# Patient Record
Sex: FEMALE | Race: BLACK OR AFRICAN AMERICAN | NOT HISPANIC OR LATINO | Employment: OTHER | URBAN - METROPOLITAN AREA
[De-identification: names, ages, dates, MRNs, and addresses within clinical notes are randomized per-mention and may not be internally consistent; named-entity substitution may affect disease eponyms.]

---

## 2017-01-01 ENCOUNTER — APPOINTMENT (EMERGENCY)
Dept: RADIOLOGY | Facility: HOSPITAL | Age: 77
End: 2017-01-01
Payer: MEDICARE

## 2017-01-01 ENCOUNTER — HOSPITAL ENCOUNTER (OUTPATIENT)
Facility: HOSPITAL | Age: 77
Discharge: HOME HEALTH | End: 2017-09-25
Attending: PHYSICAL MEDICINE & REHABILITATION | Admitting: PHYSICAL MEDICINE & REHABILITATION

## 2017-01-01 ENCOUNTER — APPOINTMENT (OUTPATIENT)
Dept: DIALYSIS | Facility: HOSPITAL | Age: 77
End: 2017-01-01

## 2017-01-01 ENCOUNTER — APPOINTMENT (EMERGENCY)
Dept: CT IMAGING | Facility: HOSPITAL | Age: 77
End: 2017-01-01
Payer: COMMERCIAL

## 2017-01-01 ENCOUNTER — APPOINTMENT (EMERGENCY)
Dept: RADIOLOGY | Facility: HOSPITAL | Age: 77
End: 2017-01-01
Payer: COMMERCIAL

## 2017-01-01 ENCOUNTER — APPOINTMENT (EMERGENCY)
Dept: CT IMAGING | Facility: HOSPITAL | Age: 77
End: 2017-01-01
Payer: MEDICARE

## 2017-01-01 ENCOUNTER — HOSPITAL ENCOUNTER (EMERGENCY)
Facility: HOSPITAL | Age: 77
End: 2017-09-19
Attending: EMERGENCY MEDICINE | Admitting: EMERGENCY MEDICINE
Payer: COMMERCIAL

## 2017-01-01 ENCOUNTER — HOSPITAL ENCOUNTER (EMERGENCY)
Facility: HOSPITAL | Age: 77
Discharge: HOME | End: 2017-11-09
Payer: MEDICARE

## 2017-01-01 VITALS
RESPIRATION RATE: 16 BRPM | DIASTOLIC BLOOD PRESSURE: 70 MMHG | HEIGHT: 59 IN | WEIGHT: 125.44 LBS | TEMPERATURE: 98 F | SYSTOLIC BLOOD PRESSURE: 168 MMHG | HEART RATE: 90 BPM | BODY MASS INDEX: 25.29 KG/M2 | OXYGEN SATURATION: 95 %

## 2017-01-01 VITALS
RESPIRATION RATE: 17 BRPM | SYSTOLIC BLOOD PRESSURE: 117 MMHG | DIASTOLIC BLOOD PRESSURE: 56 MMHG | HEART RATE: 101 BPM | OXYGEN SATURATION: 96 % | TEMPERATURE: 98.3 F | WEIGHT: 136.69 LBS

## 2017-01-01 DIAGNOSIS — I95.9 HYPOTENSION: Primary | ICD-10-CM

## 2017-01-01 DIAGNOSIS — Z99.2 ESRD ON DIALYSIS (HCC): ICD-10-CM

## 2017-01-01 DIAGNOSIS — R41.89 EPISODE OF UNRESPONSIVENESS: ICD-10-CM

## 2017-01-01 DIAGNOSIS — N18.6 ESRD ON DIALYSIS (HCC): ICD-10-CM

## 2017-01-01 DIAGNOSIS — N39.0 URINARY TRACT INFECTION IN FEMALE: Primary | ICD-10-CM

## 2017-01-01 DIAGNOSIS — Z01.818 ENCOUNTER FOR OTHER PREPROCEDURAL EXAMINATION: ICD-10-CM

## 2017-01-01 LAB
ABO GROUP BLD: NORMAL
ALBUMIN SERPL BCP-MCNC: 2.4 G/DL (ref 3.5–5)
ALBUMIN SERPL BCP-MCNC: 2.8 G/DL (ref 3.5–5)
ALBUMIN SERPL BCP-MCNC: 3.3 G/DL (ref 3.5–5)
ALBUMIN SERPL BCP-MCNC: 3.3 G/DL (ref 3.5–5)
ALP SERPL-CCNC: 108 U/L (ref 46–116)
ALP SERPL-CCNC: 209 U/L (ref 46–116)
ALP SERPL-CCNC: 339 U/L (ref 46–116)
ALT SERPL W P-5'-P-CCNC: 115 U/L (ref 12–78)
ALT SERPL W P-5'-P-CCNC: 33 U/L (ref 12–78)
ALT SERPL W P-5'-P-CCNC: 93 U/L (ref 12–78)
ANION GAP SERPL CALCULATED.3IONS-SCNC: 10 MMOL/L (ref 4–13)
ANION GAP SERPL CALCULATED.3IONS-SCNC: 10 MMOL/L (ref 4–13)
ANION GAP SERPL CALCULATED.3IONS-SCNC: 11 MMOL/L (ref 4–13)
ANION GAP SERPL CALCULATED.3IONS-SCNC: 12 MMOL/L (ref 4–13)
ANION GAP SERPL CALCULATED.3IONS-SCNC: 13 MMOL/L (ref 4–13)
ANION GAP SERPL CALCULATED.3IONS-SCNC: 14 MMOL/L (ref 4–13)
ANION GAP SERPL CALCULATED.3IONS-SCNC: 8 MMOL/L (ref 4–13)
APTT PPP: 26 SECONDS (ref 23–35)
APTT PPP: 30 SECONDS (ref 23–35)
AST SERPL W P-5'-P-CCNC: 23 U/L (ref 5–45)
AST SERPL W P-5'-P-CCNC: 59 U/L (ref 5–45)
AST SERPL W P-5'-P-CCNC: 63 U/L (ref 5–45)
ATRIAL RATE: 92 BPM
ATRIAL RATE: 98 BPM
BACTERIA UR CULT: NORMAL
BACTERIA UR QL AUTO: ABNORMAL /HPF
BASOPHILS # BLD AUTO: 0.02 THOUSANDS/ΜL (ref 0–0.1)
BASOPHILS # BLD AUTO: 0.03 THOUSANDS/ΜL (ref 0–0.1)
BASOPHILS NFR BLD AUTO: 0 % (ref 0–1)
BILIRUB SERPL-MCNC: 0.3 MG/DL (ref 0.2–1)
BILIRUB SERPL-MCNC: 0.4 MG/DL (ref 0.2–1)
BILIRUB SERPL-MCNC: 0.5 MG/DL (ref 0.2–1)
BILIRUB UR QL STRIP: NEGATIVE
BLD GP AB SCN SERPL QL: NEGATIVE
BUN SERPL-MCNC: 10 MG/DL (ref 5–25)
BUN SERPL-MCNC: 14 MG/DL (ref 5–25)
BUN SERPL-MCNC: 16 MG/DL (ref 5–25)
BUN SERPL-MCNC: 18 MG/DL (ref 5–25)
BUN SERPL-MCNC: 36 MG/DL (ref 5–25)
BUN SERPL-MCNC: 36 MG/DL (ref 5–25)
BUN SERPL-MCNC: 38 MG/DL (ref 5–25)
CALCIUM SERPL-MCNC: 8.9 MG/DL (ref 8.3–10.1)
CALCIUM SERPL-MCNC: 9.1 MG/DL (ref 8.3–10.1)
CALCIUM SERPL-MCNC: 9.3 MG/DL (ref 8.3–10.1)
CALCIUM SERPL-MCNC: 9.4 MG/DL (ref 8.3–10.1)
CALCIUM SERPL-MCNC: 9.9 MG/DL (ref 8.3–10.1)
CHLORIDE SERPL-SCNC: 100 MMOL/L (ref 100–108)
CHLORIDE SERPL-SCNC: 100 MMOL/L (ref 100–108)
CHLORIDE SERPL-SCNC: 96 MMOL/L (ref 100–108)
CHLORIDE SERPL-SCNC: 98 MMOL/L (ref 100–108)
CHLORIDE SERPL-SCNC: 98 MMOL/L (ref 100–108)
CLARITY UR: ABNORMAL
CO2 SERPL-SCNC: 26 MMOL/L (ref 21–32)
CO2 SERPL-SCNC: 27 MMOL/L (ref 21–32)
CO2 SERPL-SCNC: 28 MMOL/L (ref 21–32)
CO2 SERPL-SCNC: 28 MMOL/L (ref 21–32)
CO2 SERPL-SCNC: 29 MMOL/L (ref 21–32)
CO2 SERPL-SCNC: 29 MMOL/L (ref 21–32)
CO2 SERPL-SCNC: 31 MMOL/L (ref 21–32)
COLOR UR: YELLOW
CREAT SERPL-MCNC: 3.12 MG/DL (ref 0.6–1.3)
CREAT SERPL-MCNC: 3.4 MG/DL (ref 0.6–1.3)
CREAT SERPL-MCNC: 3.66 MG/DL (ref 0.6–1.3)
CREAT SERPL-MCNC: 3.76 MG/DL (ref 0.6–1.3)
CREAT SERPL-MCNC: 5.89 MG/DL (ref 0.6–1.3)
CREAT SERPL-MCNC: 6.56 MG/DL (ref 0.6–1.3)
CREAT SERPL-MCNC: 6.84 MG/DL (ref 0.6–1.3)
EOSINOPHIL # BLD AUTO: 0.08 THOUSAND/ΜL (ref 0–0.61)
EOSINOPHIL # BLD AUTO: 0.1 THOUSAND/ΜL (ref 0–0.61)
EOSINOPHIL # BLD AUTO: 0.1 THOUSAND/ΜL (ref 0–0.61)
EOSINOPHIL # BLD AUTO: 0.11 THOUSAND/ΜL (ref 0–0.61)
EOSINOPHIL # BLD AUTO: 0.13 THOUSAND/ΜL (ref 0–0.61)
EOSINOPHIL # BLD AUTO: 0.14 THOUSAND/ΜL (ref 0–0.61)
EOSINOPHIL # BLD AUTO: 0.2 THOUSAND/ΜL (ref 0–0.61)
EOSINOPHIL NFR BLD AUTO: 1 % (ref 0–6)
EOSINOPHIL NFR BLD AUTO: 2 % (ref 0–6)
EOSINOPHIL NFR BLD AUTO: 3 % (ref 0–6)
ERYTHROCYTE [DISTWIDTH] IN BLOOD BY AUTOMATED COUNT: 14.8 % (ref 11.6–15.1)
ERYTHROCYTE [DISTWIDTH] IN BLOOD BY AUTOMATED COUNT: 14.9 % (ref 11.6–15.1)
ERYTHROCYTE [DISTWIDTH] IN BLOOD BY AUTOMATED COUNT: 15.3 % (ref 11.6–15.1)
ERYTHROCYTE [DISTWIDTH] IN BLOOD BY AUTOMATED COUNT: 16.3 % (ref 11.6–15.1)
ERYTHROCYTE [DISTWIDTH] IN BLOOD BY AUTOMATED COUNT: 16.6 % (ref 11.6–15.1)
ERYTHROCYTE [DISTWIDTH] IN BLOOD BY AUTOMATED COUNT: 16.6 % (ref 11.6–15.1)
ERYTHROCYTE [DISTWIDTH] IN BLOOD BY AUTOMATED COUNT: 18 % (ref 11.6–15.1)
EST. AVERAGE GLUCOSE BLD GHB EST-MCNC: 111 MG/DL
FERRITIN SERPL-MCNC: 726 NG/ML (ref 8–388)
FERRITIN SERPL-MCNC: 779 NG/ML (ref 8–388)
GFR SERPL CREATININE-BSD FRML MDRD: 13 ML/MIN/1.73SQ M
GFR SERPL CREATININE-BSD FRML MDRD: 13 ML/MIN/1.73SQ M
GFR SERPL CREATININE-BSD FRML MDRD: 14 ML/MIN/1.73SQ M
GFR SERPL CREATININE-BSD FRML MDRD: 16 ML/MIN/1.73SQ M
GFR SERPL CREATININE-BSD FRML MDRD: 6 ML/MIN/1.73SQ M
GFR SERPL CREATININE-BSD FRML MDRD: 7 ML/MIN/1.73SQ M
GFR SERPL CREATININE-BSD FRML MDRD: 7 ML/MIN/1.73SQ M
GLUCOSE P FAST SERPL-MCNC: 187 MG/DL (ref 65–99)
GLUCOSE P FAST SERPL-MCNC: 99 MG/DL (ref 65–99)
GLUCOSE SERPL-MCNC: 106 MG/DL (ref 65–140)
GLUCOSE SERPL-MCNC: 120 MG/DL (ref 65–140)
GLUCOSE SERPL-MCNC: 135 MG/DL (ref 65–140)
GLUCOSE SERPL-MCNC: 187 MG/DL (ref 65–140)
GLUCOSE SERPL-MCNC: 205 MG/DL (ref 65–140)
GLUCOSE SERPL-MCNC: 206 MG/DL (ref 65–140)
GLUCOSE SERPL-MCNC: 282 MG/DL (ref 65–140)
GLUCOSE SERPL-MCNC: 99 MG/DL (ref 65–140)
GLUCOSE UR STRIP-MCNC: ABNORMAL MG/DL
HAV IGM SER QL: NORMAL
HBA1C MFR BLD: 5.5 % (ref 4.2–6.3)
HBV CORE IGM SER QL: NORMAL
HBV SURFACE AG SER QL: NORMAL
HCT VFR BLD AUTO: 28.5 % (ref 34.8–46.1)
HCT VFR BLD AUTO: 28.8 % (ref 34.8–46.1)
HCT VFR BLD AUTO: 30.5 % (ref 34.8–46.1)
HCT VFR BLD AUTO: 30.6 % (ref 34.8–46.1)
HCT VFR BLD AUTO: 31.4 % (ref 34.8–46.1)
HCT VFR BLD AUTO: 33.6 % (ref 34.8–46.1)
HCT VFR BLD AUTO: 34.5 % (ref 34.8–46.1)
HCV AB SER QL: NORMAL
HGB BLD-MCNC: 10 G/DL (ref 11.5–15.4)
HGB BLD-MCNC: 10.2 G/DL (ref 11.5–15.4)
HGB BLD-MCNC: 10.8 G/DL (ref 11.5–15.4)
HGB BLD-MCNC: 10.8 G/DL (ref 11.5–15.4)
HGB BLD-MCNC: 9.1 G/DL (ref 11.5–15.4)
HGB BLD-MCNC: 9.6 G/DL (ref 11.5–15.4)
HGB BLD-MCNC: 9.8 G/DL (ref 11.5–15.4)
HGB UR QL STRIP.AUTO: ABNORMAL
HYALINE CASTS #/AREA URNS LPF: ABNORMAL /LPF
INR PPP: 0.86 (ref 0.86–1.16)
IRON SATN MFR SERPL: 28 %
IRON SERPL-MCNC: 62 UG/DL (ref 50–170)
KETONES UR STRIP-MCNC: NEGATIVE MG/DL
LACTATE SERPL-SCNC: 1.1 MMOL/L (ref 0.5–2)
LEUKOCYTE ESTERASE UR QL STRIP: ABNORMAL
LYMPHOCYTES # BLD AUTO: 1.29 THOUSANDS/ΜL (ref 0.6–4.47)
LYMPHOCYTES # BLD AUTO: 1.69 THOUSANDS/ΜL (ref 0.6–4.47)
LYMPHOCYTES # BLD AUTO: 1.95 THOUSANDS/ΜL (ref 0.6–4.47)
LYMPHOCYTES # BLD AUTO: 1.97 THOUSANDS/ΜL (ref 0.6–4.47)
LYMPHOCYTES # BLD AUTO: 2.18 THOUSANDS/ΜL (ref 0.6–4.47)
LYMPHOCYTES # BLD AUTO: 2.42 THOUSANDS/ΜL (ref 0.6–4.47)
LYMPHOCYTES # BLD AUTO: 3.45 THOUSANDS/ΜL (ref 0.6–4.47)
LYMPHOCYTES NFR BLD AUTO: 18 % (ref 14–44)
LYMPHOCYTES NFR BLD AUTO: 25 % (ref 14–44)
LYMPHOCYTES NFR BLD AUTO: 29 % (ref 14–44)
LYMPHOCYTES NFR BLD AUTO: 30 % (ref 14–44)
LYMPHOCYTES NFR BLD AUTO: 31 % (ref 14–44)
LYMPHOCYTES NFR BLD AUTO: 35 % (ref 14–44)
LYMPHOCYTES NFR BLD AUTO: 44 % (ref 14–44)
MAGNESIUM SERPL-MCNC: 2.1 MG/DL (ref 1.6–2.6)
MCH RBC QN AUTO: 30.3 PG (ref 26.8–34.3)
MCH RBC QN AUTO: 30.8 PG (ref 26.8–34.3)
MCH RBC QN AUTO: 30.9 PG (ref 26.8–34.3)
MCH RBC QN AUTO: 31.2 PG (ref 26.8–34.3)
MCH RBC QN AUTO: 31.3 PG (ref 26.8–34.3)
MCH RBC QN AUTO: 31.9 PG (ref 26.8–34.3)
MCH RBC QN AUTO: 32.5 PG (ref 26.8–34.3)
MCHC RBC AUTO-ENTMCNC: 31.3 G/DL (ref 31.4–37.4)
MCHC RBC AUTO-ENTMCNC: 31.9 G/DL (ref 31.4–37.4)
MCHC RBC AUTO-ENTMCNC: 32.1 G/DL (ref 31.4–37.4)
MCHC RBC AUTO-ENTMCNC: 32.1 G/DL (ref 31.4–37.4)
MCHC RBC AUTO-ENTMCNC: 32.5 G/DL (ref 31.4–37.4)
MCHC RBC AUTO-ENTMCNC: 32.7 G/DL (ref 31.4–37.4)
MCHC RBC AUTO-ENTMCNC: 33.3 G/DL (ref 31.4–37.4)
MCV RBC AUTO: 96 FL (ref 82–98)
MCV RBC AUTO: 96 FL (ref 82–98)
MCV RBC AUTO: 97 FL (ref 82–98)
MCV RBC AUTO: 98 FL (ref 82–98)
MCV RBC AUTO: 98 FL (ref 82–98)
MONOCYTES # BLD AUTO: 0.48 THOUSAND/ΜL (ref 0.17–1.22)
MONOCYTES # BLD AUTO: 0.51 THOUSAND/ΜL (ref 0.17–1.22)
MONOCYTES # BLD AUTO: 0.53 THOUSAND/ΜL (ref 0.17–1.22)
MONOCYTES # BLD AUTO: 0.58 THOUSAND/ΜL (ref 0.17–1.22)
MONOCYTES # BLD AUTO: 0.58 THOUSAND/ΜL (ref 0.17–1.22)
MONOCYTES # BLD AUTO: 0.6 THOUSAND/ΜL (ref 0.17–1.22)
MONOCYTES # BLD AUTO: 0.6 THOUSAND/ΜL (ref 0.17–1.22)
MONOCYTES NFR BLD AUTO: 6 % (ref 4–12)
MONOCYTES NFR BLD AUTO: 8 % (ref 4–12)
MONOCYTES NFR BLD AUTO: 9 % (ref 4–12)
MONOCYTES NFR BLD AUTO: 9 % (ref 4–12)
NEUTROPHILS # BLD AUTO: 3.66 THOUSANDS/ΜL (ref 1.85–7.62)
NEUTROPHILS # BLD AUTO: 3.7 THOUSANDS/ΜL (ref 1.85–7.62)
NEUTROPHILS # BLD AUTO: 3.96 THOUSANDS/ΜL (ref 1.85–7.62)
NEUTROPHILS # BLD AUTO: 3.99 THOUSANDS/ΜL (ref 1.85–7.62)
NEUTROPHILS # BLD AUTO: 4.21 THOUSANDS/ΜL (ref 1.85–7.62)
NEUTROPHILS # BLD AUTO: 4.31 THOUSANDS/ΜL (ref 1.85–7.62)
NEUTROPHILS # BLD AUTO: 5.22 THOUSANDS/ΜL (ref 1.85–7.62)
NEUTS SEG NFR BLD AUTO: 47 % (ref 43–75)
NEUTS SEG NFR BLD AUTO: 53 % (ref 43–75)
NEUTS SEG NFR BLD AUTO: 56 % (ref 43–75)
NEUTS SEG NFR BLD AUTO: 60 % (ref 43–75)
NEUTS SEG NFR BLD AUTO: 61 % (ref 43–75)
NEUTS SEG NFR BLD AUTO: 63 % (ref 43–75)
NEUTS SEG NFR BLD AUTO: 71 % (ref 43–75)
NITRITE UR QL STRIP: NEGATIVE
NON-SQ EPI CELLS URNS QL MICRO: ABNORMAL /HPF
NRBC BLD AUTO-RTO: 0 /100 WBCS
NRBC BLD AUTO-RTO: 1 /100 WBCS
NRBC BLD AUTO-RTO: 1 /100 WBCS
P AXIS: 52 DEGREES
P AXIS: 75 DEGREES
PH UR STRIP.AUTO: 8.5 [PH] (ref 4.5–8)
PHOSPHATE SERPL-MCNC: 2.1 MG/DL (ref 2.3–4.1)
PHOSPHATE SERPL-MCNC: 3.2 MG/DL (ref 2.3–4.1)
PLATELET # BLD AUTO: 174 THOUSANDS/UL (ref 149–390)
PLATELET # BLD AUTO: 180 THOUSANDS/UL (ref 149–390)
PLATELET # BLD AUTO: 180 THOUSANDS/UL (ref 149–390)
PLATELET # BLD AUTO: 187 THOUSANDS/UL (ref 149–390)
PLATELET # BLD AUTO: 191 THOUSANDS/UL (ref 149–390)
PLATELET # BLD AUTO: 209 THOUSANDS/UL (ref 149–390)
PLATELET # BLD AUTO: 215 THOUSANDS/UL (ref 149–390)
PMV BLD AUTO: 11.6 FL (ref 8.9–12.7)
PMV BLD AUTO: 11.7 FL (ref 8.9–12.7)
PMV BLD AUTO: 11.8 FL (ref 8.9–12.7)
PMV BLD AUTO: 11.9 FL (ref 8.9–12.7)
PMV BLD AUTO: 12.1 FL (ref 8.9–12.7)
PMV BLD AUTO: 12.2 FL (ref 8.9–12.7)
PMV BLD AUTO: 12.3 FL (ref 8.9–12.7)
POTASSIUM SERPL-SCNC: 3.2 MMOL/L (ref 3.5–5.3)
POTASSIUM SERPL-SCNC: 3.6 MMOL/L (ref 3.5–5.3)
POTASSIUM SERPL-SCNC: 3.6 MMOL/L (ref 3.5–5.3)
POTASSIUM SERPL-SCNC: 3.8 MMOL/L (ref 3.5–5.3)
POTASSIUM SERPL-SCNC: 4.1 MMOL/L (ref 3.5–5.3)
POTASSIUM SERPL-SCNC: 4.5 MMOL/L (ref 3.5–5.3)
POTASSIUM SERPL-SCNC: 4.6 MMOL/L (ref 3.5–5.3)
PR INTERVAL: 130 MS
PR INTERVAL: 138 MS
PREALB SERPL-MCNC: 26 MG/DL (ref 18–40)
PROT SERPL-MCNC: 6.9 G/DL (ref 6.4–8.2)
PROT SERPL-MCNC: 7.2 G/DL (ref 6.4–8.2)
PROT SERPL-MCNC: 7.3 G/DL (ref 6.4–8.2)
PROT UR STRIP-MCNC: >=300 MG/DL
PROTHROMBIN TIME: 11.9 SECONDS (ref 12.1–14.4)
QRS AXIS: 2 DEGREES
QRS AXIS: 25 DEGREES
QRSD INTERVAL: 72 MS
QRSD INTERVAL: 76 MS
QT INTERVAL: 378 MS
QT INTERVAL: 384 MS
QTC INTERVAL: 474 MS
QTC INTERVAL: 482 MS
RBC # BLD AUTO: 2.95 MILLION/UL (ref 3.81–5.12)
RBC # BLD AUTO: 2.95 MILLION/UL (ref 3.81–5.12)
RBC # BLD AUTO: 3.13 MILLION/UL (ref 3.81–5.12)
RBC # BLD AUTO: 3.17 MILLION/UL (ref 3.81–5.12)
RBC # BLD AUTO: 3.26 MILLION/UL (ref 3.81–5.12)
RBC # BLD AUTO: 3.46 MILLION/UL (ref 3.81–5.12)
RBC # BLD AUTO: 3.56 MILLION/UL (ref 3.81–5.12)
RBC #/AREA URNS AUTO: ABNORMAL /HPF
RH BLD: POSITIVE
SODIUM SERPL-SCNC: 134 MMOL/L (ref 136–145)
SODIUM SERPL-SCNC: 135 MMOL/L (ref 136–145)
SODIUM SERPL-SCNC: 136 MMOL/L (ref 136–145)
SODIUM SERPL-SCNC: 137 MMOL/L (ref 136–145)
SODIUM SERPL-SCNC: 137 MMOL/L (ref 136–145)
SODIUM SERPL-SCNC: 140 MMOL/L (ref 136–145)
SODIUM SERPL-SCNC: 141 MMOL/L (ref 136–145)
SP GR UR STRIP.AUTO: 1.01 (ref 1–1.03)
SPECIMEN EXPIRATION DATE: NORMAL
SPECIMEN SOURCE: NORMAL
T WAVE AXIS: 64 DEGREES
T WAVE AXIS: 78 DEGREES
TIBC SERPL-MCNC: 222 UG/DL (ref 250–450)
TROPONIN I BLD-MCNC: 0 NG/ML (ref 0–0.08)
TROPONIN I SERPL-MCNC: 0.04 NG/ML
UROBILINOGEN UR QL STRIP.AUTO: 0.2 E.U./DL
VENTRICULAR RATE: 92 BPM
VENTRICULAR RATE: 98 BPM
WBC # BLD AUTO: 6.62 THOUSAND/UL (ref 4.31–10.16)
WBC # BLD AUTO: 6.85 THOUSAND/UL (ref 4.31–10.16)
WBC # BLD AUTO: 6.91 THOUSAND/UL (ref 4.31–10.16)
WBC # BLD AUTO: 6.94 THOUSAND/UL (ref 4.31–10.16)
WBC # BLD AUTO: 7.04 THOUSAND/UL (ref 4.31–10.16)
WBC # BLD AUTO: 7.33 THOUSAND/UL (ref 4.31–10.16)
WBC # BLD AUTO: 7.79 THOUSAND/UL (ref 4.31–10.16)
WBC #/AREA URNS AUTO: ABNORMAL /HPF

## 2017-01-01 PROCEDURE — 85025 COMPLETE CBC W/AUTO DIFF WBC: CPT | Performed by: INTERNAL MEDICINE

## 2017-01-01 PROCEDURE — 84134 ASSAY OF PREALBUMIN: CPT | Performed by: PHYSICAL MEDICINE & REHABILITATION

## 2017-01-01 PROCEDURE — G0257 UNSCHED DIALYSIS ESRD PT HOS: HCPCS

## 2017-01-01 PROCEDURE — 80048 BASIC METABOLIC PNL TOTAL CA: CPT | Performed by: INTERNAL MEDICINE

## 2017-01-01 PROCEDURE — 83605 ASSAY OF LACTIC ACID: CPT | Performed by: EMERGENCY MEDICINE

## 2017-01-01 PROCEDURE — 99285 EMERGENCY DEPT VISIT HI MDM: CPT

## 2017-01-01 PROCEDURE — 85610 PROTHROMBIN TIME: CPT | Performed by: NURSE PRACTITIONER

## 2017-01-01 PROCEDURE — 87086 URINE CULTURE/COLONY COUNT: CPT | Performed by: NURSE PRACTITIONER

## 2017-01-01 PROCEDURE — 80048 BASIC METABOLIC PNL TOTAL CA: CPT | Performed by: PHYSICAL MEDICINE & REHABILITATION

## 2017-01-01 PROCEDURE — 83036 HEMOGLOBIN GLYCOSYLATED A1C: CPT | Performed by: PHYSICAL MEDICINE & REHABILITATION

## 2017-01-01 PROCEDURE — 80053 COMPREHEN METABOLIC PANEL: CPT | Performed by: EMERGENCY MEDICINE

## 2017-01-01 PROCEDURE — 82728 ASSAY OF FERRITIN: CPT | Performed by: PHYSICAL MEDICINE & REHABILITATION

## 2017-01-01 PROCEDURE — 81001 URINALYSIS AUTO W/SCOPE: CPT | Performed by: NURSE PRACTITIONER

## 2017-01-01 PROCEDURE — 80053 COMPREHEN METABOLIC PANEL: CPT | Performed by: PHYSICAL MEDICINE & REHABILITATION

## 2017-01-01 PROCEDURE — 93005 ELECTROCARDIOGRAM TRACING: CPT

## 2017-01-01 PROCEDURE — 86901 BLOOD TYPING SEROLOGIC RH(D): CPT | Performed by: NURSE PRACTITIONER

## 2017-01-01 PROCEDURE — 83735 ASSAY OF MAGNESIUM: CPT | Performed by: EMERGENCY MEDICINE

## 2017-01-01 PROCEDURE — 85025 COMPLETE CBC W/AUTO DIFF WBC: CPT | Performed by: PHYSICAL MEDICINE & REHABILITATION

## 2017-01-01 PROCEDURE — 85025 COMPLETE CBC W/AUTO DIFF WBC: CPT | Performed by: NURSE PRACTITIONER

## 2017-01-01 PROCEDURE — 99284 EMERGENCY DEPT VISIT MOD MDM: CPT

## 2017-01-01 PROCEDURE — 71020 HB CHEST X-RAY 2VW FRONTAL&LATL: CPT

## 2017-01-01 PROCEDURE — 84100 ASSAY OF PHOSPHORUS: CPT | Performed by: PHYSICAL MEDICINE & REHABILITATION

## 2017-01-01 PROCEDURE — 80074 ACUTE HEPATITIS PANEL: CPT | Performed by: PHYSICAL MEDICINE & REHABILITATION

## 2017-01-01 PROCEDURE — 36415 COLL VENOUS BLD VENIPUNCTURE: CPT | Performed by: EMERGENCY MEDICINE

## 2017-01-01 PROCEDURE — 82948 REAGENT STRIP/BLOOD GLUCOSE: CPT

## 2017-01-01 PROCEDURE — 96361 HYDRATE IV INFUSION ADD-ON: CPT

## 2017-01-01 PROCEDURE — 86850 RBC ANTIBODY SCREEN: CPT | Performed by: NURSE PRACTITIONER

## 2017-01-01 PROCEDURE — 83550 IRON BINDING TEST: CPT | Performed by: PHYSICAL MEDICINE & REHABILITATION

## 2017-01-01 PROCEDURE — 86900 BLOOD TYPING SEROLOGIC ABO: CPT | Performed by: NURSE PRACTITIONER

## 2017-01-01 PROCEDURE — 85730 THROMBOPLASTIN TIME PARTIAL: CPT | Performed by: NURSE PRACTITIONER

## 2017-01-01 PROCEDURE — 93005 ELECTROCARDIOGRAM TRACING: CPT | Performed by: EMERGENCY MEDICINE

## 2017-01-01 PROCEDURE — 80053 COMPREHEN METABOLIC PANEL: CPT | Performed by: NURSE PRACTITIONER

## 2017-01-01 PROCEDURE — 84484 ASSAY OF TROPONIN QUANT: CPT

## 2017-01-01 PROCEDURE — 96360 HYDRATION IV INFUSION INIT: CPT

## 2017-01-01 PROCEDURE — 82040 ASSAY OF SERUM ALBUMIN: CPT | Performed by: PHYSICAL MEDICINE & REHABILITATION

## 2017-01-01 PROCEDURE — 70450 CT HEAD/BRAIN W/O DYE: CPT

## 2017-01-01 PROCEDURE — 84100 ASSAY OF PHOSPHORUS: CPT | Performed by: EMERGENCY MEDICINE

## 2017-01-01 PROCEDURE — 83540 ASSAY OF IRON: CPT | Performed by: PHYSICAL MEDICINE & REHABILITATION

## 2017-01-01 PROCEDURE — 36415 COLL VENOUS BLD VENIPUNCTURE: CPT | Performed by: NURSE PRACTITIONER

## 2017-01-01 PROCEDURE — 85025 COMPLETE CBC W/AUTO DIFF WBC: CPT | Performed by: EMERGENCY MEDICINE

## 2017-01-01 PROCEDURE — 84484 ASSAY OF TROPONIN QUANT: CPT | Performed by: INTERNAL MEDICINE

## 2017-01-01 PROCEDURE — 85730 THROMBOPLASTIN TIME PARTIAL: CPT | Performed by: PHYSICAL MEDICINE & REHABILITATION

## 2017-01-01 RX ORDER — POLYETHYLENE GLYCOL 3350 17 G/17G
17 POWDER, FOR SOLUTION ORAL DAILY PRN
Status: ON HOLD | COMMUNITY
End: 2018-01-01 | Stop reason: ALTCHOICE

## 2017-01-01 RX ORDER — AMLODIPINE BESYLATE 10 MG/1
10 TABLET ORAL DAILY
COMMUNITY
End: 2018-01-01 | Stop reason: HOSPADM

## 2017-01-01 RX ORDER — CIPROFLOXACIN 500 MG/1
500 TABLET, FILM COATED ORAL ONCE
Status: COMPLETED | OUTPATIENT
Start: 2017-01-01 | End: 2017-01-01

## 2017-01-01 RX ORDER — ACETAMINOPHEN 325 MG/1
650 TABLET ORAL ONCE
Status: COMPLETED | OUTPATIENT
Start: 2017-01-01 | End: 2017-01-01

## 2017-01-01 RX ORDER — IPRATROPIUM BROMIDE AND ALBUTEROL SULFATE 2.5; .5 MG/3ML; MG/3ML
3 SOLUTION RESPIRATORY (INHALATION) 4 TIMES DAILY
COMMUNITY
End: 2018-01-01 | Stop reason: HOSPADM

## 2017-01-01 RX ORDER — LOSARTAN POTASSIUM 100 MG/1
100 TABLET ORAL DAILY
COMMUNITY
End: 2018-01-01 | Stop reason: HOSPADM

## 2017-01-01 RX ORDER — ACETAMINOPHEN 325 MG/1
650 TABLET ORAL EVERY 4 HOURS PRN
COMMUNITY
End: 2018-01-01 | Stop reason: HOSPADM

## 2017-01-01 RX ORDER — FOLIC ACID/VIT B COMPLEX AND C 0.8 MG
0.8 TABLET ORAL
Status: ON HOLD | COMMUNITY
End: 2018-01-01

## 2017-01-01 RX ORDER — BENZONATATE 100 MG/1
100 CAPSULE ORAL EVERY 4 HOURS PRN
COMMUNITY
End: 2018-01-01 | Stop reason: HOSPADM

## 2017-01-01 RX ORDER — HYDRALAZINE HYDROCHLORIDE 50 MG/1
25 TABLET, FILM COATED ORAL EVERY 12 HOURS
COMMUNITY
End: 2018-01-01 | Stop reason: HOSPADM

## 2017-01-01 RX ORDER — CIPROFLOXACIN 250 MG/1
250 TABLET, FILM COATED ORAL 2 TIMES DAILY
Qty: 14 TABLET | Refills: 0 | Status: SHIPPED | OUTPATIENT
Start: 2017-01-01 | End: 2017-01-01

## 2017-01-01 RX ORDER — SODIUM CHLORIDE 9 MG/ML
125 INJECTION, SOLUTION INTRAVENOUS CONTINUOUS
Status: DISCONTINUED | OUTPATIENT
Start: 2017-01-01 | End: 2017-01-01 | Stop reason: HOSPADM

## 2017-01-01 RX ORDER — SENNA PLUS 8.6 MG/1
2 TABLET ORAL
Status: ON HOLD | COMMUNITY
End: 2018-01-01 | Stop reason: ALTCHOICE

## 2017-01-01 RX ADMIN — ACETAMINOPHEN 650 MG: 325 TABLET ORAL at 19:41

## 2017-01-01 RX ADMIN — SODIUM CHLORIDE 125 ML/HR: 0.9 INJECTION, SOLUTION INTRAVENOUS at 17:30

## 2017-01-01 RX ADMIN — SODIUM CHLORIDE 500 ML: 0.9 INJECTION, SOLUTION INTRAVENOUS at 19:49

## 2017-01-01 RX ADMIN — CIPROFLOXACIN 500 MG: 500 TABLET, FILM COATED ORAL at 22:40

## 2017-11-10 NOTE — ED PROVIDER NOTES
History  Chief Complaint   Patient presents with    Fatigue     pt brought in by family for weakness     51-year-old female presenting here with reports of a generalized weakness  She has a renal dialysis patient with end-stage renal disease  She did have dialysis today which may be contributing to her symptoms but her daughter reports that she is concerned about her anemia  States that she usually is anemic when she is like this  Plan is to evaluate her for anemia but also for other weakness causing medical problems  Denies fever denies chills nausea no vomiting no diarrhea no cough no recent colds  I suspect urinary tract infection  Prior to Admission Medications   Prescriptions Last Dose Informant Patient Reported? Taking?    Cholecalciferol 57405 units TABS   Yes No   Sig: Take 50,000 Units by mouth Every Monday and Friday in AM   NON FORMULARY   Yes No   Sig: Heparin 5000 Unites subcutaneously q8h, which can be stopped when she is D/C   NON FORMULARY   Yes No   Sig: Tap water enema   acetaminophen (TYLENOL) 325 mg tablet   Yes No   Sig: Take 650 mg by mouth every 4 (four) hours as needed for mild pain   amLODIPine (NORVASC) 10 mg tablet   Yes No   Sig: Take 10 mg by mouth daily   b complex-C-folic acid (NEPHRO-TRACY) 0 8 mg tablet   Yes No   Sig: Take 0 8 mg by mouth daily with dinner   benzonatate (TESSALON PERLES) 100 mg capsule   Yes No   Sig: Take 100 mg by mouth every 4 (four) hours as needed for cough   hydrALAZINE (APRESOLINE) 50 mg tablet   Yes No   Sig: Take 50 mg by mouth every 12 (twelve) hours   ipratropium-albuterol (DUONEB) 0 5-2 5 mg/mL   Yes No   Sig: Take 3 mL by nebulization 4 (four) times a day   losartan (COZAAR) 100 MG tablet   Yes No   Sig: Take 100 mg by mouth daily   polyethylene glycol (MIRALAX) 17 g packet   Yes No   Sig: Take 17 g by mouth daily as needed   senna (SENOKOT) 8 6 MG tablet   Yes No   Sig: Take 2 tablets by mouth daily at bedtime as needed for constipation      Facility-Administered Medications: None       Past Medical History:   Diagnosis Date    Anemia     Hypertension        History reviewed  No pertinent surgical history  History reviewed  No pertinent family history  I have reviewed and agree with the history as documented  Social History   Substance Use Topics    Smoking status: Never Smoker    Smokeless tobacco: Never Used    Alcohol use No        Review of Systems   Constitutional: Negative for activity change, fatigue and fever  HENT: Negative for congestion, ear pain, rhinorrhea and sore throat  Eyes: Negative  Respiratory: Negative for cough, shortness of breath and wheezing  Gastrointestinal: Negative for abdominal pain, diarrhea, nausea and vomiting  Endocrine: Negative  Genitourinary: Negative for difficulty urinating, dyspareunia, dysuria, flank pain, frequency, menstrual problem, pelvic pain, urgency, vaginal bleeding, vaginal discharge and vaginal pain  Musculoskeletal: Negative for arthralgias and myalgias  Skin: Negative for color change and pallor  Neurological: Positive for weakness  Negative for dizziness, speech difficulty and headaches  Hematological: Negative for adenopathy  Psychiatric/Behavioral: Negative for confusion  Physical Exam  ED Triage Vitals [11/09/17 1909]   Temperature Pulse Respirations Blood Pressure SpO2   97 7 °F (36 5 °C) 102 16 167/72 97 %      Temp Source Heart Rate Source Patient Position - Orthostatic VS BP Location FiO2 (%)   Oral Monitor Lying Right arm --      Pain Score       --           Orthostatic Vital Signs  Vitals:    11/09/17 1909 11/09/17 2126   BP: 167/72 168/70   Pulse: 102 90   Patient Position - Orthostatic VS: Lying Lying       Physical Exam   Constitutional: She is oriented to person, place, and time  She appears well-developed and well-nourished  She is cooperative  Non-toxic appearance  She does not have a sickly appearance   She does not appear ill  No distress  HENT:   Head: Normocephalic and atraumatic  Right Ear: Tympanic membrane and external ear normal    Left Ear: Tympanic membrane and external ear normal    Nose: No rhinorrhea, sinus tenderness or nasal deformity  No epistaxis  Right sinus exhibits no maxillary sinus tenderness and no frontal sinus tenderness  Left sinus exhibits no maxillary sinus tenderness and no frontal sinus tenderness  Mouth/Throat: Oropharynx is clear and moist and mucous membranes are normal  Normal dentition  Eyes: EOM are normal  Pupils are equal, round, and reactive to light  Neck: Normal range of motion  Neck supple  Cardiovascular: Normal rate, regular rhythm and normal heart sounds  No murmur heard  Pulmonary/Chest: Effort normal and breath sounds normal  No accessory muscle usage  No respiratory distress  She has no wheezes  She has no rales  She exhibits no tenderness  Abdominal: Soft  She exhibits no distension  There is no guarding  Musculoskeletal: Normal range of motion  She exhibits no edema or tenderness  Lymphadenopathy:     She has no cervical adenopathy  Neurological: She is alert and oriented to person, place, and time  She exhibits normal muscle tone  Skin: Skin is warm and dry  No rash noted  No erythema  Psychiatric: She has a normal mood and affect  Nursing note and vitals reviewed  ED Medications  Medications   ciprofloxacin (CIPRO) tablet 500 mg (500 mg Oral Given 11/9/17 2240)       Diagnostic Studies  Results Reviewed     Procedure Component Value Units Date/Time    Urine Microscopic [[id number]]  (Abnormal) Collected:  11/09/17 2126    Lab Status:  Final result Specimen:  Urine from Urine, Straight Cath Updated:  11/09/17 2149     RBC, UA 4-10 (A) /hpf      WBC, UA 10-20 (A) /hpf      Epithelial Cells Occasional /hpf      Bacteria, UA Occasional /hpf      Hyaline Casts, UA 0-1 (A) /lpf     Narrative:       Urine volume less than 12 mL      Urine culture [[id number]] Collected:  11/09/17 2126    Lab Status: In process Specimen:  Urine from Urine, Straight Cath Updated:  11/09/17 2149    UA w Reflex to Microscopic w Reflex to Culture [[id number]]  (Abnormal) Collected:  11/09/17 2126    Lab Status:  Final result Specimen:  Urine from Urine, Straight Cath Updated:  11/09/17 2136     Color, UA Yellow     Clarity, UA Slightly Cloudy     Specific Gravity, UA 1 015     pH, UA 8 5 (H)     Leukocytes, UA Trace (A)     Nitrite, UA Negative     Protein, UA >=300 (A) mg/dl      Glucose,  (1/10%) (A) mg/dl      Ketones, UA Negative mg/dl      Urobilinogen, UA 0 2 E U /dl      Bilirubin, UA Negative     Blood, UA Moderate (A)    POCT troponin [[id number]]  (Normal) Collected:  11/09/17 2025    Lab Status:  Final result Updated:  11/09/17 2038     POC Troponin I 0 00 ng/ml      Specimen Type VENOUS    Narrative:         [employer] i-Stat handheld analyzer 99% cutoff is > 0 08ng/mL in [facility] Emergency Departments    o cTnI 99% cutoff is useful only when applied to patients in the clinical setting of myocardial ischemia  o cTnI 99% cutoff should be interpreted in the context of clinical history, ECG findings and possibly cardiac imaging to establish correct diagnosis  o cTnI 99% cutoff may be suggestive but clearly not indicative of a coronary event without the clinical setting of myocardial ischemia      Comprehensive metabolic panel [[id number]]  (Abnormal) Collected:  11/09/17 1956    Lab Status:  Final result Specimen:  Blood from Arm, Right Updated:  11/09/17 2024     Sodium 136 mmol/L      Potassium 4 6 mmol/L      Chloride 98 (L) mmol/L      CO2 28 mmol/L      Anion Gap 10 mmol/L      BUN 10 mg/dL      Creatinine 3 66 (H) mg/dL      Glucose 282 (H) mg/dL      Calcium 9 1 mg/dL      AST 63 (H) U/L      ALT 93 (H) U/L      Alkaline Phosphatase 339 (H) U/L      Total Protein 7 2 g/dL      Albumin 3 3 (L) g/dL      Total Bilirubin 0 50 mg/dL      eGFR 13 ml/min/1 73sq m     Narrative: National Kidney Disease Education Program recommendations are as follows:  GFR calculation is accurate only with a steady state creatinine  Chronic Kidney disease less than 60 ml/min/1 73 sq  meters  Kidney failure less than 15 ml/min/1 73 sq  meters  Protime-INR [[id number]]  (Abnormal) Collected:  11/09/17 1956    Lab Status:  Final result Specimen:  Blood from Arm, Right Updated:  11/09/17 2022     Protime 11 9 (L) seconds      INR 0 86    APTT [[id number]]  (Normal) Collected:  11/09/17 1956    Lab Status:  Final result Specimen:  Blood from Arm, Right Updated:  11/09/17 2022     PTT 26 seconds     Narrative: Therapeutic Heparin Range = 60-90 seconds    CBC and differential [[id number]]  (Abnormal) Collected:  11/09/17 1956    Lab Status:  Final result Specimen:  Blood from Arm, Right Updated:  11/09/17 2008     WBC 7 33 Thousand/uL      RBC 3 56 (L) Million/uL      Hemoglobin 10 8 (L) g/dL      Hematocrit 34 5 (L) %      MCV 97 fL      MCH 30 3 pg      MCHC 31 3 (L) g/dL      RDW 18 0 (H) %      MPV 12 3 fL      Platelets 942 Thousands/uL      nRBC 0 /100 WBCs      Neutrophils Relative 71 %      Lymphocytes Relative 18 %      Monocytes Relative 8 %      Eosinophils Relative 1 %      Basophils Relative 0 %      Neutrophils Absolute 5 22 Thousands/µL      Lymphocytes Absolute 1 29 Thousands/µL      Monocytes Absolute 0 60 Thousand/µL      Eosinophils Absolute 0 10 Thousand/µL      Basophils Absolute 0 02 Thousands/µL     Lactic acid, plasma [[id number]]     Lab Status:  No result Specimen:  Blood                  CT head without contrast   Final Result by Rika Vásquez MD (11/09 2201)      No acute intracranial abnormality  Chronic small and large vessel ischemic changes, similar from previous examination   Cerebral atrophy, similar from prior exam          Workstation performed: [id number]         XR chest 2 views    (Results Pending)              Procedures  Procedures       Phone Contacts  ED Phone Contact    ED Course  ED Course                                MDM  Number of Diagnoses or Management Options  Urinary tract infection in female: new and requires workup  Diagnosis management comments: Recommend that the patient contact her primary nephrologist tomorrow  Return if worsening or not improving  BUN and creatinine at baseline, liver enzymes are chronically mildly elevated  Amount and/or Complexity of Data Reviewed  Clinical lab tests: reviewed and ordered  Tests in the radiology section of CPT®: reviewed and ordered  Review and summarize past medical records: yes  Independent visualization of images, tracings, or specimens: yes      CritCare Time    Disposition  Final diagnoses:   Urinary tract infection in female     Time reflects when diagnosis was documented in both MDM as applicable and the Disposition within this note     Time User Action Codes Description Comment    11/9/2017 10:21 PM Yohan Maria Add [N39 0] Urinary tract infection in female       ED Disposition     ED Disposition Condition Comment    Discharge  [facility] discharge to home/self care  Condition at discharge: Good        Follow-up Information     Follow up With Specialties Details Why Seamus Russell MD Nephrology Schedule an appointment as soon as possible for a visit For Continued Evaluation [address] [place] [address]  [phone]          Patient's Medications   Discharge Prescriptions    CIPROFLOXACIN (CIPRO) 250 MG TABLET    Take 1 tablet by mouth 2 (two) times a day for 7 days       Start Date: 11/9/2017 End Date: 11/16/2017       Order Dose: 250 mg       Quantity: 14 tablet    Refills: 0     No discharge procedures on file      ED Provider  Electronically Signed by           BENNY Vargas  11/09/17 81 Mary Jane Cifuentes  11/09/17 7577

## 2017-11-10 NOTE — DISCHARGE INSTRUCTIONS

## 2018-01-01 ENCOUNTER — APPOINTMENT (INPATIENT)
Dept: RADIOLOGY | Facility: HOSPITAL | Age: 78
DRG: 870 | End: 2018-01-01
Payer: MEDICARE

## 2018-01-01 ENCOUNTER — APPOINTMENT (EMERGENCY)
Dept: CT IMAGING | Facility: HOSPITAL | Age: 78
DRG: 870 | End: 2018-01-01
Payer: MEDICARE

## 2018-01-01 ENCOUNTER — APPOINTMENT (INPATIENT)
Dept: ULTRASOUND IMAGING | Facility: HOSPITAL | Age: 78
DRG: 870 | End: 2018-01-01
Payer: MEDICARE

## 2018-01-01 ENCOUNTER — APPOINTMENT (INPATIENT)
Dept: DIALYSIS | Facility: HOSPITAL | Age: 78
DRG: 870 | End: 2018-01-01
Attending: INTERNAL MEDICINE
Payer: MEDICARE

## 2018-01-01 ENCOUNTER — APPOINTMENT (INPATIENT)
Dept: NON INVASIVE DIAGNOSTICS | Facility: HOSPITAL | Age: 78
DRG: 480 | End: 2018-01-01
Payer: MEDICARE

## 2018-01-01 ENCOUNTER — APPOINTMENT (OUTPATIENT)
Dept: RADIOLOGY | Facility: HOSPITAL | Age: 78
DRG: 480 | End: 2018-01-01
Payer: MEDICARE

## 2018-01-01 ENCOUNTER — PREP FOR PROCEDURE (OUTPATIENT)
Dept: VASCULAR SURGERY | Facility: CLINIC | Age: 78
End: 2018-01-01

## 2018-01-01 ENCOUNTER — OFFICE VISIT (OUTPATIENT)
Dept: VASCULAR SURGERY | Facility: CLINIC | Age: 78
End: 2018-01-01

## 2018-01-01 ENCOUNTER — HOSPITAL ENCOUNTER (OUTPATIENT)
Dept: NON INVASIVE DIAGNOSTICS | Facility: CLINIC | Age: 78
Discharge: HOME | End: 2018-02-07
Payer: MEDICARE

## 2018-01-01 ENCOUNTER — APPOINTMENT (INPATIENT)
Dept: RADIOLOGY | Facility: HOSPITAL | Age: 78
DRG: 480 | End: 2018-01-01
Payer: MEDICARE

## 2018-01-01 ENCOUNTER — ANESTHESIA (INPATIENT)
Dept: PERIOP | Facility: HOSPITAL | Age: 78
DRG: 480 | End: 2018-01-01
Payer: MEDICARE

## 2018-01-01 ENCOUNTER — TRANSCRIBE ORDERS (OUTPATIENT)
Dept: ADMINISTRATIVE | Facility: HOSPITAL | Age: 78
End: 2018-01-01

## 2018-01-01 ENCOUNTER — ANESTHESIA EVENT (INPATIENT)
Dept: PERIOP | Facility: HOSPITAL | Age: 78
DRG: 480 | End: 2018-01-01
Payer: MEDICARE

## 2018-01-01 ENCOUNTER — APPOINTMENT (INPATIENT)
Dept: DIALYSIS | Facility: HOSPITAL | Age: 78
DRG: 480 | End: 2018-01-01
Attending: INTERNAL MEDICINE
Payer: MEDICARE

## 2018-01-01 ENCOUNTER — APPOINTMENT (INPATIENT)
Dept: DIALYSIS | Facility: HOSPITAL | Age: 78
DRG: 682 | End: 2018-01-01
Attending: INTERNAL MEDICINE
Payer: MEDICARE

## 2018-01-01 ENCOUNTER — HOSPITAL ENCOUNTER (INPATIENT)
Facility: HOSPITAL | Age: 78
LOS: 9 days | DRG: 870 | End: 2018-05-29
Attending: EMERGENCY MEDICINE | Admitting: ANESTHESIOLOGY
Payer: MEDICARE

## 2018-01-01 ENCOUNTER — TELEPHONE (OUTPATIENT)
Dept: VASCULAR SURGERY | Facility: CLINIC | Age: 78
End: 2018-01-01

## 2018-01-01 ENCOUNTER — APPOINTMENT (INPATIENT)
Dept: CT IMAGING | Facility: HOSPITAL | Age: 78
DRG: 870 | End: 2018-01-01
Payer: MEDICARE

## 2018-01-01 ENCOUNTER — APPOINTMENT (INPATIENT)
Dept: DIALYSIS | Facility: HOSPITAL | Age: 78
DRG: 682 | End: 2018-01-01
Payer: MEDICARE

## 2018-01-01 ENCOUNTER — APPOINTMENT (INPATIENT)
Dept: ULTRASOUND IMAGING | Facility: HOSPITAL | Age: 78
DRG: 480 | End: 2018-01-01
Payer: MEDICARE

## 2018-01-01 ENCOUNTER — APPOINTMENT (INPATIENT)
Dept: MRI IMAGING | Facility: HOSPITAL | Age: 78
DRG: 870 | End: 2018-01-01
Payer: MEDICARE

## 2018-01-01 ENCOUNTER — APPOINTMENT (EMERGENCY)
Dept: RADIOLOGY | Facility: HOSPITAL | Age: 78
DRG: 682 | End: 2018-01-01
Payer: MEDICARE

## 2018-01-01 ENCOUNTER — HOSPITAL ENCOUNTER (INPATIENT)
Facility: HOSPITAL | Age: 78
LOS: 7 days | Discharge: HOME HEALTH | DRG: 682 | End: 2018-03-22
Attending: EMERGENCY MEDICINE | Admitting: INTERNAL MEDICINE
Payer: MEDICARE

## 2018-01-01 ENCOUNTER — ANESTHESIA (OUTPATIENT)
Dept: PERIOP | Facility: HOSPITAL | Age: 78
DRG: 480 | End: 2018-01-01
Payer: MEDICARE

## 2018-01-01 ENCOUNTER — APPOINTMENT (INPATIENT)
Dept: NON INVASIVE DIAGNOSTICS | Facility: HOSPITAL | Age: 78
DRG: 870 | End: 2018-01-01
Payer: MEDICARE

## 2018-01-01 ENCOUNTER — APPOINTMENT (EMERGENCY)
Dept: CT IMAGING | Facility: HOSPITAL | Age: 78
DRG: 682 | End: 2018-01-01
Payer: MEDICARE

## 2018-01-01 ENCOUNTER — APPOINTMENT (EMERGENCY)
Dept: RADIOLOGY | Facility: HOSPITAL | Age: 78
DRG: 480 | End: 2018-01-01
Payer: MEDICARE

## 2018-01-01 ENCOUNTER — APPOINTMENT (EMERGENCY)
Dept: ULTRASOUND IMAGING | Facility: HOSPITAL | Age: 78
DRG: 480 | End: 2018-01-01
Payer: MEDICARE

## 2018-01-01 ENCOUNTER — ANESTHESIA EVENT (OUTPATIENT)
Dept: PERIOP | Facility: HOSPITAL | Age: 78
DRG: 480 | End: 2018-01-01
Payer: MEDICARE

## 2018-01-01 ENCOUNTER — TELEPHONE (OUTPATIENT)
Dept: OBGYN CLINIC | Facility: HOSPITAL | Age: 78
End: 2018-01-01

## 2018-01-01 ENCOUNTER — APPOINTMENT (INPATIENT)
Dept: DIALYSIS | Facility: HOSPITAL | Age: 78
DRG: 870 | End: 2018-01-01
Payer: MEDICARE

## 2018-01-01 ENCOUNTER — HOSPITAL ENCOUNTER (INPATIENT)
Facility: HOSPITAL | Age: 78
LOS: 13 days | DRG: 480 | End: 2018-03-13
Attending: EMERGENCY MEDICINE | Admitting: INTERNAL MEDICINE
Payer: MEDICARE

## 2018-01-01 ENCOUNTER — APPOINTMENT (INPATIENT)
Dept: NEUROLOGY | Facility: HOSPITAL | Age: 78
DRG: 870 | End: 2018-01-01
Payer: MEDICARE

## 2018-01-01 ENCOUNTER — APPOINTMENT (INPATIENT)
Dept: DIALYSIS | Facility: HOSPITAL | Age: 78
DRG: 480 | End: 2018-01-01
Payer: MEDICARE

## 2018-01-01 ENCOUNTER — APPOINTMENT (INPATIENT)
Dept: CT IMAGING | Facility: HOSPITAL | Age: 78
DRG: 480 | End: 2018-01-01
Payer: MEDICARE

## 2018-01-01 ENCOUNTER — HOSPITAL ENCOUNTER (OUTPATIENT)
Facility: HOSPITAL | Age: 78
Setting detail: OUTPATIENT SURGERY
Discharge: HOME | DRG: 480 | End: 2018-02-28
Attending: SURGERY | Admitting: SURGERY
Payer: MEDICARE

## 2018-01-01 VITALS
WEIGHT: 109.13 LBS | BODY MASS INDEX: 21.42 KG/M2 | TEMPERATURE: 97.6 F | OXYGEN SATURATION: 97 % | DIASTOLIC BLOOD PRESSURE: 70 MMHG | HEART RATE: 103 BPM | SYSTOLIC BLOOD PRESSURE: 126 MMHG | HEIGHT: 60 IN | RESPIRATION RATE: 18 BRPM

## 2018-01-01 VITALS
HEIGHT: 59 IN | DIASTOLIC BLOOD PRESSURE: 60 MMHG | HEART RATE: 101 BPM | SYSTOLIC BLOOD PRESSURE: 145 MMHG | WEIGHT: 113.32 LBS | OXYGEN SATURATION: 100 % | TEMPERATURE: 98.2 F | RESPIRATION RATE: 18 BRPM | BODY MASS INDEX: 22.84 KG/M2

## 2018-01-01 VITALS
DIASTOLIC BLOOD PRESSURE: 44 MMHG | HEIGHT: 59 IN | HEART RATE: 104 BPM | TEMPERATURE: 97.5 F | WEIGHT: 112.88 LBS | BODY MASS INDEX: 22.76 KG/M2 | OXYGEN SATURATION: 84 % | SYSTOLIC BLOOD PRESSURE: 97 MMHG

## 2018-01-01 VITALS — RESPIRATION RATE: 18 BRPM | SYSTOLIC BLOOD PRESSURE: 130 MMHG | DIASTOLIC BLOOD PRESSURE: 78 MMHG | HEART RATE: 92 BPM

## 2018-01-01 VITALS
BODY MASS INDEX: 25.2 KG/M2 | HEIGHT: 59 IN | TEMPERATURE: 98.4 F | RESPIRATION RATE: 18 BRPM | OXYGEN SATURATION: 97 % | DIASTOLIC BLOOD PRESSURE: 64 MMHG | SYSTOLIC BLOOD PRESSURE: 134 MMHG | WEIGHT: 125 LBS | HEART RATE: 99 BPM

## 2018-01-01 DIAGNOSIS — C90.00 MULTIPLE MYELOMA NOT HAVING ACHIEVED REMISSION (HCC): ICD-10-CM

## 2018-01-01 DIAGNOSIS — E43 SEVERE PROTEIN-CALORIE MALNUTRITION (HCC): ICD-10-CM

## 2018-01-01 DIAGNOSIS — K92.2 GI BLEED: ICD-10-CM

## 2018-01-01 DIAGNOSIS — I10 ESSENTIAL HYPERTENSION: ICD-10-CM

## 2018-01-01 DIAGNOSIS — Z01.818 ENCOUNTER FOR OTHER PREPROCEDURAL EXAMINATION: ICD-10-CM

## 2018-01-01 DIAGNOSIS — L89.159 SACRAL DECUBITUS ULCER: ICD-10-CM

## 2018-01-01 DIAGNOSIS — R79.89 ABNORMAL LFTS: ICD-10-CM

## 2018-01-01 DIAGNOSIS — Z99.2 ESRD (END STAGE RENAL DISEASE) ON DIALYSIS (HCC): ICD-10-CM

## 2018-01-01 DIAGNOSIS — Z99.2 ESRD (END STAGE RENAL DISEASE) ON DIALYSIS (HCC): Primary | ICD-10-CM

## 2018-01-01 DIAGNOSIS — I69.359 HEMIPARESIS DUE TO OLD STROKE (HCC): ICD-10-CM

## 2018-01-01 DIAGNOSIS — N19 RENAL FAILURE: ICD-10-CM

## 2018-01-01 DIAGNOSIS — K59.00 OBSTIPATION: ICD-10-CM

## 2018-01-01 DIAGNOSIS — S72.142A CLOSED INTERTROCHANTERIC FRACTURE OF LEFT FEMUR (HCC): ICD-10-CM

## 2018-01-01 DIAGNOSIS — D62 ACUTE BLOOD LOSS ANEMIA: ICD-10-CM

## 2018-01-01 DIAGNOSIS — J96.00 ACUTE RESPIRATORY FAILURE (HCC): ICD-10-CM

## 2018-01-01 DIAGNOSIS — R41.82 ALTERED MENTAL STATUS: ICD-10-CM

## 2018-01-01 DIAGNOSIS — R06.02 SHORTNESS OF BREATH: ICD-10-CM

## 2018-01-01 DIAGNOSIS — D64.9 ANEMIA, UNSPECIFIED TYPE: ICD-10-CM

## 2018-01-01 DIAGNOSIS — S72.142S CLOSED DISPLACED INTERTROCHANTERIC FRACTURE OF LEFT FEMUR, SEQUELA: ICD-10-CM

## 2018-01-01 DIAGNOSIS — D63.1 ANEMIA OF CHRONIC KIDNEY FAILURE, STAGE 5 (HCC): ICD-10-CM

## 2018-01-01 DIAGNOSIS — R53.2 FUNCTIONAL QUADRIPLEGIA (HCC): ICD-10-CM

## 2018-01-01 DIAGNOSIS — I21.4 NSTEMI (NON-ST ELEVATED MYOCARDIAL INFARCTION) (HCC): Primary | ICD-10-CM

## 2018-01-01 DIAGNOSIS — L89.152 DECUBITUS ULCER OF SACRAL REGION, STAGE 2 (HCC): ICD-10-CM

## 2018-01-01 DIAGNOSIS — R05.9 COUGH: ICD-10-CM

## 2018-01-01 DIAGNOSIS — R50.9 FEVER, UNSPECIFIED FEVER CAUSE: ICD-10-CM

## 2018-01-01 DIAGNOSIS — N18.9 CHRONIC KIDNEY DISEASE, UNSPECIFIED CKD STAGE: ICD-10-CM

## 2018-01-01 DIAGNOSIS — N85.9 FLUID IN ENDOMETRIAL CAVITY: ICD-10-CM

## 2018-01-01 DIAGNOSIS — S72.142A CLOSED INTERTROCHANTERIC FRACTURE OF HIP, LEFT, INITIAL ENCOUNTER (HCC): ICD-10-CM

## 2018-01-01 DIAGNOSIS — I63.81 LACUNAR INFARCTION (HCC): ICD-10-CM

## 2018-01-01 DIAGNOSIS — K85.90 ACUTE PANCREATITIS: Primary | ICD-10-CM

## 2018-01-01 DIAGNOSIS — R79.89 ABNORMAL SERUM CREATININE LEVEL: ICD-10-CM

## 2018-01-01 DIAGNOSIS — N18.9 CHRONIC KIDNEY DISEASE, UNSPECIFIED CKD STAGE: Primary | ICD-10-CM

## 2018-01-01 DIAGNOSIS — N18.5 ANEMIA OF CHRONIC KIDNEY FAILURE, STAGE 5 (HCC): ICD-10-CM

## 2018-01-01 DIAGNOSIS — I48.91 RAPID ATRIAL FIBRILLATION (HCC): ICD-10-CM

## 2018-01-01 DIAGNOSIS — N18.6 ESRD (END STAGE RENAL DISEASE) ON DIALYSIS (HCC): Primary | ICD-10-CM

## 2018-01-01 DIAGNOSIS — K92.1 GASTROINTESTINAL HEMORRHAGE WITH MELENA: ICD-10-CM

## 2018-01-01 DIAGNOSIS — N18.6 ESRD (END STAGE RENAL DISEASE) ON DIALYSIS (HCC): ICD-10-CM

## 2018-01-01 DIAGNOSIS — R79.89 ELEVATED PROCALCITONIN: ICD-10-CM

## 2018-01-01 LAB
ABO GROUP BLD BPU: NORMAL
ABO GROUP BLD: NORMAL
ALBUMIN SERPL BCP-MCNC: 2.3 G/DL (ref 3.5–5)
ALBUMIN SERPL BCP-MCNC: 2.4 G/DL (ref 3.5–5)
ALBUMIN SERPL BCP-MCNC: 2.8 G/DL (ref 3.5–5)
ALBUMIN SERPL BCP-MCNC: 2.9 G/DL (ref 3.5–5)
ALBUMIN SERPL BCP-MCNC: 3 G/DL (ref 3.5–5)
ALBUMIN SERPL BCP-MCNC: 3.1 G/DL (ref 3.5–5)
ALBUMIN SERPL BCP-MCNC: 3.2 G/DL (ref 3.5–5)
ALBUMIN SERPL BCP-MCNC: 3.3 G/DL (ref 3.5–5)
ALBUMIN SERPL BCP-MCNC: 3.3 G/DL (ref 3.5–5)
ALBUMIN SERPL BCP-MCNC: 3.4 G/DL (ref 3.5–5)
ALP SERPL-CCNC: 1032 U/L (ref 46–116)
ALP SERPL-CCNC: 1045 U/L (ref 46–116)
ALP SERPL-CCNC: 1047 U/L (ref 46–116)
ALP SERPL-CCNC: 1071 U/L (ref 46–116)
ALP SERPL-CCNC: 194 U/L (ref 46–116)
ALP SERPL-CCNC: 200 U/L (ref 46–116)
ALP SERPL-CCNC: 698 U/L (ref 46–116)
ALP SERPL-CCNC: 715 U/L (ref 46–116)
ALP SERPL-CCNC: 833 U/L (ref 46–116)
ALP SERPL-CCNC: 839 U/L (ref 46–116)
ALP SERPL-CCNC: 943 U/L (ref 46–116)
ALT SERPL W P-5'-P-CCNC: 101 U/L (ref 12–78)
ALT SERPL W P-5'-P-CCNC: 112 U/L (ref 12–78)
ALT SERPL W P-5'-P-CCNC: 147 U/L (ref 12–78)
ALT SERPL W P-5'-P-CCNC: 198 U/L (ref 12–78)
ALT SERPL W P-5'-P-CCNC: 316 U/L (ref 12–78)
ALT SERPL W P-5'-P-CCNC: 321 U/L (ref 12–78)
ALT SERPL W P-5'-P-CCNC: 34 U/L (ref 12–78)
ALT SERPL W P-5'-P-CCNC: 34 U/L (ref 12–78)
ALT SERPL W P-5'-P-CCNC: 359 U/L (ref 12–78)
ALT SERPL W P-5'-P-CCNC: 57 U/L (ref 12–78)
ALT SERPL W P-5'-P-CCNC: 94 U/L (ref 12–78)
ANCILLARY VALUES: ABNORMAL
ANION GAP SERPL CALCULATED.3IONS-SCNC: 11 MMOL/L (ref 4–13)
ANION GAP SERPL CALCULATED.3IONS-SCNC: 12 MMOL/L (ref 4–13)
ANION GAP SERPL CALCULATED.3IONS-SCNC: 13 MMOL/L (ref 4–13)
ANION GAP SERPL CALCULATED.3IONS-SCNC: 14 MMOL/L (ref 4–13)
ANION GAP SERPL CALCULATED.3IONS-SCNC: 15 MMOL/L (ref 4–13)
ANION GAP SERPL CALCULATED.3IONS-SCNC: 15 MMOL/L (ref 4–13)
ANION GAP SERPL CALCULATED.3IONS-SCNC: 16 MMOL/L (ref 4–13)
ANION GAP SERPL CALCULATED.3IONS-SCNC: 17 MMOL/L (ref 4–13)
ANION GAP SERPL CALCULATED.3IONS-SCNC: 17 MMOL/L (ref 4–13)
ANION GAP SERPL CALCULATED.3IONS-SCNC: 18 MMOL/L (ref 4–13)
ANION GAP SERPL CALCULATED.3IONS-SCNC: 18 MMOL/L (ref 4–13)
ANION GAP SERPL CALCULATED.3IONS-SCNC: 19 MMOL/L (ref 4–13)
ANION GAP SERPL CALCULATED.3IONS-SCNC: 21 MMOL/L (ref 4–13)
ANISOCYTOSIS BLD QL SMEAR: PRESENT
APTT PPP: 25 SECONDS (ref 24–36)
APTT PPP: 29 SECONDS (ref 23–35)
APTT PPP: 29 SECONDS (ref 23–35)
APTT PPP: 62 SECONDS (ref 24–36)
ARTERIAL PATENCY WRIST A: ABNORMAL
ARTERIAL PATENCY WRIST A: YES
AST SERPL W P-5'-P-CCNC: 111 U/L (ref 5–45)
AST SERPL W P-5'-P-CCNC: 115 U/L (ref 5–45)
AST SERPL W P-5'-P-CCNC: 125 U/L (ref 5–45)
AST SERPL W P-5'-P-CCNC: 144 U/L (ref 5–45)
AST SERPL W P-5'-P-CCNC: 150 U/L (ref 5–45)
AST SERPL W P-5'-P-CCNC: 174 U/L (ref 5–45)
AST SERPL W P-5'-P-CCNC: 22 U/L (ref 5–45)
AST SERPL W P-5'-P-CCNC: 23 U/L (ref 5–45)
AST SERPL W P-5'-P-CCNC: 260 U/L (ref 5–45)
AST SERPL W P-5'-P-CCNC: 280 U/L (ref 5–45)
AST SERPL W P-5'-P-CCNC: 596 U/L (ref 5–45)
ATRIAL RATE: 100 BPM
ATRIAL RATE: 102 BPM
ATRIAL RATE: 112 BPM
ATRIAL RATE: 113 BPM
ATRIAL RATE: 170 BPM
ATRIAL RATE: 91 BPM
ATRIAL RATE: 96 BPM
ATRIAL RATE: 98 BPM
BACTERIA BLD CULT: ABNORMAL
BACTERIA BLD CULT: NORMAL
BACTERIA SPT RESP CULT: ABNORMAL
BACTERIA UR QL AUTO: ABNORMAL /HPF
BASE EXCESS BLDA CALC-SCNC: -1.7 MMOL/L
BASE EXCESS BLDA CALC-SCNC: -2 MMOL/L (ref -2–3)
BASE EXCESS BLDA CALC-SCNC: 1.5 MMOL/L
BASOPHILS # BLD AUTO: 0.02 THOUSANDS/ΜL (ref 0–0.1)
BASOPHILS # BLD AUTO: 0.03 THOUSANDS/ΜL (ref 0–0.1)
BASOPHILS # BLD AUTO: 0.05 THOUSANDS/ΜL (ref 0–0.1)
BASOPHILS # BLD AUTO: 0.05 THOUSANDS/ΜL (ref 0–0.1)
BASOPHILS # BLD AUTO: 0.06 THOUSANDS/ΜL (ref 0–0.1)
BASOPHILS # BLD AUTO: 0.06 THOUSANDS/ΜL (ref 0–0.1)
BASOPHILS # BLD AUTO: 0.07 THOUSANDS/ΜL (ref 0–0.1)
BASOPHILS # BLD MANUAL: 0 THOUSAND/UL (ref 0–0.1)
BASOPHILS # BLD MANUAL: 0.12 THOUSAND/UL (ref 0–0.1)
BASOPHILS # BLD MANUAL: 0.13 THOUSAND/UL (ref 0–0.1)
BASOPHILS NFR BLD AUTO: 0 % (ref 0–1)
BASOPHILS NFR MAR MANUAL: 0 % (ref 0–1)
BASOPHILS NFR MAR MANUAL: 1 % (ref 0–1)
BASOPHILS NFR MAR MANUAL: 1 % (ref 0–1)
BILIRUB SERPL-MCNC: 0.5 MG/DL (ref 0.2–1)
BILIRUB SERPL-MCNC: 0.6 MG/DL (ref 0.2–1)
BILIRUB SERPL-MCNC: 0.6 MG/DL (ref 0.2–1)
BILIRUB SERPL-MCNC: 0.7 MG/DL (ref 0.2–1)
BILIRUB SERPL-MCNC: 0.7 MG/DL (ref 0.2–1)
BILIRUB SERPL-MCNC: 2.4 MG/DL (ref 0.2–1)
BILIRUB SERPL-MCNC: 3.4 MG/DL (ref 0.2–1)
BILIRUB SERPL-MCNC: 3.4 MG/DL (ref 0.2–1)
BILIRUB SERPL-MCNC: 4.9 MG/DL (ref 0.2–1)
BILIRUB SERPL-MCNC: 5.7 MG/DL (ref 0.2–1)
BILIRUB SERPL-MCNC: 6.4 MG/DL (ref 0.2–1)
BILIRUB UR QL STRIP: NEGATIVE
BLD GP AB SCN SERPL QL: NEGATIVE
BODY TEMPERATURE: 99.3 DEGREES FEHRENHEIT
BPU ID: NORMAL
BUN SERPL-MCNC: 105 MG/DL (ref 5–25)
BUN SERPL-MCNC: 134 MG/DL (ref 5–25)
BUN SERPL-MCNC: 18 MG/DL (ref 5–25)
BUN SERPL-MCNC: 24 MG/DL (ref 5–25)
BUN SERPL-MCNC: 27 MG/DL (ref 5–25)
BUN SERPL-MCNC: 29 MG/DL (ref 5–25)
BUN SERPL-MCNC: 30 MG/DL (ref 5–25)
BUN SERPL-MCNC: 32 MG/DL (ref 5–25)
BUN SERPL-MCNC: 33 MG/DL (ref 5–25)
BUN SERPL-MCNC: 38 MG/DL (ref 5–25)
BUN SERPL-MCNC: 41 MG/DL (ref 5–25)
BUN SERPL-MCNC: 42 MG/DL (ref 5–25)
BUN SERPL-MCNC: 42 MG/DL (ref 5–25)
BUN SERPL-MCNC: 49 MG/DL (ref 5–25)
BUN SERPL-MCNC: 55 MG/DL (ref 5–25)
BUN SERPL-MCNC: 58 MG/DL (ref 5–25)
BUN SERPL-MCNC: 63 MG/DL (ref 5–25)
BUN SERPL-MCNC: 67 MG/DL (ref 5–25)
BUN SERPL-MCNC: 68 MG/DL (ref 5–25)
BUN SERPL-MCNC: 68 MG/DL (ref 5–25)
BUN SERPL-MCNC: 70 MG/DL (ref 5–25)
BUN SERPL-MCNC: 71 MG/DL (ref 5–25)
BUN SERPL-MCNC: 77 MG/DL (ref 5–25)
BUN SERPL-MCNC: 78 MG/DL (ref 5–25)
BUN SERPL-MCNC: 81 MG/DL (ref 5–25)
BUN SERPL-MCNC: 84 MG/DL (ref 5–25)
BUN SERPL-MCNC: 85 MG/DL (ref 5–25)
BUN SERPL-MCNC: 91 MG/DL (ref 5–25)
BUN SERPL-MCNC: 98 MG/DL (ref 5–25)
C DIFF TOX GENS STL QL NAA+PROBE: NORMAL
CA-I BLD-SCNC: 1.11 MMOL/L (ref 1.12–1.32)
CA-I BLD-SCNC: 1.12 MMOL/L (ref 1.12–1.32)
CA-I BLD-SCNC: 1.22 MMOL/L (ref 1.12–1.32)
CALCIUM SERPL-MCNC: 10 MG/DL (ref 8.3–10.1)
CALCIUM SERPL-MCNC: 10 MG/DL (ref 8.3–10.1)
CALCIUM SERPL-MCNC: 10.1 MG/DL (ref 8.3–10.1)
CALCIUM SERPL-MCNC: 10.2 MG/DL (ref 8.3–10.1)
CALCIUM SERPL-MCNC: 10.2 MG/DL (ref 8.3–10.1)
CALCIUM SERPL-MCNC: 10.3 MG/DL (ref 8.3–10.1)
CALCIUM SERPL-MCNC: 10.4 MG/DL (ref 8.3–10.1)
CALCIUM SERPL-MCNC: 10.5 MG/DL (ref 8.3–10.1)
CALCIUM SERPL-MCNC: 10.6 MG/DL (ref 8.3–10.1)
CALCIUM SERPL-MCNC: 10.8 MG/DL (ref 8.3–10.1)
CALCIUM SERPL-MCNC: 10.8 MG/DL (ref 8.3–10.1)
CALCIUM SERPL-MCNC: 11 MG/DL (ref 8.3–10.1)
CALCIUM SERPL-MCNC: 11.1 MG/DL (ref 8.3–10.1)
CALCIUM SERPL-MCNC: 11.2 MG/DL (ref 8.3–10.1)
CALCIUM SERPL-MCNC: 11.3 MG/DL (ref 8.3–10.1)
CALCIUM SERPL-MCNC: 11.5 MG/DL (ref 8.3–10.1)
CALCIUM SERPL-MCNC: 9.2 MG/DL (ref 8.3–10.1)
CALCIUM SERPL-MCNC: 9.4 MG/DL (ref 8.3–10.1)
CALCIUM SERPL-MCNC: 9.5 MG/DL (ref 8.3–10.1)
CALCIUM SERPL-MCNC: 9.5 MG/DL (ref 8.3–10.1)
CALCIUM SERPL-MCNC: 9.7 MG/DL (ref 8.3–10.1)
CALCIUM SERPL-MCNC: 9.8 MG/DL (ref 8.3–10.1)
CALCIUM SERPL-MCNC: 9.8 MG/DL (ref 8.3–10.1)
CALCIUM SERPL-MCNC: 9.9 MG/DL (ref 8.3–10.1)
CHLORIDE SERPL-SCNC: 88 MMOL/L (ref 100–108)
CHLORIDE SERPL-SCNC: 90 MMOL/L (ref 100–108)
CHLORIDE SERPL-SCNC: 92 MMOL/L (ref 100–108)
CHLORIDE SERPL-SCNC: 92 MMOL/L (ref 100–108)
CHLORIDE SERPL-SCNC: 93 MMOL/L (ref 100–108)
CHLORIDE SERPL-SCNC: 93 MMOL/L (ref 100–108)
CHLORIDE SERPL-SCNC: 94 MMOL/L (ref 100–108)
CHLORIDE SERPL-SCNC: 95 MMOL/L (ref 100–108)
CHLORIDE SERPL-SCNC: 96 MMOL/L (ref 100–108)
CHLORIDE SERPL-SCNC: 97 MMOL/L (ref 100–108)
CHLORIDE SERPL-SCNC: 98 MMOL/L (ref 100–108)
CHLORIDE SERPL-SCNC: 99 MMOL/L (ref 100–108)
CHOLEST SERPL-MCNC: 206 MG/DL (ref 50–200)
CHOLEST SERPL-MCNC: 229 MG/DL (ref 50–200)
CLARITY UR: ABNORMAL
CO2 SERPL-SCNC: 18 MMOL/L (ref 21–32)
CO2 SERPL-SCNC: 19 MMOL/L (ref 21–32)
CO2 SERPL-SCNC: 20 MMOL/L (ref 21–32)
CO2 SERPL-SCNC: 21 MMOL/L (ref 21–32)
CO2 SERPL-SCNC: 22 MMOL/L (ref 21–32)
CO2 SERPL-SCNC: 23 MMOL/L (ref 21–32)
CO2 SERPL-SCNC: 24 MMOL/L (ref 21–32)
CO2 SERPL-SCNC: 25 MMOL/L (ref 21–32)
CO2 SERPL-SCNC: 26 MMOL/L (ref 21–32)
CO2 SERPL-SCNC: 27 MMOL/L (ref 21–32)
CO2 SERPL-SCNC: 28 MMOL/L (ref 21–32)
CO2 SERPL-SCNC: 29 MMOL/L (ref 21–32)
CO2 SERPL-SCNC: 29 MMOL/L (ref 21–32)
CO2 SERPL-SCNC: 30 MMOL/L (ref 21–32)
CO2 SERPL-SCNC: 31 MMOL/L (ref 21–32)
CO2 SERPL-SCNC: 32 MMOL/L (ref 21–32)
CO2 SERPL-SCNC: 32 MMOL/L (ref 21–32)
COLOR UR: ABNORMAL
CREAT SERPL-MCNC: 1.94 MG/DL (ref 0.6–1.3)
CREAT SERPL-MCNC: 2.51 MG/DL (ref 0.6–1.3)
CREAT SERPL-MCNC: 2.55 MG/DL (ref 0.6–1.3)
CREAT SERPL-MCNC: 2.6 MG/DL (ref 0.6–1.3)
CREAT SERPL-MCNC: 2.81 MG/DL (ref 0.6–1.3)
CREAT SERPL-MCNC: 2.96 MG/DL (ref 0.6–1.3)
CREAT SERPL-MCNC: 3 MG/DL (ref 0.6–1.3)
CREAT SERPL-MCNC: 3.09 MG/DL (ref 0.6–1.3)
CREAT SERPL-MCNC: 3.13 MG/DL (ref 0.6–1.3)
CREAT SERPL-MCNC: 3.4 MG/DL (ref 0.6–1.3)
CREAT SERPL-MCNC: 3.45 MG/DL (ref 0.6–1.3)
CREAT SERPL-MCNC: 3.53 MG/DL (ref 0.6–1.3)
CREAT SERPL-MCNC: 3.6 MG/DL (ref 0.6–1.3)
CREAT SERPL-MCNC: 4.2 MG/DL (ref 0.6–1.3)
CREAT SERPL-MCNC: 4.34 MG/DL (ref 0.6–1.3)
CREAT SERPL-MCNC: 4.35 MG/DL (ref 0.6–1.3)
CREAT SERPL-MCNC: 4.41 MG/DL (ref 0.6–1.3)
CREAT SERPL-MCNC: 4.73 MG/DL (ref 0.6–1.3)
CREAT SERPL-MCNC: 4.97 MG/DL (ref 0.6–1.3)
CREAT SERPL-MCNC: 5.07 MG/DL (ref 0.6–1.3)
CREAT SERPL-MCNC: 5.18 MG/DL (ref 0.6–1.3)
CREAT SERPL-MCNC: 5.23 MG/DL (ref 0.6–1.3)
CREAT SERPL-MCNC: 5.32 MG/DL (ref 0.6–1.3)
CREAT SERPL-MCNC: 5.44 MG/DL (ref 0.6–1.3)
CREAT SERPL-MCNC: 5.45 MG/DL (ref 0.6–1.3)
CREAT SERPL-MCNC: 5.49 MG/DL (ref 0.6–1.3)
CREAT SERPL-MCNC: 5.59 MG/DL (ref 0.6–1.3)
CREAT SERPL-MCNC: 5.91 MG/DL (ref 0.6–1.3)
CREAT SERPL-MCNC: 7.22 MG/DL (ref 0.6–1.3)
DACRYOCYTES BLD QL SMEAR: PRESENT
DS:DELIVERY SYSTEM: AC
EOSINOPHIL # BLD AUTO: 0.05 THOUSAND/ΜL (ref 0–0.61)
EOSINOPHIL # BLD AUTO: 0.06 THOUSAND/ΜL (ref 0–0.61)
EOSINOPHIL # BLD AUTO: 0.08 THOUSAND/ΜL (ref 0–0.61)
EOSINOPHIL # BLD AUTO: 0.09 THOUSAND/ΜL (ref 0–0.61)
EOSINOPHIL # BLD AUTO: 0.1 THOUSAND/ΜL (ref 0–0.61)
EOSINOPHIL # BLD AUTO: 0.12 THOUSAND/ΜL (ref 0–0.61)
EOSINOPHIL # BLD AUTO: 0.17 THOUSAND/ΜL (ref 0–0.61)
EOSINOPHIL # BLD MANUAL: 0 THOUSAND/UL (ref 0–0.4)
EOSINOPHIL # BLD MANUAL: 0.12 THOUSAND/UL (ref 0–0.4)
EOSINOPHIL # BLD MANUAL: 0.12 THOUSAND/UL (ref 0–0.4)
EOSINOPHIL # BLD MANUAL: 0.22 THOUSAND/UL (ref 0–0.4)
EOSINOPHIL # BLD MANUAL: 0.25 THOUSAND/UL (ref 0–0.4)
EOSINOPHIL # BLD MANUAL: 0.35 THOUSAND/UL (ref 0–0.4)
EOSINOPHIL NFR BLD AUTO: 0 % (ref 0–6)
EOSINOPHIL NFR BLD AUTO: 0 % (ref 0–6)
EOSINOPHIL NFR BLD AUTO: 1 % (ref 0–6)
EOSINOPHIL NFR BLD MANUAL: 0 % (ref 0–6)
EOSINOPHIL NFR BLD MANUAL: 1 % (ref 0–6)
EOSINOPHIL NFR BLD MANUAL: 1 % (ref 0–6)
EOSINOPHIL NFR BLD MANUAL: 2 % (ref 0–6)
ERYTHROCYTE [DISTWIDTH] IN BLOOD BY AUTOMATED COUNT: 20 % (ref 11.6–15.1)
ERYTHROCYTE [DISTWIDTH] IN BLOOD BY AUTOMATED COUNT: 20.1 % (ref 11.6–15.1)
ERYTHROCYTE [DISTWIDTH] IN BLOOD BY AUTOMATED COUNT: 20.2 % (ref 11.6–15.1)
ERYTHROCYTE [DISTWIDTH] IN BLOOD BY AUTOMATED COUNT: 20.3 % (ref 11.6–15.1)
ERYTHROCYTE [DISTWIDTH] IN BLOOD BY AUTOMATED COUNT: 20.4 % (ref 11.6–15.1)
ERYTHROCYTE [DISTWIDTH] IN BLOOD BY AUTOMATED COUNT: 20.6 % (ref 11.6–15.1)
ERYTHROCYTE [DISTWIDTH] IN BLOOD BY AUTOMATED COUNT: 20.6 % (ref 11.6–15.1)
ERYTHROCYTE [DISTWIDTH] IN BLOOD BY AUTOMATED COUNT: 20.7 % (ref 11.6–15.1)
ERYTHROCYTE [DISTWIDTH] IN BLOOD BY AUTOMATED COUNT: 20.8 % (ref 11.6–15.1)
ERYTHROCYTE [DISTWIDTH] IN BLOOD BY AUTOMATED COUNT: 20.8 % (ref 11.6–15.1)
ERYTHROCYTE [DISTWIDTH] IN BLOOD BY AUTOMATED COUNT: 20.9 % (ref 11.6–15.1)
ERYTHROCYTE [DISTWIDTH] IN BLOOD BY AUTOMATED COUNT: 20.9 % (ref 11.6–15.1)
ERYTHROCYTE [DISTWIDTH] IN BLOOD BY AUTOMATED COUNT: 21 % (ref 11.6–15.1)
ERYTHROCYTE [DISTWIDTH] IN BLOOD BY AUTOMATED COUNT: 21.1 % (ref 11.6–15.1)
ERYTHROCYTE [DISTWIDTH] IN BLOOD BY AUTOMATED COUNT: 21.1 % (ref 11.6–15.1)
ERYTHROCYTE [DISTWIDTH] IN BLOOD BY AUTOMATED COUNT: 23.7 % (ref 11.6–15.1)
ERYTHROCYTE [DISTWIDTH] IN BLOOD BY AUTOMATED COUNT: 23.8 % (ref 11.6–15.1)
ERYTHROCYTE [DISTWIDTH] IN BLOOD BY AUTOMATED COUNT: 23.8 % (ref 11.6–15.1)
ERYTHROCYTE [DISTWIDTH] IN BLOOD BY AUTOMATED COUNT: 24 % (ref 11.6–15.1)
ERYTHROCYTE [DISTWIDTH] IN BLOOD BY AUTOMATED COUNT: 24.2 % (ref 11.6–15.1)
ERYTHROCYTE [DISTWIDTH] IN BLOOD BY AUTOMATED COUNT: 24.5 % (ref 11.6–15.1)
ERYTHROCYTE [DISTWIDTH] IN BLOOD BY AUTOMATED COUNT: 24.7 % (ref 11.6–15.1)
ERYTHROCYTE [DISTWIDTH] IN BLOOD BY AUTOMATED COUNT: 24.7 % (ref 11.6–15.1)
ERYTHROCYTE [DISTWIDTH] IN BLOOD BY AUTOMATED COUNT: 26.1 % (ref 11.6–15.1)
ERYTHROCYTE [DISTWIDTH] IN BLOOD BY AUTOMATED COUNT: 26.2 % (ref 11.6–15.1)
ERYTHROCYTE [DISTWIDTH] IN BLOOD BY AUTOMATED COUNT: 26.4 % (ref 11.6–15.1)
EST. AVERAGE GLUCOSE BLD GHB EST-MCNC: 82 MG/DL
EST. AVERAGE GLUCOSE BLD GHB EST-MCNC: 97 MG/DL
FIBRINOGEN PPP-MCNC: 1002 MG/DL (ref 227–495)
GFR SERPL CREATININE-BSD FRML MDRD: 10 ML/MIN/1.73SQ M
GFR SERPL CREATININE-BSD FRML MDRD: 10 ML/MIN/1.73SQ M
GFR SERPL CREATININE-BSD FRML MDRD: 11 ML/MIN/1.73SQ M
GFR SERPL CREATININE-BSD FRML MDRD: 13 ML/MIN/1.73SQ M
GFR SERPL CREATININE-BSD FRML MDRD: 14 ML/MIN/1.73SQ M
GFR SERPL CREATININE-BSD FRML MDRD: 16 ML/MIN/1.73SQ M
GFR SERPL CREATININE-BSD FRML MDRD: 16 ML/MIN/1.73SQ M
GFR SERPL CREATININE-BSD FRML MDRD: 17 ML/MIN/1.73SQ M
GFR SERPL CREATININE-BSD FRML MDRD: 17 ML/MIN/1.73SQ M
GFR SERPL CREATININE-BSD FRML MDRD: 18 ML/MIN/1.73SQ M
GFR SERPL CREATININE-BSD FRML MDRD: 20 ML/MIN/1.73SQ M
GFR SERPL CREATININE-BSD FRML MDRD: 20 ML/MIN/1.73SQ M
GFR SERPL CREATININE-BSD FRML MDRD: 21 ML/MIN/1.73SQ M
GFR SERPL CREATININE-BSD FRML MDRD: 28 ML/MIN/1.73SQ M
GFR SERPL CREATININE-BSD FRML MDRD: 6 ML/MIN/1.73SQ M
GFR SERPL CREATININE-BSD FRML MDRD: 7 ML/MIN/1.73SQ M
GFR SERPL CREATININE-BSD FRML MDRD: 8 ML/MIN/1.73SQ M
GFR SERPL CREATININE-BSD FRML MDRD: 9 ML/MIN/1.73SQ M
GLUCOSE P FAST SERPL-MCNC: 176 MG/DL (ref 65–99)
GLUCOSE SERPL-MCNC: 100 MG/DL (ref 65–140)
GLUCOSE SERPL-MCNC: 100 MG/DL (ref 65–140)
GLUCOSE SERPL-MCNC: 102 MG/DL (ref 65–140)
GLUCOSE SERPL-MCNC: 104 MG/DL (ref 65–140)
GLUCOSE SERPL-MCNC: 105 MG/DL (ref 65–140)
GLUCOSE SERPL-MCNC: 105 MG/DL (ref 65–140)
GLUCOSE SERPL-MCNC: 109 MG/DL (ref 65–140)
GLUCOSE SERPL-MCNC: 112 MG/DL (ref 65–140)
GLUCOSE SERPL-MCNC: 113 MG/DL (ref 65–140)
GLUCOSE SERPL-MCNC: 115 MG/DL (ref 65–140)
GLUCOSE SERPL-MCNC: 116 MG/DL (ref 65–140)
GLUCOSE SERPL-MCNC: 116 MG/DL (ref 65–140)
GLUCOSE SERPL-MCNC: 118 MG/DL (ref 65–140)
GLUCOSE SERPL-MCNC: 119 MG/DL (ref 65–140)
GLUCOSE SERPL-MCNC: 120 MG/DL (ref 65–140)
GLUCOSE SERPL-MCNC: 122 MG/DL (ref 65–140)
GLUCOSE SERPL-MCNC: 123 MG/DL (ref 65–140)
GLUCOSE SERPL-MCNC: 125 MG/DL (ref 65–140)
GLUCOSE SERPL-MCNC: 128 MG/DL (ref 65–140)
GLUCOSE SERPL-MCNC: 129 MG/DL (ref 65–140)
GLUCOSE SERPL-MCNC: 130 MG/DL (ref 65–140)
GLUCOSE SERPL-MCNC: 130 MG/DL (ref 65–140)
GLUCOSE SERPL-MCNC: 131 MG/DL (ref 65–140)
GLUCOSE SERPL-MCNC: 133 MG/DL (ref 65–140)
GLUCOSE SERPL-MCNC: 134 MG/DL (ref 65–140)
GLUCOSE SERPL-MCNC: 136 MG/DL (ref 65–140)
GLUCOSE SERPL-MCNC: 140 MG/DL (ref 65–140)
GLUCOSE SERPL-MCNC: 142 MG/DL (ref 65–140)
GLUCOSE SERPL-MCNC: 143 MG/DL (ref 65–140)
GLUCOSE SERPL-MCNC: 144 MG/DL (ref 65–140)
GLUCOSE SERPL-MCNC: 145 MG/DL (ref 65–140)
GLUCOSE SERPL-MCNC: 146 MG/DL (ref 65–140)
GLUCOSE SERPL-MCNC: 146 MG/DL (ref 65–140)
GLUCOSE SERPL-MCNC: 147 MG/DL (ref 65–140)
GLUCOSE SERPL-MCNC: 147 MG/DL (ref 65–140)
GLUCOSE SERPL-MCNC: 148 MG/DL (ref 65–140)
GLUCOSE SERPL-MCNC: 150 MG/DL (ref 65–140)
GLUCOSE SERPL-MCNC: 150 MG/DL (ref 65–140)
GLUCOSE SERPL-MCNC: 151 MG/DL (ref 65–140)
GLUCOSE SERPL-MCNC: 153 MG/DL (ref 65–140)
GLUCOSE SERPL-MCNC: 153 MG/DL (ref 65–140)
GLUCOSE SERPL-MCNC: 155 MG/DL (ref 65–140)
GLUCOSE SERPL-MCNC: 156 MG/DL (ref 65–140)
GLUCOSE SERPL-MCNC: 157 MG/DL (ref 65–140)
GLUCOSE SERPL-MCNC: 158 MG/DL (ref 65–140)
GLUCOSE SERPL-MCNC: 159 MG/DL (ref 65–140)
GLUCOSE SERPL-MCNC: 160 MG/DL (ref 65–140)
GLUCOSE SERPL-MCNC: 161 MG/DL (ref 65–140)
GLUCOSE SERPL-MCNC: 162 MG/DL (ref 65–140)
GLUCOSE SERPL-MCNC: 163 MG/DL (ref 65–140)
GLUCOSE SERPL-MCNC: 163 MG/DL (ref 65–140)
GLUCOSE SERPL-MCNC: 164 MG/DL (ref 65–140)
GLUCOSE SERPL-MCNC: 164 MG/DL (ref 65–140)
GLUCOSE SERPL-MCNC: 165 MG/DL (ref 65–140)
GLUCOSE SERPL-MCNC: 167 MG/DL (ref 65–140)
GLUCOSE SERPL-MCNC: 169 MG/DL (ref 65–140)
GLUCOSE SERPL-MCNC: 170 MG/DL (ref 65–140)
GLUCOSE SERPL-MCNC: 172 MG/DL (ref 65–140)
GLUCOSE SERPL-MCNC: 172 MG/DL (ref 65–140)
GLUCOSE SERPL-MCNC: 173 MG/DL (ref 65–140)
GLUCOSE SERPL-MCNC: 173 MG/DL (ref 65–140)
GLUCOSE SERPL-MCNC: 174 MG/DL (ref 65–140)
GLUCOSE SERPL-MCNC: 176 MG/DL (ref 65–140)
GLUCOSE SERPL-MCNC: 177 MG/DL (ref 65–140)
GLUCOSE SERPL-MCNC: 177 MG/DL (ref 65–140)
GLUCOSE SERPL-MCNC: 179 MG/DL (ref 65–140)
GLUCOSE SERPL-MCNC: 180 MG/DL (ref 65–140)
GLUCOSE SERPL-MCNC: 181 MG/DL (ref 65–140)
GLUCOSE SERPL-MCNC: 183 MG/DL (ref 65–140)
GLUCOSE SERPL-MCNC: 183 MG/DL (ref 65–140)
GLUCOSE SERPL-MCNC: 185 MG/DL (ref 65–140)
GLUCOSE SERPL-MCNC: 185 MG/DL (ref 65–140)
GLUCOSE SERPL-MCNC: 186 MG/DL (ref 65–140)
GLUCOSE SERPL-MCNC: 186 MG/DL (ref 65–140)
GLUCOSE SERPL-MCNC: 187 MG/DL (ref 65–140)
GLUCOSE SERPL-MCNC: 187 MG/DL (ref 65–140)
GLUCOSE SERPL-MCNC: 188 MG/DL (ref 65–140)
GLUCOSE SERPL-MCNC: 188 MG/DL (ref 65–140)
GLUCOSE SERPL-MCNC: 189 MG/DL (ref 65–140)
GLUCOSE SERPL-MCNC: 191 MG/DL (ref 65–140)
GLUCOSE SERPL-MCNC: 193 MG/DL (ref 65–140)
GLUCOSE SERPL-MCNC: 193 MG/DL (ref 65–140)
GLUCOSE SERPL-MCNC: 194 MG/DL (ref 65–140)
GLUCOSE SERPL-MCNC: 195 MG/DL (ref 65–140)
GLUCOSE SERPL-MCNC: 197 MG/DL (ref 65–140)
GLUCOSE SERPL-MCNC: 197 MG/DL (ref 65–140)
GLUCOSE SERPL-MCNC: 198 MG/DL (ref 65–140)
GLUCOSE SERPL-MCNC: 199 MG/DL (ref 65–140)
GLUCOSE SERPL-MCNC: 199 MG/DL (ref 65–140)
GLUCOSE SERPL-MCNC: 200 MG/DL (ref 65–140)
GLUCOSE SERPL-MCNC: 201 MG/DL (ref 65–140)
GLUCOSE SERPL-MCNC: 202 MG/DL (ref 65–140)
GLUCOSE SERPL-MCNC: 203 MG/DL (ref 65–140)
GLUCOSE SERPL-MCNC: 206 MG/DL (ref 65–140)
GLUCOSE SERPL-MCNC: 206 MG/DL (ref 65–140)
GLUCOSE SERPL-MCNC: 208 MG/DL (ref 65–140)
GLUCOSE SERPL-MCNC: 210 MG/DL (ref 65–140)
GLUCOSE SERPL-MCNC: 211 MG/DL (ref 65–140)
GLUCOSE SERPL-MCNC: 214 MG/DL (ref 65–140)
GLUCOSE SERPL-MCNC: 215 MG/DL (ref 65–140)
GLUCOSE SERPL-MCNC: 216 MG/DL (ref 65–140)
GLUCOSE SERPL-MCNC: 217 MG/DL (ref 65–140)
GLUCOSE SERPL-MCNC: 217 MG/DL (ref 65–140)
GLUCOSE SERPL-MCNC: 219 MG/DL (ref 65–140)
GLUCOSE SERPL-MCNC: 221 MG/DL (ref 65–140)
GLUCOSE SERPL-MCNC: 222 MG/DL (ref 65–140)
GLUCOSE SERPL-MCNC: 222 MG/DL (ref 65–140)
GLUCOSE SERPL-MCNC: 224 MG/DL (ref 65–140)
GLUCOSE SERPL-MCNC: 225 MG/DL (ref 65–140)
GLUCOSE SERPL-MCNC: 226 MG/DL (ref 65–140)
GLUCOSE SERPL-MCNC: 230 MG/DL (ref 65–140)
GLUCOSE SERPL-MCNC: 233 MG/DL (ref 65–140)
GLUCOSE SERPL-MCNC: 239 MG/DL (ref 65–140)
GLUCOSE SERPL-MCNC: 240 MG/DL (ref 65–140)
GLUCOSE SERPL-MCNC: 241 MG/DL (ref 65–140)
GLUCOSE SERPL-MCNC: 241 MG/DL (ref 65–140)
GLUCOSE SERPL-MCNC: 254 MG/DL (ref 65–140)
GLUCOSE SERPL-MCNC: 256 MG/DL (ref 65–140)
GLUCOSE SERPL-MCNC: 256 MG/DL (ref 65–140)
GLUCOSE SERPL-MCNC: 260 MG/DL (ref 65–140)
GLUCOSE SERPL-MCNC: 275 MG/DL (ref 65–140)
GLUCOSE SERPL-MCNC: 87 MG/DL (ref 65–140)
GLUCOSE SERPL-MCNC: 91 MG/DL (ref 65–140)
GLUCOSE SERPL-MCNC: 92 MG/DL (ref 65–140)
GLUCOSE SERPL-MCNC: 93 MG/DL (ref 65–140)
GLUCOSE SERPL-MCNC: 93 MG/DL (ref 65–140)
GLUCOSE SERPL-MCNC: 94 MG/DL (ref 65–140)
GLUCOSE SERPL-MCNC: 95 MG/DL (ref 65–140)
GLUCOSE SERPL-MCNC: 96 MG/DL (ref 65–140)
GLUCOSE SERPL-MCNC: 96 MG/DL (ref 65–140)
GLUCOSE SERPL-MCNC: 97 MG/DL (ref 65–140)
GLUCOSE SERPL-MCNC: 99 MG/DL (ref 65–140)
GLUCOSE SERPL-MCNC: 99 MG/DL (ref 65–140)
GLUCOSE UR STRIP-MCNC: ABNORMAL MG/DL
GRAM STN SPEC: ABNORMAL
HAV IGM SER QL: NORMAL
HBA1C MFR BLD: 4.5 % (ref 4.2–6.3)
HBA1C MFR BLD: 5 % (ref 4.2–6.3)
HBV CORE AB SER QL: NORMAL
HBV CORE IGM SER QL: NORMAL
HBV CORE IGM SER QL: NORMAL
HBV SURFACE AB SER-ACNC: <3.1 MIU/ML
HBV SURFACE AG SER QL: NORMAL
HBV SURFACE AG SER QL: NORMAL
HCO3 BLDA-SCNC: 21.9 MMOL/L (ref 22–28)
HCO3 BLDA-SCNC: 22.8 MMOL/L (ref 22–28)
HCO3 BLDA-SCNC: 25.2 MMOL/L (ref 22–28)
HCT VFR BLD AUTO: 17.5 % (ref 34.8–46.1)
HCT VFR BLD AUTO: 22.2 % (ref 34.8–46.1)
HCT VFR BLD AUTO: 24 % (ref 34.8–46.1)
HCT VFR BLD AUTO: 24.3 % (ref 34.8–46.1)
HCT VFR BLD AUTO: 25.1 % (ref 34.8–46.1)
HCT VFR BLD AUTO: 25.1 % (ref 34.8–46.1)
HCT VFR BLD AUTO: 25.2 % (ref 34.8–46.1)
HCT VFR BLD AUTO: 25.2 % (ref 34.8–46.1)
HCT VFR BLD AUTO: 26.8 % (ref 34.8–46.1)
HCT VFR BLD AUTO: 26.8 % (ref 34.8–46.1)
HCT VFR BLD AUTO: 27.4 % (ref 34.8–46.1)
HCT VFR BLD AUTO: 28 % (ref 34.8–46.1)
HCT VFR BLD AUTO: 28.1 % (ref 34.8–46.1)
HCT VFR BLD AUTO: 29.2 % (ref 34.8–46.1)
HCT VFR BLD AUTO: 30.1 % (ref 34.8–46.1)
HCT VFR BLD AUTO: 30.2 % (ref 34.8–46.1)
HCT VFR BLD AUTO: 30.7 % (ref 34.8–46.1)
HCT VFR BLD AUTO: 30.9 % (ref 34.8–46.1)
HCT VFR BLD AUTO: 31.2 % (ref 34.8–46.1)
HCT VFR BLD AUTO: 31.6 % (ref 34.8–46.1)
HCT VFR BLD AUTO: 32.8 % (ref 34.8–46.1)
HCT VFR BLD AUTO: 33.1 % (ref 34.8–46.1)
HCT VFR BLD AUTO: 33.1 % (ref 34.8–46.1)
HCT VFR BLD AUTO: 33.2 % (ref 34.8–46.1)
HCT VFR BLD AUTO: 33.2 % (ref 34.8–46.1)
HCT VFR BLD AUTO: 33.7 % (ref 34.8–46.1)
HCT VFR BLD AUTO: 34.1 % (ref 34.8–46.1)
HCT VFR BLD AUTO: 34.4 % (ref 34.8–46.1)
HCT VFR BLD AUTO: 35.5 % (ref 34.8–46.1)
HCT VFR BLD CALC: 27 % (ref 34.8–46.1)
HCV AB SER QL: NORMAL
HCV AB SER QL: NORMAL
HDLC SERPL-MCNC: 10 MG/DL (ref 40–60)
HDLC SERPL-MCNC: 51 MG/DL (ref 40–60)
HGB BLD-MCNC: 10 G/DL (ref 11.5–15.4)
HGB BLD-MCNC: 10 G/DL (ref 11.5–15.4)
HGB BLD-MCNC: 10.1 G/DL (ref 11.5–15.4)
HGB BLD-MCNC: 10.2 G/DL (ref 11.5–15.4)
HGB BLD-MCNC: 10.4 G/DL (ref 11.5–15.4)
HGB BLD-MCNC: 10.5 G/DL (ref 11.5–15.4)
HGB BLD-MCNC: 10.9 G/DL (ref 11.5–15.4)
HGB BLD-MCNC: 11 G/DL (ref 11.5–15.4)
HGB BLD-MCNC: 11.1 G/DL (ref 11.5–15.4)
HGB BLD-MCNC: 11.2 G/DL (ref 11.5–15.4)
HGB BLD-MCNC: 11.2 G/DL (ref 11.5–15.4)
HGB BLD-MCNC: 11.3 G/DL (ref 11.5–15.4)
HGB BLD-MCNC: 12.1 G/DL (ref 11.5–15.4)
HGB BLD-MCNC: 5.4 G/DL (ref 11.5–15.4)
HGB BLD-MCNC: 7.1 G/DL (ref 11.5–15.4)
HGB BLD-MCNC: 7.5 G/DL (ref 11.5–15.4)
HGB BLD-MCNC: 8 G/DL (ref 11.5–15.4)
HGB BLD-MCNC: 8 G/DL (ref 11.5–15.4)
HGB BLD-MCNC: 8.1 G/DL (ref 11.5–15.4)
HGB BLD-MCNC: 8.1 G/DL (ref 11.5–15.4)
HGB BLD-MCNC: 8.3 G/DL (ref 11.5–15.4)
HGB BLD-MCNC: 8.3 G/DL (ref 11.5–15.4)
HGB BLD-MCNC: 8.5 G/DL (ref 11.5–15.4)
HGB BLD-MCNC: 8.7 G/DL (ref 11.5–15.4)
HGB BLD-MCNC: 9 G/DL (ref 11.5–15.4)
HGB BLD-MCNC: 9 G/DL (ref 11.5–15.4)
HGB BLD-MCNC: 9.5 G/DL (ref 11.5–15.4)
HGB BLD-MCNC: 9.6 G/DL (ref 11.5–15.4)
HGB BLD-MCNC: 9.7 G/DL (ref 11.5–15.4)
HGB BLDA-MCNC: 9.2 G/DL (ref 11.5–15.4)
HGB UR QL STRIP.AUTO: ABNORMAL
INR PPP: 0.92 (ref 0.86–1.16)
INR PPP: 0.94 (ref 0.86–1.16)
INR PPP: 0.98 (ref 0.86–1.16)
INR PPP: 1.05 (ref 0.86–1.17)
INR PPP: 1.13 (ref 0.86–1.17)
INR PPP: 1.17 (ref 0.86–1.17)
INR PPP: 1.19 (ref 0.86–1.17)
KETONES UR STRIP-MCNC: NEGATIVE MG/DL
LACTATE SERPL-SCNC: 0.6 MMOL/L (ref 0.5–2)
LACTATE SERPL-SCNC: 1.2 MMOL/L (ref 0.5–2)
LACTATE SERPL-SCNC: 1.5 MMOL/L (ref 0.5–2)
LACTATE SERPL-SCNC: 11.6 MMOL/L (ref 0.5–2)
LDLC SERPL CALC-MCNC: 89 MG/DL (ref 0–100)
LEUKOCYTE ESTERASE UR QL STRIP: ABNORMAL
LG PLATELETS BLD QL SMEAR: PRESENT
LIPASE SERPL-CCNC: 1043 U/L (ref 73–393)
LIPASE SERPL-CCNC: 3092 U/L (ref 73–393)
LIPASE SERPL-CCNC: 3180 U/L (ref 73–393)
LIPASE SERPL-CCNC: 6005 U/L (ref 73–393)
LYMPHOCYTES # BLD AUTO: 1.62 THOUSANDS/ΜL (ref 0.6–4.47)
LYMPHOCYTES # BLD AUTO: 1.66 THOUSAND/UL (ref 0.6–4.47)
LYMPHOCYTES # BLD AUTO: 1.81 THOUSANDS/ΜL (ref 0.6–4.47)
LYMPHOCYTES # BLD AUTO: 1.96 THOUSANDS/ΜL (ref 0.6–4.47)
LYMPHOCYTES # BLD AUTO: 12 % (ref 14–44)
LYMPHOCYTES # BLD AUTO: 14 % (ref 14–44)
LYMPHOCYTES # BLD AUTO: 14 % (ref 14–44)
LYMPHOCYTES # BLD AUTO: 16 % (ref 14–44)
LYMPHOCYTES # BLD AUTO: 17 % (ref 14–44)
LYMPHOCYTES # BLD AUTO: 2.07 THOUSAND/UL (ref 0.6–4.47)
LYMPHOCYTES # BLD AUTO: 2.14 THOUSAND/UL (ref 0.6–4.47)
LYMPHOCYTES # BLD AUTO: 2.22 THOUSANDS/ΜL (ref 0.6–4.47)
LYMPHOCYTES # BLD AUTO: 2.3 THOUSANDS/ΜL (ref 0.6–4.47)
LYMPHOCYTES # BLD AUTO: 2.52 THOUSAND/UL (ref 0.6–4.47)
LYMPHOCYTES # BLD AUTO: 2.77 THOUSAND/UL (ref 0.6–4.47)
LYMPHOCYTES # BLD AUTO: 2.85 THOUSANDS/ΜL (ref 0.6–4.47)
LYMPHOCYTES # BLD AUTO: 2.91 THOUSAND/UL (ref 0.6–4.47)
LYMPHOCYTES # BLD AUTO: 20 % (ref 14–44)
LYMPHOCYTES # BLD AUTO: 24 % (ref 14–44)
LYMPHOCYTES # BLD AUTO: 27 % (ref 14–44)
LYMPHOCYTES # BLD AUTO: 3.06 THOUSANDS/ΜL (ref 0.6–4.47)
LYMPHOCYTES # BLD AUTO: 3.12 THOUSAND/UL (ref 0.6–4.47)
LYMPHOCYTES # BLD AUTO: 3.74 THOUSAND/UL (ref 0.6–4.47)
LYMPHOCYTES # BLD AUTO: 34 % (ref 14–44)
LYMPHOCYTES # BLD AUTO: 4.16 THOUSAND/UL (ref 0.6–4.47)
LYMPHOCYTES NFR BLD AUTO: 12 % (ref 14–44)
LYMPHOCYTES NFR BLD AUTO: 13 % (ref 14–44)
LYMPHOCYTES NFR BLD AUTO: 14 % (ref 14–44)
LYMPHOCYTES NFR BLD AUTO: 15 % (ref 14–44)
LYMPHOCYTES NFR BLD AUTO: 16 % (ref 14–44)
LYMPHOCYTES NFR BLD AUTO: 19 % (ref 14–44)
LYMPHOCYTES NFR BLD AUTO: 21 % (ref 14–44)
MACROCYTES BLD QL AUTO: PRESENT
MAGNESIUM SERPL-MCNC: 2.3 MG/DL (ref 1.6–2.6)
MAGNESIUM SERPL-MCNC: 2.3 MG/DL (ref 1.6–2.6)
MAGNESIUM SERPL-MCNC: 2.4 MG/DL (ref 1.6–2.6)
MAGNESIUM SERPL-MCNC: 2.4 MG/DL (ref 1.6–2.6)
MAGNESIUM SERPL-MCNC: 2.5 MG/DL (ref 1.6–2.6)
MAGNESIUM SERPL-MCNC: 2.8 MG/DL (ref 1.6–2.6)
MAGNESIUM SERPL-MCNC: 3.1 MG/DL (ref 1.6–2.6)
MCH RBC QN AUTO: 32.4 PG (ref 26.8–34.3)
MCH RBC QN AUTO: 32.5 PG (ref 26.8–34.3)
MCH RBC QN AUTO: 32.6 PG (ref 26.8–34.3)
MCH RBC QN AUTO: 32.7 PG (ref 26.8–34.3)
MCH RBC QN AUTO: 32.8 PG (ref 26.8–34.3)
MCH RBC QN AUTO: 32.9 PG (ref 26.8–34.3)
MCH RBC QN AUTO: 33.1 PG (ref 26.8–34.3)
MCH RBC QN AUTO: 33.3 PG (ref 26.8–34.3)
MCH RBC QN AUTO: 33.3 PG (ref 26.8–34.3)
MCH RBC QN AUTO: 33.5 PG (ref 26.8–34.3)
MCH RBC QN AUTO: 33.5 PG (ref 26.8–34.3)
MCH RBC QN AUTO: 33.9 PG (ref 26.8–34.3)
MCH RBC QN AUTO: 33.9 PG (ref 26.8–34.3)
MCH RBC QN AUTO: 34 PG (ref 26.8–34.3)
MCH RBC QN AUTO: 34.1 PG (ref 26.8–34.3)
MCH RBC QN AUTO: 34.5 PG (ref 26.8–34.3)
MCH RBC QN AUTO: 34.6 PG (ref 26.8–34.3)
MCH RBC QN AUTO: 34.7 PG (ref 26.8–34.3)
MCH RBC QN AUTO: 34.7 PG (ref 26.8–34.3)
MCH RBC QN AUTO: 34.9 PG (ref 26.8–34.3)
MCH RBC QN AUTO: 35 PG (ref 26.8–34.3)
MCH RBC QN AUTO: 35 PG (ref 26.8–34.3)
MCH RBC QN AUTO: 35.2 PG (ref 26.8–34.3)
MCH RBC QN AUTO: 37 PG (ref 26.8–34.3)
MCHC RBC AUTO-ENTMCNC: 29.5 G/DL (ref 31.4–37.4)
MCHC RBC AUTO-ENTMCNC: 30.8 G/DL (ref 31.4–37.4)
MCHC RBC AUTO-ENTMCNC: 30.9 G/DL (ref 31.4–37.4)
MCHC RBC AUTO-ENTMCNC: 31.7 G/DL (ref 31.4–37.4)
MCHC RBC AUTO-ENTMCNC: 31.9 G/DL (ref 31.4–37.4)
MCHC RBC AUTO-ENTMCNC: 32 G/DL (ref 31.4–37.4)
MCHC RBC AUTO-ENTMCNC: 32 G/DL (ref 31.4–37.4)
MCHC RBC AUTO-ENTMCNC: 32.1 G/DL (ref 31.4–37.4)
MCHC RBC AUTO-ENTMCNC: 32.1 G/DL (ref 31.4–37.4)
MCHC RBC AUTO-ENTMCNC: 32.3 G/DL (ref 31.4–37.4)
MCHC RBC AUTO-ENTMCNC: 32.3 G/DL (ref 31.4–37.4)
MCHC RBC AUTO-ENTMCNC: 32.4 G/DL (ref 31.4–37.4)
MCHC RBC AUTO-ENTMCNC: 32.5 G/DL (ref 31.4–37.4)
MCHC RBC AUTO-ENTMCNC: 32.6 G/DL (ref 31.4–37.4)
MCHC RBC AUTO-ENTMCNC: 32.8 G/DL (ref 31.4–37.4)
MCHC RBC AUTO-ENTMCNC: 32.8 G/DL (ref 31.4–37.4)
MCHC RBC AUTO-ENTMCNC: 32.9 G/DL (ref 31.4–37.4)
MCHC RBC AUTO-ENTMCNC: 32.9 G/DL (ref 31.4–37.4)
MCHC RBC AUTO-ENTMCNC: 33.1 G/DL (ref 31.4–37.4)
MCHC RBC AUTO-ENTMCNC: 33.1 G/DL (ref 31.4–37.4)
MCHC RBC AUTO-ENTMCNC: 33.2 G/DL (ref 31.4–37.4)
MCHC RBC AUTO-ENTMCNC: 33.3 G/DL (ref 31.4–37.4)
MCHC RBC AUTO-ENTMCNC: 33.5 G/DL (ref 31.4–37.4)
MCHC RBC AUTO-ENTMCNC: 33.6 G/DL (ref 31.4–37.4)
MCHC RBC AUTO-ENTMCNC: 34 G/DL (ref 31.4–37.4)
MCV RBC AUTO: 100 FL (ref 82–98)
MCV RBC AUTO: 100 FL (ref 82–98)
MCV RBC AUTO: 101 FL (ref 82–98)
MCV RBC AUTO: 102 FL (ref 82–98)
MCV RBC AUTO: 103 FL (ref 82–98)
MCV RBC AUTO: 103 FL (ref 82–98)
MCV RBC AUTO: 104 FL (ref 82–98)
MCV RBC AUTO: 104 FL (ref 82–98)
MCV RBC AUTO: 105 FL (ref 82–98)
MCV RBC AUTO: 105 FL (ref 82–98)
MCV RBC AUTO: 106 FL (ref 82–98)
MCV RBC AUTO: 107 FL (ref 82–98)
MCV RBC AUTO: 108 FL (ref 82–98)
MCV RBC AUTO: 109 FL (ref 82–98)
MCV RBC AUTO: 110 FL (ref 82–98)
MCV RBC AUTO: 110 FL (ref 82–98)
MCV RBC AUTO: 120 FL (ref 82–98)
MCV RBC AUTO: 99 FL (ref 82–98)
METAMYELOCYTES NFR BLD MANUAL: 1 % (ref 0–1)
MONOCYTES # BLD AUTO: 0.22 THOUSAND/UL (ref 0–1.22)
MONOCYTES # BLD AUTO: 0.22 THOUSAND/UL (ref 0–1.22)
MONOCYTES # BLD AUTO: 0.23 THOUSAND/UL (ref 0–1.22)
MONOCYTES # BLD AUTO: 0.49 THOUSAND/UL (ref 0–1.22)
MONOCYTES # BLD AUTO: 0.52 THOUSAND/UL (ref 0–1.22)
MONOCYTES # BLD AUTO: 0.63 THOUSAND/UL (ref 0–1.22)
MONOCYTES # BLD AUTO: 0.63 THOUSAND/ΜL (ref 0.17–1.22)
MONOCYTES # BLD AUTO: 0.72 THOUSAND/ΜL (ref 0.17–1.22)
MONOCYTES # BLD AUTO: 0.78 THOUSAND/ΜL (ref 0.17–1.22)
MONOCYTES # BLD AUTO: 0.84 THOUSAND/ΜL (ref 0.17–1.22)
MONOCYTES # BLD AUTO: 0.95 THOUSAND/ΜL (ref 0.17–1.22)
MONOCYTES # BLD AUTO: 1.03 THOUSAND/ΜL (ref 0.17–1.22)
MONOCYTES # BLD AUTO: 1.04 THOUSAND/UL (ref 0–1.22)
MONOCYTES # BLD AUTO: 1.31 THOUSAND/ΜL (ref 0.17–1.22)
MONOCYTES # BLD AUTO: 1.32 THOUSAND/UL (ref 0–1.22)
MONOCYTES # BLD AUTO: 1.53 THOUSAND/UL (ref 0–1.22)
MONOCYTES NFR BLD AUTO: 4 % (ref 4–12)
MONOCYTES NFR BLD AUTO: 5 % (ref 4–12)
MONOCYTES NFR BLD AUTO: 6 % (ref 4–12)
MONOCYTES NFR BLD AUTO: 6 % (ref 4–12)
MONOCYTES NFR BLD AUTO: 7 % (ref 4–12)
MONOCYTES NFR BLD AUTO: 7 % (ref 4–12)
MONOCYTES NFR BLD AUTO: 9 % (ref 4–12)
MONOCYTES NFR BLD: 1 % (ref 4–12)
MONOCYTES NFR BLD: 10 % (ref 4–12)
MONOCYTES NFR BLD: 2 % (ref 4–12)
MONOCYTES NFR BLD: 2 % (ref 4–12)
MONOCYTES NFR BLD: 4 % (ref 4–12)
MONOCYTES NFR BLD: 5 % (ref 4–12)
MONOCYTES NFR BLD: 5 % (ref 4–12)
MONOCYTES NFR BLD: 6 % (ref 4–12)
MONOCYTES NFR BLD: 6 % (ref 4–12)
NEUTROPHILS # BLD AUTO: 10.01 THOUSANDS/ΜL (ref 1.85–7.62)
NEUTROPHILS # BLD AUTO: 10.31 THOUSANDS/ΜL (ref 1.85–7.62)
NEUTROPHILS # BLD AUTO: 10.75 THOUSANDS/ΜL (ref 1.85–7.62)
NEUTROPHILS # BLD AUTO: 11.33 THOUSANDS/ΜL (ref 1.85–7.62)
NEUTROPHILS # BLD AUTO: 11.89 THOUSANDS/ΜL (ref 1.85–7.62)
NEUTROPHILS # BLD AUTO: 13 THOUSANDS/ΜL (ref 1.85–7.62)
NEUTROPHILS # BLD AUTO: 8.62 THOUSANDS/ΜL (ref 1.85–7.62)
NEUTROPHILS # BLD MANUAL: 11.16 THOUSAND/UL (ref 1.85–7.62)
NEUTROPHILS # BLD MANUAL: 13.66 THOUSAND/UL (ref 1.85–7.62)
NEUTROPHILS # BLD MANUAL: 16.28 THOUSAND/UL (ref 1.85–7.62)
NEUTROPHILS # BLD MANUAL: 18.96 THOUSAND/UL (ref 1.85–7.62)
NEUTROPHILS # BLD MANUAL: 7.22 THOUSAND/UL (ref 1.85–7.62)
NEUTROPHILS # BLD MANUAL: 7.32 THOUSAND/UL (ref 1.85–7.62)
NEUTROPHILS # BLD MANUAL: 8.18 THOUSAND/UL (ref 1.85–7.62)
NEUTROPHILS # BLD MANUAL: 8.44 THOUSAND/UL (ref 1.85–7.62)
NEUTROPHILS # BLD MANUAL: 9.06 THOUSAND/UL (ref 1.85–7.62)
NEUTS BAND NFR BLD MANUAL: 1 % (ref 0–8)
NEUTS BAND NFR BLD MANUAL: 16 % (ref 0–8)
NEUTS BAND NFR BLD MANUAL: 3 % (ref 0–8)
NEUTS SEG NFR BLD AUTO: 56 % (ref 43–75)
NEUTS SEG NFR BLD AUTO: 57 % (ref 43–75)
NEUTS SEG NFR BLD AUTO: 67 % (ref 43–75)
NEUTS SEG NFR BLD AUTO: 67 % (ref 43–75)
NEUTS SEG NFR BLD AUTO: 70 % (ref 43–75)
NEUTS SEG NFR BLD AUTO: 72 % (ref 43–75)
NEUTS SEG NFR BLD AUTO: 73 % (ref 43–75)
NEUTS SEG NFR BLD AUTO: 74 % (ref 43–75)
NEUTS SEG NFR BLD AUTO: 75 % (ref 43–75)
NEUTS SEG NFR BLD AUTO: 77 % (ref 43–75)
NEUTS SEG NFR BLD AUTO: 78 % (ref 43–75)
NEUTS SEG NFR BLD AUTO: 78 % (ref 43–75)
NEUTS SEG NFR BLD AUTO: 79 % (ref 43–75)
NEUTS SEG NFR BLD AUTO: 79 % (ref 43–75)
NEUTS SEG NFR BLD AUTO: 80 % (ref 43–75)
NEUTS SEG NFR BLD AUTO: 84 % (ref 43–75)
NITRITE UR QL STRIP: POSITIVE
NON-SQ EPI CELLS URNS QL MICRO: ABNORMAL /HPF
NONHDLC SERPL-MCNC: 219 MG/DL
NRBC BLD AUTO-RTO: 0 /100 WBCS
NRBC BLD AUTO-RTO: 1 /100 WBC (ref 0–2)
NRBC BLD AUTO-RTO: 1 /100 WBCS
NRBC BLD AUTO-RTO: 2 /100 WBC (ref 0–2)
NRBC BLD AUTO-RTO: 2 /100 WBC (ref 0–2)
NRBC BLD AUTO-RTO: 2 /100 WBCS
NRBC BLD AUTO-RTO: 3 /100 WBC (ref 0–2)
NRBC BLD AUTO-RTO: 3 /100 WBCS
NRBC BLD AUTO-RTO: 4 /100 WBCS
NRBC BLD AUTO-RTO: 5 /100 WBC (ref 0–2)
NRBC BLD AUTO-RTO: 7 /100 WBCS
NRBC BLD AUTO-RTO: 9 /100 WBC (ref 0–2)
NRBC BLD AUTO-RTO: 9 /100 WBCS
NT-PROBNP SERPL-MCNC: 4142 PG/ML
O2 CT BLDA-SCNC: 12.6 ML/DL (ref 16–23)
O2 CT BLDA-SCNC: 9.5 ML/DL (ref 16–23)
OVALOCYTES BLD QL SMEAR: PRESENT
OVALOCYTES BLD QL SMEAR: PRESENT
OXYHGB MFR BLDA: 94.8 % (ref 94–97)
OXYHGB MFR BLDA: 98.2 % (ref 94–97)
P AXIS: 21 DEGREES
P AXIS: 30 DEGREES
P AXIS: 6 DEGREES
P AXIS: 66 DEGREES
P AXIS: 69 DEGREES
P AXIS: 73 DEGREES
P AXIS: 85 DEGREES
PCO2 BLD: 23 MMOL/L (ref 21–32)
PCO2 BLD: 31.1 MM HG (ref 36–44)
PCO2 BLDA: 35.8 MM HG (ref 36–44)
PCO2 BLDA: 36.9 MM HG (ref 36–44)
PH BLD: 7.46 [PH] (ref 7.35–7.45)
PH BLDA: 7.41 [PH] (ref 7.35–7.45)
PH BLDA: 7.46 [PH] (ref 7.35–7.45)
PH UR STRIP.AUTO: 6.5 [PH] (ref 4.5–8)
PHOSPHATE SERPL-MCNC: 1.9 MG/DL (ref 2.3–4.1)
PHOSPHATE SERPL-MCNC: 2.1 MG/DL (ref 2.3–4.1)
PHOSPHATE SERPL-MCNC: 2.1 MG/DL (ref 2.3–4.1)
PHOSPHATE SERPL-MCNC: 2.7 MG/DL (ref 2.3–4.1)
PHOSPHATE SERPL-MCNC: 2.7 MG/DL (ref 2.3–4.1)
PHOSPHATE SERPL-MCNC: 4.4 MG/DL (ref 2.3–4.1)
PHOSPHATE SERPL-MCNC: 5.4 MG/DL (ref 2.3–4.1)
PHOSPHATE SERPL-MCNC: 6.4 MG/DL (ref 2.3–4.1)
PHOSPHATE SERPL-MCNC: 6.9 MG/DL (ref 2.3–4.1)
PLATELET # BLD AUTO: 126 THOUSANDS/UL (ref 149–390)
PLATELET # BLD AUTO: 146 THOUSANDS/UL (ref 149–390)
PLATELET # BLD AUTO: 156 THOUSANDS/UL (ref 149–390)
PLATELET # BLD AUTO: 164 THOUSANDS/UL (ref 149–390)
PLATELET # BLD AUTO: 168 THOUSANDS/UL (ref 149–390)
PLATELET # BLD AUTO: 169 THOUSANDS/UL (ref 149–390)
PLATELET # BLD AUTO: 171 THOUSANDS/UL (ref 149–390)
PLATELET # BLD AUTO: 201 THOUSANDS/UL (ref 149–390)
PLATELET # BLD AUTO: 204 THOUSANDS/UL (ref 149–390)
PLATELET # BLD AUTO: 206 THOUSANDS/UL (ref 149–390)
PLATELET # BLD AUTO: 206 THOUSANDS/UL (ref 149–390)
PLATELET # BLD AUTO: 210 THOUSANDS/UL (ref 149–390)
PLATELET # BLD AUTO: 212 THOUSANDS/UL (ref 149–390)
PLATELET # BLD AUTO: 215 THOUSANDS/UL (ref 149–390)
PLATELET # BLD AUTO: 216 THOUSANDS/UL (ref 149–390)
PLATELET # BLD AUTO: 217 THOUSANDS/UL (ref 149–390)
PLATELET # BLD AUTO: 220 THOUSANDS/UL (ref 149–390)
PLATELET # BLD AUTO: 223 THOUSANDS/UL (ref 149–390)
PLATELET # BLD AUTO: 226 THOUSANDS/UL (ref 149–390)
PLATELET # BLD AUTO: 228 THOUSANDS/UL (ref 149–390)
PLATELET # BLD AUTO: 240 THOUSANDS/UL (ref 149–390)
PLATELET # BLD AUTO: 244 THOUSANDS/UL (ref 149–390)
PLATELET # BLD AUTO: 254 THOUSANDS/UL (ref 149–390)
PLATELET # BLD AUTO: 258 THOUSANDS/UL (ref 149–390)
PLATELET # BLD AUTO: 259 THOUSANDS/UL (ref 149–390)
PLATELET # BLD AUTO: 282 THOUSANDS/UL (ref 149–390)
PLATELET # BLD AUTO: 305 THOUSANDS/UL (ref 149–390)
PLATELET # BLD AUTO: 316 THOUSANDS/UL (ref 149–390)
PLATELET BLD QL SMEAR: ABNORMAL
PLATELET BLD QL SMEAR: ADEQUATE
PMV BLD AUTO: 11.4 FL (ref 8.9–12.7)
PMV BLD AUTO: 11.6 FL (ref 8.9–12.7)
PMV BLD AUTO: 11.7 FL (ref 8.9–12.7)
PMV BLD AUTO: 12 FL (ref 8.9–12.7)
PMV BLD AUTO: 12 FL (ref 8.9–12.7)
PMV BLD AUTO: 12.1 FL (ref 8.9–12.7)
PMV BLD AUTO: 12.2 FL (ref 8.9–12.7)
PMV BLD AUTO: 12.2 FL (ref 8.9–12.7)
PMV BLD AUTO: 12.3 FL (ref 8.9–12.7)
PMV BLD AUTO: 12.3 FL (ref 8.9–12.7)
PMV BLD AUTO: 12.4 FL (ref 8.9–12.7)
PMV BLD AUTO: 12.5 FL (ref 8.9–12.7)
PMV BLD AUTO: 12.5 FL (ref 8.9–12.7)
PMV BLD AUTO: 12.6 FL (ref 8.9–12.7)
PMV BLD AUTO: 12.7 FL (ref 8.9–12.7)
PMV BLD AUTO: 12.8 FL (ref 8.9–12.7)
PMV BLD AUTO: 12.8 FL (ref 8.9–12.7)
PMV BLD AUTO: 12.9 FL (ref 8.9–12.7)
PMV BLD AUTO: 13 FL (ref 8.9–12.7)
PMV BLD AUTO: 13.3 FL (ref 8.9–12.7)
PO2 BLD: 84 MM HG (ref 75–129)
PO2 BLDA: 149.5 MM HG (ref 75–129)
PO2 BLDA: 82.4 MM HG (ref 75–129)
POIKILOCYTOSIS BLD QL SMEAR: PRESENT
POLYCHROMASIA BLD QL SMEAR: PRESENT
POTASSIUM BLD-SCNC: 3.1 MMOL/L (ref 3.5–5.3)
POTASSIUM SERPL-SCNC: 3.1 MMOL/L (ref 3.5–5.3)
POTASSIUM SERPL-SCNC: 3.2 MMOL/L (ref 3.5–5.3)
POTASSIUM SERPL-SCNC: 3.4 MMOL/L (ref 3.5–5.3)
POTASSIUM SERPL-SCNC: 3.6 MMOL/L (ref 3.5–5.3)
POTASSIUM SERPL-SCNC: 3.7 MMOL/L (ref 3.5–5.3)
POTASSIUM SERPL-SCNC: 3.8 MMOL/L (ref 3.5–5.3)
POTASSIUM SERPL-SCNC: 3.9 MMOL/L (ref 3.5–5.3)
POTASSIUM SERPL-SCNC: 4 MMOL/L (ref 3.5–5.3)
POTASSIUM SERPL-SCNC: 4.1 MMOL/L (ref 3.5–5.3)
POTASSIUM SERPL-SCNC: 4.1 MMOL/L (ref 3.5–5.3)
POTASSIUM SERPL-SCNC: 4.2 MMOL/L (ref 3.5–5.3)
POTASSIUM SERPL-SCNC: 4.3 MMOL/L (ref 3.5–5.3)
POTASSIUM SERPL-SCNC: 4.3 MMOL/L (ref 3.5–5.3)
POTASSIUM SERPL-SCNC: 4.5 MMOL/L (ref 3.5–5.3)
POTASSIUM SERPL-SCNC: 4.5 MMOL/L (ref 3.5–5.3)
POTASSIUM SERPL-SCNC: 4.6 MMOL/L (ref 3.5–5.3)
POTASSIUM SERPL-SCNC: 4.6 MMOL/L (ref 3.5–5.3)
POTASSIUM SERPL-SCNC: 4.7 MMOL/L (ref 3.5–5.3)
PR INTERVAL: 132 MS
PR INTERVAL: 134 MS
PR INTERVAL: 134 MS
PR INTERVAL: 140 MS
PR INTERVAL: 144 MS
PRESSURE SETTING: 5
PROCALCITONIN SERPL-MCNC: 5.55 NG/ML
PROCALCITONIN SERPL-MCNC: 7.13 NG/ML
PROCALCITONIN SERPL-MCNC: 9.28 NG/ML
PROT SERPL-MCNC: 6.2 G/DL (ref 6.4–8.2)
PROT SERPL-MCNC: 6.8 G/DL (ref 6.4–8.2)
PROT SERPL-MCNC: 6.9 G/DL (ref 6.4–8.2)
PROT SERPL-MCNC: 6.9 G/DL (ref 6.4–8.2)
PROT SERPL-MCNC: 7 G/DL (ref 6.4–8.2)
PROT SERPL-MCNC: 7.1 G/DL (ref 6.4–8.2)
PROT SERPL-MCNC: 7.2 G/DL (ref 6.4–8.2)
PROT SERPL-MCNC: 7.4 G/DL (ref 6.4–8.2)
PROT SERPL-MCNC: 7.4 G/DL (ref 6.4–8.2)
PROT SERPL-MCNC: 7.6 G/DL (ref 6.4–8.2)
PROT SERPL-MCNC: 7.6 G/DL (ref 6.4–8.2)
PROT UR STRIP-MCNC: ABNORMAL MG/DL
PROTHROMBIN TIME: 12.5 SECONDS (ref 12.1–14.4)
PROTHROMBIN TIME: 12.8 SECONDS (ref 12.1–14.4)
PROTHROMBIN TIME: 13.1 SECONDS (ref 12.1–14.4)
PROTHROMBIN TIME: 13.6 SECONDS (ref 11.8–14.2)
PROTHROMBIN TIME: 14.4 SECONDS (ref 11.8–14.2)
PROTHROMBIN TIME: 14.8 SECONDS (ref 11.8–14.2)
PROTHROMBIN TIME: 15 SECONDS (ref 11.8–14.2)
PS CM H2O: 10
PS CM H2O: 12
PS VENT FIO2: 35
PS VENT FIO2: 35
PS VENT PEEP: 5
PS VENT PEEP: 5
QRS AXIS: -20 DEGREES
QRS AXIS: -9 DEGREES
QRS AXIS: 14 DEGREES
QRS AXIS: 18 DEGREES
QRS AXIS: 2 DEGREES
QRS AXIS: 20 DEGREES
QRS AXIS: 21 DEGREES
QRS AXIS: 22 DEGREES
QRSD INTERVAL: 70 MS
QRSD INTERVAL: 70 MS
QRSD INTERVAL: 72 MS
QRSD INTERVAL: 74 MS
QRSD INTERVAL: 76 MS
QRSD INTERVAL: 82 MS
QT INTERVAL: 296 MS
QT INTERVAL: 300 MS
QT INTERVAL: 320 MS
QT INTERVAL: 336 MS
QT INTERVAL: 338 MS
QT INTERVAL: 354 MS
QT INTERVAL: 362 MS
QT INTERVAL: 366 MS
QTC INTERVAL: 381 MS
QTC INTERVAL: 437 MS
QTC INTERVAL: 438 MS
QTC INTERVAL: 447 MS
QTC INTERVAL: 450 MS
QTC INTERVAL: 461 MS
QTC INTERVAL: 462 MS
QTC INTERVAL: 486 MS
RBC # BLD AUTO: 1.46 MILLION/UL (ref 3.81–5.12)
RBC # BLD AUTO: 2.03 MILLION/UL (ref 3.81–5.12)
RBC # BLD AUTO: 2.3 MILLION/UL (ref 3.81–5.12)
RBC # BLD AUTO: 2.38 MILLION/UL (ref 3.81–5.12)
RBC # BLD AUTO: 2.43 MILLION/UL (ref 3.81–5.12)
RBC # BLD AUTO: 2.43 MILLION/UL (ref 3.81–5.12)
RBC # BLD AUTO: 2.45 MILLION/UL (ref 3.81–5.12)
RBC # BLD AUTO: 2.47 MILLION/UL (ref 3.81–5.12)
RBC # BLD AUTO: 2.51 MILLION/UL (ref 3.81–5.12)
RBC # BLD AUTO: 2.55 MILLION/UL (ref 3.81–5.12)
RBC # BLD AUTO: 2.59 MILLION/UL (ref 3.81–5.12)
RBC # BLD AUTO: 2.6 MILLION/UL (ref 3.81–5.12)
RBC # BLD AUTO: 2.61 MILLION/UL (ref 3.81–5.12)
RBC # BLD AUTO: 2.86 MILLION/UL (ref 3.81–5.12)
RBC # BLD AUTO: 2.86 MILLION/UL (ref 3.81–5.12)
RBC # BLD AUTO: 2.87 MILLION/UL (ref 3.81–5.12)
RBC # BLD AUTO: 2.88 MILLION/UL (ref 3.81–5.12)
RBC # BLD AUTO: 2.89 MILLION/UL (ref 3.81–5.12)
RBC # BLD AUTO: 2.9 MILLION/UL (ref 3.81–5.12)
RBC # BLD AUTO: 2.99 MILLION/UL (ref 3.81–5.12)
RBC # BLD AUTO: 3.07 MILLION/UL (ref 3.81–5.12)
RBC # BLD AUTO: 3.09 MILLION/UL (ref 3.81–5.12)
RBC # BLD AUTO: 3.21 MILLION/UL (ref 3.81–5.12)
RBC # BLD AUTO: 3.28 MILLION/UL (ref 3.81–5.12)
RBC # BLD AUTO: 3.28 MILLION/UL (ref 3.81–5.12)
RBC # BLD AUTO: 3.29 MILLION/UL (ref 3.81–5.12)
RBC # BLD AUTO: 3.31 MILLION/UL (ref 3.81–5.12)
RBC # BLD AUTO: 3.31 MILLION/UL (ref 3.81–5.12)
RBC #/AREA URNS AUTO: ABNORMAL /HPF
RESPIRATORY RATE: 12
RH BLD: POSITIVE
SAO2 % BLD FROM PO2: 97 % (ref 95–98)
SODIUM BLD-SCNC: 133 MMOL/L (ref 136–145)
SODIUM SERPL-SCNC: 126 MMOL/L (ref 136–145)
SODIUM SERPL-SCNC: 126 MMOL/L (ref 136–145)
SODIUM SERPL-SCNC: 130 MMOL/L (ref 136–145)
SODIUM SERPL-SCNC: 131 MMOL/L (ref 136–145)
SODIUM SERPL-SCNC: 131 MMOL/L (ref 136–145)
SODIUM SERPL-SCNC: 132 MMOL/L (ref 136–145)
SODIUM SERPL-SCNC: 132 MMOL/L (ref 136–145)
SODIUM SERPL-SCNC: 133 MMOL/L (ref 136–145)
SODIUM SERPL-SCNC: 133 MMOL/L (ref 136–145)
SODIUM SERPL-SCNC: 134 MMOL/L (ref 136–145)
SODIUM SERPL-SCNC: 134 MMOL/L (ref 136–145)
SODIUM SERPL-SCNC: 135 MMOL/L (ref 136–145)
SODIUM SERPL-SCNC: 135 MMOL/L (ref 136–145)
SODIUM SERPL-SCNC: 136 MMOL/L (ref 136–145)
SODIUM SERPL-SCNC: 137 MMOL/L (ref 136–145)
SODIUM SERPL-SCNC: 137 MMOL/L (ref 136–145)
SODIUM SERPL-SCNC: 138 MMOL/L (ref 136–145)
SODIUM SERPL-SCNC: 139 MMOL/L (ref 136–145)
SODIUM SERPL-SCNC: 139 MMOL/L (ref 136–145)
SODIUM SERPL-SCNC: 140 MMOL/L (ref 136–145)
SODIUM SERPL-SCNC: 140 MMOL/L (ref 136–145)
SP GR UR STRIP.AUTO: <=1.005 (ref 1–1.03)
SPECIMEN EXPIRATION DATE: NORMAL
SPECIMEN SOURCE: ABNORMAL
T WAVE AXIS: 193 DEGREES
T WAVE AXIS: 207 DEGREES
T WAVE AXIS: 40 DEGREES
T WAVE AXIS: 57 DEGREES
T WAVE AXIS: 60 DEGREES
T WAVE AXIS: 64 DEGREES
T WAVE AXIS: 84 DEGREES
T WAVE AXIS: 96 DEGREES
T4 FREE SERPL-MCNC: 1.01 NG/DL (ref 0.76–1.46)
TOTAL CELLS COUNTED SPEC: 100
TRIGL SERPL-MCNC: 330 MG/DL
TRIGL SERPL-MCNC: 443 MG/DL
TRIGL SERPL-MCNC: 558 MG/DL
TROPONIN I SERPL-MCNC: 0.06 NG/ML
TROPONIN I SERPL-MCNC: 0.13 NG/ML
TROPONIN I SERPL-MCNC: 0.22 NG/ML
TROPONIN I SERPL-MCNC: 0.27 NG/ML
TROPONIN I SERPL-MCNC: 0.29 NG/ML
TSH SERPL DL<=0.05 MIU/L-ACNC: 10.15 UIU/ML (ref 0.36–3.74)
TSH SERPL DL<=0.05 MIU/L-ACNC: 7.71 UIU/ML (ref 0.36–3.74)
UNIT DISPENSE STATUS: NORMAL
UNIT PRODUCT CODE: NORMAL
UNIT RH: NORMAL
UROBILINOGEN UR QL STRIP.AUTO: 1 E.U./DL
VANCOMYCIN TROUGH SERPL-MCNC: 13.2 UG/ML (ref 10–20)
VARIANT LYMPHS # BLD AUTO: 1 %
VARIANT LYMPHS # BLD AUTO: 1 %
VARIANT LYMPHS # BLD AUTO: 2 %
VARIANT LYMPHS # BLD AUTO: 2 %
VENT - PS: ABNORMAL
VENT - PS: ABNORMAL
VENT TYPE: ABNORMAL
VENTILATION VALUE: 300
VENTRICULAR RATE: 100 BPM
VENTRICULAR RATE: 102 BPM
VENTRICULAR RATE: 112 BPM
VENTRICULAR RATE: 113 BPM
VENTRICULAR RATE: 158 BPM
VENTRICULAR RATE: 91 BPM
VENTRICULAR RATE: 96 BPM
VENTRICULAR RATE: 98 BPM
WBC # BLD AUTO: 10.35 THOUSAND/UL (ref 4.31–10.16)
WBC # BLD AUTO: 10.6 THOUSAND/UL (ref 4.31–10.16)
WBC # BLD AUTO: 10.77 THOUSAND/UL (ref 4.31–10.16)
WBC # BLD AUTO: 10.85 THOUSAND/UL (ref 4.31–10.16)
WBC # BLD AUTO: 11.07 THOUSAND/UL (ref 4.31–10.16)
WBC # BLD AUTO: 11.1 THOUSAND/UL (ref 4.31–10.16)
WBC # BLD AUTO: 11.16 THOUSAND/UL (ref 4.31–10.16)
WBC # BLD AUTO: 11.18 THOUSAND/UL (ref 4.31–10.16)
WBC # BLD AUTO: 11.56 THOUSAND/UL (ref 4.31–10.16)
WBC # BLD AUTO: 12 THOUSAND/UL (ref 4.31–10.16)
WBC # BLD AUTO: 12.19 THOUSAND/UL (ref 4.31–10.16)
WBC # BLD AUTO: 12.24 THOUSAND/UL (ref 4.31–10.16)
WBC # BLD AUTO: 12.59 THOUSAND/UL (ref 4.31–10.16)
WBC # BLD AUTO: 12.67 THOUSAND/UL (ref 4.31–10.16)
WBC # BLD AUTO: 12.77 THOUSAND/UL (ref 4.31–10.16)
WBC # BLD AUTO: 14.3 THOUSAND/UL (ref 4.31–10.16)
WBC # BLD AUTO: 14.54 THOUSAND/UL (ref 4.31–10.16)
WBC # BLD AUTO: 14.75 THOUSAND/UL (ref 4.31–10.16)
WBC # BLD AUTO: 14.86 THOUSAND/UL (ref 4.31–10.16)
WBC # BLD AUTO: 14.96 THOUSAND/UL (ref 4.31–10.16)
WBC # BLD AUTO: 15.29 THOUSAND/UL (ref 4.31–10.16)
WBC # BLD AUTO: 15.6 THOUSAND/UL (ref 4.31–10.16)
WBC # BLD AUTO: 16.45 THOUSAND/UL (ref 4.31–10.16)
WBC # BLD AUTO: 16.89 THOUSAND/UL (ref 4.31–10.16)
WBC # BLD AUTO: 17.29 THOUSAND/UL (ref 4.31–10.16)
WBC # BLD AUTO: 22 THOUSAND/UL (ref 4.31–10.16)
WBC # BLD AUTO: 22.3 THOUSAND/UL (ref 4.31–10.16)
WBC # BLD AUTO: 9.73 THOUSAND/UL (ref 4.31–10.16)
WBC #/AREA URNS AUTO: ABNORMAL /HPF

## 2018-01-01 PROCEDURE — 27245 TREAT THIGH FRACTURE: CPT | Performed by: ORTHOPAEDIC SURGERY

## 2018-01-01 PROCEDURE — 74176 CT ABD & PELVIS W/O CONTRAST: CPT

## 2018-01-01 PROCEDURE — 99221 1ST HOSP IP/OBS SF/LOW 40: CPT | Performed by: INTERNAL MEDICINE

## 2018-01-01 PROCEDURE — 82948 REAGENT STRIP/BLOOD GLUCOSE: CPT

## 2018-01-01 PROCEDURE — 94760 N-INVAS EAR/PLS OXIMETRY 1: CPT

## 2018-01-01 PROCEDURE — 99232 SBSQ HOSP IP/OBS MODERATE 35: CPT | Performed by: GENERAL PRACTICE

## 2018-01-01 PROCEDURE — 85027 COMPLETE CBC AUTOMATED: CPT | Performed by: GENERAL PRACTICE

## 2018-01-01 PROCEDURE — 85730 THROMBOPLASTIN TIME PARTIAL: CPT | Performed by: EMERGENCY MEDICINE

## 2018-01-01 PROCEDURE — 94640 AIRWAY INHALATION TREATMENT: CPT

## 2018-01-01 PROCEDURE — 93880 EXTRACRANIAL BILAT STUDY: CPT

## 2018-01-01 PROCEDURE — 93970 EXTREMITY STUDY: CPT

## 2018-01-01 PROCEDURE — 70551 MRI BRAIN STEM W/O DYE: CPT

## 2018-01-01 PROCEDURE — 99232 SBSQ HOSP IP/OBS MODERATE 35: CPT | Performed by: INTERNAL MEDICINE

## 2018-01-01 PROCEDURE — 99238 HOSP IP/OBS DSCHRG MGMT 30/<: CPT | Performed by: FAMILY MEDICINE

## 2018-01-01 PROCEDURE — 94003 VENT MGMT INPAT SUBQ DAY: CPT

## 2018-01-01 PROCEDURE — 86901 BLOOD TYPING SEROLOGIC RH(D): CPT

## 2018-01-01 PROCEDURE — 80048 BASIC METABOLIC PNL TOTAL CA: CPT | Performed by: GENERAL PRACTICE

## 2018-01-01 PROCEDURE — 85027 COMPLETE CBC AUTOMATED: CPT | Performed by: EMERGENCY MEDICINE

## 2018-01-01 PROCEDURE — 84439 ASSAY OF FREE THYROXINE: CPT | Performed by: EMERGENCY MEDICINE

## 2018-01-01 PROCEDURE — 84443 ASSAY THYROID STIM HORMONE: CPT | Performed by: EMERGENCY MEDICINE

## 2018-01-01 PROCEDURE — C1713 ANCHOR/SCREW BN/BN,TIS/BN: HCPCS | Performed by: ORTHOPAEDIC SURGERY

## 2018-01-01 PROCEDURE — 85025 COMPLETE CBC W/AUTO DIFF WBC: CPT | Performed by: EMERGENCY MEDICINE

## 2018-01-01 PROCEDURE — 83880 ASSAY OF NATRIURETIC PEPTIDE: CPT | Performed by: EMERGENCY MEDICINE

## 2018-01-01 PROCEDURE — 90935 HEMODIALYSIS ONE EVALUATION: CPT | Performed by: INTERNAL MEDICINE

## 2018-01-01 PROCEDURE — 85025 COMPLETE CBC W/AUTO DIFF WBC: CPT | Performed by: INTERNAL MEDICINE

## 2018-01-01 PROCEDURE — 84478 ASSAY OF TRIGLYCERIDES: CPT | Performed by: NURSE PRACTITIONER

## 2018-01-01 PROCEDURE — 83690 ASSAY OF LIPASE: CPT | Performed by: NURSE PRACTITIONER

## 2018-01-01 PROCEDURE — 80053 COMPREHEN METABOLIC PANEL: CPT | Performed by: GENERAL PRACTICE

## 2018-01-01 PROCEDURE — 0QS704Z REPOSITION LEFT UPPER FEMUR WITH INTERNAL FIXATION DEVICE, OPEN APPROACH: ICD-10-PCS | Performed by: ORTHOPAEDIC SURGERY

## 2018-01-01 PROCEDURE — 85027 COMPLETE CBC AUTOMATED: CPT | Performed by: INTERNAL MEDICINE

## 2018-01-01 PROCEDURE — 93970 EXTREMITY STUDY: CPT | Performed by: SURGERY

## 2018-01-01 PROCEDURE — 80053 COMPREHEN METABOLIC PANEL: CPT | Performed by: NURSE PRACTITIONER

## 2018-01-01 PROCEDURE — 82330 ASSAY OF CALCIUM: CPT | Performed by: NURSE PRACTITIONER

## 2018-01-01 PROCEDURE — 80061 LIPID PANEL: CPT | Performed by: PHYSICIAN ASSISTANT

## 2018-01-01 PROCEDURE — 83735 ASSAY OF MAGNESIUM: CPT | Performed by: NURSE PRACTITIONER

## 2018-01-01 PROCEDURE — 84484 ASSAY OF TROPONIN QUANT: CPT | Performed by: EMERGENCY MEDICINE

## 2018-01-01 PROCEDURE — 84100 ASSAY OF PHOSPHORUS: CPT | Performed by: PHYSICIAN ASSISTANT

## 2018-01-01 PROCEDURE — 84145 PROCALCITONIN (PCT): CPT | Performed by: NURSE PRACTITIONER

## 2018-01-01 PROCEDURE — 99222 1ST HOSP IP/OBS MODERATE 55: CPT | Performed by: INTERNAL MEDICINE

## 2018-01-01 PROCEDURE — 86900 BLOOD TYPING SEROLOGIC ABO: CPT | Performed by: EMERGENCY MEDICINE

## 2018-01-01 PROCEDURE — 85014 HEMATOCRIT: CPT

## 2018-01-01 PROCEDURE — 87070 CULTURE OTHR SPECIMN AEROBIC: CPT | Performed by: NURSE PRACTITIONER

## 2018-01-01 PROCEDURE — 80202 ASSAY OF VANCOMYCIN: CPT | Performed by: NURSE PRACTITIONER

## 2018-01-01 PROCEDURE — 85027 COMPLETE CBC AUTOMATED: CPT | Performed by: NURSE PRACTITIONER

## 2018-01-01 PROCEDURE — 84484 ASSAY OF TROPONIN QUANT: CPT | Performed by: PHYSICIAN ASSISTANT

## 2018-01-01 PROCEDURE — 99222 1ST HOSP IP/OBS MODERATE 55: CPT | Performed by: PSYCHIATRY & NEUROLOGY

## 2018-01-01 PROCEDURE — 87205 SMEAR GRAM STAIN: CPT | Performed by: NURSE PRACTITIONER

## 2018-01-01 PROCEDURE — 99024 POSTOP FOLLOW-UP VISIT: CPT | Performed by: PHYSICIAN ASSISTANT

## 2018-01-01 PROCEDURE — 93010 ELECTROCARDIOGRAM REPORT: CPT | Performed by: INTERNAL MEDICINE

## 2018-01-01 PROCEDURE — 93880 EXTRACRANIAL BILAT STUDY: CPT | Performed by: SURGERY

## 2018-01-01 PROCEDURE — C9113 INJ PANTOPRAZOLE SODIUM, VIA: HCPCS | Performed by: NURSE PRACTITIONER

## 2018-01-01 PROCEDURE — 85014 HEMATOCRIT: CPT | Performed by: INTERNAL MEDICINE

## 2018-01-01 PROCEDURE — 84100 ASSAY OF PHOSPHORUS: CPT | Performed by: NURSE PRACTITIONER

## 2018-01-01 PROCEDURE — 84484 ASSAY OF TROPONIN QUANT: CPT | Performed by: INTERNAL MEDICINE

## 2018-01-01 PROCEDURE — 80069 RENAL FUNCTION PANEL: CPT | Performed by: INTERNAL MEDICINE

## 2018-01-01 PROCEDURE — 93005 ELECTROCARDIOGRAM TRACING: CPT

## 2018-01-01 PROCEDURE — 71275 CT ANGIOGRAPHY CHEST: CPT

## 2018-01-01 PROCEDURE — 86704 HEP B CORE ANTIBODY TOTAL: CPT | Performed by: INTERNAL MEDICINE

## 2018-01-01 PROCEDURE — 80048 BASIC METABOLIC PNL TOTAL CA: CPT | Performed by: INTERNAL MEDICINE

## 2018-01-01 PROCEDURE — 85027 COMPLETE CBC AUTOMATED: CPT | Performed by: PHYSICIAN ASSISTANT

## 2018-01-01 PROCEDURE — 83735 ASSAY OF MAGNESIUM: CPT | Performed by: EMERGENCY MEDICINE

## 2018-01-01 PROCEDURE — 99231 SBSQ HOSP IP/OBS SF/LOW 25: CPT | Performed by: INTERNAL MEDICINE

## 2018-01-01 PROCEDURE — 71045 X-RAY EXAM CHEST 1 VIEW: CPT

## 2018-01-01 PROCEDURE — 0DJ08ZZ INSPECTION OF UPPER INTESTINAL TRACT, VIA NATURAL OR ARTIFICIAL OPENING ENDOSCOPIC: ICD-10-PCS | Performed by: STUDENT IN AN ORGANIZED HEALTH CARE EDUCATION/TRAINING PROGRAM

## 2018-01-01 PROCEDURE — 85025 COMPLETE CBC W/AUTO DIFF WBC: CPT | Performed by: GENERAL PRACTICE

## 2018-01-01 PROCEDURE — 73502 X-RAY EXAM HIP UNI 2-3 VIEWS: CPT

## 2018-01-01 PROCEDURE — 94002 VENT MGMT INPAT INIT DAY: CPT

## 2018-01-01 PROCEDURE — 74018 RADEX ABDOMEN 1 VIEW: CPT

## 2018-01-01 PROCEDURE — 5A1D70Z PERFORMANCE OF URINARY FILTRATION, INTERMITTENT, LESS THAN 6 HOURS PER DAY: ICD-10-PCS | Performed by: INTERNAL MEDICINE

## 2018-01-01 PROCEDURE — 70450 CT HEAD/BRAIN W/O DYE: CPT

## 2018-01-01 PROCEDURE — 87185 SC STD ENZYME DETCJ PER NZM: CPT | Performed by: EMERGENCY MEDICINE

## 2018-01-01 PROCEDURE — 0BH18EZ INSERTION OF ENDOTRACHEAL AIRWAY INTO TRACHEA, VIA NATURAL OR ARTIFICIAL OPENING ENDOSCOPIC: ICD-10-PCS | Performed by: STUDENT IN AN ORGANIZED HEALTH CARE EDUCATION/TRAINING PROGRAM

## 2018-01-01 PROCEDURE — 85007 BL SMEAR W/DIFF WBC COUNT: CPT | Performed by: EMERGENCY MEDICINE

## 2018-01-01 PROCEDURE — 99223 1ST HOSP IP/OBS HIGH 75: CPT | Performed by: INTERNAL MEDICINE

## 2018-01-01 PROCEDURE — 99233 SBSQ HOSP IP/OBS HIGH 50: CPT | Performed by: PSYCHIATRY & NEUROLOGY

## 2018-01-01 PROCEDURE — 96361 HYDRATE IV INFUSION ADD-ON: CPT

## 2018-01-01 PROCEDURE — 86900 BLOOD TYPING SEROLOGIC ABO: CPT | Performed by: INTERNAL MEDICINE

## 2018-01-01 PROCEDURE — 76856 US EXAM PELVIC COMPLETE: CPT

## 2018-01-01 PROCEDURE — 85610 PROTHROMBIN TIME: CPT | Performed by: EMERGENCY MEDICINE

## 2018-01-01 PROCEDURE — C1769 GUIDE WIRE: HCPCS | Performed by: ORTHOPAEDIC SURGERY

## 2018-01-01 PROCEDURE — 82947 ASSAY GLUCOSE BLOOD QUANT: CPT

## 2018-01-01 PROCEDURE — 84295 ASSAY OF SERUM SODIUM: CPT

## 2018-01-01 PROCEDURE — 86923 COMPATIBILITY TEST ELECTRIC: CPT

## 2018-01-01 PROCEDURE — 82330 ASSAY OF CALCIUM: CPT | Performed by: PHYSICIAN ASSISTANT

## 2018-01-01 PROCEDURE — 85007 BL SMEAR W/DIFF WBC COUNT: CPT | Performed by: NURSE PRACTITIONER

## 2018-01-01 PROCEDURE — 84100 ASSAY OF PHOSPHORUS: CPT | Performed by: EMERGENCY MEDICINE

## 2018-01-01 PROCEDURE — 96376 TX/PRO/DX INJ SAME DRUG ADON: CPT

## 2018-01-01 PROCEDURE — 36415 COLL VENOUS BLD VENIPUNCTURE: CPT | Performed by: EMERGENCY MEDICINE

## 2018-01-01 PROCEDURE — 99203 OFFICE O/P NEW LOW 30 MIN: CPT | Performed by: SURGERY

## 2018-01-01 PROCEDURE — G8987 SELF CARE CURRENT STATUS: HCPCS

## 2018-01-01 PROCEDURE — 74177 CT ABD & PELVIS W/CONTRAST: CPT

## 2018-01-01 PROCEDURE — P9021 RED BLOOD CELLS UNIT: HCPCS

## 2018-01-01 PROCEDURE — 80048 BASIC METABOLIC PNL TOTAL CA: CPT | Performed by: NURSE PRACTITIONER

## 2018-01-01 PROCEDURE — 83605 ASSAY OF LACTIC ACID: CPT | Performed by: EMERGENCY MEDICINE

## 2018-01-01 PROCEDURE — 82803 BLOOD GASES ANY COMBINATION: CPT

## 2018-01-01 PROCEDURE — 5A1D70Z PERFORMANCE OF URINARY FILTRATION, INTERMITTENT, LESS THAN 6 HOURS PER DAY: ICD-10-PCS | Performed by: STUDENT IN AN ORGANIZED HEALTH CARE EDUCATION/TRAINING PROGRAM

## 2018-01-01 PROCEDURE — 85007 BL SMEAR W/DIFF WBC COUNT: CPT | Performed by: GENERAL PRACTICE

## 2018-01-01 PROCEDURE — 76705 ECHO EXAM OF ABDOMEN: CPT

## 2018-01-01 PROCEDURE — 93930 UPPER EXTREMITY STUDY: CPT | Performed by: SURGERY

## 2018-01-01 PROCEDURE — 80053 COMPREHEN METABOLIC PANEL: CPT | Performed by: EMERGENCY MEDICINE

## 2018-01-01 PROCEDURE — PBRAD PB RADIOLOGY PLACEHOLDER: Performed by: SURGERY

## 2018-01-01 PROCEDURE — 99233 SBSQ HOSP IP/OBS HIGH 50: CPT | Performed by: INTERNAL MEDICINE

## 2018-01-01 PROCEDURE — 83735 ASSAY OF MAGNESIUM: CPT | Performed by: PHYSICIAN ASSISTANT

## 2018-01-01 PROCEDURE — 86705 HEP B CORE ANTIBODY IGM: CPT | Performed by: INTERNAL MEDICINE

## 2018-01-01 PROCEDURE — 87493 C DIFF AMPLIFIED PROBE: CPT | Performed by: NURSE PRACTITIONER

## 2018-01-01 PROCEDURE — 30233N1 TRANSFUSION OF NONAUTOLOGOUS RED BLOOD CELLS INTO PERIPHERAL VEIN, PERCUTANEOUS APPROACH: ICD-10-PCS | Performed by: STUDENT IN AN ORGANIZED HEALTH CARE EDUCATION/TRAINING PROGRAM

## 2018-01-01 PROCEDURE — 87040 BLOOD CULTURE FOR BACTERIA: CPT | Performed by: EMERGENCY MEDICINE

## 2018-01-01 PROCEDURE — 83036 HEMOGLOBIN GLYCOSYLATED A1C: CPT | Performed by: INTERNAL MEDICINE

## 2018-01-01 PROCEDURE — 86803 HEPATITIS C AB TEST: CPT | Performed by: INTERNAL MEDICINE

## 2018-01-01 PROCEDURE — 93306 TTE W/DOPPLER COMPLETE: CPT | Performed by: INTERNAL MEDICINE

## 2018-01-01 PROCEDURE — 93306 TTE W/DOPPLER COMPLETE: CPT

## 2018-01-01 PROCEDURE — 5A1955Z RESPIRATORY VENTILATION, GREATER THAN 96 CONSECUTIVE HOURS: ICD-10-PCS | Performed by: STUDENT IN AN ORGANIZED HEALTH CARE EDUCATION/TRAINING PROGRAM

## 2018-01-01 PROCEDURE — 87070 CULTURE OTHR SPECIMN AEROBIC: CPT | Performed by: PHYSICIAN ASSISTANT

## 2018-01-01 PROCEDURE — 93971 EXTREMITY STUDY: CPT

## 2018-01-01 PROCEDURE — 99024 POSTOP FOLLOW-UP VISIT: CPT | Performed by: ORTHOPAEDIC SURGERY

## 2018-01-01 PROCEDURE — 82805 BLOOD GASES W/O2 SATURATION: CPT | Performed by: NURSE PRACTITIONER

## 2018-01-01 PROCEDURE — 96374 THER/PROPH/DIAG INJ IV PUSH: CPT

## 2018-01-01 PROCEDURE — 86900 BLOOD TYPING SEROLOGIC ABO: CPT

## 2018-01-01 PROCEDURE — 87205 SMEAR GRAM STAIN: CPT | Performed by: PHYSICIAN ASSISTANT

## 2018-01-01 PROCEDURE — 87077 CULTURE AEROBIC IDENTIFY: CPT | Performed by: EMERGENCY MEDICINE

## 2018-01-01 PROCEDURE — 83036 HEMOGLOBIN GLYCOSYLATED A1C: CPT | Performed by: PHYSICIAN ASSISTANT

## 2018-01-01 PROCEDURE — 3E0G76Z INTRODUCTION OF NUTRITIONAL SUBSTANCE INTO UPPER GI, VIA NATURAL OR ARTIFICIAL OPENING: ICD-10-PCS | Performed by: STUDENT IN AN ORGANIZED HEALTH CARE EDUCATION/TRAINING PROGRAM

## 2018-01-01 PROCEDURE — G8988 SELF CARE GOAL STATUS: HCPCS

## 2018-01-01 PROCEDURE — 84443 ASSAY THYROID STIM HORMONE: CPT | Performed by: GENERAL PRACTICE

## 2018-01-01 PROCEDURE — 73552 X-RAY EXAM OF FEMUR 2/>: CPT

## 2018-01-01 PROCEDURE — 36430 TRANSFUSION BLD/BLD COMPNT: CPT

## 2018-01-01 PROCEDURE — 30233N1 TRANSFUSION OF NONAUTOLOGOUS RED BLOOD CELLS INTO PERIPHERAL VEIN, PERCUTANEOUS APPROACH: ICD-10-PCS | Performed by: INTERNAL MEDICINE

## 2018-01-01 PROCEDURE — G8979 MOBILITY GOAL STATUS: HCPCS

## 2018-01-01 PROCEDURE — 86850 RBC ANTIBODY SCREEN: CPT | Performed by: INTERNAL MEDICINE

## 2018-01-01 PROCEDURE — 86901 BLOOD TYPING SEROLOGIC RH(D): CPT | Performed by: EMERGENCY MEDICINE

## 2018-01-01 PROCEDURE — 99285 EMERGENCY DEPT VISIT HI MDM: CPT

## 2018-01-01 PROCEDURE — 36600 WITHDRAWAL OF ARTERIAL BLOOD: CPT

## 2018-01-01 PROCEDURE — 83605 ASSAY OF LACTIC ACID: CPT | Performed by: NURSE PRACTITIONER

## 2018-01-01 PROCEDURE — 83605 ASSAY OF LACTIC ACID: CPT | Performed by: INTERNAL MEDICINE

## 2018-01-01 PROCEDURE — 95816 EEG AWAKE AND DROWSY: CPT

## 2018-01-01 PROCEDURE — 99222 1ST HOSP IP/OBS MODERATE 55: CPT | Performed by: PHYSICIAN ASSISTANT

## 2018-01-01 PROCEDURE — 80048 BASIC METABOLIC PNL TOTAL CA: CPT | Performed by: PHYSICIAN ASSISTANT

## 2018-01-01 PROCEDURE — 86901 BLOOD TYPING SEROLOGIC RH(D): CPT | Performed by: INTERNAL MEDICINE

## 2018-01-01 PROCEDURE — G8980 MOBILITY D/C STATUS: HCPCS

## 2018-01-01 PROCEDURE — 86850 RBC ANTIBODY SCREEN: CPT | Performed by: EMERGENCY MEDICINE

## 2018-01-01 PROCEDURE — 80074 ACUTE HEPATITIS PANEL: CPT | Performed by: NURSE PRACTITIONER

## 2018-01-01 PROCEDURE — 87040 BLOOD CULTURE FOR BACTERIA: CPT | Performed by: GENERAL PRACTICE

## 2018-01-01 PROCEDURE — 83690 ASSAY OF LIPASE: CPT | Performed by: EMERGENCY MEDICINE

## 2018-01-01 PROCEDURE — 80069 RENAL FUNCTION PANEL: CPT | Performed by: NURSE PRACTITIONER

## 2018-01-01 PROCEDURE — 85610 PROTHROMBIN TIME: CPT | Performed by: NURSE PRACTITIONER

## 2018-01-01 PROCEDURE — 82330 ASSAY OF CALCIUM: CPT

## 2018-01-01 PROCEDURE — 97163 PT EVAL HIGH COMPLEX 45 MIN: CPT

## 2018-01-01 PROCEDURE — 80053 COMPREHEN METABOLIC PANEL: CPT | Performed by: PHYSICIAN ASSISTANT

## 2018-01-01 PROCEDURE — 85730 THROMBOPLASTIN TIME PARTIAL: CPT | Performed by: NURSE PRACTITIONER

## 2018-01-01 PROCEDURE — 87040 BLOOD CULTURE FOR BACTERIA: CPT | Performed by: PHYSICIAN ASSISTANT

## 2018-01-01 PROCEDURE — 99232 SBSQ HOSP IP/OBS MODERATE 35: CPT | Performed by: FAMILY MEDICINE

## 2018-01-01 PROCEDURE — 99239 HOSP IP/OBS DSCHRG MGMT >30: CPT | Performed by: INTERNAL MEDICINE

## 2018-01-01 PROCEDURE — 97167 OT EVAL HIGH COMPLEX 60 MIN: CPT

## 2018-01-01 PROCEDURE — 87040 BLOOD CULTURE FOR BACTERIA: CPT | Performed by: NURSE PRACTITIONER

## 2018-01-01 PROCEDURE — 82272 OCCULT BLD FECES 1-3 TESTS: CPT

## 2018-01-01 PROCEDURE — 87340 HEPATITIS B SURFACE AG IA: CPT | Performed by: INTERNAL MEDICINE

## 2018-01-01 PROCEDURE — 85027 COMPLETE CBC AUTOMATED: CPT | Performed by: SURGERY

## 2018-01-01 PROCEDURE — 99219 PR INITIAL OBSERVATION CARE/DAY 50 MINUTES: CPT | Performed by: INTERNAL MEDICINE

## 2018-01-01 PROCEDURE — 87147 CULTURE TYPE IMMUNOLOGIC: CPT | Performed by: EMERGENCY MEDICINE

## 2018-01-01 PROCEDURE — 86706 HEP B SURFACE ANTIBODY: CPT | Performed by: INTERNAL MEDICINE

## 2018-01-01 PROCEDURE — 43235 EGD DIAGNOSTIC BRUSH WASH: CPT | Performed by: INTERNAL MEDICINE

## 2018-01-01 PROCEDURE — 94664 DEMO&/EVAL PT USE INHALER: CPT

## 2018-01-01 PROCEDURE — 73551 X-RAY EXAM OF FEMUR 1: CPT

## 2018-01-01 PROCEDURE — G8989 SELF CARE D/C STATUS: HCPCS

## 2018-01-01 PROCEDURE — 85610 PROTHROMBIN TIME: CPT | Performed by: PHYSICIAN ASSISTANT

## 2018-01-01 PROCEDURE — G0365 VESSEL MAPPING HEMO ACCESS: HCPCS

## 2018-01-01 PROCEDURE — 71260 CT THORAX DX C+: CPT

## 2018-01-01 PROCEDURE — 81001 URINALYSIS AUTO W/SCOPE: CPT | Performed by: NURSE PRACTITIONER

## 2018-01-01 PROCEDURE — 85018 HEMOGLOBIN: CPT | Performed by: INTERNAL MEDICINE

## 2018-01-01 PROCEDURE — 85610 PROTHROMBIN TIME: CPT | Performed by: SURGERY

## 2018-01-01 PROCEDURE — 85384 FIBRINOGEN ACTIVITY: CPT | Performed by: NURSE PRACTITIONER

## 2018-01-01 PROCEDURE — 80053 COMPREHEN METABOLIC PANEL: CPT | Performed by: INTERNAL MEDICINE

## 2018-01-01 PROCEDURE — 85007 BL SMEAR W/DIFF WBC COUNT: CPT | Performed by: PHYSICIAN ASSISTANT

## 2018-01-01 PROCEDURE — 71250 CT THORAX DX C-: CPT

## 2018-01-01 PROCEDURE — 80048 BASIC METABOLIC PNL TOTAL CA: CPT | Performed by: SURGERY

## 2018-01-01 PROCEDURE — G8978 MOBILITY CURRENT STATUS: HCPCS

## 2018-01-01 PROCEDURE — 84132 ASSAY OF SERUM POTASSIUM: CPT

## 2018-01-01 PROCEDURE — 95816 EEG AWAKE AND DROWSY: CPT | Performed by: PSYCHIATRY & NEUROLOGY

## 2018-01-01 DEVICE — 11MM/130 DEG TI CANN TROCH FIXATION NAIL 340MM/LEFT-STER: Type: IMPLANTABLE DEVICE | Site: FEMUR | Status: FUNCTIONAL

## 2018-01-01 DEVICE — 11.0MM TI HELICAL BLADE 80MM-STERILE: Type: IMPLANTABLE DEVICE | Site: FEMUR | Status: FUNCTIONAL

## 2018-01-01 DEVICE — 1.7MM CABLE WITH CRIMP 750MM-STERILE: Type: IMPLANTABLE DEVICE | Site: FEMUR | Status: FUNCTIONAL

## 2018-01-01 DEVICE — 5.0MM TI LOCKING SCREW 42MM- FOR IM NAILS-STERILE: Type: IMPLANTABLE DEVICE | Site: FEMUR | Status: FUNCTIONAL

## 2018-01-01 DEVICE — 5.0MM TI LOCKING SCREW 30MM- FOR IM NAILS-STERILE: Type: IMPLANTABLE DEVICE | Site: FEMUR | Status: FUNCTIONAL

## 2018-01-01 RX ORDER — FENTANYL CITRATE 50 UG/ML
INJECTION, SOLUTION INTRAMUSCULAR; INTRAVENOUS AS NEEDED
Status: DISCONTINUED | OUTPATIENT
Start: 2018-01-01 | End: 2018-01-01 | Stop reason: SURG

## 2018-01-01 RX ORDER — ALBUMIN, HUMAN INJ 5% 5 %
SOLUTION INTRAVENOUS CONTINUOUS PRN
Status: DISCONTINUED | OUTPATIENT
Start: 2018-01-01 | End: 2018-01-01 | Stop reason: SURG

## 2018-01-01 RX ORDER — SODIUM CHLORIDE 9 MG/ML
75 INJECTION, SOLUTION INTRAVENOUS CONTINUOUS
Status: DISCONTINUED | OUTPATIENT
Start: 2018-01-01 | End: 2018-01-01

## 2018-01-01 RX ORDER — PROPOFOL 10 MG/ML
INJECTION, EMULSION INTRAVENOUS
Status: DISCONTINUED
Start: 2018-01-01 | End: 2018-01-01 | Stop reason: WASHOUT

## 2018-01-01 RX ORDER — OXYCODONE HCL 5 MG/5 ML
2.5 SOLUTION, ORAL ORAL EVERY 4 HOURS PRN
Qty: 100 ML | Refills: 0 | Status: SHIPPED | OUTPATIENT
Start: 2018-01-01 | End: 2018-01-01

## 2018-01-01 RX ORDER — IPRATROPIUM BROMIDE AND ALBUTEROL SULFATE 2.5; .5 MG/3ML; MG/3ML
3 SOLUTION RESPIRATORY (INHALATION) 4 TIMES DAILY
Status: DISCONTINUED | OUTPATIENT
Start: 2018-01-01 | End: 2018-01-01

## 2018-01-01 RX ORDER — ALBUTEROL SULFATE 2.5 MG/3ML
2.5 SOLUTION RESPIRATORY (INHALATION) EVERY 6 HOURS PRN
Status: DISCONTINUED | OUTPATIENT
Start: 2018-01-01 | End: 2018-01-01

## 2018-01-01 RX ORDER — ONDANSETRON 2 MG/ML
4 INJECTION INTRAMUSCULAR; INTRAVENOUS EVERY 6 HOURS PRN
Status: DISCONTINUED | OUTPATIENT
Start: 2018-01-01 | End: 2018-01-01 | Stop reason: HOSPADM

## 2018-01-01 RX ORDER — HEPARIN SODIUM 5000 [USP'U]/ML
5000 INJECTION, SOLUTION INTRAVENOUS; SUBCUTANEOUS EVERY 8 HOURS SCHEDULED
Status: CANCELLED | OUTPATIENT
Start: 2018-01-01

## 2018-01-01 RX ORDER — ATORVASTATIN CALCIUM 20 MG/1
20 TABLET, FILM COATED ORAL DAILY
Status: DISCONTINUED | OUTPATIENT
Start: 2018-01-01 | End: 2018-01-01 | Stop reason: HOSPADM

## 2018-01-01 RX ORDER — HEPARIN SODIUM 1000 [USP'U]/ML
3000 INJECTION, SOLUTION INTRAVENOUS; SUBCUTANEOUS AS NEEDED
Status: DISCONTINUED | OUTPATIENT
Start: 2018-01-01 | End: 2018-01-01 | Stop reason: CLARIF

## 2018-01-01 RX ORDER — PANTOPRAZOLE SODIUM 20 MG/1
20 TABLET, DELAYED RELEASE ORAL
Status: DISCONTINUED | OUTPATIENT
Start: 2018-01-01 | End: 2018-01-01

## 2018-01-01 RX ORDER — CHLORHEXIDINE GLUCONATE 0.12 MG/ML
15 RINSE ORAL EVERY 12 HOURS SCHEDULED
Status: DISCONTINUED | OUTPATIENT
Start: 2018-01-01 | End: 2018-01-01

## 2018-01-01 RX ORDER — MEPERIDINE HYDROCHLORIDE 50 MG/ML
12.5 INJECTION INTRAMUSCULAR; INTRAVENOUS; SUBCUTANEOUS ONCE AS NEEDED
Status: DISCONTINUED | OUTPATIENT
Start: 2018-01-01 | End: 2018-01-01

## 2018-01-01 RX ORDER — ACETAMINOPHEN 160 MG/5ML
650 SUSPENSION, ORAL (FINAL DOSE FORM) ORAL EVERY 6 HOURS PRN
Status: DISCONTINUED | OUTPATIENT
Start: 2018-01-01 | End: 2018-01-01

## 2018-01-01 RX ORDER — PROPOFOL 10 MG/ML
5-50 INJECTION, EMULSION INTRAVENOUS
Status: DISCONTINUED | OUTPATIENT
Start: 2018-01-01 | End: 2018-01-01

## 2018-01-01 RX ORDER — FOLIC ACID/VIT B COMPLEX AND C 0.8 MG
0.8 TABLET ORAL
Qty: 30 TABLET | Refills: 0 | Status: SHIPPED | OUTPATIENT
Start: 2018-01-01 | End: 2018-01-01 | Stop reason: HOSPADM

## 2018-01-01 RX ORDER — FENTANYL CITRATE 50 UG/ML
25 INJECTION, SOLUTION INTRAMUSCULAR; INTRAVENOUS ONCE
Status: COMPLETED | OUTPATIENT
Start: 2018-01-01 | End: 2018-01-01

## 2018-01-01 RX ORDER — ALBUMIN (HUMAN) 12.5 G/50ML
SOLUTION INTRAVENOUS
Status: DISPENSED
Start: 2018-01-01 | End: 2018-01-01

## 2018-01-01 RX ORDER — MINERAL OIL AND PETROLATUM 150; 830 MG/G; MG/G
OINTMENT OPHTHALMIC
Status: DISCONTINUED | OUTPATIENT
Start: 2018-01-01 | End: 2018-01-01

## 2018-01-01 RX ORDER — ALBUMIN (HUMAN) 12.5 G/50ML
50 SOLUTION INTRAVENOUS ONCE
Status: COMPLETED | OUTPATIENT
Start: 2018-01-01 | End: 2018-01-01

## 2018-01-01 RX ORDER — POLYETHYLENE GLYCOL 3350 17 G/17G
17 POWDER, FOR SOLUTION ORAL DAILY
Status: DISCONTINUED | OUTPATIENT
Start: 2018-01-01 | End: 2018-01-01

## 2018-01-01 RX ORDER — CHOLECALCIFEROL (VITAMIN D3) 10 MCG
1 TABLET ORAL
Status: DISCONTINUED | OUTPATIENT
Start: 2018-01-01 | End: 2018-01-01 | Stop reason: HOSPADM

## 2018-01-01 RX ORDER — ALBUMIN (HUMAN) 12.5 G/50ML
50 SOLUTION INTRAVENOUS ONCE
Status: DISCONTINUED | OUTPATIENT
Start: 2018-01-01 | End: 2018-01-01 | Stop reason: HOSPADM

## 2018-01-01 RX ORDER — LEVALBUTEROL 1.25 MG/.5ML
1.25 SOLUTION, CONCENTRATE RESPIRATORY (INHALATION)
Status: DISCONTINUED | OUTPATIENT
Start: 2018-01-01 | End: 2018-01-01

## 2018-01-01 RX ORDER — BISACODYL 10 MG
10 SUPPOSITORY, RECTAL RECTAL DAILY PRN
Status: DISCONTINUED | OUTPATIENT
Start: 2018-01-01 | End: 2018-01-01 | Stop reason: HOSPADM

## 2018-01-01 RX ORDER — IPRATROPIUM BROMIDE AND ALBUTEROL SULFATE 2.5; .5 MG/3ML; MG/3ML
3 SOLUTION RESPIRATORY (INHALATION)
Status: DISCONTINUED | OUTPATIENT
Start: 2018-01-01 | End: 2018-01-01

## 2018-01-01 RX ORDER — ALBUMIN, HUMAN INJ 5% 5 %
25 SOLUTION INTRAVENOUS ONCE
Status: COMPLETED | OUTPATIENT
Start: 2018-01-01 | End: 2018-01-01

## 2018-01-01 RX ORDER — METOPROLOL TARTRATE 5 MG/5ML
5 INJECTION INTRAVENOUS ONCE
Status: COMPLETED | OUTPATIENT
Start: 2018-01-01 | End: 2018-01-01

## 2018-01-01 RX ORDER — PROPOFOL 10 MG/ML
50 INJECTION, EMULSION INTRAVENOUS ONCE
Status: COMPLETED | OUTPATIENT
Start: 2018-01-01 | End: 2018-01-01

## 2018-01-01 RX ORDER — ACETAMINOPHEN 325 MG/1
650 TABLET ORAL EVERY 6 HOURS PRN
Status: DISCONTINUED | OUTPATIENT
Start: 2018-01-01 | End: 2018-01-01

## 2018-01-01 RX ORDER — PANTOPRAZOLE SODIUM 40 MG/1
40 INJECTION, POWDER, FOR SOLUTION INTRAVENOUS
Status: DISCONTINUED | OUTPATIENT
Start: 2018-01-01 | End: 2018-01-01

## 2018-01-01 RX ORDER — METOCLOPRAMIDE HYDROCHLORIDE 5 MG/ML
10 INJECTION INTRAMUSCULAR; INTRAVENOUS ONCE AS NEEDED
Status: DISCONTINUED | OUTPATIENT
Start: 2018-01-01 | End: 2018-01-01

## 2018-01-01 RX ORDER — AMOXICILLIN 250 MG
1 CAPSULE ORAL
Refills: 0
Start: 2018-01-01 | End: 2018-01-01 | Stop reason: HOSPADM

## 2018-01-01 RX ORDER — ACETAMINOPHEN 325 MG/1
650 TABLET ORAL ONCE
Status: DISCONTINUED | OUTPATIENT
Start: 2018-01-01 | End: 2018-01-01

## 2018-01-01 RX ORDER — AMOXICILLIN 250 MG
1 CAPSULE ORAL
Status: DISCONTINUED | OUTPATIENT
Start: 2018-01-01 | End: 2018-01-01 | Stop reason: HOSPADM

## 2018-01-01 RX ORDER — HEPARIN SODIUM 1000 [USP'U]/ML
1500 INJECTION, SOLUTION INTRAVENOUS; SUBCUTANEOUS AS NEEDED
Status: DISCONTINUED | OUTPATIENT
Start: 2018-01-01 | End: 2018-01-01 | Stop reason: CLARIF

## 2018-01-01 RX ORDER — ASPIRIN 325 MG
325 TABLET ORAL DAILY
Status: DISCONTINUED | OUTPATIENT
Start: 2018-01-01 | End: 2018-01-01 | Stop reason: HOSPADM

## 2018-01-01 RX ORDER — LIDOCAINE HYDROCHLORIDE 10 MG/ML
INJECTION, SOLUTION INFILTRATION; PERINEURAL AS NEEDED
Status: DISCONTINUED | OUTPATIENT
Start: 2018-01-01 | End: 2018-01-01 | Stop reason: SURG

## 2018-01-01 RX ORDER — MAGNESIUM SULFATE HEPTAHYDRATE 40 MG/ML
2 INJECTION, SOLUTION INTRAVENOUS ONCE
Status: COMPLETED | OUTPATIENT
Start: 2018-01-01 | End: 2018-01-01

## 2018-01-01 RX ORDER — ALBUMIN, HUMAN INJ 5% 5 %
SOLUTION INTRAVENOUS
Status: DISPENSED
Start: 2018-01-01 | End: 2018-01-01

## 2018-01-01 RX ORDER — MIDAZOLAM HYDROCHLORIDE 1 MG/ML
INJECTION INTRAMUSCULAR; INTRAVENOUS
Status: COMPLETED
Start: 2018-01-01 | End: 2018-01-01

## 2018-01-01 RX ORDER — ALBUTEROL SULFATE 2.5 MG/3ML
2.5 SOLUTION RESPIRATORY (INHALATION) EVERY 4 HOURS PRN
Status: DISCONTINUED | OUTPATIENT
Start: 2018-01-01 | End: 2018-01-01 | Stop reason: HOSPADM

## 2018-01-01 RX ORDER — MIDODRINE HYDROCHLORIDE 5 MG/1
10 TABLET ORAL
Status: DISCONTINUED | OUTPATIENT
Start: 2018-01-01 | End: 2018-01-01

## 2018-01-01 RX ORDER — AMLODIPINE BESYLATE 5 MG/1
5 TABLET ORAL DAILY
Status: DISCONTINUED | OUTPATIENT
Start: 2018-01-01 | End: 2018-01-01

## 2018-01-01 RX ORDER — MIDAZOLAM HYDROCHLORIDE 1 MG/ML
2 INJECTION INTRAMUSCULAR; INTRAVENOUS ONCE
Status: COMPLETED | OUTPATIENT
Start: 2018-01-01 | End: 2018-01-01

## 2018-01-01 RX ORDER — PANTOPRAZOLE SODIUM 20 MG/1
20 TABLET, DELAYED RELEASE ORAL DAILY
COMMUNITY
End: 2018-01-01 | Stop reason: HOSPADM

## 2018-01-01 RX ORDER — FENTANYL CITRATE 50 UG/ML
INJECTION, SOLUTION INTRAMUSCULAR; INTRAVENOUS
Status: COMPLETED
Start: 2018-01-01 | End: 2018-01-01

## 2018-01-01 RX ORDER — ETOMIDATE 2 MG/ML
10 INJECTION INTRAVENOUS ONCE
Status: COMPLETED | OUTPATIENT
Start: 2018-01-01 | End: 2018-01-01

## 2018-01-01 RX ORDER — PROPOFOL 10 MG/ML
INJECTION, EMULSION INTRAVENOUS AS NEEDED
Status: DISCONTINUED | OUTPATIENT
Start: 2018-01-01 | End: 2018-01-01 | Stop reason: SURG

## 2018-01-01 RX ORDER — MORPHINE SULFATE 4 MG/ML
4 INJECTION, SOLUTION INTRAMUSCULAR; INTRAVENOUS ONCE
Status: DISCONTINUED | OUTPATIENT
Start: 2018-01-01 | End: 2018-01-01

## 2018-01-01 RX ORDER — ASPIRIN 325 MG
325 TABLET ORAL DAILY
Qty: 14 TABLET | Refills: 0 | Status: SHIPPED | OUTPATIENT
Start: 2018-01-01 | End: 2018-01-01 | Stop reason: HOSPADM

## 2018-01-01 RX ORDER — VANCOMYCIN HYDROCHLORIDE 500 MG/100ML
10 INJECTION, SOLUTION INTRAVENOUS
Status: DISCONTINUED | OUTPATIENT
Start: 2018-01-01 | End: 2018-01-01

## 2018-01-01 RX ORDER — LORAZEPAM 2 MG/ML
1 INJECTION INTRAMUSCULAR
Status: DISCONTINUED | OUTPATIENT
Start: 2018-01-01 | End: 2018-01-01 | Stop reason: HOSPADM

## 2018-01-01 RX ORDER — BISACODYL 10 MG
10 SUPPOSITORY, RECTAL RECTAL DAILY
Status: DISCONTINUED | OUTPATIENT
Start: 2018-01-01 | End: 2018-01-01

## 2018-01-01 RX ORDER — SIMETHICONE 80 MG
80 TABLET,CHEWABLE ORAL 4 TIMES DAILY PRN
Status: DISCONTINUED | OUTPATIENT
Start: 2018-01-01 | End: 2018-01-01 | Stop reason: HOSPADM

## 2018-01-01 RX ORDER — POTASSIUM CHLORIDE 20MEQ/15ML
40 LIQUID (ML) ORAL ONCE
Status: COMPLETED | OUTPATIENT
Start: 2018-01-01 | End: 2018-01-01

## 2018-01-01 RX ORDER — ACETAMINOPHEN 325 MG/1
650 TABLET ORAL EVERY 8 HOURS PRN
Status: DISCONTINUED | OUTPATIENT
Start: 2018-01-01 | End: 2018-01-01 | Stop reason: HOSPADM

## 2018-01-01 RX ORDER — PROMETHAZINE HYDROCHLORIDE 25 MG/ML
25 INJECTION, SOLUTION INTRAMUSCULAR; INTRAVENOUS ONCE AS NEEDED
Status: DISCONTINUED | OUTPATIENT
Start: 2018-01-01 | End: 2018-01-01

## 2018-01-01 RX ORDER — OMEPRAZOLE 10 MG/1
10 CAPSULE, DELAYED RELEASE ORAL DAILY
Status: ON HOLD | COMMUNITY
End: 2018-01-01 | Stop reason: ALTCHOICE

## 2018-01-01 RX ORDER — CLOPIDOGREL BISULFATE 75 MG/1
75 TABLET ORAL DAILY
Status: DISCONTINUED | OUTPATIENT
Start: 2018-01-01 | End: 2018-01-01

## 2018-01-01 RX ORDER — OXYCODONE HCL 5 MG/5 ML
2.5 SOLUTION, ORAL ORAL EVERY 4 HOURS PRN
Status: DISCONTINUED | OUTPATIENT
Start: 2018-01-01 | End: 2018-01-01 | Stop reason: HOSPADM

## 2018-01-01 RX ORDER — MIDODRINE HYDROCHLORIDE 5 MG/1
5 TABLET ORAL ONCE
Status: COMPLETED | OUTPATIENT
Start: 2018-01-01 | End: 2018-01-01

## 2018-01-01 RX ORDER — ASPIRIN 81 MG/1
81 TABLET, CHEWABLE ORAL DAILY
Status: DISCONTINUED | OUTPATIENT
Start: 2018-01-01 | End: 2018-01-01

## 2018-01-01 RX ORDER — FENTANYL CITRATE 50 UG/ML
50 INJECTION, SOLUTION INTRAMUSCULAR; INTRAVENOUS
Status: DISCONTINUED | OUTPATIENT
Start: 2018-01-01 | End: 2018-01-01 | Stop reason: HOSPADM

## 2018-01-01 RX ORDER — VANCOMYCIN HYDROCHLORIDE 1 G/200ML
20 INJECTION, SOLUTION INTRAVENOUS ONCE
Status: COMPLETED | OUTPATIENT
Start: 2018-01-01 | End: 2018-01-01

## 2018-01-01 RX ORDER — ACETAMINOPHEN 160 MG/5ML
650 SUSPENSION, ORAL (FINAL DOSE FORM) ORAL ONCE
Status: COMPLETED | OUTPATIENT
Start: 2018-01-01 | End: 2018-01-01

## 2018-01-01 RX ORDER — ACETAMINOPHEN 325 MG/1
650 TABLET ORAL EVERY 4 HOURS PRN
Status: DISCONTINUED | OUTPATIENT
Start: 2018-01-01 | End: 2018-01-01 | Stop reason: HOSPADM

## 2018-01-01 RX ORDER — PROPOFOL 10 MG/ML
30 INJECTION, EMULSION INTRAVENOUS ONCE
Status: COMPLETED | OUTPATIENT
Start: 2018-01-01 | End: 2018-01-01

## 2018-01-01 RX ORDER — ACETAMINOPHEN 650 MG/1
650 SUPPOSITORY RECTAL EVERY 6 HOURS PRN
Status: DISCONTINUED | OUTPATIENT
Start: 2018-01-01 | End: 2018-01-01

## 2018-01-01 RX ORDER — POLYVINYL ALCOHOL 14 MG/ML
2 SOLUTION/ DROPS OPHTHALMIC EVERY 6 HOURS
Status: DISCONTINUED | OUTPATIENT
Start: 2018-01-01 | End: 2018-01-01

## 2018-01-01 RX ORDER — AMLODIPINE BESYLATE 10 MG/1
10 TABLET ORAL DAILY
Status: DISCONTINUED | OUTPATIENT
Start: 2018-01-01 | End: 2018-01-01

## 2018-01-01 RX ORDER — ACETAMINOPHEN 325 MG/1
650 TABLET ORAL ONCE
Status: COMPLETED | OUTPATIENT
Start: 2018-01-01 | End: 2018-01-01

## 2018-01-01 RX ORDER — SODIUM CHLORIDE 9 MG/ML
INJECTION, SOLUTION INTRAVENOUS CONTINUOUS PRN
Status: DISCONTINUED | OUTPATIENT
Start: 2018-01-01 | End: 2018-01-01 | Stop reason: SURG

## 2018-01-01 RX ORDER — ALBUMIN (HUMAN) 12.5 G/50ML
12.5 SOLUTION INTRAVENOUS ONCE
Status: DISCONTINUED | OUTPATIENT
Start: 2018-01-01 | End: 2018-01-01

## 2018-01-01 RX ORDER — DIPHENHYDRAMINE HYDROCHLORIDE 50 MG/ML
12.5 INJECTION INTRAMUSCULAR; INTRAVENOUS ONCE AS NEEDED
Status: DISCONTINUED | OUTPATIENT
Start: 2018-01-01 | End: 2018-01-01

## 2018-01-01 RX ORDER — SUCCINYLCHOLINE CHLORIDE 20 MG/ML
80 INJECTION INTRAMUSCULAR; INTRAVENOUS ONCE
Status: COMPLETED | OUTPATIENT
Start: 2018-01-01 | End: 2018-01-01

## 2018-01-01 RX ORDER — BISACODYL 10 MG
10 SUPPOSITORY, RECTAL RECTAL DAILY PRN
Qty: 12 SUPPOSITORY | Refills: 0 | Status: SHIPPED | OUTPATIENT
Start: 2018-01-01 | End: 2018-01-01 | Stop reason: HOSPADM

## 2018-01-01 RX ORDER — OXYCODONE HCL 5 MG/5 ML
2.5 SOLUTION, ORAL ORAL EVERY 4 HOURS PRN
Qty: 100 ML | Refills: 0 | Status: ON HOLD | OUTPATIENT
Start: 2018-01-01 | End: 2018-01-01

## 2018-01-01 RX ORDER — GLYCOPYRROLATE 0.2 MG/ML
0.1 INJECTION INTRAMUSCULAR; INTRAVENOUS ONCE
Status: COMPLETED | OUTPATIENT
Start: 2018-01-01 | End: 2018-01-01

## 2018-01-01 RX ORDER — MIDODRINE HYDROCHLORIDE 5 MG/1
5 TABLET ORAL
Status: DISCONTINUED | OUTPATIENT
Start: 2018-01-01 | End: 2018-01-01

## 2018-01-01 RX ORDER — MAGNESIUM HYDROXIDE 1200 MG/15ML
LIQUID ORAL AS NEEDED
Status: DISCONTINUED | OUTPATIENT
Start: 2018-01-01 | End: 2018-01-01 | Stop reason: HOSPADM

## 2018-01-01 RX ORDER — HEPARIN SODIUM 10000 [USP'U]/100ML
3-20 INJECTION, SOLUTION INTRAVENOUS
Status: DISCONTINUED | OUTPATIENT
Start: 2018-01-01 | End: 2018-01-01 | Stop reason: CLARIF

## 2018-01-01 RX ORDER — ATORVASTATIN CALCIUM 20 MG/1
20 TABLET, FILM COATED ORAL DAILY
Status: ON HOLD | COMMUNITY
End: 2018-01-01

## 2018-01-01 RX ORDER — METOPROLOL TARTRATE 5 MG/5ML
5 INJECTION INTRAVENOUS EVERY 6 HOURS PRN
Status: DISCONTINUED | OUTPATIENT
Start: 2018-01-01 | End: 2018-01-01

## 2018-01-01 RX ORDER — SUCCINYLCHOLINE CHLORIDE 20 MG/ML
INJECTION INTRAMUSCULAR; INTRAVENOUS AS NEEDED
Status: DISCONTINUED | OUTPATIENT
Start: 2018-01-01 | End: 2018-01-01 | Stop reason: SURG

## 2018-01-01 RX ORDER — DOPAMINE HYDROCHLORIDE 160 MG/100ML
1-20 INJECTION, SOLUTION INTRAVENOUS
Status: DISCONTINUED | OUTPATIENT
Start: 2018-01-01 | End: 2018-01-01

## 2018-01-01 RX ORDER — DOPAMINE HYDROCHLORIDE 160 MG/100ML
INJECTION, SOLUTION INTRAVENOUS
Status: DISCONTINUED
Start: 2018-01-01 | End: 2018-01-01 | Stop reason: HOSPADM

## 2018-01-01 RX ORDER — HYDRALAZINE HYDROCHLORIDE 10 MG/1
10 TABLET, FILM COATED ORAL EVERY 12 HOURS
Status: DISCONTINUED | OUTPATIENT
Start: 2018-01-01 | End: 2018-01-01

## 2018-01-01 RX ORDER — HEPARIN SODIUM 5000 [USP'U]/ML
5000 INJECTION, SOLUTION INTRAVENOUS; SUBCUTANEOUS EVERY 8 HOURS SCHEDULED
Status: DISCONTINUED | OUTPATIENT
Start: 2018-01-01 | End: 2018-01-01

## 2018-01-01 RX ORDER — CALCIUM CARBONATE 200(500)MG
1000 TABLET,CHEWABLE ORAL DAILY PRN
Status: DISCONTINUED | OUTPATIENT
Start: 2018-01-01 | End: 2018-01-01 | Stop reason: HOSPADM

## 2018-01-01 RX ORDER — MORPHINE SULFATE 4 MG/ML
4 INJECTION, SOLUTION INTRAMUSCULAR; INTRAVENOUS ONCE
Status: COMPLETED | OUTPATIENT
Start: 2018-01-01 | End: 2018-01-01

## 2018-01-01 RX ORDER — ATORVASTATIN CALCIUM 20 MG/1
20 TABLET, FILM COATED ORAL DAILY
Qty: 30 TABLET | Refills: 0 | Status: SHIPPED | OUTPATIENT
Start: 2018-01-01 | End: 2018-01-01 | Stop reason: HOSPADM

## 2018-01-01 RX ORDER — OXYCODONE HCL 5 MG/5 ML
2.5 SOLUTION, ORAL ORAL ONCE
Status: COMPLETED | OUTPATIENT
Start: 2018-01-01 | End: 2018-01-01

## 2018-01-01 RX ORDER — ATORVASTATIN CALCIUM 10 MG/1
20 TABLET, FILM COATED ORAL
Status: DISCONTINUED | OUTPATIENT
Start: 2018-01-01 | End: 2018-01-01

## 2018-01-01 RX ORDER — LEVALBUTEROL 1.25 MG/.5ML
1.25 SOLUTION, CONCENTRATE RESPIRATORY (INHALATION) EVERY 6 HOURS
Status: DISCONTINUED | OUTPATIENT
Start: 2018-01-01 | End: 2018-01-01

## 2018-01-01 RX ORDER — ALBUMIN (HUMAN) 12.5 G/50ML
SOLUTION INTRAVENOUS
Status: COMPLETED
Start: 2018-01-01 | End: 2018-01-01

## 2018-01-01 RX ORDER — ALBUMIN (HUMAN) 12.5 G/50ML
25 SOLUTION INTRAVENOUS ONCE
Status: COMPLETED | OUTPATIENT
Start: 2018-01-01 | End: 2018-01-01

## 2018-01-01 RX ORDER — VECURONIUM BROMIDE 1 MG/ML
10 INJECTION, POWDER, LYOPHILIZED, FOR SOLUTION INTRAVENOUS ONCE
Status: COMPLETED | OUTPATIENT
Start: 2018-01-01 | End: 2018-01-01

## 2018-01-01 RX ORDER — FENTANYL CITRATE 50 UG/ML
50 INJECTION, SOLUTION INTRAMUSCULAR; INTRAVENOUS EVERY 2 HOUR PRN
Status: DISCONTINUED | OUTPATIENT
Start: 2018-01-01 | End: 2018-01-01

## 2018-01-01 RX ORDER — MIDODRINE HYDROCHLORIDE 5 MG/1
15 TABLET ORAL
Status: DISCONTINUED | OUTPATIENT
Start: 2018-01-01 | End: 2018-01-01

## 2018-01-01 RX ORDER — HYDRALAZINE HYDROCHLORIDE 25 MG/1
25 TABLET, FILM COATED ORAL EVERY 12 HOURS
Status: DISCONTINUED | OUTPATIENT
Start: 2018-01-01 | End: 2018-01-01

## 2018-01-01 RX ORDER — METOPROLOL TARTRATE 5 MG/5ML
INJECTION INTRAVENOUS AS NEEDED
Status: DISCONTINUED | OUTPATIENT
Start: 2018-01-01 | End: 2018-01-01 | Stop reason: SURG

## 2018-01-01 RX ORDER — FENTANYL CITRATE/PF 50 MCG/ML
25 SYRINGE (ML) INJECTION AS NEEDED
Status: DISCONTINUED | OUTPATIENT
Start: 2018-01-01 | End: 2018-01-01

## 2018-01-01 RX ORDER — OMEPRAZOLE 20 MG/1
20 CAPSULE, DELAYED RELEASE ORAL DAILY
Qty: 30 CAPSULE | Refills: 0 | Status: SHIPPED | OUTPATIENT
Start: 2018-01-01 | End: 2018-01-01 | Stop reason: HOSPADM

## 2018-01-01 RX ORDER — CEFAZOLIN SODIUM 1 G/3ML
INJECTION, POWDER, FOR SOLUTION INTRAMUSCULAR; INTRAVENOUS AS NEEDED
Status: DISCONTINUED | OUTPATIENT
Start: 2018-01-01 | End: 2018-01-01 | Stop reason: SURG

## 2018-01-01 RX ORDER — PROPOFOL 10 MG/ML
INJECTION, EMULSION INTRAVENOUS
Status: COMPLETED
Start: 2018-01-01 | End: 2018-01-01

## 2018-01-01 RX ORDER — LOSARTAN POTASSIUM 50 MG/1
100 TABLET ORAL DAILY
Status: DISCONTINUED | OUTPATIENT
Start: 2018-01-01 | End: 2018-01-01

## 2018-01-01 RX ORDER — ALBUMIN (HUMAN) 12.5 G/50ML
12.5 SOLUTION INTRAVENOUS ONCE
Status: COMPLETED | OUTPATIENT
Start: 2018-01-01 | End: 2018-01-01

## 2018-01-01 RX ORDER — ONDANSETRON 2 MG/ML
INJECTION INTRAMUSCULAR; INTRAVENOUS AS NEEDED
Status: DISCONTINUED | OUTPATIENT
Start: 2018-01-01 | End: 2018-01-01 | Stop reason: SURG

## 2018-01-01 RX ORDER — POTASSIUM CHLORIDE 20MEQ/15ML
20 LIQUID (ML) ORAL ONCE
Status: COMPLETED | OUTPATIENT
Start: 2018-01-01 | End: 2018-01-01

## 2018-01-01 RX ADMIN — FENTANYL CITRATE 25 MCG: 50 INJECTION, SOLUTION INTRAMUSCULAR; INTRAVENOUS at 13:59

## 2018-01-01 RX ADMIN — INSULIN LISPRO 1 UNITS: 100 INJECTION, SOLUTION INTRAVENOUS; SUBCUTANEOUS at 17:33

## 2018-01-01 RX ADMIN — ACETAMINOPHEN 650 MG: 325 TABLET, FILM COATED ORAL at 11:28

## 2018-01-01 RX ADMIN — MIDODRINE HYDROCHLORIDE 10 MG: 5 TABLET ORAL at 08:00

## 2018-01-01 RX ADMIN — PANTOPRAZOLE SODIUM 20 MG: 20 TABLET, DELAYED RELEASE ORAL at 15:32

## 2018-01-01 RX ADMIN — ATORVASTATIN CALCIUM 20 MG: 20 TABLET, FILM COATED ORAL at 12:52

## 2018-01-01 RX ADMIN — IPRATROPIUM BROMIDE AND ALBUTEROL SULFATE 3 ML: .5; 3 SOLUTION RESPIRATORY (INHALATION) at 19:53

## 2018-01-01 RX ADMIN — INSULIN LISPRO 1 UNITS: 100 INJECTION, SOLUTION INTRAVENOUS; SUBCUTANEOUS at 14:09

## 2018-01-01 RX ADMIN — MIDODRINE HYDROCHLORIDE 15 MG: 5 TABLET ORAL at 07:07

## 2018-01-01 RX ADMIN — HEPARIN SODIUM 5000 UNITS: 5000 INJECTION, SOLUTION INTRAVENOUS; SUBCUTANEOUS at 14:00

## 2018-01-01 RX ADMIN — ASCORBIC ACID, THIAMINE MONONITRATE,RIBOFLAVIN, NIACINAMIDE, PYRIDOXINE HYDROCHLORIDE, FOLIC ACID, CYANOCOBALAMIN, BIOTIN, CALCIUM PANTOTHENATE, 1 CAPSULE: 100; 1.5; 1.7; 20; 10; 1; 6000; 150000; 5 CAPSULE, LIQUID FILLED ORAL at 15:58

## 2018-01-01 RX ADMIN — ATORVASTATIN CALCIUM 20 MG: 20 TABLET, FILM COATED ORAL at 10:24

## 2018-01-01 RX ADMIN — ACETAMINOPHEN 650 MG: 325 TABLET, FILM COATED ORAL at 21:15

## 2018-01-01 RX ADMIN — INSULIN LISPRO 1 UNITS: 100 INJECTION, SOLUTION INTRAVENOUS; SUBCUTANEOUS at 21:27

## 2018-01-01 RX ADMIN — INSULIN LISPRO 1 UNITS: 100 INJECTION, SOLUTION INTRAVENOUS; SUBCUTANEOUS at 06:11

## 2018-01-01 RX ADMIN — IODIXANOL 85 ML: 320 INJECTION, SOLUTION INTRAVASCULAR at 16:00

## 2018-01-01 RX ADMIN — SODIUM CHLORIDE: 0.9 INJECTION, SOLUTION INTRAVENOUS at 09:31

## 2018-01-01 RX ADMIN — LEVALBUTEROL HYDROCHLORIDE 1.25 MG: 1.25 SOLUTION, CONCENTRATE RESPIRATORY (INHALATION) at 14:04

## 2018-01-01 RX ADMIN — METOPROLOL TARTRATE 25 MG: 25 TABLET ORAL at 08:12

## 2018-01-01 RX ADMIN — METOPROLOL TARTRATE 12.5 MG: 25 TABLET ORAL at 08:40

## 2018-01-01 RX ADMIN — ACETAMINOPHEN 650 MG: 325 TABLET, FILM COATED ORAL at 10:24

## 2018-01-01 RX ADMIN — FENTANYL CITRATE 50 MCG: 50 INJECTION INTRAMUSCULAR; INTRAVENOUS at 05:16

## 2018-01-01 RX ADMIN — ACETAMINOPHEN 650 MG: 160 SUSPENSION ORAL at 01:48

## 2018-01-01 RX ADMIN — ACETAMINOPHEN 650 MG: 325 TABLET, FILM COATED ORAL at 15:11

## 2018-01-01 RX ADMIN — Medication 80 MG: at 01:54

## 2018-01-01 RX ADMIN — METOPROLOL TARTRATE 5 MG: 5 INJECTION, SOLUTION INTRAVENOUS at 21:56

## 2018-01-01 RX ADMIN — METOPROLOL TARTRATE 25 MG: 25 TABLET ORAL at 20:58

## 2018-01-01 RX ADMIN — PANTOPRAZOLE SODIUM 40 MG: 40 INJECTION, POWDER, FOR SOLUTION INTRAVENOUS at 08:43

## 2018-01-01 RX ADMIN — CHLORHEXIDINE GLUCONATE 15 ML: 1.2 RINSE ORAL at 21:19

## 2018-01-01 RX ADMIN — METOPROLOL TARTRATE 12.5 MG: 25 TABLET ORAL at 21:27

## 2018-01-01 RX ADMIN — IPRATROPIUM BROMIDE 0.5 MG: 0.5 SOLUTION RESPIRATORY (INHALATION) at 07:19

## 2018-01-01 RX ADMIN — OMEPRAZOLE MAGNESIUM 20 MG: 20 CAPSULE, DELAYED RELEASE ORAL at 08:14

## 2018-01-01 RX ADMIN — INSULIN LISPRO 1 UNITS: 100 INJECTION, SOLUTION INTRAVENOUS; SUBCUTANEOUS at 06:07

## 2018-01-01 RX ADMIN — ASPIRIN 325 MG: 325 TABLET ORAL at 09:10

## 2018-01-01 RX ADMIN — INSULIN LISPRO 1 UNITS: 100 INJECTION, SOLUTION INTRAVENOUS; SUBCUTANEOUS at 21:39

## 2018-01-01 RX ADMIN — ASPIRIN 81 MG 81 MG: 81 TABLET ORAL at 08:59

## 2018-01-01 RX ADMIN — INSULIN LISPRO 1 UNITS: 100 INJECTION, SOLUTION INTRAVENOUS; SUBCUTANEOUS at 13:25

## 2018-01-01 RX ADMIN — ATORVASTATIN CALCIUM 20 MG: 20 TABLET, FILM COATED ORAL at 09:10

## 2018-01-01 RX ADMIN — ACETAMINOPHEN 650 MG: 325 TABLET, FILM COATED ORAL at 21:21

## 2018-01-01 RX ADMIN — ATORVASTATIN CALCIUM 20 MG: 10 TABLET, FILM COATED ORAL at 16:01

## 2018-01-01 RX ADMIN — OXYCODONE HYDROCHLORIDE 2.5 MG: 5 SOLUTION ORAL at 13:29

## 2018-01-01 RX ADMIN — PROPOFOL 20 MCG/KG/MIN: 10 INJECTION, EMULSION INTRAVENOUS at 12:30

## 2018-01-01 RX ADMIN — MIDODRINE HYDROCHLORIDE 15 MG: 5 TABLET ORAL at 12:07

## 2018-01-01 RX ADMIN — OXYCODONE HYDROCHLORIDE 2.5 MG: 5 SOLUTION ORAL at 09:09

## 2018-01-01 RX ADMIN — METOPROLOL TARTRATE 25 MG: 25 TABLET ORAL at 10:39

## 2018-01-01 RX ADMIN — PROPOFOL 30 MG: 10 INJECTION, EMULSION INTRAVENOUS at 17:11

## 2018-01-01 RX ADMIN — IPRATROPIUM BROMIDE AND ALBUTEROL SULFATE 3 ML: .5; 3 SOLUTION RESPIRATORY (INHALATION) at 20:03

## 2018-01-01 RX ADMIN — ASPIRIN 325 MG: 325 TABLET ORAL at 15:48

## 2018-01-01 RX ADMIN — FENTANYL CITRATE 25 MCG: 50 INJECTION INTRAMUSCULAR; INTRAVENOUS at 13:29

## 2018-01-01 RX ADMIN — SODIUM CHLORIDE 75 ML/HR: 0.9 INJECTION, SOLUTION INTRAVENOUS at 06:00

## 2018-01-01 RX ADMIN — FENTANYL CITRATE 25 MCG: 50 INJECTION INTRAMUSCULAR; INTRAVENOUS at 13:40

## 2018-01-01 RX ADMIN — NOREPINEPHRINE BITARTRATE 5 MCG/MIN: 1 INJECTION INTRAVENOUS at 12:30

## 2018-01-01 RX ADMIN — OXYCODONE HYDROCHLORIDE 2.5 MG: 5 SOLUTION ORAL at 15:31

## 2018-01-01 RX ADMIN — ALBUMIN HUMAN 25 G: 0.05 INJECTION, SOLUTION INTRAVENOUS at 03:17

## 2018-01-01 RX ADMIN — INSULIN LISPRO 1 UNITS: 100 INJECTION, SOLUTION INTRAVENOUS; SUBCUTANEOUS at 12:24

## 2018-01-01 RX ADMIN — IPRATROPIUM BROMIDE 0.5 MG: 0.5 SOLUTION RESPIRATORY (INHALATION) at 07:54

## 2018-01-01 RX ADMIN — POTASSIUM CHLORIDE 20 MEQ: 20 SOLUTION ORAL at 08:43

## 2018-01-01 RX ADMIN — ATORVASTATIN CALCIUM 20 MG: 20 TABLET, FILM COATED ORAL at 12:17

## 2018-01-01 RX ADMIN — ASPIRIN 81 MG 81 MG: 81 TABLET ORAL at 09:44

## 2018-01-01 RX ADMIN — ASPIRIN 325 MG: 325 TABLET ORAL at 13:10

## 2018-01-01 RX ADMIN — ASPIRIN 81 MG 81 MG: 81 TABLET ORAL at 13:59

## 2018-01-01 RX ADMIN — ASCORBIC ACID, THIAMINE MONONITRATE,RIBOFLAVIN, NIACINAMIDE, PYRIDOXINE HYDROCHLORIDE, FOLIC ACID, CYANOCOBALAMIN, BIOTIN, CALCIUM PANTOTHENATE, 1 CAPSULE: 100; 1.5; 1.7; 20; 10; 1; 6000; 150000; 5 CAPSULE, LIQUID FILLED ORAL at 16:20

## 2018-01-01 RX ADMIN — HEPARIN SODIUM 5000 UNITS: 5000 INJECTION, SOLUTION INTRAVENOUS; SUBCUTANEOUS at 05:10

## 2018-01-01 RX ADMIN — ASCORBIC ACID, THIAMINE MONONITRATE,RIBOFLAVIN, NIACINAMIDE, PYRIDOXINE HYDROCHLORIDE, FOLIC ACID, CYANOCOBALAMIN, BIOTIN, CALCIUM PANTOTHENATE, 1 CAPSULE: 100; 1.5; 1.7; 20; 10; 1; 6000; 150000; 5 CAPSULE, LIQUID FILLED ORAL at 15:44

## 2018-01-01 RX ADMIN — IPRATROPIUM BROMIDE 0.5 MG: 0.5 SOLUTION RESPIRATORY (INHALATION) at 19:51

## 2018-01-01 RX ADMIN — LOSARTAN POTASSIUM 100 MG: 50 TABLET ORAL at 13:34

## 2018-01-01 RX ADMIN — Medication 1 TABLET: at 21:42

## 2018-01-01 RX ADMIN — INSULIN LISPRO 1 UNITS: 100 INJECTION, SOLUTION INTRAVENOUS; SUBCUTANEOUS at 21:20

## 2018-01-01 RX ADMIN — HYDROMORPHONE HYDROCHLORIDE 0.2 MG: 1 INJECTION, SOLUTION INTRAMUSCULAR; INTRAVENOUS; SUBCUTANEOUS at 09:47

## 2018-01-01 RX ADMIN — ATORVASTATIN CALCIUM 20 MG: 20 TABLET, FILM COATED ORAL at 13:29

## 2018-01-01 RX ADMIN — ASCORBIC ACID, THIAMINE MONONITRATE,RIBOFLAVIN, NIACINAMIDE, PYRIDOXINE HYDROCHLORIDE, FOLIC ACID, CYANOCOBALAMIN, BIOTIN, CALCIUM PANTOTHENATE, 1 CAPSULE: 100; 1.5; 1.7; 20; 10; 1; 6000; 150000; 5 CAPSULE, LIQUID FILLED ORAL at 21:14

## 2018-01-01 RX ADMIN — CHLORHEXIDINE GLUCONATE 15 ML: 1.2 RINSE ORAL at 21:20

## 2018-01-01 RX ADMIN — ATORVASTATIN CALCIUM 20 MG: 20 TABLET, FILM COATED ORAL at 08:30

## 2018-01-01 RX ADMIN — ASCORBIC ACID, THIAMINE MONONITRATE,RIBOFLAVIN, NIACINAMIDE, PYRIDOXINE HYDROCHLORIDE, FOLIC ACID, CYANOCOBALAMIN, BIOTIN, CALCIUM PANTOTHENATE, 1 CAPSULE: 100; 1.5; 1.7; 20; 10; 1; 6000; 150000; 5 CAPSULE, LIQUID FILLED ORAL at 17:15

## 2018-01-01 RX ADMIN — PANTOPRAZOLE SODIUM 40 MG: 40 INJECTION, POWDER, FOR SOLUTION INTRAVENOUS at 08:58

## 2018-01-01 RX ADMIN — OXYCODONE HYDROCHLORIDE 2.5 MG: 5 SOLUTION ORAL at 21:42

## 2018-01-01 RX ADMIN — INSULIN LISPRO 1 UNITS: 100 INJECTION, SOLUTION INTRAVENOUS; SUBCUTANEOUS at 22:04

## 2018-01-01 RX ADMIN — CHLORHEXIDINE GLUCONATE 15 ML: 1.2 RINSE ORAL at 08:24

## 2018-01-01 RX ADMIN — MIDODRINE HYDROCHLORIDE 15 MG: 5 TABLET ORAL at 08:00

## 2018-01-01 RX ADMIN — SUCCINYLCHOLINE CHLORIDE 80 MG: 20 INJECTION, SOLUTION INTRAMUSCULAR; INTRAVENOUS at 22:40

## 2018-01-01 RX ADMIN — METOPROLOL TARTRATE 5 MG: 5 INJECTION, SOLUTION INTRAVENOUS at 13:35

## 2018-01-01 RX ADMIN — ALBUMIN HUMAN 12.5 G: 0.25 SOLUTION INTRAVENOUS at 07:25

## 2018-01-01 RX ADMIN — IPRATROPIUM BROMIDE AND ALBUTEROL SULFATE 3 ML: .5; 3 SOLUTION RESPIRATORY (INHALATION) at 07:42

## 2018-01-01 RX ADMIN — INSULIN LISPRO 1 UNITS: 100 INJECTION, SOLUTION INTRAVENOUS; SUBCUTANEOUS at 07:40

## 2018-01-01 RX ADMIN — FENTANYL CITRATE 100 MCG: 50 INJECTION, SOLUTION INTRAMUSCULAR; INTRAVENOUS at 09:56

## 2018-01-01 RX ADMIN — MAGNESIUM SULFATE HEPTAHYDRATE 2 G: 40 INJECTION, SOLUTION INTRAVENOUS at 16:24

## 2018-01-01 RX ADMIN — CEFEPIME HYDROCHLORIDE 1000 MG: 1 INJECTION, SOLUTION INTRAVENOUS at 04:02

## 2018-01-01 RX ADMIN — Medication 1 TABLET: at 21:21

## 2018-01-01 RX ADMIN — IPRATROPIUM BROMIDE 0.5 MG: 0.5 SOLUTION RESPIRATORY (INHALATION) at 07:14

## 2018-01-01 RX ADMIN — IPRATROPIUM BROMIDE 0.5 MG: 0.5 SOLUTION RESPIRATORY (INHALATION) at 19:54

## 2018-01-01 RX ADMIN — ACETAMINOPHEN 650 MG: 325 TABLET, FILM COATED ORAL at 21:04

## 2018-01-01 RX ADMIN — ACETAMINOPHEN 650 MG: 325 TABLET, FILM COATED ORAL at 19:36

## 2018-01-01 RX ADMIN — LEVALBUTEROL HYDROCHLORIDE 1.25 MG: 1.25 SOLUTION, CONCENTRATE RESPIRATORY (INHALATION) at 13:53

## 2018-01-01 RX ADMIN — Medication 1 TABLET: at 21:07

## 2018-01-01 RX ADMIN — ACETAMINOPHEN 650 MG: 325 TABLET, FILM COATED ORAL at 09:45

## 2018-01-01 RX ADMIN — INSULIN LISPRO 1 UNITS: 100 INJECTION, SOLUTION INTRAVENOUS; SUBCUTANEOUS at 11:55

## 2018-01-01 RX ADMIN — IPRATROPIUM BROMIDE 0.5 MG: 0.5 SOLUTION RESPIRATORY (INHALATION) at 20:05

## 2018-01-01 RX ADMIN — INSULIN LISPRO 2 UNITS: 100 INJECTION, SOLUTION INTRAVENOUS; SUBCUTANEOUS at 08:14

## 2018-01-01 RX ADMIN — METOPROLOL TARTRATE 25 MG: 25 TABLET ORAL at 21:22

## 2018-01-01 RX ADMIN — ONDANSETRON 4 MG: 2 INJECTION INTRAMUSCULAR; INTRAVENOUS at 12:20

## 2018-01-01 RX ADMIN — METOPROLOL TARTRATE 12.5 MG: 25 TABLET ORAL at 10:00

## 2018-01-01 RX ADMIN — HEPARIN SODIUM 5000 UNITS: 5000 INJECTION, SOLUTION INTRAVENOUS; SUBCUTANEOUS at 23:00

## 2018-01-01 RX ADMIN — INSULIN LISPRO 2 UNITS: 100 INJECTION, SOLUTION INTRAVENOUS; SUBCUTANEOUS at 15:32

## 2018-01-01 RX ADMIN — VANCOMYCIN HYDROCHLORIDE 750 MG: 750 INJECTION, SOLUTION INTRAVENOUS at 11:01

## 2018-01-01 RX ADMIN — IBUPROFEN 400 MG: 100 SUSPENSION ORAL at 23:46

## 2018-01-01 RX ADMIN — METOPROLOL TARTRATE 12.5 MG: 25 TABLET ORAL at 22:33

## 2018-01-01 RX ADMIN — HEPARIN SODIUM 5000 UNITS: 5000 INJECTION, SOLUTION INTRAVENOUS; SUBCUTANEOUS at 15:00

## 2018-01-01 RX ADMIN — MIDODRINE HYDROCHLORIDE 15 MG: 5 TABLET ORAL at 11:19

## 2018-01-01 RX ADMIN — LIDOCAINE HYDROCHLORIDE 50 MG: 10 INJECTION, SOLUTION INFILTRATION; PERINEURAL at 09:56

## 2018-01-01 RX ADMIN — IPRATROPIUM BROMIDE 0.5 MG: 0.5 SOLUTION RESPIRATORY (INHALATION) at 13:13

## 2018-01-01 RX ADMIN — INSULIN LISPRO 1 UNITS: 100 INJECTION, SOLUTION INTRAVENOUS; SUBCUTANEOUS at 07:47

## 2018-01-01 RX ADMIN — IBUPROFEN 400 MG: 100 SUSPENSION ORAL at 13:13

## 2018-01-01 RX ADMIN — IPRATROPIUM BROMIDE 0.5 MG: 0.5 SOLUTION RESPIRATORY (INHALATION) at 19:59

## 2018-01-01 RX ADMIN — EPOETIN ALFA 6000 UNITS: 3000 SOLUTION INTRAVENOUS; SUBCUTANEOUS at 10:30

## 2018-01-01 RX ADMIN — METOPROLOL TARTRATE 25 MG: 25 TABLET ORAL at 21:01

## 2018-01-01 RX ADMIN — INSULIN LISPRO 1 UNITS: 100 INJECTION, SOLUTION INTRAVENOUS; SUBCUTANEOUS at 17:39

## 2018-01-01 RX ADMIN — ASPIRIN 325 MG: 325 TABLET ORAL at 08:30

## 2018-01-01 RX ADMIN — ASCORBIC ACID, THIAMINE MONONITRATE,RIBOFLAVIN, NIACINAMIDE, PYRIDOXINE HYDROCHLORIDE, FOLIC ACID, CYANOCOBALAMIN, BIOTIN, CALCIUM PANTOTHENATE, 1 CAPSULE: 100; 1.5; 1.7; 20; 10; 1; 6000; 150000; 5 CAPSULE, LIQUID FILLED ORAL at 15:30

## 2018-01-01 RX ADMIN — ASPIRIN 325 MG: 325 TABLET ORAL at 12:51

## 2018-01-01 RX ADMIN — LOSARTAN POTASSIUM 100 MG: 50 TABLET ORAL at 08:29

## 2018-01-01 RX ADMIN — MIDODRINE HYDROCHLORIDE 15 MG: 5 TABLET ORAL at 06:03

## 2018-01-01 RX ADMIN — METOPROLOL TARTRATE 25 MG: 25 TABLET ORAL at 21:06

## 2018-01-01 RX ADMIN — OMEPRAZOLE MAGNESIUM 20 MG: 20 CAPSULE, DELAYED RELEASE ORAL at 09:12

## 2018-01-01 RX ADMIN — ATORVASTATIN CALCIUM 20 MG: 20 TABLET, FILM COATED ORAL at 10:06

## 2018-01-01 RX ADMIN — INSULIN LISPRO 2 UNITS: 100 INJECTION, SOLUTION INTRAVENOUS; SUBCUTANEOUS at 12:28

## 2018-01-01 RX ADMIN — EPOETIN ALFA 3000 UNITS: 3000 SOLUTION INTRAVENOUS; SUBCUTANEOUS at 11:41

## 2018-01-01 RX ADMIN — INSULIN LISPRO 1 UNITS: 100 INJECTION, SOLUTION INTRAVENOUS; SUBCUTANEOUS at 08:35

## 2018-01-01 RX ADMIN — ASPIRIN 325 MG: 325 TABLET ORAL at 09:04

## 2018-01-01 RX ADMIN — OXYCODONE HYDROCHLORIDE 2.5 MG: 5 SOLUTION ORAL at 00:34

## 2018-01-01 RX ADMIN — OMEPRAZOLE MAGNESIUM 20 MG: 20 CAPSULE, DELAYED RELEASE ORAL at 12:32

## 2018-01-01 RX ADMIN — FENTANYL CITRATE 100 MCG: 50 INJECTION INTRAMUSCULAR; INTRAVENOUS at 22:39

## 2018-01-01 RX ADMIN — HEPARIN SODIUM 5000 UNITS: 5000 INJECTION, SOLUTION INTRAVENOUS; SUBCUTANEOUS at 21:16

## 2018-01-01 RX ADMIN — INSULIN LISPRO 1 UNITS: 100 INJECTION, SOLUTION INTRAVENOUS; SUBCUTANEOUS at 01:45

## 2018-01-01 RX ADMIN — INSULIN LISPRO 1 UNITS: 100 INJECTION, SOLUTION INTRAVENOUS; SUBCUTANEOUS at 18:08

## 2018-01-01 RX ADMIN — CHLORHEXIDINE GLUCONATE 15 ML: 1.2 RINSE ORAL at 08:40

## 2018-01-01 RX ADMIN — HYDRALAZINE HYDROCHLORIDE 25 MG: 25 TABLET, FILM COATED ORAL at 20:47

## 2018-01-01 RX ADMIN — CHLORHEXIDINE GLUCONATE 15 ML: 1.2 RINSE ORAL at 21:16

## 2018-01-01 RX ADMIN — ASCORBIC ACID, THIAMINE MONONITRATE,RIBOFLAVIN, NIACINAMIDE, PYRIDOXINE HYDROCHLORIDE, FOLIC ACID, CYANOCOBALAMIN, BIOTIN, CALCIUM PANTOTHENATE, 1 CAPSULE: 100; 1.5; 1.7; 20; 10; 1; 6000; 150000; 5 CAPSULE, LIQUID FILLED ORAL at 16:54

## 2018-01-01 RX ADMIN — ATORVASTATIN CALCIUM 20 MG: 10 TABLET, FILM COATED ORAL at 17:02

## 2018-01-01 RX ADMIN — HYDRALAZINE HYDROCHLORIDE 25 MG: 25 TABLET, FILM COATED ORAL at 21:55

## 2018-01-01 RX ADMIN — ATORVASTATIN CALCIUM 20 MG: 10 TABLET, FILM COATED ORAL at 16:45

## 2018-01-01 RX ADMIN — HYDROMORPHONE HYDROCHLORIDE 0.2 MG: 1 INJECTION, SOLUTION INTRAMUSCULAR; INTRAVENOUS; SUBCUTANEOUS at 20:47

## 2018-01-01 RX ADMIN — PANTOPRAZOLE SODIUM 20 MG: 20 TABLET, DELAYED RELEASE ORAL at 17:57

## 2018-01-01 RX ADMIN — MIDODRINE HYDROCHLORIDE 15 MG: 5 TABLET ORAL at 16:00

## 2018-01-01 RX ADMIN — SODIUM CHLORIDE 0.5 UNITS/HR: 9 INJECTION, SOLUTION INTRAVENOUS at 10:11

## 2018-01-01 RX ADMIN — IPRATROPIUM BROMIDE 0.5 MG: 0.5 SOLUTION RESPIRATORY (INHALATION) at 13:53

## 2018-01-01 RX ADMIN — ASPIRIN 325 MG: 325 TABLET ORAL at 08:29

## 2018-01-01 RX ADMIN — HEPARIN SODIUM 5000 UNITS: 5000 INJECTION, SOLUTION INTRAVENOUS; SUBCUTANEOUS at 05:17

## 2018-01-01 RX ADMIN — FENTANYL CITRATE 25 MCG: 50 INJECTION INTRAMUSCULAR; INTRAVENOUS at 13:35

## 2018-01-01 RX ADMIN — IPRATROPIUM BROMIDE AND ALBUTEROL SULFATE 3 ML: .5; 3 SOLUTION RESPIRATORY (INHALATION) at 19:47

## 2018-01-01 RX ADMIN — EPOETIN ALFA 10000 UNITS: 10000 SOLUTION INTRAVENOUS; SUBCUTANEOUS at 13:57

## 2018-01-01 RX ADMIN — INSULIN LISPRO 1 UNITS: 100 INJECTION, SOLUTION INTRAVENOUS; SUBCUTANEOUS at 17:16

## 2018-01-01 RX ADMIN — FENTANYL CITRATE 50 MCG: 50 INJECTION INTRAMUSCULAR; INTRAVENOUS at 20:30

## 2018-01-01 RX ADMIN — OMEPRAZOLE MAGNESIUM 20 MG: 20 CAPSULE, DELAYED RELEASE ORAL at 13:32

## 2018-01-01 RX ADMIN — INSULIN LISPRO 2 UNITS: 100 INJECTION, SOLUTION INTRAVENOUS; SUBCUTANEOUS at 14:05

## 2018-01-01 RX ADMIN — ASCORBIC ACID, THIAMINE MONONITRATE,RIBOFLAVIN, NIACINAMIDE, PYRIDOXINE HYDROCHLORIDE, FOLIC ACID, CYANOCOBALAMIN, BIOTIN, CALCIUM PANTOTHENATE, 1 CAPSULE: 100; 1.5; 1.7; 20; 10; 1; 6000; 150000; 5 CAPSULE, LIQUID FILLED ORAL at 16:36

## 2018-01-01 RX ADMIN — CHLORHEXIDINE GLUCONATE 15 ML: 1.2 RINSE ORAL at 22:34

## 2018-01-01 RX ADMIN — METOPROLOL TARTRATE 2.5 MG: 5 INJECTION INTRAVENOUS at 12:11

## 2018-01-01 RX ADMIN — METOPROLOL TARTRATE 12.5 MG: 25 TABLET ORAL at 21:16

## 2018-01-01 RX ADMIN — EPOETIN ALFA 3000 UNITS: 3000 SOLUTION INTRAVENOUS; SUBCUTANEOUS at 11:50

## 2018-01-01 RX ADMIN — LEVALBUTEROL HYDROCHLORIDE 1.25 MG: 1.25 SOLUTION, CONCENTRATE RESPIRATORY (INHALATION) at 08:17

## 2018-01-01 RX ADMIN — PANTOPRAZOLE SODIUM 40 MG: 40 INJECTION, POWDER, FOR SOLUTION INTRAVENOUS at 09:44

## 2018-01-01 RX ADMIN — METOPROLOL TARTRATE 12.5 MG: 25 TABLET ORAL at 12:27

## 2018-01-01 RX ADMIN — ATORVASTATIN CALCIUM 20 MG: 20 TABLET, FILM COATED ORAL at 08:13

## 2018-01-01 RX ADMIN — PROPOFOL 200 MG: 10 INJECTION, EMULSION INTRAVENOUS at 22:38

## 2018-01-01 RX ADMIN — OMEPRAZOLE MAGNESIUM 20 MG: 20 CAPSULE, DELAYED RELEASE ORAL at 09:23

## 2018-01-01 RX ADMIN — LEVALBUTEROL HYDROCHLORIDE 1.25 MG: 1.25 SOLUTION, CONCENTRATE RESPIRATORY (INHALATION) at 19:50

## 2018-01-01 RX ADMIN — LEVALBUTEROL HYDROCHLORIDE 1.25 MG: 1.25 SOLUTION, CONCENTRATE RESPIRATORY (INHALATION) at 19:59

## 2018-01-01 RX ADMIN — ASPIRIN 325 MG: 325 TABLET ORAL at 13:24

## 2018-01-01 RX ADMIN — ACETAMINOPHEN 650 MG: 325 TABLET, FILM COATED ORAL at 16:44

## 2018-01-01 RX ADMIN — CEFAZOLIN SODIUM 1000 MG: 1 SOLUTION INTRAVENOUS at 10:10

## 2018-01-01 RX ADMIN — CEFAZOLIN SODIUM 1000 MG: 1 SOLUTION INTRAVENOUS at 00:04

## 2018-01-01 RX ADMIN — INSULIN LISPRO 1 UNITS: 100 INJECTION, SOLUTION INTRAVENOUS; SUBCUTANEOUS at 17:51

## 2018-01-01 RX ADMIN — LEVALBUTEROL HYDROCHLORIDE 1.25 MG: 1.25 SOLUTION, CONCENTRATE RESPIRATORY (INHALATION) at 07:18

## 2018-01-01 RX ADMIN — IPRATROPIUM BROMIDE 0.5 MG: 0.5 SOLUTION RESPIRATORY (INHALATION) at 14:04

## 2018-01-01 RX ADMIN — INSULIN LISPRO 2 UNITS: 100 INJECTION, SOLUTION INTRAVENOUS; SUBCUTANEOUS at 16:49

## 2018-01-01 RX ADMIN — INSULIN LISPRO 2 UNITS: 100 INJECTION, SOLUTION INTRAVENOUS; SUBCUTANEOUS at 22:00

## 2018-01-01 RX ADMIN — CEFEPIME HYDROCHLORIDE 1000 MG: 1 INJECTION, SOLUTION INTRAVENOUS at 00:33

## 2018-01-01 RX ADMIN — PANTOPRAZOLE SODIUM 40 MG: 40 INJECTION, POWDER, FOR SOLUTION INTRAVENOUS at 12:15

## 2018-01-01 RX ADMIN — MIDODRINE HYDROCHLORIDE 15 MG: 5 TABLET ORAL at 16:45

## 2018-01-01 RX ADMIN — FENTANYL CITRATE 50 MCG: 50 INJECTION INTRAMUSCULAR; INTRAVENOUS at 04:41

## 2018-01-01 RX ADMIN — IOHEXOL 85 ML: 350 INJECTION, SOLUTION INTRAVENOUS at 15:33

## 2018-01-01 RX ADMIN — CHLORHEXIDINE GLUCONATE 15 ML: 1.2 RINSE ORAL at 20:02

## 2018-01-01 RX ADMIN — MIDODRINE HYDROCHLORIDE 15 MG: 5 TABLET ORAL at 17:00

## 2018-01-01 RX ADMIN — CEFEPIME HYDROCHLORIDE 1000 MG: 1 INJECTION, SOLUTION INTRAVENOUS at 05:19

## 2018-01-01 RX ADMIN — Medication 1 TABLET: at 21:00

## 2018-01-01 RX ADMIN — POLYVINYL ALCOHOL 2 DROP: 14 SOLUTION/ DROPS OPHTHALMIC at 05:52

## 2018-01-01 RX ADMIN — POLYVINYL ALCOHOL 2 DROP: 14 SOLUTION/ DROPS OPHTHALMIC at 18:00

## 2018-01-01 RX ADMIN — CEFEPIME HYDROCHLORIDE 1000 MG: 1 INJECTION, SOLUTION INTRAVENOUS at 04:21

## 2018-01-01 RX ADMIN — METOPROLOL TARTRATE 25 MG: 25 TABLET ORAL at 21:39

## 2018-01-01 RX ADMIN — POTASSIUM CHLORIDE 40 MEQ: 20 SOLUTION ORAL at 10:08

## 2018-01-01 RX ADMIN — FENTANYL CITRATE 50 MCG: 50 INJECTION INTRAMUSCULAR; INTRAVENOUS at 12:45

## 2018-01-01 RX ADMIN — IBUPROFEN 400 MG: 100 SUSPENSION ORAL at 04:11

## 2018-01-01 RX ADMIN — LEVALBUTEROL HYDROCHLORIDE 1.25 MG: 1.25 SOLUTION, CONCENTRATE RESPIRATORY (INHALATION) at 20:19

## 2018-01-01 RX ADMIN — INSULIN LISPRO 1 UNITS: 100 INJECTION, SOLUTION INTRAVENOUS; SUBCUTANEOUS at 21:28

## 2018-01-01 RX ADMIN — Medication 1 TABLET: at 21:27

## 2018-01-01 RX ADMIN — EPOETIN ALFA 10000 UNITS: 10000 SOLUTION INTRAVENOUS; SUBCUTANEOUS at 15:07

## 2018-01-01 RX ADMIN — CHLORHEXIDINE GLUCONATE 15 ML: 1.2 RINSE ORAL at 21:13

## 2018-01-01 RX ADMIN — Medication 1 TABLET: at 21:16

## 2018-01-01 RX ADMIN — MIDODRINE HYDROCHLORIDE 10 MG: 5 TABLET ORAL at 11:45

## 2018-01-01 RX ADMIN — POLYVINYL ALCOHOL 2 DROP: 14 SOLUTION/ DROPS OPHTHALMIC at 00:01

## 2018-01-01 RX ADMIN — PANTOPRAZOLE SODIUM 20 MG: 20 TABLET, DELAYED RELEASE ORAL at 08:30

## 2018-01-01 RX ADMIN — IPRATROPIUM BROMIDE 0.5 MG: 0.5 SOLUTION RESPIRATORY (INHALATION) at 01:16

## 2018-01-01 RX ADMIN — LEVALBUTEROL HYDROCHLORIDE 1.25 MG: 1.25 SOLUTION, CONCENTRATE RESPIRATORY (INHALATION) at 14:16

## 2018-01-01 RX ADMIN — ATORVASTATIN CALCIUM 20 MG: 20 TABLET, FILM COATED ORAL at 09:26

## 2018-01-01 RX ADMIN — IPRATROPIUM BROMIDE AND ALBUTEROL SULFATE 3 ML: .5; 3 SOLUTION RESPIRATORY (INHALATION) at 20:23

## 2018-01-01 RX ADMIN — ASPIRIN 325 MG: 325 TABLET ORAL at 09:26

## 2018-01-01 RX ADMIN — HEPARIN SODIUM 5000 UNITS: 5000 INJECTION, SOLUTION INTRAVENOUS; SUBCUTANEOUS at 17:02

## 2018-01-01 RX ADMIN — LEVALBUTEROL HYDROCHLORIDE 1.25 MG: 1.25 SOLUTION, CONCENTRATE RESPIRATORY (INHALATION) at 07:19

## 2018-01-01 RX ADMIN — CHLORHEXIDINE GLUCONATE 15 ML: 1.2 RINSE ORAL at 20:45

## 2018-01-01 RX ADMIN — GLYCOPYRROLATE 0.1 MG: 0.2 INJECTION, SOLUTION INTRAMUSCULAR; INTRAVENOUS at 07:59

## 2018-01-01 RX ADMIN — OMEPRAZOLE MAGNESIUM 20 MG: 20 CAPSULE, DELAYED RELEASE ORAL at 16:42

## 2018-01-01 RX ADMIN — ACETAMINOPHEN 650 MG: 325 TABLET, FILM COATED ORAL at 22:05

## 2018-01-01 RX ADMIN — CEFAZOLIN SODIUM 1000 MG: 1 INJECTION, POWDER, FOR SOLUTION INTRAMUSCULAR; INTRAVENOUS at 10:15

## 2018-01-01 RX ADMIN — HEPARIN SODIUM 5000 UNITS: 5000 INJECTION, SOLUTION INTRAVENOUS; SUBCUTANEOUS at 05:51

## 2018-01-01 RX ADMIN — SODIUM CHLORIDE 1000 ML: 0.9 INJECTION, SOLUTION INTRAVENOUS at 20:30

## 2018-01-01 RX ADMIN — EPOETIN ALFA 10000 UNITS: 10000 SOLUTION INTRAVENOUS; SUBCUTANEOUS at 12:25

## 2018-01-01 RX ADMIN — MIDODRINE HYDROCHLORIDE 15 MG: 5 TABLET ORAL at 12:00

## 2018-01-01 RX ADMIN — Medication 8 MG/HR: at 09:13

## 2018-01-01 RX ADMIN — HYDROMORPHONE HYDROCHLORIDE 0.2 MG: 1 INJECTION, SOLUTION INTRAMUSCULAR; INTRAVENOUS; SUBCUTANEOUS at 23:36

## 2018-01-01 RX ADMIN — INSULIN LISPRO 1 UNITS: 100 INJECTION, SOLUTION INTRAVENOUS; SUBCUTANEOUS at 16:42

## 2018-01-01 RX ADMIN — METOPROLOL TARTRATE 25 MG: 25 TABLET ORAL at 13:29

## 2018-01-01 RX ADMIN — ASPIRIN 325 MG: 325 TABLET ORAL at 12:17

## 2018-01-01 RX ADMIN — VANCOMYCIN HYDROCHLORIDE 500 MG: 500 INJECTION, SOLUTION INTRAVENOUS at 15:32

## 2018-01-01 RX ADMIN — INSULIN LISPRO 1 UNITS: 100 INJECTION, SOLUTION INTRAVENOUS; SUBCUTANEOUS at 16:37

## 2018-01-01 RX ADMIN — OMEPRAZOLE MAGNESIUM 20 MG: 20 CAPSULE, DELAYED RELEASE ORAL at 13:14

## 2018-01-01 RX ADMIN — ATORVASTATIN CALCIUM 20 MG: 10 TABLET, FILM COATED ORAL at 15:35

## 2018-01-01 RX ADMIN — ATORVASTATIN CALCIUM 20 MG: 20 TABLET, FILM COATED ORAL at 13:24

## 2018-01-01 RX ADMIN — INSULIN LISPRO 2 UNITS: 100 INJECTION, SOLUTION INTRAVENOUS; SUBCUTANEOUS at 11:53

## 2018-01-01 RX ADMIN — OXYCODONE HYDROCHLORIDE 2.5 MG: 5 SOLUTION ORAL at 03:11

## 2018-01-01 RX ADMIN — INSULIN LISPRO 1 UNITS: 100 INJECTION, SOLUTION INTRAVENOUS; SUBCUTANEOUS at 15:56

## 2018-01-01 RX ADMIN — VANCOMYCIN HYDROCHLORIDE 1000 MG: 1 INJECTION, SOLUTION INTRAVENOUS at 02:52

## 2018-01-01 RX ADMIN — ACETAMINOPHEN 650 MG: 325 TABLET, FILM COATED ORAL at 12:03

## 2018-01-01 RX ADMIN — HEPARIN SODIUM 5000 UNITS: 5000 INJECTION, SOLUTION INTRAVENOUS; SUBCUTANEOUS at 05:36

## 2018-01-01 RX ADMIN — LEVALBUTEROL HYDROCHLORIDE 1.25 MG: 1.25 SOLUTION, CONCENTRATE RESPIRATORY (INHALATION) at 01:16

## 2018-01-01 RX ADMIN — MIDAZOLAM HYDROCHLORIDE 2 MG: 1 INJECTION INTRAMUSCULAR; INTRAVENOUS at 22:30

## 2018-01-01 RX ADMIN — AMLODIPINE BESYLATE 10 MG: 10 TABLET ORAL at 13:33

## 2018-01-01 RX ADMIN — INSULIN LISPRO 1 UNITS: 100 INJECTION, SOLUTION INTRAVENOUS; SUBCUTANEOUS at 09:10

## 2018-01-01 RX ADMIN — HYDROMORPHONE HYDROCHLORIDE 0.5 MG: 1 INJECTION, SOLUTION INTRAMUSCULAR; INTRAVENOUS; SUBCUTANEOUS at 20:00

## 2018-01-01 RX ADMIN — IPRATROPIUM BROMIDE AND ALBUTEROL SULFATE 3 ML: .5; 3 SOLUTION RESPIRATORY (INHALATION) at 07:27

## 2018-01-01 RX ADMIN — EPOETIN ALFA 3000 UNITS: 3000 SOLUTION INTRAVENOUS; SUBCUTANEOUS at 09:12

## 2018-01-01 RX ADMIN — BISACODYL 10 MG: 10 SUPPOSITORY RECTAL at 08:43

## 2018-01-01 RX ADMIN — MIDODRINE HYDROCHLORIDE 10 MG: 5 TABLET ORAL at 17:28

## 2018-01-01 RX ADMIN — VANCOMYCIN HYDROCHLORIDE 500 MG: 500 INJECTION, SOLUTION INTRAVENOUS at 16:13

## 2018-01-01 RX ADMIN — METOPROLOL TARTRATE 25 MG: 25 TABLET ORAL at 21:42

## 2018-01-01 RX ADMIN — HYDROMORPHONE HYDROCHLORIDE 0.2 MG: 1 INJECTION, SOLUTION INTRAMUSCULAR; INTRAVENOUS; SUBCUTANEOUS at 06:36

## 2018-01-01 RX ADMIN — INSULIN LISPRO 2 UNITS: 100 INJECTION, SOLUTION INTRAVENOUS; SUBCUTANEOUS at 06:29

## 2018-01-01 RX ADMIN — IPRATROPIUM BROMIDE 0.5 MG: 0.5 SOLUTION RESPIRATORY (INHALATION) at 01:29

## 2018-01-01 RX ADMIN — ACETAMINOPHEN 650 MG: 650 SUPPOSITORY RECTAL at 07:41

## 2018-01-01 RX ADMIN — ASPIRIN 325 MG: 325 TABLET ORAL at 13:32

## 2018-01-01 RX ADMIN — INSULIN LISPRO 1 UNITS: 100 INJECTION, SOLUTION INTRAVENOUS; SUBCUTANEOUS at 08:31

## 2018-01-01 RX ADMIN — HYDROMORPHONE HYDROCHLORIDE 0.2 MG: 1 INJECTION, SOLUTION INTRAMUSCULAR; INTRAVENOUS; SUBCUTANEOUS at 04:02

## 2018-01-01 RX ADMIN — ASPIRIN 325 MG: 325 TABLET ORAL at 09:27

## 2018-01-01 RX ADMIN — ATORVASTATIN CALCIUM 20 MG: 10 TABLET, FILM COATED ORAL at 16:43

## 2018-01-01 RX ADMIN — ALBUMIN HUMAN 25 G: 0.25 SOLUTION INTRAVENOUS at 01:00

## 2018-01-01 RX ADMIN — ATORVASTATIN CALCIUM 20 MG: 20 TABLET, FILM COATED ORAL at 15:31

## 2018-01-01 RX ADMIN — CHLORHEXIDINE GLUCONATE 15 ML: 1.2 RINSE ORAL at 08:43

## 2018-01-01 RX ADMIN — MIDAZOLAM HYDROCHLORIDE 1 MG/HR: 5 INJECTION, SOLUTION INTRAMUSCULAR; INTRAVENOUS at 08:17

## 2018-01-01 RX ADMIN — INSULIN LISPRO 1 UNITS: 100 INJECTION, SOLUTION INTRAVENOUS; SUBCUTANEOUS at 23:10

## 2018-01-01 RX ADMIN — OXYCODONE HYDROCHLORIDE 2.5 MG: 5 SOLUTION ORAL at 15:32

## 2018-01-01 RX ADMIN — PANTOPRAZOLE SODIUM 20 MG: 20 TABLET, DELAYED RELEASE ORAL at 16:53

## 2018-01-01 RX ADMIN — HYDROMORPHONE HYDROCHLORIDE 0.2 MG: 1 INJECTION, SOLUTION INTRAMUSCULAR; INTRAVENOUS; SUBCUTANEOUS at 14:37

## 2018-01-01 RX ADMIN — Medication 1 TABLET: at 22:45

## 2018-01-01 RX ADMIN — HYDROMORPHONE HYDROCHLORIDE 0.2 MG: 1 INJECTION, SOLUTION INTRAMUSCULAR; INTRAVENOUS; SUBCUTANEOUS at 15:10

## 2018-01-01 RX ADMIN — MAGNESIUM SULFATE HEPTAHYDRATE 2 G: 40 INJECTION, SOLUTION INTRAVENOUS at 15:00

## 2018-01-01 RX ADMIN — FENTANYL CITRATE 50 MCG: 50 INJECTION INTRAMUSCULAR; INTRAVENOUS at 01:24

## 2018-01-01 RX ADMIN — DOPAMINE HYDROCHLORIDE IN DEXTROSE 15 MCG/KG/MIN: 1.6 INJECTION, SOLUTION INTRAVENOUS at 07:29

## 2018-01-01 RX ADMIN — ATORVASTATIN CALCIUM 20 MG: 20 TABLET, FILM COATED ORAL at 08:35

## 2018-01-01 RX ADMIN — Medication 1 TABLET: at 21:22

## 2018-01-01 RX ADMIN — INSULIN LISPRO 1 UNITS: 100 INJECTION, SOLUTION INTRAVENOUS; SUBCUTANEOUS at 16:21

## 2018-01-01 RX ADMIN — IPRATROPIUM BROMIDE 0.5 MG: 0.5 SOLUTION RESPIRATORY (INHALATION) at 20:14

## 2018-01-01 RX ADMIN — EPOETIN ALFA 10000 UNITS: 10000 SOLUTION INTRAVENOUS; SUBCUTANEOUS at 12:04

## 2018-01-01 RX ADMIN — MIDODRINE HYDROCHLORIDE 15 MG: 5 TABLET ORAL at 15:35

## 2018-01-01 RX ADMIN — CHLORHEXIDINE GLUCONATE 15 ML: 1.2 RINSE ORAL at 09:44

## 2018-01-01 RX ADMIN — PROPOFOL 50 MG: 10 INJECTION, EMULSION INTRAVENOUS at 17:12

## 2018-01-01 RX ADMIN — OMEPRAZOLE MAGNESIUM 20 MG: 20 CAPSULE, DELAYED RELEASE ORAL at 10:10

## 2018-01-01 RX ADMIN — LEVALBUTEROL HYDROCHLORIDE 1.25 MG: 1.25 SOLUTION, CONCENTRATE RESPIRATORY (INHALATION) at 08:01

## 2018-01-01 RX ADMIN — EPOETIN ALFA 10000 UNITS: 10000 SOLUTION INTRAVENOUS; SUBCUTANEOUS at 10:41

## 2018-01-01 RX ADMIN — HEPARIN SODIUM 5000 UNITS: 5000 INJECTION, SOLUTION INTRAVENOUS; SUBCUTANEOUS at 21:20

## 2018-01-01 RX ADMIN — IPRATROPIUM BROMIDE AND ALBUTEROL SULFATE 3 ML: .5; 3 SOLUTION RESPIRATORY (INHALATION) at 08:45

## 2018-01-01 RX ADMIN — MIDODRINE HYDROCHLORIDE 15 MG: 5 TABLET ORAL at 16:43

## 2018-01-01 RX ADMIN — CLOPIDOGREL BISULFATE 75 MG: 75 TABLET ORAL at 08:24

## 2018-01-01 RX ADMIN — MIDODRINE HYDROCHLORIDE 5 MG: 5 TABLET ORAL at 03:17

## 2018-01-01 RX ADMIN — INSULIN LISPRO 2 UNITS: 100 INJECTION, SOLUTION INTRAVENOUS; SUBCUTANEOUS at 06:17

## 2018-01-01 RX ADMIN — HYDROMORPHONE HYDROCHLORIDE 0.5 MG: 1 INJECTION, SOLUTION INTRAMUSCULAR; INTRAVENOUS; SUBCUTANEOUS at 06:16

## 2018-01-01 RX ADMIN — HYDROMORPHONE HYDROCHLORIDE 0.2 MG: 1 INJECTION, SOLUTION INTRAMUSCULAR; INTRAVENOUS; SUBCUTANEOUS at 22:08

## 2018-01-01 RX ADMIN — CHLORHEXIDINE GLUCONATE 15 ML: 1.2 RINSE ORAL at 23:15

## 2018-01-01 RX ADMIN — IPRATROPIUM BROMIDE 0.5 MG: 0.5 SOLUTION RESPIRATORY (INHALATION) at 13:10

## 2018-01-01 RX ADMIN — HYDROMORPHONE HYDROCHLORIDE 0.2 MG: 1 INJECTION, SOLUTION INTRAMUSCULAR; INTRAVENOUS; SUBCUTANEOUS at 18:08

## 2018-01-01 RX ADMIN — HEPARIN SODIUM 5000 UNITS: 5000 INJECTION, SOLUTION INTRAVENOUS; SUBCUTANEOUS at 21:10

## 2018-01-01 RX ADMIN — Medication 1 TABLET: at 21:25

## 2018-01-01 RX ADMIN — PANTOPRAZOLE SODIUM 40 MG: 40 INJECTION, POWDER, FOR SOLUTION INTRAVENOUS at 08:40

## 2018-01-01 RX ADMIN — Medication 1 TABLET: at 22:05

## 2018-01-01 RX ADMIN — ATORVASTATIN CALCIUM 20 MG: 10 TABLET, FILM COATED ORAL at 17:28

## 2018-01-01 RX ADMIN — ASPIRIN 81 MG 81 MG: 81 TABLET ORAL at 10:07

## 2018-01-01 RX ADMIN — OXYCODONE HYDROCHLORIDE 2.5 MG: 5 SOLUTION ORAL at 05:43

## 2018-01-01 RX ADMIN — IPRATROPIUM BROMIDE AND ALBUTEROL SULFATE 3 ML: .5; 3 SOLUTION RESPIRATORY (INHALATION) at 20:15

## 2018-01-01 RX ADMIN — BISACODYL 10 MG: 10 SUPPOSITORY RECTAL at 17:08

## 2018-01-01 RX ADMIN — IOHEXOL 85 ML: 350 INJECTION, SOLUTION INTRAVENOUS at 09:47

## 2018-01-01 RX ADMIN — MIDODRINE HYDROCHLORIDE 15 MG: 5 TABLET ORAL at 05:58

## 2018-01-01 RX ADMIN — LEVALBUTEROL HYDROCHLORIDE 1.25 MG: 1.25 SOLUTION, CONCENTRATE RESPIRATORY (INHALATION) at 19:54

## 2018-01-01 RX ADMIN — INSULIN LISPRO 1 UNITS: 100 INJECTION, SOLUTION INTRAVENOUS; SUBCUTANEOUS at 21:29

## 2018-01-01 RX ADMIN — LEVALBUTEROL HYDROCHLORIDE 1.25 MG: 1.25 SOLUTION, CONCENTRATE RESPIRATORY (INHALATION) at 20:08

## 2018-01-01 RX ADMIN — ALBUMIN HUMAN 25 G: 0.05 INJECTION, SOLUTION INTRAVENOUS at 08:51

## 2018-01-01 RX ADMIN — ACETAMINOPHEN 650 MG: 325 TABLET, FILM COATED ORAL at 17:14

## 2018-01-01 RX ADMIN — PANTOPRAZOLE SODIUM 40 MG: 40 INJECTION, POWDER, FOR SOLUTION INTRAVENOUS at 08:13

## 2018-01-01 RX ADMIN — OXYCODONE HYDROCHLORIDE 2.5 MG: 5 SOLUTION ORAL at 10:00

## 2018-01-01 RX ADMIN — ASPIRIN 325 MG: 325 TABLET ORAL at 08:35

## 2018-01-01 RX ADMIN — IOHEXOL 100 ML: 350 INJECTION, SOLUTION INTRAVENOUS at 10:59

## 2018-01-01 RX ADMIN — EPOETIN ALFA 10000 UNITS: 10000 SOLUTION INTRAVENOUS; SUBCUTANEOUS at 11:11

## 2018-01-01 RX ADMIN — CEFEPIME HYDROCHLORIDE 1000 MG: 1 INJECTION, SOLUTION INTRAVENOUS at 01:16

## 2018-01-01 RX ADMIN — LEVALBUTEROL HYDROCHLORIDE 1.25 MG: 1.25 SOLUTION, CONCENTRATE RESPIRATORY (INHALATION) at 13:10

## 2018-01-01 RX ADMIN — HYDROMORPHONE HYDROCHLORIDE 0.2 MG: 1 INJECTION, SOLUTION INTRAMUSCULAR; INTRAVENOUS; SUBCUTANEOUS at 04:32

## 2018-01-01 RX ADMIN — CHLORHEXIDINE GLUCONATE 15 ML: 1.2 RINSE ORAL at 12:07

## 2018-01-01 RX ADMIN — IPRATROPIUM BROMIDE 0.5 MG: 0.5 SOLUTION RESPIRATORY (INHALATION) at 20:08

## 2018-01-01 RX ADMIN — CHLORHEXIDINE GLUCONATE 15 ML: 1.2 RINSE ORAL at 09:43

## 2018-01-01 RX ADMIN — ASCORBIC ACID, THIAMINE MONONITRATE,RIBOFLAVIN, NIACINAMIDE, PYRIDOXINE HYDROCHLORIDE, FOLIC ACID, CYANOCOBALAMIN, BIOTIN, CALCIUM PANTOTHENATE, 1 CAPSULE: 100; 1.5; 1.7; 20; 10; 1; 6000; 150000; 5 CAPSULE, LIQUID FILLED ORAL at 17:57

## 2018-01-01 RX ADMIN — LEVALBUTEROL HYDROCHLORIDE 1.25 MG: 1.25 SOLUTION, CONCENTRATE RESPIRATORY (INHALATION) at 07:54

## 2018-01-01 RX ADMIN — CEFEPIME HYDROCHLORIDE 1000 MG: 1 INJECTION, SOLUTION INTRAVENOUS at 05:35

## 2018-01-01 RX ADMIN — LEVALBUTEROL HYDROCHLORIDE 1.25 MG: 1.25 SOLUTION, CONCENTRATE RESPIRATORY (INHALATION) at 01:29

## 2018-01-01 RX ADMIN — CHLORHEXIDINE GLUCONATE 15 ML: 1.2 RINSE ORAL at 08:58

## 2018-01-01 RX ADMIN — HYDROMORPHONE HYDROCHLORIDE 0.2 MG: 1 INJECTION, SOLUTION INTRAMUSCULAR; INTRAVENOUS; SUBCUTANEOUS at 21:05

## 2018-01-01 RX ADMIN — ASPIRIN 325 MG: 325 TABLET ORAL at 10:06

## 2018-01-01 RX ADMIN — IBUPROFEN 400 MG: 100 SUSPENSION ORAL at 04:42

## 2018-01-01 RX ADMIN — ALBUMIN HUMAN: 0.05 INJECTION, SOLUTION INTRAVENOUS at 11:32

## 2018-01-01 RX ADMIN — HYDRALAZINE HYDROCHLORIDE 25 MG: 25 TABLET, FILM COATED ORAL at 19:43

## 2018-01-01 RX ADMIN — ATORVASTATIN CALCIUM 20 MG: 20 TABLET, FILM COATED ORAL at 13:10

## 2018-01-01 RX ADMIN — ACETAMINOPHEN 650 MG: 325 TABLET ORAL at 16:06

## 2018-01-01 RX ADMIN — METOPROLOL TARTRATE 12.5 MG: 25 TABLET ORAL at 09:10

## 2018-01-01 RX ADMIN — CLOPIDOGREL BISULFATE 75 MG: 75 TABLET ORAL at 10:07

## 2018-01-01 RX ADMIN — INSULIN LISPRO 1 UNITS: 100 INJECTION, SOLUTION INTRAVENOUS; SUBCUTANEOUS at 12:46

## 2018-01-01 RX ADMIN — IPRATROPIUM BROMIDE 0.5 MG: 0.5 SOLUTION RESPIRATORY (INHALATION) at 08:01

## 2018-01-01 RX ADMIN — LEVALBUTEROL HYDROCHLORIDE 1.25 MG: 1.25 SOLUTION, CONCENTRATE RESPIRATORY (INHALATION) at 13:13

## 2018-01-01 RX ADMIN — CEFEPIME HYDROCHLORIDE 1000 MG: 1 INJECTION, SOLUTION INTRAVENOUS at 05:00

## 2018-01-01 RX ADMIN — IBUPROFEN 400 MG: 100 SUSPENSION ORAL at 15:33

## 2018-01-01 RX ADMIN — IPRATROPIUM BROMIDE 0.5 MG: 0.5 SOLUTION RESPIRATORY (INHALATION) at 07:18

## 2018-01-01 RX ADMIN — HYDROMORPHONE HYDROCHLORIDE 0.2 MG: 1 INJECTION, SOLUTION INTRAMUSCULAR; INTRAVENOUS; SUBCUTANEOUS at 19:20

## 2018-01-01 RX ADMIN — OXYCODONE HYDROCHLORIDE 2.5 MG: 5 SOLUTION ORAL at 13:34

## 2018-01-01 RX ADMIN — LEVALBUTEROL HYDROCHLORIDE 1.25 MG: 1.25 SOLUTION, CONCENTRATE RESPIRATORY (INHALATION) at 20:14

## 2018-01-01 RX ADMIN — VECURONIUM BROMIDE 10 MG: 10 INJECTION, POWDER, LYOPHILIZED, FOR SOLUTION INTRAVENOUS at 23:23

## 2018-01-01 RX ADMIN — PROPOFOL 5 MCG/KG/MIN: 10 INJECTION, EMULSION INTRAVENOUS at 23:07

## 2018-01-01 RX ADMIN — Medication 1 TABLET: at 21:01

## 2018-01-01 RX ADMIN — ASPIRIN 325 MG: 325 TABLET ORAL at 13:29

## 2018-01-01 RX ADMIN — HEPARIN SODIUM 5000 UNITS: 5000 INJECTION, SOLUTION INTRAVENOUS; SUBCUTANEOUS at 21:00

## 2018-01-01 RX ADMIN — ALBUMIN HUMAN 50 G: 0.25 SOLUTION INTRAVENOUS at 07:17

## 2018-01-01 RX ADMIN — PROPOFOL 100 MG: 10 INJECTION, EMULSION INTRAVENOUS at 09:56

## 2018-01-01 RX ADMIN — METOPROLOL TARTRATE 25 MG: 25 TABLET ORAL at 09:04

## 2018-01-01 RX ADMIN — METOPROLOL TARTRATE 5 MG: 5 INJECTION, SOLUTION INTRAVENOUS at 05:07

## 2018-01-01 RX ADMIN — HYDROMORPHONE HYDROCHLORIDE 0.5 MG: 1 INJECTION, SOLUTION INTRAMUSCULAR; INTRAVENOUS; SUBCUTANEOUS at 10:36

## 2018-01-01 RX ADMIN — OMEPRAZOLE MAGNESIUM 20 MG: 20 CAPSULE, DELAYED RELEASE ORAL at 13:35

## 2018-01-01 RX ADMIN — MORPHINE SULFATE 4 MG: 4 INJECTION, SOLUTION INTRAMUSCULAR; INTRAVENOUS at 07:56

## 2018-01-01 RX ADMIN — MIDODRINE HYDROCHLORIDE 15 MG: 5 TABLET ORAL at 12:03

## 2018-01-01 RX ADMIN — FENTANYL CITRATE 50 MCG: 50 INJECTION INTRAMUSCULAR; INTRAVENOUS at 21:13

## 2018-01-01 RX ADMIN — HYDROMORPHONE HYDROCHLORIDE 0.5 MG: 1 INJECTION, SOLUTION INTRAMUSCULAR; INTRAVENOUS; SUBCUTANEOUS at 02:05

## 2018-01-01 RX ADMIN — ACETAMINOPHEN 650 MG: 325 TABLET, FILM COATED ORAL at 01:19

## 2018-01-01 RX ADMIN — ATORVASTATIN CALCIUM 20 MG: 20 TABLET, FILM COATED ORAL at 15:48

## 2018-01-01 RX ADMIN — MIDAZOLAM HYDROCHLORIDE 1 MG/HR: 5 INJECTION, SOLUTION INTRAMUSCULAR; INTRAVENOUS at 22:30

## 2018-01-01 RX ADMIN — METOPROLOL TARTRATE 25 MG: 25 TABLET ORAL at 13:10

## 2018-01-01 RX ADMIN — IPRATROPIUM BROMIDE 0.5 MG: 0.5 SOLUTION RESPIRATORY (INHALATION) at 14:16

## 2018-01-01 RX ADMIN — METOPROLOL TARTRATE 25 MG: 25 TABLET ORAL at 21:16

## 2018-01-01 RX ADMIN — IPRATROPIUM BROMIDE 0.5 MG: 0.5 SOLUTION RESPIRATORY (INHALATION) at 08:17

## 2018-01-01 RX ADMIN — Medication 1 TABLET: at 21:39

## 2018-01-01 RX ADMIN — ACETAMINOPHEN 650 MG: 325 TABLET, FILM COATED ORAL at 04:25

## 2018-01-01 RX ADMIN — IPRATROPIUM BROMIDE 0.5 MG: 0.5 SOLUTION RESPIRATORY (INHALATION) at 20:19

## 2018-01-01 RX ADMIN — CEFAZOLIN SODIUM 1000 MG: 1 SOLUTION INTRAVENOUS at 21:55

## 2018-01-01 RX ADMIN — ASCORBIC ACID, THIAMINE MONONITRATE,RIBOFLAVIN, NIACINAMIDE, PYRIDOXINE HYDROCHLORIDE, FOLIC ACID, CYANOCOBALAMIN, BIOTIN, CALCIUM PANTOTHENATE, 1 CAPSULE: 100; 1.5; 1.7; 20; 10; 1; 6000; 150000; 5 CAPSULE, LIQUID FILLED ORAL at 17:51

## 2018-01-01 RX ADMIN — LEVALBUTEROL HYDROCHLORIDE 1.25 MG: 1.25 SOLUTION, CONCENTRATE RESPIRATORY (INHALATION) at 20:05

## 2018-01-01 RX ADMIN — HYDRALAZINE HYDROCHLORIDE 25 MG: 25 TABLET, FILM COATED ORAL at 08:30

## 2018-01-01 RX ADMIN — METOPROLOL TARTRATE 12.5 MG: 25 TABLET ORAL at 21:25

## 2018-01-01 RX ADMIN — ASCORBIC ACID, THIAMINE MONONITRATE,RIBOFLAVIN, NIACINAMIDE, PYRIDOXINE HYDROCHLORIDE, FOLIC ACID, CYANOCOBALAMIN, BIOTIN, CALCIUM PANTOTHENATE, 1 CAPSULE: 100; 1.5; 1.7; 20; 10; 1; 6000; 150000; 5 CAPSULE, LIQUID FILLED ORAL at 16:43

## 2018-01-01 RX ADMIN — ASCORBIC ACID, THIAMINE MONONITRATE,RIBOFLAVIN, NIACINAMIDE, PYRIDOXINE HYDROCHLORIDE, FOLIC ACID, CYANOCOBALAMIN, BIOTIN, CALCIUM PANTOTHENATE, 1 CAPSULE: 100; 1.5; 1.7; 20; 10; 1; 6000; 150000; 5 CAPSULE, LIQUID FILLED ORAL at 17:35

## 2018-01-01 RX ADMIN — CEFEPIME HYDROCHLORIDE 1000 MG: 1 INJECTION, SOLUTION INTRAVENOUS at 04:38

## 2018-01-01 RX ADMIN — OXYCODONE HYDROCHLORIDE 2.5 MG: 5 SOLUTION ORAL at 03:40

## 2018-01-01 RX ADMIN — ALBUMIN HUMAN 50 G: 0.25 SOLUTION INTRAVENOUS at 11:07

## 2018-01-01 RX ADMIN — ATORVASTATIN CALCIUM 20 MG: 20 TABLET, FILM COATED ORAL at 09:27

## 2018-01-01 RX ADMIN — ACETAMINOPHEN 650 MG: 325 TABLET, FILM COATED ORAL at 15:43

## 2018-01-01 RX ADMIN — EPOETIN ALFA 10000 UNITS: 10000 SOLUTION INTRAVENOUS; SUBCUTANEOUS at 08:59

## 2018-01-01 RX ADMIN — AMLODIPINE BESYLATE 10 MG: 10 TABLET ORAL at 08:30

## 2018-01-01 RX ADMIN — ASPIRIN 325 MG: 325 TABLET ORAL at 08:13

## 2018-01-01 RX ADMIN — ETOMIDATE 10 MG: 2 INJECTION, SOLUTION INTRAVENOUS at 22:33

## 2018-01-01 RX ADMIN — PANTOPRAZOLE SODIUM 40 MG: 40 INJECTION, POWDER, FOR SOLUTION INTRAVENOUS at 08:24

## 2018-01-01 RX ADMIN — METOPROLOL TARTRATE 25 MG: 25 TABLET ORAL at 21:10

## 2018-01-01 RX ADMIN — Medication 1 TABLET: at 21:10

## 2018-01-01 RX ADMIN — ASPIRIN 325 MG: 325 TABLET ORAL at 10:25

## 2018-01-01 RX ADMIN — EPOETIN ALFA 10000 UNITS: 10000 SOLUTION INTRAVENOUS; SUBCUTANEOUS at 10:11

## 2018-01-01 RX ADMIN — ATORVASTATIN CALCIUM 20 MG: 20 TABLET, FILM COATED ORAL at 09:04

## 2018-01-01 RX ADMIN — LORAZEPAM 1 MG: 2 INJECTION INTRAMUSCULAR; INTRAVENOUS at 08:05

## 2018-01-01 RX ADMIN — IPRATROPIUM BROMIDE AND ALBUTEROL SULFATE 3 ML: .5; 3 SOLUTION RESPIRATORY (INHALATION) at 08:00

## 2018-01-01 RX ADMIN — INSULIN LISPRO 1 UNITS: 100 INJECTION, SOLUTION INTRAVENOUS; SUBCUTANEOUS at 13:40

## 2018-01-01 RX ADMIN — HYDRALAZINE HYDROCHLORIDE 25 MG: 25 TABLET, FILM COATED ORAL at 13:34

## 2018-01-01 RX ADMIN — CHLORHEXIDINE GLUCONATE 15 ML: 1.2 RINSE ORAL at 08:10

## 2018-01-01 RX ADMIN — ACETAMINOPHEN 650 MG: 325 TABLET, FILM COATED ORAL at 23:55

## 2018-01-01 RX ADMIN — METOPROLOL TARTRATE 2.5 MG: 5 INJECTION INTRAVENOUS at 10:06

## 2018-01-01 RX ADMIN — CEFEPIME HYDROCHLORIDE 2000 MG: 2 INJECTION, SOLUTION INTRAVENOUS at 03:55

## 2018-01-01 RX ADMIN — OMEPRAZOLE MAGNESIUM 20 MG: 20 CAPSULE, DELAYED RELEASE ORAL at 10:22

## 2018-01-01 RX ADMIN — CLOPIDOGREL BISULFATE 75 MG: 75 TABLET ORAL at 08:59

## 2018-01-01 RX ADMIN — CLOPIDOGREL BISULFATE 75 MG: 75 TABLET ORAL at 09:44

## 2018-01-01 RX ADMIN — ACETAMINOPHEN 650 MG: 160 SUSPENSION ORAL at 06:19

## 2018-01-01 RX ADMIN — ASPIRIN 325 MG: 325 TABLET ORAL at 10:39

## 2018-01-01 RX ADMIN — SUCCINYLCHOLINE CHLORIDE 40 MG: 20 INJECTION, SOLUTION INTRAMUSCULAR; INTRAVENOUS at 09:56

## 2018-01-01 RX ADMIN — Medication 8 MG/HR: at 01:55

## 2018-01-01 RX ADMIN — ATORVASTATIN CALCIUM 20 MG: 20 TABLET, FILM COATED ORAL at 10:44

## 2018-01-01 RX ADMIN — CLOPIDOGREL BISULFATE 75 MG: 75 TABLET ORAL at 14:00

## 2018-01-01 RX ADMIN — DEXAMETHASONE SODIUM PHOSPHATE 4 MG: 10 INJECTION INTRAMUSCULAR; INTRAVENOUS at 09:56

## 2018-01-01 RX ADMIN — ACETAMINOPHEN 650 MG: 325 TABLET, FILM COATED ORAL at 10:07

## 2018-01-01 RX ADMIN — OMEPRAZOLE MAGNESIUM 20 MG: 20 CAPSULE, DELAYED RELEASE ORAL at 10:51

## 2018-01-01 RX ADMIN — ACETAMINOPHEN 650 MG: 650 SUPPOSITORY RECTAL at 23:08

## 2018-01-01 RX ADMIN — ASPIRIN 325 MG: 325 TABLET ORAL at 09:47

## 2018-01-01 RX ADMIN — ALBUMIN HUMAN 12.5 G: 0.25 SOLUTION INTRAVENOUS at 03:07

## 2018-01-01 RX ADMIN — SODIUM PHOSPHATE, MONOBASIC, MONOHYDRATE 30 MMOL: 276; 142 INJECTION, SOLUTION INTRAVENOUS at 08:23

## 2018-01-01 RX ADMIN — ASPIRIN 325 MG: 325 TABLET ORAL at 10:21

## 2018-01-01 RX ADMIN — METOPROLOL TARTRATE 5 MG: 5 INJECTION, SOLUTION INTRAVENOUS at 21:02

## 2018-01-01 RX ADMIN — ASPIRIN 81 MG 81 MG: 81 TABLET ORAL at 08:24

## 2018-01-01 RX ADMIN — INSULIN LISPRO 1 UNITS: 100 INJECTION, SOLUTION INTRAVENOUS; SUBCUTANEOUS at 09:22

## 2018-01-01 RX ADMIN — ATORVASTATIN CALCIUM 20 MG: 20 TABLET, FILM COATED ORAL at 10:21

## 2018-01-01 RX ADMIN — MIDAZOLAM 2 MG: 1 INJECTION INTRAMUSCULAR; INTRAVENOUS at 22:30

## 2018-01-01 RX ADMIN — MIDODRINE HYDROCHLORIDE 10 MG: 5 TABLET ORAL at 09:08

## 2018-01-01 RX ADMIN — LEVALBUTEROL HYDROCHLORIDE 1.25 MG: 1.25 SOLUTION, CONCENTRATE RESPIRATORY (INHALATION) at 07:14

## 2018-01-01 RX ADMIN — ASCORBIC ACID, THIAMINE MONONITRATE,RIBOFLAVIN, NIACINAMIDE, PYRIDOXINE HYDROCHLORIDE, FOLIC ACID, CYANOCOBALAMIN, BIOTIN, CALCIUM PANTOTHENATE, 1 CAPSULE: 100; 1.5; 1.7; 20; 10; 1; 6000; 150000; 5 CAPSULE, LIQUID FILLED ORAL at 17:08

## 2018-01-01 RX ADMIN — MIDODRINE HYDROCHLORIDE 15 MG: 5 TABLET ORAL at 12:30

## 2018-01-01 RX ADMIN — HEPARIN SODIUM 5000 UNITS: 5000 INJECTION, SOLUTION INTRAVENOUS; SUBCUTANEOUS at 06:03

## 2018-01-01 RX ADMIN — HEPARIN SODIUM 5000 UNITS: 5000 INJECTION, SOLUTION INTRAVENOUS; SUBCUTANEOUS at 05:58

## 2018-02-07 PROBLEM — Z99.2 ESRD (END STAGE RENAL DISEASE) ON DIALYSIS (HCC): Status: ACTIVE | Noted: 2018-01-01

## 2018-02-07 PROBLEM — N18.6 ESRD (END STAGE RENAL DISEASE) ON DIALYSIS (HCC): Status: ACTIVE | Noted: 2018-01-01

## 2018-02-07 NOTE — H&P
Assessment/Plan:  Going     ESRD (end stage renal disease) on dialysis ([address] )  On dialysis via right internal jugular PermCath  She also has had multiple strokes involving right hemiplegia  I spoke extensively with her daughter Clive Sanchez on the phone regarding the procedure  Will plan for right upper extremity AV fistula or graft creation  Upon review of the vein mapping it appears that the cephalic and basilic vein in the upper arm are adequate and hence most likely it will be a right brachial cephalic arm brachial basilic fistula  If no suitable veins on the right side then we will plan for the left side  However I will like to avoid using the left side as that is her functional arm  She has significant cognitive decline secondary to the strokes and hence consent is obtained from her daughter on the telephone  Risks of the procedure such as bleeding, non maturation, steal from hand were discussed          There are no diagnoses linked to this encounter        Subjective:       Patient ID: Nadira Cosby is a 68 y o  female      HPI     The following portions of the patient's history were reviewed and updated as appropriate: allergies, current medications, past family history, past medical history, past social history, past surgical history and problem list          Patient Active Problem List   Diagnosis    ESRD (end stage renal disease) on dialysis [facility])         Surgical History   History reviewed   No pertinent surgical history         No family history on file      Social History   Social History            Social History    Marital status: Single       Spouse name: N/A    Number of children: N/A    Years of education: N/A          Occupational History    Not on file            Social History Main Topics    Smoking status: Never Smoker    Smokeless tobacco: Never Used    Alcohol use No    Drug use: Unknown    Sexual activity: Not on file           Other Topics Concern    Not on file          Social History Narrative    No narrative on file            No Known Allergies        Current Outpatient Prescriptions:     acetaminophen (TYLENOL) 325 mg tablet, Take 650 mg by mouth every 4 (four) hours as needed for mild pain, Disp: , Rfl:     amLODIPine (NORVASC) 10 mg tablet, Take 10 mg by mouth daily, Disp: , Rfl:     b complex-C-folic acid (NEPHRO-TRACY) 0 8 mg tablet, Take 0 8 mg by mouth daily with dinner, Disp: , Rfl:     benzonatate (TESSALON PERLES) 100 mg capsule, Take 100 mg by mouth every 4 (four) hours as needed for cough, Disp: , Rfl:     Cholecalciferol 49389 units TABS, Take 50,000 Units by mouth Every Monday and Friday in AM, Disp: , Rfl:     hydrALAZINE (APRESOLINE) 50 mg tablet, Take 50 mg by mouth every 12 (twelve) hours, Disp: , Rfl:     ipratropium-albuterol (DUONEB) 0 5-2 5 mg/mL, Take 3 mL by nebulization 4 (four) times a day, Disp: , Rfl:     losartan (COZAAR) 100 MG tablet, Take 100 mg by mouth daily, Disp: , Rfl:     NON FORMULARY, Heparin 5000 Unites subcutaneously q8h, which can be stopped when she is D/C, Disp: , Rfl:     NON FORMULARY, Tap water enema, Disp: , Rfl:     polyethylene glycol (MIRALAX) 17 g packet, Take 17 g by mouth daily as needed, Disp: , Rfl:     senna (SENOKOT) 8 6 MG tablet, Take 2 tablets by mouth daily at bedtime as needed for constipation, Disp: , Rfl:         Review of Systems   Unable to perform ROS: Dementia          Objective:      Physical Exam   Constitutional: She appears well-developed and well-nourished  She appears listless  HENT:   Head: Normocephalic and atraumatic  Eyes: Conjunctivae are normal    Neck: Normal range of motion  Neck supple  Cardiovascular: Normal rate, regular rhythm, normal heart sounds and intact distal pulses  Pulses:       Radial pulses are 2+ on the right side, and 2+ on the left side  Bilateral 2+ brachial artery pulses  Pulmonary/Chest: Effort normal and breath sounds normal    Abdominal: Soft   Bowel sounds are normal    Neurological: She appears listless  Dementia with unresponsive state    Right upper extremity hemiplegia   Skin: Skin is warm and dry  Nursing note and vitals reviewed

## 2018-02-07 NOTE — PROGRESS NOTES
Assessment/Plan:  Going    ESRD (end stage renal disease) on dialysis ([address] )  On dialysis via right internal jugular PermCath  She also has had multiple strokes involving right hemiplegia  I spoke extensively with her daughter Jose Martines on the phone regarding the procedure  Will plan for right upper extremity AV fistula or graft creation  Upon review of the vein mapping it appears that the cephalic and basilic vein in the upper arm are adequate and hence most likely it will be a right brachial cephalic arm brachial basilic fistula  If no suitable veins on the right side then we will plan for the left side  However I will like to avoid using the left side as that is her functional arm  She has significant cognitive decline secondary to the strokes and hence consent is obtained from her daughter on the telephone  Risks of the procedure such as bleeding, non maturation, steal from hand were discussed  There are no diagnoses linked to this encounter  Subjective:      Patient ID: Giuliana Slaughter is a 68 y o  female  HPI    The following portions of the patient's history were reviewed and updated as appropriate: allergies, current medications, past family history, past medical history, past social history, past surgical history and problem list     Patient Active Problem List   Diagnosis    ESRD (end stage renal disease) on dialysis [facility])       History reviewed  No pertinent surgical history  No family history on file  Social History     Social History    Marital status: Single     Spouse name: N/A    Number of children: N/A    Years of education: N/A     Occupational History    Not on file       Social History Main Topics    Smoking status: Never Smoker    Smokeless tobacco: Never Used    Alcohol use No    Drug use: Unknown    Sexual activity: Not on file     Other Topics Concern    Not on file     Social History Narrative    No narrative on file       No Known Allergies      Current Outpatient Prescriptions:     acetaminophen (TYLENOL) 325 mg tablet, Take 650 mg by mouth every 4 (four) hours as needed for mild pain, Disp: , Rfl:     amLODIPine (NORVASC) 10 mg tablet, Take 10 mg by mouth daily, Disp: , Rfl:     b complex-C-folic acid (NEPHRO-TRACY) 0 8 mg tablet, Take 0 8 mg by mouth daily with dinner, Disp: , Rfl:     benzonatate (TESSALON PERLES) 100 mg capsule, Take 100 mg by mouth every 4 (four) hours as needed for cough, Disp: , Rfl:     Cholecalciferol 24053 units TABS, Take 50,000 Units by mouth Every Monday and Friday in AM, Disp: , Rfl:     hydrALAZINE (APRESOLINE) 50 mg tablet, Take 50 mg by mouth every 12 (twelve) hours, Disp: , Rfl:     ipratropium-albuterol (DUONEB) 0 5-2 5 mg/mL, Take 3 mL by nebulization 4 (four) times a day, Disp: , Rfl:     losartan (COZAAR) 100 MG tablet, Take 100 mg by mouth daily, Disp: , Rfl:     NON FORMULARY, Heparin 5000 Unites subcutaneously q8h, which can be stopped when she is D/C, Disp: , Rfl:     NON FORMULARY, Tap water enema, Disp: , Rfl:     polyethylene glycol (MIRALAX) 17 g packet, Take 17 g by mouth daily as needed, Disp: , Rfl:     senna (SENOKOT) 8 6 MG tablet, Take 2 tablets by mouth daily at bedtime as needed for constipation, Disp: , Rfl:        Review of Systems   Unable to perform ROS: Dementia         Objective:     Physical Exam   Constitutional: She appears well-developed and well-nourished  She appears listless  HENT:   Head: Normocephalic and atraumatic  Eyes: Conjunctivae are normal    Neck: Normal range of motion  Neck supple  Cardiovascular: Normal rate, regular rhythm, normal heart sounds and intact distal pulses  Pulses:       Radial pulses are 2+ on the right side, and 2+ on the left side  Bilateral 2+ brachial artery pulses  Pulmonary/Chest: Effort normal and breath sounds normal    Abdominal: Soft  Bowel sounds are normal    Neurological: She appears listless     Dementia with unresponsive state    Right upper extremity hemiplegia   Skin: Skin is warm and dry  Nursing note and vitals reviewed

## 2018-02-07 NOTE — ASSESSMENT & PLAN NOTE
On dialysis via right internal jugular PermCath  She also has had multiple strokes involving right hemiplegia  I spoke extensively with her daughter Madie Hogue on the phone regarding the procedure  Will plan for right upper extremity AV fistula or graft creation  Upon review of the vein mapping it appears that the cephalic and basilic vein in the upper arm are adequate and hence most likely it will be a right brachial cephalic arm brachial basilic fistula  If no suitable veins on the right side then we will plan for the left side  However I will like to avoid using the left side as that is her functional arm  She has significant cognitive decline secondary to the strokes and hence consent is obtained from her daughter on the telephone  Risks of the procedure such as bleeding, non maturation, steal from hand were discussed

## 2018-02-08 NOTE — TELEPHONE ENCOUNTER
Ronak Freeman and was able to obtain OR block at [facility] with an 11am start on 2/28/18  Confirmed with Dr Genaro Padilla that this is ok to schedule  Contacted Lupis Ribeiro to see if patient's dialysis day can be changed to accommodate the surgery and she confirmed that it can  Contacted patient's dtr Ellen Isaacs and gave her the surgery information  Patient is scheduled at [facility]/OR 2/28/18 11am for RUE AVF anabelle JOHNSON  She advised me her mom lives at [facility] in [place] [phone]  I have called twice today and the phone rings w/o answer  I need a fax number to fax all preadmission testing to      2/15/18 1:58pm Spoke with Supervisor Butch Martin who advised me Reddy Ellis from 2nd floor has all of our scripts and would take care of the testing      2/20/18 11:05am Spoke with Butch Martin and she advised me that the patient will be discharged today to home and she will fax me what she has and I will f/u w/the daughter tomorrow

## 2018-02-22 NOTE — PRE-PROCEDURE INSTRUCTIONS
Pre-Surgery Instructions:   Medication Instructions    acetaminophen (TYLENOL) 325 mg tablet Patient was instructed by Physician and understands   amLODIPine (NORVASC) 10 mg tablet Patient was instructed by Physician and understands   aspirin 325 mg tablet Patient was instructed by Physician and understands   b complex-C-folic acid (NEPHRO-TRACY) 0 8 mg tablet Patient was instructed by Physician and understands   hydrALAZINE (APRESOLINE) 50 mg tablet Patient was instructed by Physician and understands   omeprazole (PriLOSEC) 10 mg delayed release capsule Patient was instructed by Physician and understands   pantoprazole (PROTONIX) 20 mg tablet Patient was instructed by Physician and understands

## 2018-02-28 PROBLEM — S72.142A CLOSED INTERTROCHANTERIC FRACTURE OF LEFT FEMUR (HCC): Status: ACTIVE | Noted: 2018-01-01

## 2018-02-28 PROBLEM — I69.359 HEMIPARESIS DUE TO OLD STROKE (HCC): Status: ACTIVE | Noted: 2018-01-01

## 2018-02-28 PROBLEM — Z86.2 HISTORY OF ANEMIA DUE TO CHRONIC KIDNEY DISEASE: Status: ACTIVE | Noted: 2018-01-01

## 2018-02-28 PROBLEM — I10 ESSENTIAL HYPERTENSION: Status: ACTIVE | Noted: 2018-01-01

## 2018-02-28 PROBLEM — E11.9 DIABETES MELLITUS TYPE 2 IN NONOBESE (HCC): Status: ACTIVE | Noted: 2018-01-01

## 2018-02-28 PROBLEM — N18.5 ANEMIA OF CHRONIC KIDNEY FAILURE, STAGE 5 (HCC): Status: ACTIVE | Noted: 2018-01-01

## 2018-02-28 PROBLEM — N18.9 HISTORY OF ANEMIA DUE TO CHRONIC KIDNEY DISEASE: Status: ACTIVE | Noted: 2018-01-01

## 2018-02-28 PROBLEM — C90.00 MULTIPLE MYELOMA NOT HAVING ACHIEVED REMISSION (HCC): Status: ACTIVE | Noted: 2018-01-01

## 2018-02-28 PROBLEM — D63.1 ANEMIA OF CHRONIC KIDNEY FAILURE, STAGE 5 (HCC): Status: ACTIVE | Noted: 2018-01-01

## 2018-02-28 NOTE — H&P
History and Physical - [facility] Internal Medicine    Patient Information: Yaritza Roberts 68 y o  female MRN: [id number]  Unit/Bed#: ED 15 Encounter: [id number]  Admitting Physician: Baron Davis MD  PCP: Rosa Yo MD  Date of Admission:  02/28/18    Assessment/Plan:    Hospital Problem List:     Principal Problem:    Closed intertrochanteric fracture of left femur ([facility] )  Active Problems:    ESRD (end stage renal disease) on dialysis ([facility] )    Anemia of chronic kidney failure, stage 5 ([facility] )    Multiple myeloma not having achieved remission ([facility] )    Essential hypertension    Diabetes mellitus type 2 in nonobese ([place] [address] )    Hemiparesis due to old stroke [facility])    Present on Admission:   Closed intertrochanteric fracture of left femur ([facility] )   Anemia of chronic kidney failure, stage 5 ([facility] )   Multiple myeloma not having achieved remission ([place] [address] )   Essential hypertension   Diabetes mellitus type 2 in nonobese [facility])      Plan for the Primary Problem(s):  · This is a sick appearing 68years old [facility] [employer] female who basically is aphasic and did not communicate with me whatsoever  As per family, at times, she would have bursts of communication and then nothing  She is on tube feeding  She has right upper chest PermCath  On hemodialysis  She basically is bedridden and was found to have left femur fracture  She also has history of what appears to be multiple myeloma  I have had lengthy discussion with patient's daughter who is POA  She has discussion with ER physician multiple times to who has spoken with our orthopedic team prior to making final decision that daughter wants patient to have surgery although for comfort, however, she is still full code  Patient is high risk for any surgical intervention as her respiratory status could very well be easily compromised and high risk for aspiration  I have not restarted her tube feeds  She would be on her home medications and also sliding scale insulin    I have spoken with Nephrology service as patient would need hemodialysis tomorrow  ER physician has spoken with on call orthopedic service/physician  I have put a consult for orthopedic service as well for tomorrow  Further treatment plan would depend upon discussion between patient's daughter and the orthopedic services  · Continue other home medications as above    Plan for Additional Problems:   · As per daughter, patient required 10 units of packed RBCs when she was on subcutaneous heparin, therefore, would not want patient to be on any anticoagulants other than aspirin  Patient is already on 325 mg aspirin I believe for her stroke  This will be continued  I would increase the frequency of PPI patient has been previously  · Anemia of chronic disease  Hemoglobin stable  Patient also with history of multiple myeloma  · For diabetes mellitus , she should be on sliding scale insulin  · Old stroke with left-sided hemiparesis/aphasia   Patient has PEG tube which would be maintained  Continue with aspirin and blood pressure control    VTE Prophylaxis: Pharmacologic VTE Prophylaxis contraindicated due to History of significant GI bleeding and as per family not to give any subcutaneous heparin or anticoagulants other than aspirin  / sequential compression device   Code Status:  Full code  POLST: There is no POLST form on file for this patient (pre-hospital)    Anticipated Length of Stay:  Patient will be admitted on an Inpatient basis with an anticipated length of stay of  more than 2 midnights  Justification for Hospital Stay:  Left femur fracture    Total Time for Visit, including Counseling / Coordination of Care: 75+ minutes  Greater than 50% of this total time spent on direct patient counseling and coordination of care      Chief Complaint:   Found to have left leg fracture/femur fracture    History of Present Illness:    Leidy Herrera is a 68 y o  female who basically was supposed to have an AV fistula created today by vascular surgery, however, she was noticed to have swelling of her left leg  I believe family has also noticed that  Patient is aphasic and really cannot give any information by herself  She is also bed-bound and is taken for hemodialysis 3 times a week  Daughter who is POA is at bedside  There has not been any fall as per daughter at the nursing home from where she was recently moved back to her family  Family has been helping to transport her for hemodialysis and they have not noticed any fall either  As per daughter, with any movement, she would have significant pain in her left leg and wants her to be comfortable  Patient also has a PEG tube  She gets very little fluids slept through her mouth  Review of Systems    Information is as above  Patient is unable to provide any information because of previous stroke and being aphasic      Past Medical and Surgical History:     Past Medical History:   Diagnosis Date    Anemia     Arthritis     Chronic kidney disease     Dialysis patient ([facility] )     monday, wednesday, friday    Hypertension     [facility]     Stroke [facility])        Past Surgical History:   Procedure Laterality Date     SECTION      JOINT REPLACEMENT Right     hip       Meds/Allergies:    Prior to Admission medications    Medication Sig Start Date End Date Taking?  Authorizing Provider   acetaminophen (TYLENOL) 325 mg tablet Take 650 mg by mouth every 4 (four) hours as needed for mild pain    Historical Provider, MD   amLODIPine (NORVASC) 10 mg tablet Take 10 mg by mouth daily    Historical Provider, MD   aspirin 325 mg tablet Take 325 mg by mouth daily    Historical Provider, MD adame complex-C-folic acid (NEPHRO-TRACY) 0 8 mg tablet Take 0 8 mg by mouth daily with dinner    Historical Provider, MD   benzonatate (TESSALON PERLES) 100 mg capsule Take 100 mg by mouth every 4 (four) hours as needed for cough    Historical Provider, MD   Cholecalciferol 93154 units TABS Take 50,000 Units by mouth Every Monday and Friday in AM    Historical Provider, MD   hydrALAZINE (APRESOLINE) 50 mg tablet Take 25 mg by mouth every 12 (twelve) hours      Historical Provider, MD   ipratropium-albuterol (DUONEB) 0 5-2 5 mg/mL Take 3 mL by nebulization 4 (four) times a day    Historical Provider, MD   losartan (COZAAR) 100 MG tablet Take 100 mg by mouth daily    Historical Provider, MD   pantoprazole (PROTONIX) 20 mg tablet Take 20 mg by mouth daily    Historical Provider, MD   NON FORMULARY Heparin 5000 Unites subcutaneously q8h, which can be stopped when she is D/C  2/28/18  Historical Provider, MD   NON FORMULARY Tap water enema  2/28/18  Historical Provider, MD   omeprazole (PriLOSEC) 10 mg delayed release capsule Take 10 mg by mouth daily  2/28/18  Historical Provider, MD   polyethylene glycol (MIRALAX) 17 g packet Take 17 g by mouth daily as needed  2/28/18  Historical Provider, MD   senna (SENOKOT) 8 6 MG tablet Take 2 tablets by mouth daily at bedtime as needed for constipation  2/28/18  Historical Provider, MD     Reviewed home medications as listed in admission med rec    Allergies: No Known Allergies    Social History:     Marital Status: Single   Occupation:  Does not work  Patient Pre-hospital Living Situation:  Recently was transferred from extended care facility to home  Patient Pre-hospital Level of Mobility:  Very much nonambulatory  Patient Pre-hospital Diet Restrictions:  Patient on tube feeds  Substance Use History:   History   Alcohol Use No     History   Smoking Status    Never Smoker   Smokeless Tobacco    Never Used     History   Drug Use No       Family History:    Patient herself unable to give any information about her family because of her old stroke and aphasia        Physical Exam      Vitals:   Blood Pressure: 142/67 (02/28/18 1430)  Pulse: 97 (02/28/18 1430)  Temperature: 97 7 °F (36 5 °C) (02/28/18 1323)  Temp Source: Axillary (02/28/18 1323)  Respirations: 20 (02/28/18 1430)  Height: 4' 11" (149 9 cm) (02/28/18 1323)  Weight - Scale: 54 6 kg (120 lb 5 9 oz) (02/28/18 1323)  SpO2: 95 % (02/28/18 1430)    Vital signs are reviewed as above  Constitutional:  Chronically sick appearing aphasic 68years old [facility] [employer] female  She does not speak or ambulate  She is lying in bed  At time, she would start breathing heavy  Eyes:  Eyes are open   HENT:  Did not open her mouth  She has left-sided hemiparesis from her previous stroke    Neck: Neck is supple  Patient has right-sided PermCath for hemodialysis   Cardiac: I did not hear any rubs or gallop  Patient appears to be in sinus rhythm  Respiratory: Patient not in significant respiratory distress although at times, she would breathe heavy  Air entry in general anterolaterally  GI: Abdomen is soft  It is grossly nontender  Bowel sounds are adequate  I was not able to appreciate any hepatosplenomegaly  She has a PEG tube in  Neurologic:  Patient is awake and alert  Chronic left-sided hemiparesis and aphasia  Patient does not communicate   Skin: Skin is warm and dry  Psychiatric:  Unable to assess   Musculoskeletal   Moving her arms here and there  Otherwise she has swollen left thigh  Also left knee is currently bent  It was rotated earlier as I reviewed some of the notes  She has some ice on her left thigh  Extremities:  As above  She has left-sided hemiparesis          Additional Data:     Lab Results: I have personally reviewed pertinent reports          Results from last 7 days  Lab Units 02/28/18  1748   WBC Thousand/uL 11 18*   HEMOGLOBIN g/dL 8 7*   HEMATOCRIT % 26 8*   PLATELETS Thousands/uL 215   NEUTROS PCT % 77*   LYMPHS PCT % 15   MONOS PCT % 6   EOS PCT % 1       Results from last 7 days  Lab Units 02/28/18  1748   SODIUM mmol/L 135*   POTASSIUM mmol/L 3 2*   CHLORIDE mmol/L 96*   CO2 mmol/L 25   BUN mg/dL 71*   CREATININE mg/dL 5 32*   CALCIUM mg/dL 10 1   TOTAL PROTEIN g/dL 6 8   BILIRUBIN TOTAL mg/dL 0 50   ALK PHOS U/L 194*   ALT U/L 34   AST U/L 22   GLUCOSE RANDOM mg/dL 157*       Results from last 7 days  Lab Units 02/28/18  1748   INR  0 98       Imaging: I have personally reviewed pertinent reports  Xr Hip/pelv 2-3 Vws Left If Performed    Result Date: 2/28/2018  Narrative: LEFT HIP INDICATION:  Left hip pain  COMPARISON: None VIEWS:  XR HIP/PELV 2-3 VWS LEFT  W PELVIS IF PERFORMED 3 FINDINGS: There is a mildly displaced oblique fracture through the intertrochanteric proximal right femur with extension to the proximal diaphysis  There are severe arthritic changes of the left hip joint including severe joint space narrowing with subchondral sclerosis of the acetabulum and femoral head  Postoperative changes of prior right total hip arthroplasty are noted  No lytic or blastic osseous lesions  Soft tissues are unremarkable  The visualized lumbar spine is unremarkable  Impression: Mild displaced intertrochanteric fracture of the proximal left femur extending to the proximal diaphysis  Severe arthritic changes of the left hip joint  Workstation performed: [id number]     Xr Femur 2 Views Left    Result Date: 2/28/2018  Narrative: LEFT FEMUR INDICATION:  Left femur pain  COMPARISON: None VIEWS:  XR FEMUR 2 VW LEFT FINDINGS: Comminuted displaced left proximal femoral fracture  While suboptimally characterized, it appears the intertrochanteric or subtrochanteric  If the latter, suspicious for underlying lesion  Impression: Intertrochanteric versus of subtrochanteric left femoral fracture  Workstation performed: [id number]     Xr Chest 1 View    Result Date: 2/28/2018  Narrative: CHEST INDICATION: Preop COMPARISON:  Chest x-ray dated November 9, 2017  EXAM PERFORMED/VIEWS:  XR CHEST 1 VIEW Images: 3 FINDINGS: The cardiomediastinal silhouette is unchanged from the prior exam and demonstrate cardiomegaly  There is atherosclerotic calcification of the aortic arch  Again noted is mild pulmonary vascular congestion  There is bibasilar atelectasis with no focal infiltrate or pleural effusion  There is a right-sided dialysis catheter with the tip at the level of the atrial caval junction  No pneumothorax  There are moderate to severe arthritic changes of both shoulder joints  Impression: Persistent mild pulmonary vascular congestion  Stable cardiomegaly  Workstation performed: [id number]     Lety Mendoza Mapping For Hemodialysis Upper    Result Date: 2/7/2018  Narrative:  [facility] REPORT CLINICAL: Indications: Other Specified Pre-Operative Examination [Z01 818]  Renal Disease unspecified [N18 9]  Patient is pre-op for dialysis access graft placement  FINDINGS:  Segment                 Right                Left                                            AP (mm)  PSV (cm/s)  AP (mm)  PSV (cm/s)  Subclavian                              105                  163  Brachial Artery                         124                  114  Dist  Radial Artery         2 6          98      2 3          89  Axillary                                 79                   86  Prox  Radial                2 6         104      2 7          98  Mid Radial                  2 4         108      2 0          89  Mid   Ulnar                              103                  119  Bas Mid Arm                 2 5                  3 6              Basilic Lower Arm           3 4                  4 0              Bas AC                      2 3                  3 4              Basilic Upper Forearm       2 5                  2 5              Bas Mid Forearm             2 1                  2 2              Basilic Lower Forearm       2 0                                   Bas Wrist                                        2 4              Ceph Upper                  2 8                  2 5              Ceph Mid Arm                2 9                  2 6              Cephalic Lower Arm 2 9                  2 7              Ceph AC                     4 3                  3 0              Cephalic Upper Forearm      3 4                  2 4              Ceph Mid Forearm            3 7                  2 3              Ceph Wrist                  3 2                  3 0                 CONCLUSION: Impression RIGHT ARM: Evaluation shows patent and thrombus free cephalic vein and basilic vein  The cephalic and basilic veins communicate with the median cubital vein  The intraluminal diameters of the cephalic vein are adequate caliber >2mm throughout the arm  The intraluminal diameters of the basilic vein are adequate caliber >2mm throughout the arm  The IJV, subclavian, axillary and brachial veins are patent  There is no evidence of arterial occlusive disease in the subclavian, axillary, brachial,ulnar and radial arteries  The radial and ulnar arteries bifurcate at the antecubital fossa  The radial artery is adequate caliber  LEFT ARM: Evaluation shows patent and thrombus free cephalic vein and basilic vein  The cephalic and basilic veins communicate with the median cubital vein  The intraluminal diameters of the cephalic vein are adequate caliber >2mm throughout the arm  The intraluminal diameters of the basilic vein are adequate caliber >2mm throughout the arm  The IJV, subclavian, axillary and brachial veins are patent  There is no evidence of arterial occlusive disease in the subclavian, axillary, brachial,ulnar and radial arteries  The radial and ulnar arteries bifurcate at the antecubital fossa  The radial artery is adequate caliber  Tech note:  Preliminary report called to Dr Dora Ganser: Electronically Signed by: Melecio Bradford MD, [address] on 2018-02-07 01:43:51 PM    Vas Lower Limb Venous Duplex Study, Unilateral/limited    Result Date: 2/28/2018  Narrative:  [facility] REPORT CLINICAL: Indications: Swelling of Limb [R22 4]   Recent history of broken femur with thigh swelling and pain  Patient is bed ridden  No history of DVT  End stage kidney disease  Clinical:  FINDINGS:  Left     Impression       CFV      Normal (Patent)     CONCLUSION:  Impression: RIGHT LOWER LIMB LIMITED: Evaluation shows no evidence of thrombus in the common femoral vein  Doppler evaluation shows a normal response to augmentation maneuvers  LEFT LOWER LIMB: No evidence of acute or chronic deep vein thrombosis No evidence of superficial thrombophlebitis noted  Doppler evaluation shows a normal response to augmentation maneuvers  Popliteal, posterior tibial and anterior tibial arterial Doppler waveforms are triphasic  Technical findings were given to Dr Tony Kearney at [phone]  SIGNATURE: Electronically Signed by: Timothy Israel on 2018-02-28 05:50:39 PM      EKG, Pathology, and Other Studies Reviewed on Admission:   · EKG:  Do not see any acute ischemic changes    Allscripts Records Reviewed: No     ** Please Note: Dragon 360 Dictation voice to text software may have been used in the creation of this document   **

## 2018-02-28 NOTE — ANESTHESIA PREPROCEDURE EVALUATION
Review of Systems/Medical History  Patient summary reviewed  Chart reviewed      Cardiovascular  Hypertension poorly controlled,    Pulmonary       GI/Hepatic            Endo/Other  Diabetes well controlled Diet controlled,      GYN       Hematology  Anemia renal failure anemia,     Musculoskeletal    Arthritis     Neurology    CVA , residual symptoms,   Comment: Right sided weakness Psychology           Physical Exam    Airway    Mallampati score: III  TM Distance: <3 FB  Neck ROM: limited     Dental       Cardiovascular  Rhythm: regular, Rate: normal, Cardiovascular exam normal    Pulmonary  Pulmonary exam normal Breath sounds clear to auscultation,     Other Findings        Anesthesia Plan  ASA Score- 4     Anesthesia Type- IV sedation with anesthesia with ASA Monitors  Additional Monitors:   Airway Plan:         Plan Factors-    Induction- intravenous  Postoperative Plan- Plan for postoperative opioid use  Informed Consent- Anesthetic plan and risks discussed with patient and daughter  I personally reviewed this patient with the CRNA  Discussed and agreed on the Anesthesia Plan with the CRNA  Douglas De Jesus

## 2018-02-28 NOTE — PROGRESS NOTES
Report called to ER nurse  Patient being brought over to the ER for further evaluation of hip  AV fistula canceled as per Dr  Peewee Robinson  Called patients daughter to make her aware that patient is being transferred to the ER

## 2018-02-28 NOTE — PROGRESS NOTES
Patients daughter advised that patients left upper leg is swollen  Leg appears to be externally rotated, swollen and patient moans in pain with any movement  Dr Suha Mckay aware, and Dr Gretchen Ely aware  Hip xray ordered  Patients daughter states that she just picked her Mother up from a nursing home about 3 days ago and brought her back home with her  She states no one from the nursing home told her about any falls, and no falls at home per daughter

## 2018-03-01 PROBLEM — R53.2 FUNCTIONAL QUADRIPLEGIA (HCC): Status: ACTIVE | Noted: 2018-01-01

## 2018-03-01 PROBLEM — S72.142A CLOSED INTERTROCHANTERIC FRACTURE OF HIP, LEFT, INITIAL ENCOUNTER (HCC): Status: ACTIVE | Noted: 2018-01-01

## 2018-03-01 NOTE — TELEPHONE ENCOUNTER
Called and spoke to her about the patient   She will meet with Dr Mare Fernández to discuss all options      PATRICK

## 2018-03-01 NOTE — TELEPHONE ENCOUNTER
Patient's daughter is calling returning a voicemail she received this morning  She stated that it was regarding her mother's case

## 2018-03-01 NOTE — CASE MANAGEMENT
Initial Clinical Review    Admission: Date/Time/Statement: 2/28/18 @ 1829     Orders Placed This Encounter   Procedures    Inpatient Admission     Standing Status:   Standing     Number of Occurrences:   1     Order Specific Question:   Admitting Physician     Answer:   Sundar Rock [[id number]]     Order Specific Question:   Level of Care     Answer:   Med Surg [[id number]]     Order Specific Question:   Estimated length of stay     Answer:   More than 2 Midnights     Order Specific Question:   Certification     Answer:   I certify that inpatient services are medically necessary for this patient for a duration of greater than two midnights  See H&P and MD Progress Notes for additional information about the patient's course of treatment  ED: Date/Time/Mode of Arrival:   ED Arrival Information     Expected Arrival Acuity Means of Arrival Escorted By Service Admission Type    - 2/28/2018 13:20 Urgent Wheelchair Other General Medicine Urgent    Arrival Complaint    Hip pain          Chief Complaint:   Chief Complaint   Patient presents with    Hip Injury     Pt c/o increased L hip pain  Pt was in [facility] for AV fistula placement and patient c/o increased pain with movement  Pt had L hip XR and showed hip fx       History of Illness: Kunal Blair is a 68 y o  female who basically was supposed to have an AV fistula created today by vascular surgery, however, she was noticed to have swelling of her left leg  I believe family has also noticed that  Patient is aphasic and really cannot give any information by herself  She is also bed-bound and is taken for hemodialysis 3 times a week  Daughter who is POA is at bedside  There has not been any fall as per daughter at the nursing home from where she was recently moved back to her family  Family has been helping to transport her for hemodialysis and they have not noticed any fall either    As per daughter, with any movement, she would have significant pain in her left leg and wants her to be comfortable  Patient also has a PEG tube  She gets very little fluids slept through her mouth      ED Vital Signs:   ED Triage Vitals [02/28/18 1323]   Temperature Pulse Respirations Blood Pressure SpO2   97 7 °F (36 5 °C) 97 20 158/67 97 %      Temp Source Heart Rate Source Patient Position - Orthostatic VS BP Location FiO2 (%)   Axillary Monitor Lying Right arm --      Pain Score       Worst Possible Pain        Wt Readings from Last 1 Encounters:   02/28/18 49 5 kg (109 lb 2 oz)       Vital Signs (abnormal):     Date/Time  Temp  Pulse  Resp  BP  SpO2  O2 Device  Patient Position - Orthostatic VS   03/01/18 1430  --  104  --  122/56  --  --  --   03/01/18 1400  --   107  --  114/52  --  --  --   03/01/18 1345  --  104  --  102/50  --  --  --   03/01/18 1330  --  104  --  114/56  --  --  --   03/01/18 1315  --   107  --  103/55  --  --  --   03/01/18 1300  --  103  --  111/52  --  --  --   03/01/18 1230  --  104  --  121/61  --  --  --   03/01/18 1200  --  97  --  120/63  --  --  --   03/01/18 1130  --  100  --  127/66  --  --  --   03/01/18 1100  --  100  --  155/70  --  --  --   03/01/18 1055  --  102  --  148/73  --  --  --   03/01/18 0727   97 3 °F (36 3 °C)  101  19  143/66  96 %  None (Room air)  Lying   03/01/18 0328   97 2 °F (36 2 °C)   107  20  147/69  97 %  None (Room air)  Lying   02/28/18 2332  99 °F (37 2 °C)   107  22  143/59  97 %  None (Room air)  Lying   02/28/18 2009  97 8 °F (36 6 °C)  100  20  134/70  96 %  --  Lying         Abnormal Labs/Diagnostic Test Results:      Sodium 135 (L) 136 - 145 mmol/L     Potassium 3 2 (L) 3 5 - 5 3 mmol/L     Chloride 96 (L) 100 - 108 mmol/L     CO2 25 21 - 32 mmol/L     Anion Gap 14 (H) 4 - 13 mmol/L     BUN 71 (H) 5 - 25 mg/dL     Creatinine 5 32 (H) 0 60 - 1 30 mg/dL     Comment: Standardized to IDMS reference method       Glucose 157 (H) 65 - 140 mg/dL        Lab Units 02/28/18  1748   WBC Thousand/uL 11 18*   HEMOGLOBIN g/dL 8 7* HEMATOCRIT % 26 8*   PLATELETS Thousands/uL 215     Xray left femur  Intertrochanteric versus of subtrochanteric left femoral fracture  Xray left hip  Mild displaced intertrochanteric fracture of the proximal left femur extending to the proximal diaphysis  Severe arthritic changes of the left hip joint  CXR  Persistent mild pulmonary vascular congestion  Stable cardiomegaly  ED Treatment:   Medication Administration from 02/28/2018 1320 to 02/28/2018 2000       Date/Time Order Dose Route Action Comments     02/28/2018 1359 fentanyl citrate (PF) 100 MCG/2ML 25 mcg 25 mcg Intravenous Given      02/28/2018 1532 oxyCODONE (ROXICODONE) oral solution 2 5 mg 2 5 mg Oral Given           Past Medical/Surgical History:    Active Ambulatory Problems     Diagnosis Date Noted    ESRD (end stage renal disease) on dialysis ([place] [address] ) 02/07/2018     Resolved Ambulatory Problems     Diagnosis Date Noted    No Resolved Ambulatory Problems     Past Medical History:   Diagnosis Date    Anemia     Arthritis     Chronic kidney disease     Dialysis patient ([place] [address] )     Hypertension     Multiple myeloma ([place] [address] )     Stroke [facility])        Admitting Diagnosis: Hip pain [M25 559]  ESRD (end stage renal disease) on dialysis ([place] [address] ) [N18 6, Z99 2]  Closed intertrochanteric fracture of left femur ([place] [address] ) [S72 142A]  Closed intertrochanteric fracture of hip, left, initial encounter ([place] [address] ) [S72 142A]    Age/Sex: 68 y o  female    Assessment/Plan:    Hospital Problem List:      Principal Problem:    Closed intertrochanteric fracture of left femur ([place] [address] )  Active Problems:    ESRD (end stage renal disease) on dialysis ([place] [address] )    An[facility] of chronic kidney failure, stage 5 [facility]  )    Multiple myeloma not having achieved remission ([place] [address] )    Essential hypertension    Diabetes mellitus type 2 in nonobese ([place] [address] )    Hemiparesis due to old stroke [facility])     Present on Admission:   Closed intertrochanteric fracture of left femur (HCC)   Anemia of chronic kidney failure, stage 5 (HCC)   [facility] not having achieved remission [facility]   Essential hypertension   Diabetes mellitus type 2 in nonobese ([facility] [address] )        Plan for the Primary Problem(s):  · This is a sick appearing 68years old [facility] female who basically is aphasic and did not communicate with me whatsoever  As per family, at times, she would have bursts of communication and then nothing  She is on tube feeding  She has right upper chest PermCath  On hemodialysis  She basically is bedridden and was found to have left femur fracture  She also has history of what appears to be multiple myeloma  I have had lengthy discussion with patient's daughter who is POA  She has discussion with ER physician multiple times to who has spoken with our orthopedic team prior to making final decision that daughter wants patient to have surgery although for comfort, however, she is still full code  Patient is high risk for any surgical intervention as her respiratory status could very well be easily compromised and high risk for aspiration  I have not restarted her tube feeds  She would be on her home medications and also sliding scale insulin  I have spoken with Nephrology service as patient would need hemodialysis tomorrow  ER physician has spoken with on call orthopedic service/physician  I have put a consult for orthopedic service as well for tomorrow  Further treatment plan would depend upon discussion between patient's daughter and the orthopedic services  ? Continue other home medications as above     Plan for Additional Problems:   · As per daughter, patient required 10 units of packed RBCs when she was on subcutaneous heparin, therefore, would not want patient to be on any anticoagulants other than aspirin  Patient is already on 325 mg aspirin I believe for her stroke  This will be continued  I would increase the frequency of PPI patient has been previously  · Anemia of chronic disease  Hemoglobin stable  Patient also with history of multiple myeloma  · For diabetes mellitus , she should be on sliding scale insulin  · Old stroke with left-sided hemiparesis/aphasia   Patient has PEG tube which would be maintained  Continue with aspirin and blood pressure control     VTE Prophylaxis: Pharmacologic VTE Prophylaxis contraindicated due to History of significant GI bleeding and as per family not to give any subcutaneous heparin or anticoagulants other than aspirin  / sequential compression device   Code Status:  Full code     Anticipated Length of Stay:  Patient will be admitted on an Inpatient basis with an anticipated length of stay of  more than 2 midnights  Justification for Hospital Stay:  Left femur fracture     Total Time for Visit, including Counseling / Coordination of Care: 75+ minutes    Greater than 50% of this total time spent on direct patient counseling and coordination of care      Chief Complaint:   Found to have left leg fracture/femur fracture       Admission Orders:  Scheduled Meds:   Current Facility-Administered Medications:  acetaminophen 650 mg Oral Q4H PRN Kahlil Hagan MD   amLODIPine 10 mg Oral Daily Kahlil Hagan MD   aspirin 325 mg Oral Daily Kahlil Hagan MD   b complex-vitamin C-folic acid 1 capsule Oral Daily With Lenin Nettles MD   [START ON 3/2/2018] epoetin babak 6,000 Units Intravenous Once per day on Mon Wed Fri Sanaz Steinberg MD   hydrALAZINE 25 mg Oral Q12H Kahlil Hagan MD   HYDROmorphone 0 2 mg Intravenous Q4H PRN Kahlil Hagan MD   insulin lispro 1-6 Units Subcutaneous TID Tim Melendrez MD   insulin lispro 1-6 Units Subcutaneous HS Kahlil Hagan MD   ipratropium-albuterol 3 mL Nebulization BID Kahlil Hagan MD   losartan 100 mg Oral Daily Kahlil Hagan MD   ondansetron 4 mg Intravenous Q6H PRN Kahlil Hagan MD   oxyCODONE 2 5 mg Oral Q4H PRN Kahlil Hagan MD   pantoprazole 20 mg Oral BID AC Kahlil Hagan MD     Continuous Infusions: PRN Meds:   acetaminophen    HYDROmorphone    ondansetron    oxyCODONE    Consult orthopedics  Tube feeding  NPO  Bed rest  ECHO   CBC CMP 3/2  Hemodialysis  SCD  Tele

## 2018-03-01 NOTE — SOCIAL WORK
Cm met with pt and daughter Teresa Nuñez at bedside  Pt lives with her daughter and her family  Pt is bedridden and needs total assistance with ADL's  She has a complicated hx-multiple myeloma, multiple CVA's resulting in R hemiplegia and aphasia, tube feeding, Dm, ESRD-goes to dialysis through [place] on [facility] on M-W-F and now a L femur fracture  Pt was discharged from [place] on Wednesday  She gets to dialysis through the [facility]  [facility] is already involved in providing additional assistance  Teresa Nuñez owns [employer] and can provide any needed assistance in the home  Pt has an Advanced Directive and her POA is her daughter  CM discussed discharge plan  Daughter had promised her mother that she would not put her in a STR, but now understands that she may not be able to provide the needed care in the home  Will allow CM to place referral to [place] to check bed availability  CM will keep daughter informed of acceptance  CM department   will continue to follow through hospitalization

## 2018-03-01 NOTE — PLAN OF CARE
Problem: DISCHARGE PLANNING - CARE MANAGEMENT  Goal: Discharge to post-acute care or home with appropriate resources  INTERVENTIONS:  - Conduct assessment to determine patient/family and health care team treatment goals, and need for post-acute services based on payer coverage, community resources, and patient preferences, and barriers to discharge  - Address psychosocial, clinical, and financial barriers to discharge as identified in assessment in conjunction with the patient/family and health care team  - Arrange appropriate level of post-acute services according to patients   needs and preference and payer coverage in collaboration with the physician and health care team  - Communicate with and update the patient/family, physician, and health care team regarding progress on the discharge plan  - Arrange appropriate transportation to post-acute venues   Outcome: Progressing  Cm met with pt and daughter Nedra Rodriguez at bedside  Pt lives with her daughter and her family  Pt is bedridden and needs total assistance with ADL's  She has a complicated hx-multiple myeloma, multiple CVA's resulting in R hemiplegia and aphasia, tube feeding, Dm, ESRD-goes to dialysis through [place] on [facility] on M-W-F and now a L femur fracture  Pt was discharged from [facility] on Wednesday  She gets to dialysis through the [facility]  [facility] is already involved in providing additional assistance  Nedra Rodriguez owns [employer] and can provide any needed assistance in the home  Pt has an Advanced Directive and her POA is her daughter  CM discussed discharge plan  Daughter had promised her mother that she would not put her in a STR, but now understands that she may not be able to provide the needed care in the home  Will allow CM to place referral to [facility] to check bed availability  CM will keep daughter informed of acceptance  CM department   will continue to follow through hospitalization

## 2018-03-01 NOTE — CONSULTS
Orthopedics   Dee Goldberg 68 y o  female MRN: [id number]  Unit/Bed#: -01      Chief Complaint:   left hip pain    HPI:   68 y  o female nonambulator with left hip pain  Patient was at [facility]  for AV fistula placement on 2018 and complained of left hip pain with movement x-rays were taken and patient was noted to have a fracture of the left hip  59-year-old female with history of end-stage renal disease on dialysis, multiple myeloma, diabetes mellitus, hemiparesis secondary to stroke  Patient is aphasic and has been bed ridden for little over one year  No history of recent fall  Patient was in bed in no acute distress but unable to communicate  Any movement of the patient causes extreme pain left hip  Family was at bedside and discussed in detail with Dr YOGESH MENDIETA [facility] both surgical and nonsurgical options and family opted to move forward with surgery for the left hip      Review Of Systems:   · Skin: Normal  · Musculoskeletal: See HPI  · Unable to complete full ROS secondary to patient being aphasic from stroke    Past Medical History:   Past Medical History:   Diagnosis Date    Anemia     Arthritis     Chronic kidney disease     Dialysis patient ([facility] [address] )     monday, wednesday, friday    Hypertension     Multiple myeloma ([facility] [address] )     Stroke [facility])        Past Surgical History:   Past Surgical History:   Procedure Laterality Date     SECTION      JOINT REPLACEMENT Right     hip       Family History:  Family history reviewed and non-contributory  Family History   Problem Relation Age of Onset    Hypertension Mother        Social History:  Social History     Social History    Marital status: Single     Spouse name: N/A    Number of children: N/A    Years of education: N/A     Social History Main Topics    Smoking status: Never Smoker    Smokeless tobacco: Never Used    Alcohol use No    Drug use: No    Sexual activity: Not Asked     Other Topics Concern    None     Social History Narrative    None       Allergies:   No Known Allergies        Labs:    0  Lab Value Date/Time   HCT 25 2 (L) 03/01/2018 0539   HCT 26 8 (L) 02/28/2018 1748   HCT 27 4 (L) 02/28/2018 1207   HGB 8 3 (L) 03/01/2018 0539   HGB 8 7 (L) 02/28/2018 1748   HGB 9 0 (L) 02/28/2018 1207   INR 0 98 02/28/2018 1748   WBC 10 85 (H) 03/01/2018 0539   WBC 11 18 (H) 02/28/2018 1748   WBC 12 00 (H) 02/28/2018 1207       Meds:    Current Facility-Administered Medications:     acetaminophen (TYLENOL) tablet 650 mg, 650 mg, Oral, Q4H PRN, Edna Jernigan MD    amLODIPine (NORVASC) tablet 10 mg, 10 mg, Oral, Daily, Edna Jernigan MD, Stopped at 03/01/18 7765    aspirin tablet 325 mg, 325 mg, Oral, Daily, Edna Jernigan MD, 325 mg at 03/01/18 0947    b complex-vitamin C-folic acid (NEPHROCAPS) capsule 1 capsule, 1 capsule, Oral, Daily With Aleksander Corrales MD    hydrALAZINE (APRESOLINE) tablet 25 mg, 25 mg, Oral, Q12H, Edna Jernigan MD, Stopped at 03/01/18 0925    HYDROmorphone (DILAUDID) injection 0 2 mg, 0 2 mg, Intravenous, Q4H PRN, Edna Jernigan MD, 0 2 mg at 03/01/18 0947    insulin lispro (HumaLOG) 100 units/mL subcutaneous injection 1-6 Units, 1-6 Units, Subcutaneous, TID AC **AND** Fingerstick Glucose (POCT), , , TID AC, Edna Jernigan MD    insulin lispro (HumaLOG) 100 units/mL subcutaneous injection 1-6 Units, 1-6 Units, Subcutaneous, HS, Edna Jernigan MD    ipratropium-albuterol (DUO-NEB) 0 5-2 5 mg/3 mL inhalation solution 3 mL, 3 mL, Nebulization, 4x Daily, Edna Jernigan MD, 3 mL at 03/01/18 0727    losartan (COZAAR) tablet 100 mg, 100 mg, Oral, Daily, Edna Jernigan MD, Stopped at 03/01/18 0925    ondansetron (ZOFRAN) injection 4 mg, 4 mg, Intravenous, Q6H PRN, Edna Jernigan MD    oxyCODONE (ROXICODONE) oral solution 2 5 mg, 2 5 mg, Oral, Q4H PRN, Edna Jernigan MD    pantoprazole (PROTONIX) EC tablet 20 mg, 20 mg, Oral, BID AC, Edna Jernigan MD    Blood Culture:   No results found for: BLOODCX    Wound Culture:   No results found for: WOUNDCULT    Ins and Outs:  I/O last 24 hours: In: 200 [I V :200]  Out: -           Physical Exam:   /63   Pulse 97   Temp (!) 97 3 °F (36 3 °C) (Axillary)   Resp 19   Ht 5' (1 524 m)   Wt 49 5 kg (109 lb 2 oz)   SpO2 96%   BMI 21 31 kg/m²   Gen:  Sick appearing aphasic female  Unable to communicate   Respiratory: Bilateral chest rise  No audible wheezing found  Cardiovascular: Regular Rate and Rhythm  Abdomen: soft nontender/nondistended  Musculoskeletal: left lower extremity  · Skin intact, limb shortened and externally rotated  · Tender to palpation over hip  · Positive log roll  · Does not follow commands  · 2+ DP pulse  · Left sided hemiparesis noted      Radiology:   I personally reviewed the films  X-rays left hip shows subtrochanteric femur fracture    _*_*_*_*_*_*_*_*_*_*_*_*_*_*_*_*_*_*_*_*_*_*_*_*_*_*_*_*_*_*_*_*_*_*_*_*_*_*_*_*_*    Assessment:  68 y  o female  with leftsubtrochanteric femur fracture  Family was present to speak to Dr Andressa Disla regarding surgical intervention  Patient's daughter, Lesa Alves, gave consent for patient to have left hip intramedullary nail  Colette phone number is [phone]    We will plan for tomorrow after hemodialysis today and cardiology clearance    Plan:   · Non weight bearing left lower extremity  · Analgesics for pain  · NPO at midnight  · Medicine for all medical management and preoperative risk stratification  · To OR for IM nail femur fracture of subtrochanteric femur fracture  · Dispo: Ortho will follow      [employer] DIEGO Bates

## 2018-03-01 NOTE — ESCALATED TEAM TX
Care Team Meeting  Attended by:  Dr Jamel Colon RN/CM  RN's-Tanya Ricks  Daughter-Amira Swann  Son-in-law    Plan: To discuss d/c plan  Pt has a fractured L femur  Scheduled for surgery 3/2/18 pending cardiac clearance  Daughter just d/subhash mother home from [facility] on Wednesday and had promised not to put her back in a STR  After discussing all alternatives, it was decided to place referral to [facility] to check for bed availability  Daughter owns and operates [employer]  Will also place referral to [facility] for possible d/c home pending surgical outcome   department will continue to follow

## 2018-03-01 NOTE — PLAN OF CARE
CARDIOVASCULAR - ADULT     Maintains optimal cardiac output and hemodynamic stability Progressing     Absence of cardiac dysrhythmias or at baseline rhythm Progressing        DISCHARGE PLANNING     Discharge to home or other facility with appropriate resources Progressing        GASTROINTESTINAL - ADULT     Maintains adequate nutritional intake Progressing        Knowledge Deficit     Patient/family/caregiver demonstrates understanding of disease process, treatment plan, medications, and discharge instructions Progressing        PAIN - ADULT     Verbalizes/displays adequate comfort level or baseline comfort level Progressing        Potential for Falls     Patient will remain free of falls Progressing        Prexisting or High Potential for Compromised Skin Integrity     Skin integrity is maintained or improved Progressing        SKIN/TISSUE INTEGRITY - ADULT     Skin integrity remains intact Progressing

## 2018-03-01 NOTE — CONSULTS
Consultation - Cardiology    Eli Escobedo 68 y o  female MRN: [id number]  Unit/Bed#: -01 Encounter: [id number]    Physician Requesting Consult: Kirit Acosta MD    Reason for Consult / Principal Problem:  Close intratrochanteric fracture of left femur    HPI: Eli Escobedo is a 68y o  year old female with a past medical history significant for ESRD on hemodialysis, anemia, multiple myeloma, hypertension, diabetes, hemipareses is due to stroke presents as she is supposed to have an AV fistula created per vascular surgery  However she was noticed to have swelling in her left leg and it was discovered that she had a left femur fracture  Patient is a poor historian as she does not speak, also bedridden  Patient has significant pain with leg movement  Has PEG tube  Per chart review, no cardiac issues found      Historical Information   Past Medical History:   Diagnosis Date    Anemia     Arthritis     Chronic kidney disease     Dialysis patient ([facility] )     monday, wednesday, friday    Hypertension     Multiple myeloma ([facility] )     Stroke ([facility] )      Past Surgical History:   Procedure Laterality Date     SECTION      JOINT REPLACEMENT Right     hip     History   Alcohol Use No     History   Drug Use No     History   Smoking Status    Never Smoker   Smokeless Tobacco    Never Used       FAMILY HISTORY:  Family History   Problem Relation Age of Onset    Hypertension Mother        MEDS & ALLERGIES:  all current active meds have been reviewed and current meds:   Current Facility-Administered Medications   Medication Dose Route Frequency    acetaminophen (TYLENOL) tablet 650 mg  650 mg Oral Q4H PRN    amLODIPine (NORVASC) tablet 10 mg  10 mg Oral Daily    aspirin tablet 325 mg  325 mg Oral Daily    b complex-vitamin C-folic acid (NEPHROCAPS) capsule 1 capsule  1 capsule Oral Daily With Dinner    hydrALAZINE (APRESOLINE) tablet 25 mg  25 mg Oral Q12H    HYDROmorphone (DILAUDID) injection 0 2 mg  0 2 mg Intravenous Q4H PRN    insulin lispro (HumaLOG) 100 units/mL subcutaneous injection 1-6 Units  1-6 Units Subcutaneous TID AC    insulin lispro (HumaLOG) 100 units/mL subcutaneous injection 1-6 Units  1-6 Units Subcutaneous HS    ipratropium-albuterol (DUO-NEB) 0 5-2 5 mg/3 mL inhalation solution 3 mL  3 mL Nebulization 4x Daily    losartan (COZAAR) tablet 100 mg  100 mg Oral Daily    ondansetron (ZOFRAN) injection 4 mg  4 mg Intravenous Q6H PRN    oxyCODONE (ROXICODONE) oral solution 2 5 mg  2 5 mg Oral Q4H PRN    pantoprazole (PROTONIX) EC tablet 20 mg  20 mg Oral BID AC        No Known Allergies      REVIEW OF SYSTEMS:  Unable to be obtained as patient does not speak  PHYSICAL EXAM:  Vitals:   Vitals:    18 1300   BP: 111/52   Pulse: 103   Resp: 19   Temp:    SpO2: 96%     Body mass index is 21 31 kg/m²  Systolic (04LEM), RZ , Min:120 , STM:103     Diastolic (40APT), II, Min:59, Max:73      Intake/Output Summary (Last 24 hours) at 18 1252  Last data filed at 18 1055   Gross per 24 hour   Intake              200 ml   Output                0 ml   Net              200 ml     Weight (last 2 days)     Date/Time   Weight    18  49 5 (109 13)    18 1323  54 6 (120 37)            Invasive Devices     Peripheral Intravenous Line            Peripheral IV 18 Left Hand 1 day          Line            HD Cath Double -- days          Drain            Gastrostomy/Enterostomy Percutaneous endoscopic gastrostomy (PEG) LUQ -- days              PHYSICAL EXAM:  General: +on hemodialysis  +aphasic  Patient is not in acute distress  Laying comfortably in the bed  Awake, alert  Head: Normocephalic  Atraumatic  HEENT: Both pupils normal sized, round and reactive to light  Nonicteric  Neck: JVP not elevated   Trachea central  No thyromegaly  Respiratory: Bilateral bronchovascular breath sounds with no crackles or rhonchi  Cardiovascular: + tachycardia, S1 and S2 normal  No murmur, rub or gallop  GI: Abdomen soft, nontender  No guarding or rigidity  Liver and spleen not palpable  Bowel sounds present  Neurologic: +aphasic  Patient is awake, alert  Well-oriented to time, place and person     Extremities: No clubbing, cyanosis or edema  Integument: No skin rashes or ulceration  Lymphatic: No cervical lymphadenopathy      LABORATORY RESULTS:        CBC with diff:   Results from last 7 days  Lab Units 03/01/18  0539 02/28/18  1748 02/28/18  1207   WBC Thousand/uL 10 85* 11 18* 12 00*   HEMOGLOBIN g/dL 8 3* 8 7* 9 0*   HEMATOCRIT % 25 2* 26 8* 27 4*   MCV fL 106* 107* 106*   PLATELETS Thousands/uL 220 215 217   MCH pg 34 9* 34 7* 34 7*   MCHC g/dL 32 9 32 5 32 8   RDW % 21 0* 21 1* 20 9*   MPV fL 12 4 12 3 12 7   NRBC AUTO /100 WBCs  --  0  --        CMP:  Results from last 7 days  Lab Units 03/01/18  0539 02/28/18  1748 02/28/18  1207   SODIUM mmol/L 134* 135* 135*   POTASSIUM mmol/L 3 6 3 2* 3 9   CHLORIDE mmol/L 95* 96* 95*   CO2 mmol/L 26 25 26   ANION GAP mmol/L 13 14* 14*   BUN mg/dL 78* 71* 67*   CREATININE mg/dL 5 91* 5 32* 5 07*   GLUCOSE RANDOM mg/dL 112 157* 176*   CALCIUM mg/dL 10 5* 10 1 10 6*   AST U/L  --  22  --    ALT U/L  --  34  --    ALK PHOS U/L  --  194*  --    TOTAL PROTEIN g/dL  --  6 8  --    BILIRUBIN TOTAL mg/dL  --  0 50  --    EGFR ml/min/1 73sq m 7 8 9       BMP:  Results from last 7 days  Lab Units 03/01/18  0539 02/28/18  1748 02/28/18  1207   SODIUM mmol/L 134* 135* 135*   POTASSIUM mmol/L 3 6 3 2* 3 9   CHLORIDE mmol/L 95* 96* 95*   CO2 mmol/L 26 25 26   BUN mg/dL 78* 71* 67*   CREATININE mg/dL 5 91* 5 32* 5 07*   GLUCOSE RANDOM mg/dL 112 157* 176*   CALCIUM mg/dL 10 5* 10 1 10 6*                          Results from last 7 days  Lab Units 02/28/18  1748 02/28/18  1207   INR  0 98 0 92       Lipid Profile:   No results found for: CHOL  No results found for: HDL  No results found for: LDLCALC  No results found for: TRIG    Cardiac testing:   No results found for this or any previous visit  No results found for this or any previous visit  No procedure found  No results found for this or any previous visit  Imaging: I have personally reviewed pertinent reports  Procedure: Xr Hip/pelv 2-3 Vws Left If Performed    Result Date: 2/28/2018  Narrative: LEFT HIP INDICATION:  Left hip pain  COMPARISON: None VIEWS:  XR HIP/PELV 2-3 VWS LEFT  W PELVIS IF PERFORMED 3 FINDINGS: There is a mildly displaced oblique fracture through the intertrochanteric proximal right femur with extension to the proximal diaphysis  There are severe arthritic changes of the left hip joint including severe joint space narrowing with subchondral sclerosis of the acetabulum and femoral head  Postoperative changes of prior right total hip arthroplasty are noted  No lytic or blastic osseous lesions  Soft tissues are unremarkable  The visualized lumbar spine is unremarkable  Impression: Mild displaced intertrochanteric fracture of the proximal left femur extending to the proximal diaphysis  Severe arthritic changes of the left hip joint  Workstation performed: [id number]     Procedure: Xr Femur 2 Views Left    Result Date: 2/28/2018  Narrative: LEFT FEMUR INDICATION:  Left femur pain  COMPARISON: None VIEWS:  XR FEMUR 2 VW LEFT FINDINGS: Comminuted displaced left proximal femoral fracture  While suboptimally characterized, it appears the intertrochanteric or subtrochanteric  If the latter, suspicious for underlying lesion  Impression: Intertrochanteric versus of subtrochanteric left femoral fracture  Workstation performed: [id number]     Procedure: Xr Chest 1 View    Result Date: 2/28/2018  Narrative: CHEST INDICATION: Preop COMPARISON:  Chest x-ray dated November 9, 2017  EXAM PERFORMED/VIEWS:  XR CHEST 1 VIEW Images: 3 FINDINGS: The cardiomediastinal silhouette is unchanged from the prior exam and demonstrate cardiomegaly    There is atherosclerotic calcification of the aortic arch  Again noted is mild pulmonary vascular congestion  There is bibasilar atelectasis with no focal infiltrate or pleural effusion  There is a right-sided dialysis catheter with the tip at the level of the atrial caval junction  No pneumothorax  There are moderate to severe arthritic changes of both shoulder joints  Impression: Persistent mild pulmonary vascular congestion  Stable cardiomegaly  Workstation performed: [id number]     Procedure: Vas Lower Limb Venous Duplex Study, Unilateral/limited    Result Date: 2/28/2018  Narrative:  [facility] REPORT CLINICAL: Indications: Swelling of Limb [R22 4]  Recent history of broken femur with thigh swelling and pain  Patient is bed ridden  No history of DVT  End stage kidney disease  Clinical:  FINDINGS:  Left     Impression       CFV      Normal (Patent)     CONCLUSION:  Impression: RIGHT LOWER LIMB LIMITED: Evaluation shows no evidence of thrombus in the common femoral vein  Doppler evaluation shows a normal response to augmentation maneuvers  LEFT LOWER LIMB: No evidence of acute or chronic deep vein thrombosis No evidence of superficial thrombophlebitis noted  Doppler evaluation shows a normal response to augmentation maneuvers  Popliteal, posterior tibial and anterior tibial arterial Doppler waveforms are triphasic  Technical findings were given to Dr Tierra Craig at 4244  SIGNATURE: Electronically Signed by: Jack Soriano on 2018-02-28 05:50:39 PM    EKG reviewed personally:  NSR, occasional PVCs, poor R-wave progression in precordial leads    Assessment and Plan:  1  Preoperative clearance:  EKG shows NSR, occasional PVCs, poor R-wave progression in precordial leads  Will order echocardiogram to assess EF and regional wall motion abnormalities  Further recommendations after results of echo  2   History of stroke:  Continue aspirin  Will order lipid panel  3   Hypertension:  Stable, continue present regimen      4   ESRD, anemia from ESRD:  On hemodialysis  Hb 8 3  Managed by hospitalist Medicine, nephrology following  Code Status: Level 1 - Full Code    Thank you for allowing us to participate in this patient's care  Counseling / Coordination of Care  Total floor / unit time spent today 35 minutes  Greater than 50% of total time was spent with the patient and / or family counseling and / or coordination of care  A description of the counseling / coordination of care: Review of history, current assessment, development of a plan         Gin Khan PA-C  3/1/2018,12:52 PM

## 2018-03-01 NOTE — PLAN OF CARE
Problem: DISCHARGE PLANNING - CARE MANAGEMENT  Goal: Discharge to post-acute care or home with appropriate resources  INTERVENTIONS:  - Conduct assessment to determine patient/family and health care team treatment goals, and need for post-acute services based on payer coverage, community resources, and patient preferences, and barriers to discharge  - Address psychosocial, clinical, and financial barriers to discharge as identified in assessment in conjunction with the patient/family and health care team  - Arrange appropriate level of post-acute services according to patients   needs and preference and payer coverage in collaboration with the physician and health care team  - Communicate with and update the patient/family, physician, and health care team regarding progress on the discharge plan  - Arrange appropriate transportation to post-acute venues  Outcome: Progressing  Care Team Meeting  Attended by:  Dr Teja Starr RN/ERI Wang-Amirabritany Swann  Son-in-law    Plan: To discuss d/c plan  Pt has a fractured L femur  Scheduled for surgery 3/2/18 pending cardiac clearance  Daughter just d/subhash mother home from [place] on Wednesday and had promised not to put her back in a STR  After discussing all alternatives, it was decided to place referral to [place] to check for bed availability  Daughter owns and operates [employer]  Will also place referral to [facility] for possible d/c home pending surgical outcome   department will continue to follow  Comments: Care Team Meeting  Attended by:  Dr Teja Starr RN/ERI Wang-Amira Shree  Son-in-law    Plan: To discuss d/c plan  Pt has a fractured L femur  Scheduled for surgery 3/2/18 pending cardiac clearance  Daughter just d/subhash mother home from [place] on Wednesday and had promised not to put her back in a STR    After discussing all alternatives, it was decided to place referral to [facility] to check for bed availability  Daughter owns and operates [employer]  Will also place referral to [facility] for possible d/c home pending surgical outcome  CM department will continue to follow

## 2018-03-01 NOTE — CONSULTS
Consultation - Nephrology   Samson Bella 68 y o  female MRN: [id number]  Unit/Bed#: -01 Encounter: [id number]    Referring PHYSICIAN: Eron Avalos    REASON FOR THE CONSULTATION:  ESRD    DATE OF CONSULTATION:  2018    ADMISSION DIAGNOSIS: Closed intertrochanteric fracture of left femur ([facility] )     CHIEF COMPLAINT     63-year-old woman admitted the hospital with a fracture left femur admitted the hospital for surgery and I am being consulted for dialysis    HPI     5 woman who had a history of stroke and quite aphasic and hemiparesis on dialysis for a while on Monday and S2 Friday  Came to the hospital yesterday after she was found of fracture left femur  Patient is not giving any history as she cannot speak so water history which I got is from the chart  Appearance supposed to a fistula but she was found to swelling of the left leg workup to well left femoral fracture so she was admitted  Patient does have history of myeloma  ESRD on hemodialysis  History of CVA with him paresis as well as aphasia  She is basically bedridden    She has PEG tube for the feeding        PAST MEDICAL HISTORY     Past Medical History:   Diagnosis Date    Anemia     Arthritis     Chronic kidney disease     Dialysis patient ([place] [address] )     monday, wednesday, friday    Hypertension     Multiple myeloma ([place] [address] )     Stroke ([place] [address] )        PAST SURGICAL HISTORY     Past Surgical History:   Procedure Laterality Date     SECTION      JOINT REPLACEMENT Right     hip       ALLERGIES     No Known Allergies    SOCIAL HISTORY     History   Alcohol Use No     History   Drug Use No     History   Smoking Status    Never Smoker   Smokeless Tobacco    Never Used       FAMILY HISTORY     Family History   Problem Relation Age of Onset    Hypertension Mother        CURRENT MEDICATIONS       Current Facility-Administered Medications:     acetaminophen (TYLENOL) tablet 650 mg, 650 mg, Oral, Q4H PRN, MD Brittany Galdamez amLODIPine (NORVASC) tablet 10 mg, 10 mg, Oral, Daily, Nusrat Agrawal MD, Stopped at 03/01/18 2574    aspirin tablet 325 mg, 325 mg, Oral, Daily, Nusrat Agrawal MD, 325 mg at 03/01/18 0947    b complex-vitamin C-folic acid (NEPHROCAPS) capsule 1 capsule, 1 capsule, Oral, Daily With Steven Buckner MD    hydrALAZINE (APRESOLINE) tablet 25 mg, 25 mg, Oral, Q12H, Nusrat Agrawal MD, Stopped at 03/01/18 3831    HYDROmorphone (DILAUDID) injection 0 2 mg, 0 2 mg, Intravenous, Q4H PRN, Nusrat Agrawal MD, 0 2 mg at 03/01/18 0947    insulin lispro (HumaLOG) 100 units/mL subcutaneous injection 1-6 Units, 1-6 Units, Subcutaneous, TID AC **AND** Fingerstick Glucose (POCT), , , TID AC, Nusrat Agrawal MD    insulin lispro (HumaLOG) 100 units/mL subcutaneous injection 1-6 Units, 1-6 Units, Subcutaneous, HS, Nusrat Agrawal MD    ipratropium-albuterol (DUO-NEB) 0 5-2 5 mg/3 mL inhalation solution 3 mL, 3 mL, Nebulization, 4x Daily, Nusrat Agrawal MD, 3 mL at 03/01/18 4896    losartan (COZAAR) tablet 100 mg, 100 mg, Oral, Daily, Nusrat Agrawal MD, Stopped at 03/01/18 0925    ondansetron (ZOFRAN) injection 4 mg, 4 mg, Intravenous, Q6H PRN, Nusrat Agrawal MD    oxyCODONE (ROXICODONE) oral solution 2 5 mg, 2 5 mg, Oral, Q4H PRN, Nusrat Agrawal MD    pantoprazole (PROTONIX) EC tablet 20 mg, 20 mg, Oral, BID ACNusrat MD    REVIEW OF SYSTEMS     Review of Systems   Unable to perform ROS: Patient nonverbal     Worse symptoms which I got is from the chart  LAB RESULTS          Results from last 7 days  Lab Units 03/01/18  0539 02/28/18  1748 02/28/18  1207   WBC Thousand/uL 10 85* 11 18* 12 00*   HEMOGLOBIN g/dL 8 3* 8 7* 9 0*   HEMATOCRIT % 25 2* 26 8* 27 4*   PLATELETS Thousands/uL 220 215 217   SODIUM mmol/L 134* 135* 135*   POTASSIUM mmol/L 3 6 3 2* 3 9   CHLORIDE mmol/L 95* 96* 95*   CO2 mmol/L 26 25 26   BUN mg/dL 78* 71* 67*   CREATININE mg/dL 5 91* 5 32* 5 07*   EGFR ml/min/1 73sq m 7 8 9   CALCIUM mg/dL 10 5* 10 1 10 6*   TOTAL PROTEIN g/dL  --  6 8  --    GLUCOSE RANDOM mg/dL 112 157* 176*           RADIOLOGY RESULTS     No results found for this or any previous visit  Results for orders placed during the hospital encounter of 11/09/17   XR chest 2 views    Narrative CHEST     INDICATION:  Weakness  History taken directly from the electronic ordering system  COMPARISON:  Chest x-ray of 9/19/2017    VIEWS:  Frontal and lateral projections    IMAGES:  2    FINDINGS:  Right-sided dialysis catheter tip overlies the right atrium  The heart is enlarged  Patient is somewhat rotated to the left  There is persistent pulmonary vascular congestion, appears chronic  No pneumothorax or pleural effusion  Degenerative spurring is noted of the spine  There are marked arthritic changes of the right shoulder  Impression Persistent pulmonary vascular congestion, appears chronic  Workstation performed: [id number]       No results found for this or any previous visit  No results found for this or any previous visit  No results found for this or any previous visit  No results found for this or any previous visit  OBJECTIVE     Current Weight: Weight - Scale: 49 5 kg (109 lb 2 oz)  Vitals:    03/01/18 1345   BP: 102/50   Pulse: 104   Resp:    Temp:    SpO2:        Intake/Output Summary (Last 24 hours) at 03/01/18 1411  Last data filed at 03/01/18 1315   Gross per 24 hour   Intake              300 ml   Output                0 ml   Net              300 ml       PHYSICAL EXAMINATION     Physical Exam   Constitutional: No distress  Chronically ill looking patient   HENT:   Head: Normocephalic and atraumatic  Eyes: Conjunctivae are normal  Pupils are equal, round, and reactive to light  No scleral icterus  Neck: Normal range of motion  Neck supple  No JVD present  Cardiovascular: Normal rate and normal heart sounds  No murmur heard    Pulmonary/Chest: Effort normal and breath sounds normal  She has no wheezes  She has no rales  Abdominal: Soft  Bowel sounds are normal  She exhibits no mass  There is no tenderness  Musculoskeletal: Normal range of motion  She exhibits no edema  Neurological: She is alert  Skin: Skin is warm  No rash noted  PLAN / RECOMMENDATIONS      ESRD:  Will get dialyzed today as she missed her treatment yesterday  I will also dialyze her tomorrow to keep her on the schedule  Fractured hip:  Will be needing surgery  Renal point of view she is stable and I will try to dialyze her before surgery again  Discussed with primary team    History of CVA:  Aphasic and hemiparetic with PEG tube    Anemia will treat with Procrit and monitor her    Overall prognosis guarded    Thank you for the consultation to participate in patient's care  I have personally discussed my plan with the referring physician  Noble Alonso MD  Nephrology  3/1/2018        Portions of the record may have been created with voice recognition software  Occasional wrong word or "sound a like" substitutions may have occurred due to the inherent limitations of voice recognition software  Read the chart carefully and recognize, using context, where substitutions have occurred

## 2018-03-01 NOTE — PROGRESS NOTES
[facility] Progress Note  Patient: Albin Cameron 68 y o  female   MRN: [id number]  PCP: Clive Almanzar MD  Unit/Bed#: -[facility] Encounter: [id number]  Date Of Visit: 03/01/18    Assessment/Plan:    Principal Problem:    Closed intertrochanteric fracture of left femur ([facility] )  Active Problems:    ESRD (end stage renal disease) on dialysis ([address] )    Anemia of chronic kidney failure, stage 5 (HCC)    Multiple myeloma not having achieved remission ([address] )    Essential hypertension    Diabetes mellitus type 2 in nonobese ([address] )    Hemiparesis due to old stroke ([address] )    Closed intertrochanteric fracture of hip, left, initial encounter ([facility] [address] [facility] )    Functional quadriplegia ([address] )    Present on Admission:   Closed intertrochanteric fracture of left femur ([address] )   Anemia of chronic kidney failure, stage 5 ([address] )   Multiple myeloma not having achieved remission ([address] )   Essential hypertension   Diabetes mellitus type 2 in nonobese ([address] )   Closed intertrochanteric fracture of hip, left, initial encounter ([facility] [address] )   Functional quadriplegia ([address] )      · Fracture left femur  Patient with history of multiple myeloma  May have had pathological fracture and then complicated later on  Patient does not ambulate and basically is bedridden  She is also aphasic with history of old stroke and left hemiparesis-functional cardioplegia  Her quality of life is minimal   Again discussed with patient's daughter today  Orthopedic service ask for cardiology clearance  Awaiting echocardiogram   Previously, I believe she has had extensive workup done in another hospital in the area  She is high risk for surgical procedures-and also awaiting anesthesia service for evaluation  Goal for the treatment of her femur fracture is comfort and patient not to be in pain, however, she is still full code  · Functional quadriplegia with underlying old stroke/left millicent paresis and aphasia  Patient requires care for everything  · Nutrition  Resumed her tube feeds-at lower rate for today  Anticipating that patient would be going for surgery tomorrow if cleared by all  · End-stage renal disease with underlying multiple myeloma/hypertension/diabetes mellitus  Continue with hemodialysis  Nephrology following  · Old stroke-continue with aspirin  VTE Pharmacologic Prophylaxis:   Pharmacologic: Patient had severe bleeding while being on subcutaneous heparin in the recent past requiring multiple blood transfusions  Family does not want patient to be on any anticoagulants  Continue with aspirin  Mechanical VTE Prophylaxis in Place: Only right leg if possible as patient screams with pain with any movement especially of her left leg    Patient Centered Rounds: I have performed bedside rounds with nursing staff today  Discussions with Specialists or Other Care Team Provider:  Yes    Education and Discussions with Family / Patient:  Yes    Time Spent for Care: 35+ minutes  More than 50% of total time spent on counseling and coordination of care as described above  Current Length of Stay: 1 day(s)    Current Patient Status: Inpatient   Certification Statement: The patient will continue to require additional inpatient hospital stay due to Left femur fracture    Discharge Plan:  Daughter was adamant to take patient home after the surgery  After discussion in the presence of case management/, patient's daughter's -daughter now inclining towards patient going to a facility for at least short-term rehab    Code Status: Level 1 - Full Code      Subjective:   Patient is aphasic and does not communicate  As per staff, patient screams when she is moved in the bed because of pain in her left leg    Objective:     Vitals:   Temp (24hrs), Av 8 °F (36 6 °C), Min:97 2 °F (36 2 °C), Max:99 °F (37 2 °C)    HR:  [] 97  Resp:  [19-22] 19  BP: (120-158)/(59-73) 120/63  SpO2:  [95 %-97 %] 96 %  Body mass index is 21 31 kg/m²  Input and Output Summary (last 24 hours): Intake/Output Summary (Last 24 hours) at 03/01/18 1229  Last data filed at 03/01/18 1055   Gross per 24 hour   Intake              200 ml   Output                0 ml   Net              200 ml           Physical Exam:     Chronically sick [facility] 68[facility] who is aphasic  Lips are dry  Swollen left thigh  Left leg is also short  Awake  S1 and S2 are audible  Conjunctivae are pale  Abdomen:  Soft  Has PEG tube in  Right PermCath for hemodialysis      Additional Data:     Labs:      Results from last 7 days  Lab Units 03/01/18  0539 02/28/18  1748   WBC Thousand/uL 10 85* 11 18*   HEMOGLOBIN g/dL 8 3* 8 7*   HEMATOCRIT % 25 2* 26 8*   PLATELETS Thousands/uL 220 215   NEUTROS PCT %  --  77*   LYMPHS PCT %  --  15   MONOS PCT %  --  6   EOS PCT %  --  1       Results from last 7 days  Lab Units 03/01/18  0539 02/28/18  1748   SODIUM mmol/L 134* 135*   POTASSIUM mmol/L 3 6 3 2*   CHLORIDE mmol/L 95* 96*   CO2 mmol/L 26 25   BUN mg/dL 78* 71*   CREATININE mg/dL 5 91* 5 32*   CALCIUM mg/dL 10 5* 10 1   TOTAL PROTEIN g/dL  --  6 8   BILIRUBIN TOTAL mg/dL  --  0 50   ALK PHOS U/L  --  194*   ALT U/L  --  34   AST U/L  --  22   GLUCOSE RANDOM mg/dL 112 157*       Results from last 7 days  Lab Units 02/28/18  1748   INR  0 98       * I Have Reviewed All Lab Data Listed Above  * Additional Pertinent Lab Tests Reviewed:  All Labs Within Last 24 Hours Reviewed    Recent Cultures (last 7 days):           Last 24 Hours Medication List:     Current Facility-Administered Medications:  acetaminophen 650 mg Oral Q4H PRN James Cuello MD   amLODIPine 10 mg Oral Daily James Cuello MD   aspirin 325 mg Oral Daily James Cuello MD   b complex-vitamin C-folic acid 1 capsule Oral Daily With Cecilio Bourne MD   hydrALAZINE 25 mg Oral Q12H James Cuello MD   HYDROmorphone 0 2 mg Intravenous Q4H PRN James Cuello MD   insulin lispro 1-6 Units Subcutaneous TID Gaynelle Fleischer, MD   insulin lispro 1-6 Units Subcutaneous HS Erika Patient, MD   ipratropium-albuterol 3 mL Nebulization 4x Daily Erika Patient, MD   losartan 100 mg Oral Daily Erika Patient, MD   ondansetron 4 mg Intravenous Q6H PRN Erika Patient, MD   oxyCODONE 2 5 mg Oral Q4H PRN Erika Patient, MD   pantoprazole 20 mg Oral BID AC Erika Patient, MD        Today, Patient Was Seen By: Erika Garcia MD    ** Please Note: Dragon 360 Dictation voice to text software may have been used in the creation of this document   **

## 2018-03-01 NOTE — PLAN OF CARE
Problem: Potential for Falls  Goal: Patient will remain free of falls  INTERVENTIONS:  - Assess patient frequently for physical needs  -  Identify cognitive and physical deficits and behaviors that affect risk of falls    -  [place] fall precautions as indicated by assessment   - Educate patient/family on patient safety including physical limitations  - Instruct patient to call for assistance with activity based on assessment  - Modify environment to reduce risk of injury  - Consider OT/PT consult to assist with strengthening/mobility   Outcome: Progressing      Problem: Prexisting or High Potential for Compromised Skin Integrity  Goal: Skin integrity is maintained or improved  INTERVENTIONS:  - Identify patients at risk for skin breakdown  - Assess and monitor skin integrity  - Assess and monitor nutrition and hydration status  - Monitor labs (i e  albumin)  - Assess for incontinence   - Turn and reposition patient  - Assist with mobility/ambulation  - Relieve pressure over bony prominences  - Avoid friction and shearing  - Provide appropriate hygiene as needed including keeping skin clean and dry  - Evaluate need for skin moisturizer/barrier cream  - Collaborate with interdisciplinary team (i e  Nutrition, Rehabilitation, etc )   - Patient/family teaching   Outcome: Progressing      Problem: PAIN - ADULT  Goal: Verbalizes/displays adequate comfort level or baseline comfort level  Interventions:  - Encourage patient to monitor pain and request assistance  - Assess pain using appropriate pain scale  - Administer analgesics based on type and severity of pain and evaluate response  - Implement non-pharmacological measures as appropriate and evaluate response  - Consider cultural and social influences on pain and pain management  - Notify physician/advanced practitioner if interventions unsuccessful or patient reports new pain   Outcome: Progressing      Problem: DISCHARGE PLANNING  Goal: Discharge to home or other facility with appropriate resources  INTERVENTIONS:  - Identify barriers to discharge w/patient and caregiver  - Arrange for needed discharge resources and transportation as appropriate  - Identify discharge learning needs (meds, wound care, etc )  - Arrange for interpretive services to assist at discharge as needed  - Refer to Case Management Department for coordinating discharge planning if the patient needs post-hospital services based on physician/advanced practitioner order or complex needs related to functional status, cognitive ability, or social support system   Outcome: Progressing      Problem: Knowledge Deficit  Goal: Patient/family/caregiver demonstrates understanding of disease process, treatment plan, medications, and discharge instructions  Complete learning assessment and assess knowledge base  Interventions:  - Provide teaching at level of understanding  - Provide teaching via preferred learning methods   Outcome: Progressing      Problem: CARDIOVASCULAR - ADULT  Goal: Maintains optimal cardiac output and hemodynamic stability  INTERVENTIONS:  - Monitor I/O, vital signs and rhythm  - Monitor for S/S and trends of decreased cardiac output i e  bleeding, hypotension  - Administer and titrate ordered vasoactive medications to optimize hemodynamic stability  - Assess quality of pulses, skin color and temperature  - Assess for signs of decreased coronary artery perfusion - ex   Angina  - Instruct patient to report change in severity of symptoms   Outcome: Progressing    Goal: Absence of cardiac dysrhythmias or at baseline rhythm  INTERVENTIONS:  - Continuous cardiac monitoring, monitor vital signs, obtain 12 lead EKG if indicated  - Administer antiarrhythmic and heart rate control medications as ordered  - Monitor electrolytes and administer replacement therapy as ordered   Outcome: Progressing      Problem: GASTROINTESTINAL - ADULT  Goal: Maintains adequate nutritional intake  INTERVENTIONS:  - Monitor percentage of each meal consumed  - Identify factors contributing to decreased intake, treat as appropriate  - Assist with meals as needed  - Monitor I&O, WT and lab values  - Obtain nutrition services referral as needed   Outcome: Progressing      Problem: SKIN/TISSUE INTEGRITY - ADULT  Goal: Skin integrity remains intact  INTERVENTIONS  - Identify patients at risk for skin breakdown  - Assess and monitor skin integrity  - Assess and monitor nutrition and hydration status  - Monitor labs (i e  albumin)  - Assess for incontinence   - Turn and reposition patient  - Assist with mobility/ambulation  - Relieve pressure over bony prominences  - Avoid friction and shearing  - Provide appropriate hygiene as needed including keeping skin clean and dry  - Evaluate need for skin moisturizer/barrier cream  - Collaborate with interdisciplinary team (i e  Nutrition, Rehabilitation, etc )   - Patient/family teaching   Outcome: Progressing

## 2018-03-02 NOTE — MALNUTRITION/BMI
This medical record reflects one or more clinical indicators suggestive of malnutrition and/or morbid obesity  Malnutrition Findings:   Malnutrition type: Chronic illness  Degree of Malnutrition: Other severe protein calorie malnutrition  Malnutrition Characteristics: Muscle loss, Fat loss, Weight loss (related to inadequate energy intake as evidenced by 13% weight loss since 11/9/17 ED visit, clavicle visible  , dark circles orbital Interventions enteral feeding and protein modular via PEG  )    BMI Findings: Body mass index is 21 31 kg/m²  See Nutrition note dated 3/2/18 for additional details  Completed nutrition assessment is viewable in the nutrition documentation

## 2018-03-02 NOTE — PROGRESS NOTES
[facility] Progress Note  Patient: Ana Braun 68 y o  female   MRN: [id number]  PCP: Kg Hernandez MD  Unit/Bed#: OR POOL Encounter: [id number]  Date Of Visit: 03/02/18    Assessment/Plan:    Principal Problem:    Closed intertrochanteric fracture of left femur ([facility] [address] )  Active Problems:    ESRD (end stage renal disease) on dialysis ([facility] [address] )    Anemia of chronic kidney failure, stage 5 (HCC)    Multiple myeloma not having achieved remission ([facility] [address] )    Essential hypertension    Diabetes mellitus type 2 in nonobese ([facility] [address] )    Hemiparesis due to old stroke ([facility] [address] )    Closed intertrochanteric fracture of hip, left, initial encounter ([facility] [address] )    Functional quadriplegia ([facility] [address] )    Present on Admission:   Closed intertrochanteric fracture of left femur ([facility] [address] )   Anemia of chronic kidney failure, stage 5 ([address] )   Multiple myeloma not having achieved remission ([facility] [address] )   Essential hypertension   Diabetes mellitus type 2 in nonobese ([facility] [address] )   Closed intertrochanteric fracture of hip, left, initial encounter ([facility] [address] )   Functional quadriplegia ([address] )      · Fracture left femur-patient to go for surgery today  Goal is for pain control  Clearance obtained by Cardiology-echocardiogram results pending  I assume that echocardiogram was reviewed by Cardiology service  · End-stage renal disease  On hemodialysis  · Old stroke with functional cardioplegia  Patient basically bedridden and needs assistance for everything  She also has a PEG tube  Tube feeds on hold because of surgery scheduled  · Anemia of chronic disease  Patient also has underlying multiple myeloma  · Hypertension  Continue with current treatment      VTE Pharmacologic Prophylaxis:   Pharmacologic: No anticoagulation given her history of significant bleeding while being on subcutaneous heparin  Mechanical VTE Prophylaxis in Place:  Right leg only    Patient Centered Rounds: I have performed bedside rounds with nursing staff today      Discussions with Specialists or Other Care Team Provider:  Yes    Education and Discussions with Family / Patient:  Yes      Current Length of Stay: 2 day(s)    Current Patient Status: Inpatient   Certification Statement: The patient will continue to require additional inpatient hospital stay due to Femur fracture    Discharge Plan:  Short-term rehab-as patient's daughter is now agreeable for that    Code Status: Level 1 - Full Code      Subjective:   Patient does not communicate  Getting hemodialysis  Has mask on    Objective:     Vitals:   Temp (24hrs), Av 5 °F (36 9 °C), Min:97 6 °F (36 4 °C), Max:99 7 °F (37 6 °C)    HR:  [] 105  Resp:  [18-20] 20  BP: ()/(42-81) 110/55  SpO2:  [95 %-100 %] 100 %  Body mass index is 21 31 kg/m²  Input and Output Summary (last 24 hours): Intake/Output Summary (Last 24 hours) at 18 1132  Last data filed at 18 1100   Gross per 24 hour   Intake             1975 ml   Output             4067 ml   Net            -2092 ml           Physical Exam:     Vital signs reviewed as above  Patient in bed and getting hemodialysis  Awake and alert otherwise does not communicate with chronic left-sided hemiparesis  S1 and S2 are audible  Conjunctiva pale  Short left leg  Abdomen is soft  Positive bowel sounds    Additional Data:     Labs:      Results from last 7 days  Lab Units 18  0436  18  1748   WBC Thousand/uL 11 10*  < > 11 18*   HEMOGLOBIN g/dL 9 0*  < > 8 7*   HEMATOCRIT % 28 0*  < > 26 8*   PLATELETS Thousands/uL 259  < > 215   NEUTROS PCT %  --   --  77*   LYMPHS PCT %  --   --  15   MONOS PCT %  --   --  6   EOS PCT %  --   --  1   < > = values in this interval not displayed      Results from last 7 days  Lab Units 18  0436   SODIUM mmol/L 138   POTASSIUM mmol/L 4 0   CHLORIDE mmol/L 98*   CO2 mmol/L 28   BUN mg/dL 32*   CREATININE mg/dL 3 09*   CALCIUM mg/dL 10 3*   TOTAL PROTEIN g/dL 7 2   BILIRUBIN TOTAL mg/dL 0 60   ALK PHOS U/L 200*   ALT U/L 34   AST U/L 23   GLUCOSE RANDOM mg/dL 119       Results from last 7 days  Lab Units 02/28/18  1748   INR  0 98       * I Have Reviewed All Lab Data Listed Above  * Additional Pertinent Lab Tests Reviewed: All Labs Within Last 24 Hours Reviewed    Delete  Recent Cultures (last 7 days):           Last 24 Hours Medication List:     Current Facility-Administered Medications:  [[facility] Hold] acetaminophen 650 mg Oral Q4H PRN Suhail Keita MD   [facility] Hold] amLODIPine 10 mg Oral Daily Suhail Keita MD   [facility] Hold] aspirin 325 mg Oral Daily Suhail Keita MD   [facility] Hold] b complex-vitamin C-folic acid 1 capsule Oral Daily With [place] MD   [facility] Hold] epoetin babak 6,000 Units Intravenous Once per day on Mon Wed Fri Eli Sotomayor MD   [facility] Hold] hydrALAZINE 25 mg Oral Q12H Suhail Keita MD   [facility] Hold] HYDROmorphone 0 2 mg Intravenous Q4H PRN Suhail Keita MD   [facility] Hold] insulin lispro 1-6 Units Subcutaneous TID Jennifer Del Rosario MD   [facility] Hold] insulin lispro 1-6 Units Subcutaneous HS Suhail Keita MD   [facility] Hold] ipratropium-albuterol 3 mL Nebulization BID Suhail Keita MD   [facility] Hold] losartan 100 mg Oral Daily Suhail Keita MD   [facility] Hold] ondansetron 4 mg Intravenous Q6H PRN Suhail Keita MD   [facility] Hold] oxyCODONE 2 5 mg Oral Q4H PRN Suhail Keita MD   [facility] Hold] pantoprazole 20 mg Oral BID AC Suhail Keita MD        Today, Patient Was Seen By: Suhail Keita MD    ** Please Note: Dragon 360 Dictation voice to text software may have been used in the creation of this document   **

## 2018-03-02 NOTE — PLAN OF CARE
Problem: Potential for Falls  Goal: Patient will remain free of falls  INTERVENTIONS:  - Assess patient frequently for physical needs  -  Identify cognitive and physical deficits and behaviors that affect risk of falls    -  [place] fall precautions as indicated by assessment   - Educate patient/family on patient safety including physical limitations  - Instruct patient to call for assistance with activity based on assessment  - Modify environment to reduce risk of injury  - Consider OT/PT consult to assist with strengthening/mobility   Outcome: Progressing      Problem: Prexisting or High Potential for Compromised Skin Integrity  Goal: Skin integrity is maintained or improved  INTERVENTIONS:  - Identify patients at risk for skin breakdown  - Assess and monitor skin integrity  - Assess and monitor nutrition and hydration status  - Monitor labs (i e  albumin)  - Assess for incontinence   - Turn and reposition patient  - Assist with mobility/ambulation  - Relieve pressure over bony prominences  - Avoid friction and shearing  - Provide appropriate hygiene as needed including keeping skin clean and dry  - Evaluate need for skin moisturizer/barrier cream  - Collaborate with interdisciplinary team (i e  Nutrition, Rehabilitation, etc )   - Patient/family teaching   Outcome: Progressing      Problem: PAIN - ADULT  Goal: Verbalizes/displays adequate comfort level or baseline comfort level  Interventions:  - Encourage patient to monitor pain and request assistance  - Assess pain using appropriate pain scale  - Administer analgesics based on type and severity of pain and evaluate response  - Implement non-pharmacological measures as appropriate and evaluate response  - Consider cultural and social influences on pain and pain management  - Notify physician/advanced practitioner if interventions unsuccessful or patient reports new pain   Outcome: Progressing      Problem: DISCHARGE PLANNING  Goal: Discharge to home or other facility with appropriate resources  INTERVENTIONS:  - Identify barriers to discharge w/patient and caregiver  - Arrange for needed discharge resources and transportation as appropriate  - Identify discharge learning needs (meds, wound care, etc )  - Arrange for interpretive services to assist at discharge as needed  - Refer to Case Management Department for coordinating discharge planning if the patient needs post-hospital services based on physician/advanced practitioner order or complex needs related to functional status, cognitive ability, or social support system   Outcome: Progressing      Problem: Knowledge Deficit  Goal: Patient/family/caregiver demonstrates understanding of disease process, treatment plan, medications, and discharge instructions  Complete learning assessment and assess knowledge base  Interventions:  - Provide teaching at level of understanding  - Provide teaching via preferred learning methods   Outcome: Progressing      Problem: CARDIOVASCULAR - ADULT  Goal: Maintains optimal cardiac output and hemodynamic stability  INTERVENTIONS:  - Monitor I/O, vital signs and rhythm  - Monitor for S/S and trends of decreased cardiac output i e  bleeding, hypotension  - Administer and titrate ordered vasoactive medications to optimize hemodynamic stability  - Assess quality of pulses, skin color and temperature  - Assess for signs of decreased coronary artery perfusion - ex   Angina  - Instruct patient to report change in severity of symptoms   Outcome: Progressing    Goal: Absence of cardiac dysrhythmias or at baseline rhythm  INTERVENTIONS:  - Continuous cardiac monitoring, monitor vital signs, obtain 12 lead EKG if indicated  - Administer antiarrhythmic and heart rate control medications as ordered  - Monitor electrolytes and administer replacement therapy as ordered   Outcome: Progressing      Problem: GASTROINTESTINAL - ADULT  Goal: Maintains adequate nutritional intake  INTERVENTIONS:  - Monitor percentage of each meal consumed  - Identify factors contributing to decreased intake, treat as appropriate  - Assist with meals as needed  - Monitor I&O, WT and lab values  - Obtain nutrition services referral as needed   Outcome: Progressing      Problem: SKIN/TISSUE INTEGRITY - ADULT  Goal: Skin integrity remains intact  INTERVENTIONS  - Identify patients at risk for skin breakdown  - Assess and monitor skin integrity  - Assess and monitor nutrition and hydration status  - Monitor labs (i e  albumin)  - Assess for incontinence   - Turn and reposition patient  - Assist with mobility/ambulation  - Relieve pressure over bony prominences  - Avoid friction and shearing  - Provide appropriate hygiene as needed including keeping skin clean and dry  - Evaluate need for skin moisturizer/barrier cream  - Collaborate with interdisciplinary team (i e  Nutrition, Rehabilitation, etc )   - Patient/family teaching   Outcome: Progressing      Problem: DISCHARGE PLANNING - CARE MANAGEMENT  Goal: Discharge to post-acute care or home with appropriate resources  INTERVENTIONS:  - Conduct assessment to determine patient/family and health care team treatment goals, and need for post-acute services based on payer coverage, community resources, and patient preferences, and barriers to discharge  - Address psychosocial, clinical, and financial barriers to discharge as identified in assessment in conjunction with the patient/family and health care team  - Arrange appropriate level of post-acute services according to patients   needs and preference and payer coverage in collaboration with the physician and health care team  - Communicate with and update the patient/family, physician, and health care team regarding progress on the discharge plan  - Arrange appropriate transportation to post-acute venues   Outcome: Progressing

## 2018-03-02 NOTE — ANESTHESIA PREPROCEDURE EVALUATION
Review of Systems/Medical History  Patient summary reviewed        Cardiovascular  Hypertension ,    Pulmonary  Negative pulmonary ROS        GI/Hepatic  Negative GI/hepatic ROS          Negative  ROS Chronic kidney disease stage 4, Dialysis ,        Endo/Other  Diabetes ,   Comment: MM      GYN  Negative gynecology ROS          Hematology  Anemia renal failure anemia,     Musculoskeletal    Arthritis     Neurology    CVA , Motor deficit , quadriplegia,    Psychology   Negative psychology ROS              Physical Exam    Airway    Mallampati score: II  TM Distance: >3 FB  Neck ROM: full     Dental       Cardiovascular  Cardiovascular exam normal    Pulmonary  Pulmonary exam normal     Other Findings        Anesthesia Plan  ASA Score- 4     Anesthesia Type- general with ASA Monitors  Additional Monitors:   Airway Plan:         Plan Factors-    Induction- intravenous  Postoperative Plan-     Informed Consent- Anesthetic plan and risks discussed with patient  I personally reviewed this patient with the CRNA  Discussed and agreed on the Anesthesia Plan with the CRNA  Thelma Senior

## 2018-03-02 NOTE — PROGRESS NOTES
NEPHROLOGY HEMODIALYSIS NOTE  Albin Cameron 68 y o  female MRN: [id number]  Unit/Bed#: -01 Encounter: [id number]        Patient seen and examined on Hemodialysis, tolerating procedure well  All documentation, labs, medications were reviewed by myself, and the treatment plan was reviewed with nurse and patient  Single visit made on Hd  ASSESSMENT and PLAN:    1  End Stage Renal Disease on Hemodialysis  Patient seen and examined on HD  HD prescription:   Time: 3 5hours  Sodium:  138  Blood flow (ml/min):  300-arterial pressure is high  - will monitor    Dialyzer: F160 Potassium:  2 0 Dialysate flow(ml/min):  500   Access: Tunneled dialysis catheter, right IJ Bicarbonate:  35 Ultrafiltration goal (kg):  2 5 kg   Medications on HD: epogen 6000 units    Next HD Monday  Schedule is MWF  2  Access  Rt TDC   3  Hypertension BP controlled on antihtn meds  Continue same meds  4  Anemia of CKD - on epogen with HD  5  Mineral Bone Disorder -will check phosphorus level  6  DM-2 - on insulin per primary team   7  Closed intertrochanteric fracture left femur-plan for surgery today  8  History of multiple myeloma/ history of CVA      SUBJECTIVE:  No new complaints  Seen on Hd  Plan for surgery today  ECHO final reading pending  OBJECTIVE:    Current Weight: Weight - Scale: 49 5 kg (109 lb 2 oz)    Physical Exam:    Vitals:    03/02/18 0830   BP: 134/68   Pulse: (!) 107   Resp:    Temp:    SpO2:      General: awake alert , chronically ill     Skin: no rash  Eyes: pale conjunctivae, anicteric sclerae  ENT: moist lips and mucous membranes  Neck: supple, no JVD  Chest: clear breath sounds, rt TDC+   CVS: distinct S1 & S2, normal rate, regular rhythm  Abdomen: soft, non-tender, non-distended, normoactive bowel sounds  Extremities: no edema of both legs  Neuro: awake, alert, aphasic , does not follow commands      Laboratory Results:  Lab Results   Component Value Date    WBC 11 10 (H) 03/02/2018    HGB 9 0 (L) 03/02/2018    HCT 28 0 (L) 03/02/2018     (H) 03/02/2018     03/02/2018     Lab Results   Component Value Date    GLUCOSE 119 03/02/2018    CALCIUM 10 3 (H) 03/02/2018     03/02/2018    K 4 0 03/02/2018    CO2 28 03/02/2018    CL 98 (L) 03/02/2018    BUN 32 (H) 03/02/2018    CREATININE 3 09 (H) 03/02/2018       LEFT FEMUR     INDICATION:  Left femur pain      COMPARISON: None     VIEWS:  XR FEMUR 2 VW LEFT     FINDINGS:     Comminuted displaced left proximal femoral fracture  While suboptimally characterized, it appears the intertrochanteric or subtrochanteric    If the latter, suspicious for underlying lesion      IMPRESSION:     Intertrochanteric versus of subtrochanteric left femoral fracture

## 2018-03-02 NOTE — PROGRESS NOTES
General Cardiology   Progress Note   Leidy Herrera 68 y o  female MRN: [id number]  Unit/Bed#: -01 Encounter: [id number]        Subjective:    No significant events since the last encounter  ECHO fine  Objective:   Vitals:  Vitals:    03/02/18 1243   BP: 130/65   Pulse: (!) 114   Resp: 18   Temp: 97 6 °F (36 4 °C)   SpO2: 99%       Body mass index is 21 31 kg/m²  Systolic (29DIF), WOU:334 , Min:85 , IOW:676     Diastolic (97UXX), UPY:53, Min:42, Max:81      Intake/Output Summary (Last 24 hours) at 03/02/18 1127  Last data filed at 03/02/18 1100   Gross per 24 hour   Intake             1975 ml   Output             4067 ml   Net            -2092 ml     Weight (last 2 days)     Date/Time   Weight    02/28/18 2009  49 5 (109 13)    02/28/18 1323  54 6 (120 37)              PHYSICAL EXAMS:  General:  +bedbound, +aphasic, +on dialysis  Patient is not in acute distress, laying in the bed comfortably, awake, alert responding to commands  Head: Normocephalic, Atraumatic  HEENT: White sclera, pink conjunctiva,  PERRLA,pharynx benign  Neck:  Supple, no neck vein distention, carotids+2/+2 no bruits, thyromegaly, adenopathy  Respiratory: clear to P/A  Cardiovascular:  PMI normal, S1-S2 normal, No  Murmurs, thrills, gallops, rubs   Regular rhythm  GI:  Abdomen soft nontender   No hepatosplenomegaly, adenopathy, ascites,or rebound tenderness  Extremities: No edema, normal pulses, no calf tenderness, no joint deformities, no venous disease   Integument:  No skin rashes or ulceration  Lymphatic:  No cervical or inguinal lymphadenopathy  Neurologic:  Patient is awake alert, responding to command, well-oriented to time and place and person moving all extremities      LABORATORY RESULTS:      CBC with diff:   Results from last 7 days  Lab Units 03/02/18  0436 03/01/18  0539 02/28/18  1748   WBC Thousand/uL 11 10* 10 85* 11 18*   HEMOGLOBIN g/dL 9 0* 8 3* 8 7*   HEMATOCRIT % 28 0* 25 2* 26 8*   MCV fL 107* 106* 107*   PLATELETS Thousands/uL 259 220 215   MCH pg 34 5* 34 9* 34 7*   MCHC g/dL 32 1 32 9 32 5   RDW % 21 1* 21 0* 21 1*   MPV fL 11 6 12 4 12 3   NRBC AUTO /100 WBCs  --   --  0       CMP:  Results from last 7 days  Lab Units 03/02/18  0436 03/01/18  0539 02/28/18  1748   SODIUM mmol/L 138 134* 135*   POTASSIUM mmol/L 4 0 3 6 3 2*   CHLORIDE mmol/L 98* 95* 96*   CO2 mmol/L 28 26 25   ANION GAP mmol/L 12 13 14*   BUN mg/dL 32* 78* 71*   CREATININE mg/dL 3 09* 5 91* 5 32*   GLUCOSE RANDOM mg/dL 119 112 157*   CALCIUM mg/dL 10 3* 10 5* 10 1   AST U/L 23  --  22   ALT U/L 34  --  34   ALK PHOS U/L 200*  --  194*   TOTAL PROTEIN g/dL 7 2  --  6 8   BILIRUBIN TOTAL mg/dL 0 60  --  0 50   EGFR ml/min/1 73sq m 16 7 8       BMP:  Results from last 7 days  Lab Units 03/02/18  0436 03/01/18  0539 02/28/18  1748   SODIUM mmol/L 138 134* 135*   POTASSIUM mmol/L 4 0 3 6 3 2*   CHLORIDE mmol/L 98* 95* 96*   CO2 mmol/L 28 26 25   BUN mg/dL 32* 78* 71*   CREATININE mg/dL 3 09* 5 91* 5 32*   GLUCOSE RANDOM mg/dL 119 112 157*   CALCIUM mg/dL 10 3* 10 5* 10 1                            Results from last 7 days  Lab Units 02/28/18  1748 02/28/18  1207   INR  0 98 0 92       Lipid Profile:   Lab Results   Component Value Date    CHOL 206 (H) 03/01/2018     Lab Results   Component Value Date    HDL 51 03/01/2018     Lab Results   Component Value Date    LDLCALC 89 03/01/2018     Lab Results   Component Value Date    TRIG 330 (H) 03/01/2018       Cardiac testing:   No results found for this or any previous visit  No results found for this or any previous visit  No results found for this or any previous visit  No procedure found  No results found for this or any previous visit      Meds/Allergies   all current active meds have been reviewed and current meds:   Current Facility-Administered Medications   Medication Dose Route Frequency    acetaminophen (TYLENOL) tablet 650 mg  650 mg Oral Q4H PRN    amLODIPine (NORVASC) tablet 10 mg  10 mg Oral Daily  aspirin tablet 325 mg  325 mg Oral Daily    b complex-vitamin C-folic acid (NEPHROCAPS) capsule 1 capsule  1 capsule Oral Daily With Dinner    epoetin babak (EPOGEN,PROCRIT) injection 6,000 Units  6,000 Units Intravenous Once per day on Mon Wed Fri    hydrALAZINE (APRESOLINE) tablet 25 mg  25 mg Oral Q12H    HYDROmorphone (DILAUDID) injection 0 2 mg  0 2 mg Intravenous Q4H PRN    insulin lispro (HumaLOG) 100 units/mL subcutaneous injection 1-6 Units  1-6 Units Subcutaneous TID AC    insulin lispro (HumaLOG) 100 units/mL subcutaneous injection 1-6 Units  1-6 Units Subcutaneous HS    ipratropium-albuterol (DUO-NEB) 0 5-2 5 mg/3 mL inhalation solution 3 mL  3 mL Nebulization BID    losartan (COZAAR) tablet 100 mg  100 mg Oral Daily    ondansetron (ZOFRAN) injection 4 mg  4 mg Intravenous Q6H PRN    oxyCODONE (ROXICODONE) oral solution 2 5 mg  2 5 mg Oral Q4H PRN    pantoprazole (PROTONIX) EC tablet 20 mg  20 mg Oral BID AC     Prescriptions Prior to Admission   Medication    atorvastatin (LIPITOR) 20 mg tablet    acetaminophen (TYLENOL) 325 mg tablet    amLODIPine (NORVASC) 10 mg tablet    aspirin 81 MG tablet    b complex-C-folic acid (NEPHRO-TRACY) 0 8 mg tablet    benzonatate (TESSALON PERLES) 100 mg capsule    Cholecalciferol 97590 units TABS    hydrALAZINE (APRESOLINE) 50 mg tablet    ipratropium-albuterol (DUONEB) 0 5-2 5 mg/mL    losartan (COZAAR) 100 MG tablet    pantoprazole (PROTONIX) 20 mg tablet            Assessment/Plan:  1  Preoperative clearance:    -EKG shows NSR, occasional PVCs, poor R-wave progression in precordial leads  -ECHO unremarkable  -Patient moderate to high risk from cardiac standpoint for surgery  Patient achieves less than 4 mets of activity, bed-bound  Has cardiac risk factors including hypertension and diabetes  No cardiac contraindications including Congestive heart failure exacerbation, severe aortic stenosis, ACS, uncontrolled arrhythmias    Continue aspirin perioperatively if possible      2  History of stroke:  Continue aspirin  Lipid panel shows cholesterol 206, triglycerides 330       3  Hypertension:  Stable, continue present regimen      4  ESRD, anemia from ESRD:  On hemodialysis  Hb 9 0  Managed by hospitalist Medicine, nephrology following  ** Please Note: Dragon 360 Dictation voice to text software may have been used in the creation of this document   **

## 2018-03-02 NOTE — PROGRESS NOTES
Received pt this morning from VINI Carbajal  Pt brought to HD at 0730  Report given to HD nurse Rubén Dawkins  Pt kept NPO by prior nurse for possible surgery this am  Morning medications via PEG held until further clarification from providers  Spoke with Kelsy from ortho surgery  She stated that as per Ena Kothari they are simply managing the fracture at this point  No further plan received  Spoke with  VINI Price from [address] [place] [address]  She stated that if pt were to have surgery it may possibly be around 11:30 am   Spoke with Dr Kaylan Bentley about clarification  Waiting on Cardiology to clear patient  Pt had an echo done this morning  Pt continues to be in HD at this point  Will update daughter Teresa

## 2018-03-02 NOTE — PROGRESS NOTES
HD done this morning using HD catheter  High pressures at arterial port resulted in low BFR, MD aware      Lines reversed with out any improvement in Blood flow

## 2018-03-02 NOTE — CONSULTS
Progress Note - Wound   Heriberto Kingsley 68 y o  female MRN: [id number]  Unit/Bed#: -01 Encounter: [id number]      Assessment:  Patient is 68year old female who presents with leg pain, admitted with femur fracture  Patient has a history of - anemia, arthritis, CKD on dialysis, HTN and stroke  Wound care consulted for wound POA  Unable to see patient as patient is off unit for dialysis  Attempted to see patient in dialysis suite but patient was being accessed  Unable to assess  Dicussed with primary nurse who had not been able to assess the patient yet  Recommend preliminary plan of care as noted below  Can apply calazime to open skin / hydraguard to intact skin  Primary nurse aware of preliminary plan of care  Wound care will attempt to see patient on another date  Plan:   1  Apply calazime to sacrum and buttocks TID and PRN   2  Apply hydraguard to b/l heels BID and PRN for prevention and protection  3  Apply skin nourishing cream to the entire skin daily for moisture   4  Soft care cushion to chair when OOB   5  Turn and reposition patient every 2 hours   6  Elevate heels off of bed with pillows to offload pressure  7  Wound care will follow along with patient weekly  Please call with questions or concerns [phone]    Objective:    Vitals: Blood pressure 134/68, pulse (!) 107, temperature 98 2 °F (36 8 °C), temperature source Axillary, resp  rate 20, height 5' (1 524 m), weight 49 5 kg (109 lb 2 oz), SpO2 100 %  ,Body mass index is 21 31 kg/m²  Recommendations written as orders    Blake Baca RN BSN

## 2018-03-02 NOTE — PROGRESS NOTES
RD recommends 2 packets prosource daily in addition to Nepro at 30ml/hr, 100ml water every 4 hours via PEg

## 2018-03-03 PROBLEM — S72.22XA SUBTROCHANTERIC FRACTURE OF LEFT FEMUR, CLOSED, INITIAL ENCOUNTER (HCC): Status: ACTIVE | Noted: 2018-01-01
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
## 2018-03-03 NOTE — PROGRESS NOTES
NEPHROLOGY PROGRESS NOTE   Mookie Right 68 y o  female MRN: [id number]  Unit/Bed#: -01 Encounter: [id number]      ASSESSMENT AND PLAN:  Known end-stage renal disease on hemodialysis was admitted for left subtrochanteric fracture of femur  1   End-stage renal disease on hemodialysis  -patient had hemodialysis yesterday  Her volume status appears acceptable  Next hemodialysis on Monday   -continue as per outpatient schedule  MWF  - has rt [facility] for access  Will need AVF later on    - had high arterial pressors on dialysis yesterday, this could be positional but if persistent may  try cath flow  2   Hypertension in chronic kidney disease  -blood pressure controlled on antihypertensive medication  Continue same medication    3  Anemia in chronic kidney disease  - continue Epogen on hemodialysis    4  Hypokalemia  - continue monitoring  Will adjust potassium bath during next hemodialysis treatment  Continue tube feeding  5   Hypophosphatemia/hypercalcemia  -patient is not on any binders  Continue monitoring  She has history of multiple myeloma  6   Closed intertrochanteric fracture left femur  -status post surgery today  Continue management per Orthopedics  SUBJECTIVE:  Patient seen and examined at bedside  Postop day 0 of insertion of intramedullary nail for subtrochanteric fracture of left femur  Patient is awake but does not follow any commands    OBJECTIVE:  Current Weight: Weight - Scale: 49 5 kg (109 lb 2 oz)  Vitals:    03/03/18 1430   BP: 135/76   Pulse: 95   Resp: 18   Temp:    SpO2: 100%       Intake/Output Summary (Last 24 hours) at 03/03/18 1517  Last data filed at 03/03/18 1226   Gross per 24 hour   Intake              650 ml   Output              200 ml   Net              450 ml     General:  Ill looking, awake   Chronically ill   Eyes: Conjunctivae pink,  Sclera anicteric  ENT: lips and mucous membranes moist  Neck: supple   Chest: Clear to Auscultation both lungs,  no crackles, ronchus or wheezing  CVS: S1 & S2 present, normal rate, regular rhythm, no murmur    Abdomen: soft, non-tender, non-distended, Bowel sounds normoactive  Extremities: no edema of  Legs, dressing left thigh   Skin: no rash  Neuro:  Awake, aphasic and does not follow commands          Medications:    Current Facility-Administered Medications:     acetaminophen (TYLENOL) tablet 650 mg, 650 mg, Oral, Q4H PRN, Keyon Burgess MD    amLODIPine (NORVASC) tablet 10 mg, 10 mg, Oral, Daily, Keyon Burgess MD, 10 mg at 03/02/18 1333    aspirin tablet 325 mg, 325 mg, Oral, Daily, Keyon Burgess MD, 325 mg at 03/02/18 1332    atorvastatin (LIPITOR) tablet 20 mg, 20 mg, Oral, Daily, Jalil Velez MD    b complex-vitamin C-folic acid (NEPHROCAPS) capsule 1 capsule, 1 capsule, Oral, Daily With Ariella Cain MD, 1 capsule at 03/02/18 1654    ceFAZolin (ANCEF) IVPB (premix) 1,000 mg, 1,000 mg, Intravenous, Q8H, Jalil Velez MD    epoetin babak (EPOGEN,PROCRIT) injection 6,000 Units, 6,000 Units, Intravenous, Once per day on Mon Wed Fri, Mirza Crowley MD, 6,000 Units at 03/02/18 1030    hydrALAZINE (APRESOLINE) tablet 25 mg, 25 mg, Oral, Q12H, Keyon Burgess MD, 25 mg at 03/02/18 1334    HYDROmorphone (DILAUDID) injection 0 2 mg, 0 2 mg, Intravenous, Q4H PRN, Keyon Burgess MD, 0 2 mg at 03/03/18 1437    insulin lispro (HumaLOG) 100 units/mL subcutaneous injection 1-6 Units, 1-6 Units, Subcutaneous, TID AC **AND** Fingerstick Glucose (POCT), , , TID AC, Keyon Burgess MD    insulin lispro (HumaLOG) 100 units/mL subcutaneous injection 1-6 Units, 1-6 Units, Subcutaneous, HS, Keyon Burgess MD    ipratropium-albuterol (DUO-NEB) 0 5-2 5 mg/3 mL inhalation solution 3 mL, 3 mL, Nebulization, BID, Keyon Burgess MD, 3 mL at 03/03/18 0882    losartan (COZAAR) tablet 100 mg, 100 mg, Oral, Daily, Keyon Burgess MD, 100 mg at 03/02/18 1334    ondansetron (ZOFRAN) injection 4 mg, 4 mg, Intravenous, Q6H PRN, Clemnecia Kulkarni MD    oxyCODONE (ROXICODONE) oral solution 2 5 mg, 2 5 mg, Oral, Q4H PRN, Clemencia Kulkarni MD, 2 5 mg at 03/02/18 1334    pantoprazole (PROTONIX) EC tablet 20 mg, 20 mg, Oral, BID AC, Clemencia Kulkarni MD, Stopped at 03/03/18 2969    Laboratory Results:    Results from last 7 days  Lab Units 03/03/18  0537 03/02/18  0436 03/01/18  0539 02/28/18  1748 02/28/18  1207   WBC Thousand/uL 14 75* 11 10* 10 85* 11 18* 12 00*   HEMOGLOBIN g/dL 10 0* 9 0* 8 3* 8 7* 9 0*   HEMATOCRIT % 30 9* 28 0* 25 2* 26 8* 27 4*   PLATELETS Thousands/uL 316 259 220 215 217   SODIUM mmol/L 140 138 134* 135* 135*   POTASSIUM mmol/L 3 4* 4 0 3 6 3 2* 3 9   CHLORIDE mmol/L 97* 98* 95* 96* 95*   CO2 mmol/L 32 28 26 25 26   BUN mg/dL 33* 32* 78* 71* 67*   CREATININE mg/dL 2 96* 3 09* 5 91* 5 32* 5 07*   CALCIUM mg/dL 11 1* 10 3* 10 5* 10 1 10 6*   PHOSPHORUS mg/dL 2 1*  --   --   --   --    TOTAL PROTEIN g/dL  --  7 2  --  6 8  --    GLUCOSE RANDOM mg/dL 165* 119 112 157* 176*            Portions of the record may have been created with voice recognition software  Occasional wrong word or "sound a like" substitutions may have occurred due to the inherent limitations of voice recognition software  Read the chart carefully and recognize, using context, where substitutions have occurred

## 2018-03-03 NOTE — INTERIM OP NOTE
INSERTION NAIL IM FEMUR ANTEGRADE (TROCHANTERIC), OPEN REDUCTION W/ INTERNAL FIXATION (ORIF) FEMUR  Postoperative Note  PATIENT NAME: Jannette Veloz  : 1940  MRN: [id number]  [facility] OR ROOM 03    Surgery Date: 3/3/2018    Preop Diagnosis:  * No pre-op diagnosis entered *    Post-Op Diagnosis Codes:     * Closed displaced subtrochanteric fracture of left femur (HCC) [S72 22XA]    Procedure(s) (LRB):  INSERTION NAIL IM FEMUR ANTEGRADE (TROCHANTERIC) (Left)  OPEN REDUCTION W/ INTERNAL FIXATION (ORIF) FEMUR (Left)    Surgeon(s) and Role:     * Matilda Tucker MD - Primary    Specimens:  * No specimens in log *    Estimated Blood Loss:   200 mL    Anesthesia Type:   * No anesthesia type entered *     Findings:   Comminuted intertroch/subtroch fracture     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

## 2018-03-03 NOTE — PLAN OF CARE
CARDIOVASCULAR - ADULT     Maintains optimal cardiac output and hemodynamic stability Progressing     Absence of cardiac dysrhythmias or at baseline rhythm Progressing        DISCHARGE PLANNING     Discharge to home or other facility with appropriate resources Progressing        DISCHARGE PLANNING - CARE MANAGEMENT     Discharge to post-acute care or home with appropriate resources Progressing        GASTROINTESTINAL - ADULT     Maintains adequate nutritional intake Progressing        Knowledge Deficit     Patient/family/caregiver demonstrates understanding of disease process, treatment plan, medications, and discharge instructions Progressing        Nutrition/Hydration-ADULT     Nutrient/Hydration intake appropriate for improving, restoring or maintaining nutritional needs Progressing        PAIN - ADULT     Verbalizes/displays adequate comfort level or baseline comfort level Progressing        Potential for Falls     Patient will remain free of falls Progressing        Prexisting or High Potential for Compromised Skin Integrity     Skin integrity is maintained or improved Progressing        SKIN/TISSUE INTEGRITY - ADULT     Skin integrity remains intact Progressing

## 2018-03-03 NOTE — ANESTHESIA POSTPROCEDURE EVALUATION
Post-Op Assessment Note      CV Status:  Stable    Mental Status:  Somnolent    Hydration Status:  Euvolemic    PONV Controlled:  Controlled    Airway Patency:  Patent    Post Op Vitals Reviewed: Yes          Staff: CRNA           /58 (03/03/18 1253)    Temp 97 7 °F (36 5 °C) (03/03/18 1253)    Pulse 92 (03/03/18 1253)   Resp (!) 24 (03/03/18 1253)    SpO2 100 % (03/03/18 1253)

## 2018-03-03 NOTE — ANESTHESIA PREPROCEDURE EVALUATION
Review of Systems/Medical History  Patient summary reviewed        Cardiovascular  Hypertension ,    Pulmonary  Negative pulmonary ROS        GI/Hepatic  Negative GI/hepatic ROS          Negative  ROS Chronic kidney disease stage 4, Dialysis ,        Endo/Other  Diabetes ,   Comment: MM      GYN  Negative gynecology ROS          Hematology  Anemia renal failure anemia,     Musculoskeletal    Arthritis     Neurology    CVA , Motor deficit , quadriplegia,    Psychology   Negative psychology ROS              Physical Exam    Airway  Comment: Not cooperative with airway exam    TM Distance: >3 FB  Neck ROM: full     Dental   Comment: edentulous,     Cardiovascular  Cardiovascular exam normal    Pulmonary  Pulmonary exam normal     Other Findings        Anesthesia Plan  ASA Score- 4     Anesthesia Type- general with ASA Monitors  Additional Monitors:   Airway Plan:         Plan Factors-    Induction- intravenous  Postoperative Plan-     Informed Consent- Anesthetic plan and risks discussed with patient  I personally reviewed this patient with the CRNA  Discussed and agreed on the Anesthesia Plan with the CRNA  Janelle York

## 2018-03-04 NOTE — PROGRESS NOTES
[facility] Internal Medicine Progress Note  Patient: Suresh Parr 68 y o  female   MRN: [id number]  PCP: Fidel Abraham MD  Unit/Bed#: -01 Encounter: [id number]  Date Of Visit: 03/04/18    Assessment/Plan:    Principal Problem:    Subtrochanteric fracture of left femur, closed, initial encounter ([facility] [address] )  Active Problems:    ESRD (end stage renal disease) on dialysis ([facility] [address] )    Closed intertrochanteric fracture of left femur (HCC)    Anemia of chronic kidney failure, stage 5 ([facility] [address] )    Multiple myeloma not having achieved remission ([facility] [address] )    Essential hypertension    Diabetes mellitus type 2 in nonobese ([facility] [address] )    Hemiparesis due to old stroke ([facility] [address] )    Closed intertrochanteric fracture of hip, left, initial encounter ([facility] [address] )    Functional quadriplegia ([facility] [address] )    Present on Admission:   Closed intertrochanteric fracture of left femur ([facility] [address] )   Anemia of chronic kidney failure, stage 5 ([facility] [address] )   Multiple myeloma not having achieved remission ([facility] [address] )   Essential hypertension   Diabetes mellitus type 2 in nonobese ([facility] [address] )   [facility]   Functional quadriplegia ([facility] [address] )      · Left femur fracture-patient with history of multiple myeloma  Patient status post placement of nail mostly for comfort and pain control  Patient appears comfortable although she moans off and on with pain  Short-term rehab hopefully in the next day or so  Today is postop day 1   · End-stage renal disease  Has also anemia of chronic kidney disease with underlying multiple myeloma  Continue with hemodialysis  Nephrology following  · 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  Patient is also aphasic  · Nutrition-on tube feeds    Would increase the rate to her home rate 45 mL/hour      VTE Pharmacologic Prophylaxis:   Pharmacologic: No anticoagulants other than aspirin as per family  Mechanical VTE Prophylaxis in Place:  Right leg only    Patient Centered Rounds: I have performed bedside rounds with nursing staff today  Discussions with Specialists or Other Care Team Provider:  Yes    Education and Discussions with Family / Patient:  Yes      Current Length of Stay: 4 day(s)    Current Patient Status: Inpatient   Certification Statement: The patient will continue to require additional inpatient hospital stay due to [address]    Discharge Plan:  Short-term rehab    Code Status: Level 1 - Full Code      Subjective:   Patient does not communicate  Once in a while, she nods about having pain or not    Objective:     Vitals:   Temp (24hrs), Av 8 °F (36 6 °C), Min:97 4 °F (36 3 °C), Max:98 3 °F (36 8 °C)    HR:  [] 111  Resp:  [16-24] 20  BP: (115-137)/(58-76) 115/62  SpO2:  [96 %-100 %] 100 %  Body mass index is 21 31 kg/m²  Input and Output Summary (last 24 hours): Intake/Output Summary (Last 24 hours) at 18 1110  Last data filed at 18 0300   Gross per 24 hour   Intake              850 ml   Output              200 ml   Net              650 ml           Physical Exam:     Vital signs are reviewed as above  Constitutional:  In bed   Eyes:  Has pale conjunctivae   HENT:  Does not communicate much at all   Neck: Neck is supple  Cardiac: I did not hear any rubs or gallop  Patient appears to be in sinus rhythm  Respiratory: Patient not in significant respiratory distress  Air entry in general is fair although she had has poor inspiratory effort does not follow much commands  GI: Abdomen is soft  Getting tube feeds through her PEG  Tolerating    Neurologic:  Awake otherwise bedridden and has functional cardioplegia-requires help for any activity    Also left-sided hemiparesis with aphasia secondary to stroke   Psychiatric:  Difficult to assess  Musculoskeletal  Patient status post left femur surgery   Extremities:  As above       Additional Data:     Labs:      Results from last 7 days  Lab Units 18  0537   WBC Thousand/uL 14 75* HEMOGLOBIN g/dL 10 0*   HEMATOCRIT % 30 9*   PLATELETS Thousands/uL 316   NEUTROS PCT % 70   LYMPHS PCT % 19   MONOS PCT % 9   EOS PCT % 0       Results from last 7 days  Lab Units 03/03/18  0537 03/02/18  0436   SODIUM mmol/L 140 138   POTASSIUM mmol/L 3 4* 4 0   CHLORIDE mmol/L 97* 98*   CO2 mmol/L 32 28   BUN mg/dL 33* 32*   CREATININE mg/dL 2 96* 3 09*   CALCIUM mg/dL 11 1* 10 3*   TOTAL PROTEIN g/dL  --  7 2   BILIRUBIN TOTAL mg/dL  --  0 60   ALK PHOS U/L  --  200*   ALT U/L  --  34   AST U/L  --  23   GLUCOSE RANDOM mg/dL 165* 119       Results from last 7 days  Lab Units 02/28/18  1748   INR  0 98       * I Have Reviewed All Lab Data Listed Above    * Additional Pertinent Lab Tests Reviewed: No New Labs Available For Today      Recent Cultures (last 7 days):           Last 24 Hours Medication List:     Current Facility-Administered Medications:  acetaminophen 650 mg Oral Q4H PRN Shakira Varner MD    amLODIPine 10 mg Oral Daily Shakira Varner MD    aspirin 325 mg Oral Daily Shakira Varner MD    atorvastatin 20 mg Oral Daily Jenetta Shone, MD    b complex-vitamin C-folic acid 1 capsule Oral Daily With Prosper Soria MD    cefazolin 1,000 mg Intravenous Q12H Justino Sands MD Last Rate: 1,000 mg (03/04/18 1010)   epoetin babak 6,000 Units Intravenous Once per day on Mon Wed Fri Marilee Linares MD    hydrALAZINE 25 mg Oral Q12H Shakira Varner MD    HYDROmorphone 0 2 mg Intravenous Q4H PRN Shakira Varner MD    insulin lispro 1-6 Units Subcutaneous TID Mamta Alexandre MD    insulin lispro 1-6 Units Subcutaneous HS Shakira Varner MD    ipratropium-albuterol 3 mL Nebulization BID Shakira Varner MD    losartan 100 mg Oral Daily Shakira Varner MD    ondansetron 4 mg Intravenous Q6H PRN Shakira Varner MD    oxyCODONE 2 5 mg Oral Q4H PRN Shakira Varner MD    pantoprazole 20 mg Oral BID Mamta Alexandre MD         Today, Patient Was Seen By: Shakira Varner MD    ** Please Note: Dragon 360 Dictation voice to text software may have been used in the creation of this document   **

## 2018-03-04 NOTE — PROGRESS NOTES
NEPHROLOGY PROGRESS NOTE   aJlen Alvarado 68 y o  female MRN: [id number]  Unit/Bed#: -01 Encounter: [id number]      ASSESSMENT AND PLAN:  70-year-old  female who is aphasic post CVA, history of multiple myeloma, Known end-stage renal disease on hemodialysis was admitted for left subtrochanteric fracture of femur  1   End-stage renal disease on hemodialysis  -patient had hemodialysis Saturday  Her volume status appears acceptable  Next hemodialysis tomorrow  -continue as per outpatient schedule  MWF  - has rt [facility] [place] operated [facility] for access  Will need AVF later on    - had high arterial pressors on dialysis Saturday, this could be positional but if persistent may  try cathflow  2   Hypertension in chronic kidney disease  -blood pressure controlled on antihypertensive medication  Continue same medication    3  Anemia in chronic kidney disease  - continue Epogen on hemodialysis    4  Hypokalemia  - continue monitoring  Recheck labs tomorrow morning  Will adjust potassium bath during next hemodialysis treatment  Continue tube feeding  5   Hypophosphatemia/hypercalcemia  -patient is not on any binders  Continue monitoring  She has history of multiple myeloma which could be contributing to hypercalcemia  6   Closed intertrochanteric fracture left femur  -status post surgery March 3, 2018  Continue management per Orthopedics  SUBJECTIVE:  Patient seen and examined at bedside  Postop day 1 of insertion of intramedullary nail for subtrochanteric fracture of left femur  Patient is awake but does not follow any commands    No shortness of breath    OBJECTIVE:  Current Weight: Weight - Scale: 49 5 kg (109 lb 2 oz)  Vitals:    03/04/18 0800   BP:    Pulse:    Resp:    Temp:    SpO2: 100%       Intake/Output Summary (Last 24 hours) at 03/04/18 0937  Last data filed at 03/04/18 0300   Gross per 24 hour   Intake              850 ml   Output              200 ml   Net              650 ml     General: Ill looking, awake  Chronically ill and aphasic  Eyes: Conjunctivae pink,  Sclera anicteric  ENT: lips and mucous membranes moist  Neck: supple   Chest: Clear to Auscultation both lungs,  no crackles, ronchus or wheezing  CVS: S1 & S2 present, normal rate, regular rhythm, no murmur    Abdomen: soft, non-tender, non-distended, Bowel sounds normoactive, has PEG tube  Extremities: no edema of  legs, dressing in the left thigh  Skin: no rash  Neuro: awake,  aphasic, does not follow commands         Medications:    Current Facility-Administered Medications:     acetaminophen (TYLENOL) tablet 650 mg, 650 mg, Oral, Q4H PRN, Nusrat Agrawal MD    amLODIPine (NORVASC) tablet 10 mg, 10 mg, Oral, Daily, Nusrat Agrawal MD, 10 mg at 03/04/18 0830    aspirin tablet 325 mg, 325 mg, Oral, Daily, Nusrat Agrawal MD, 325 mg at 03/04/18 3994    atorvastatin (LIPITOR) tablet 20 mg, 20 mg, Oral, Daily, Patricia Cleveland MD, 20 mg at 03/04/18 0830    b complex-vitamin C-folic acid (NEPHROCAPS) capsule 1 capsule, 1 capsule, Oral, Daily With Steven Buckner MD, 1 capsule at 03/03/18 1530    ceFAZolin (ANCEF) IVPB (premix) 1,000 mg, 1,000 mg, Intravenous, Q12H, Jose Velarde MD, Last Rate: 100 mL/hr at 03/04/18 0004, 1,000 mg at 03/04/18 0004    epoetin babak (EPOGEN,PROCRIT) injection 6,000 Units, 6,000 Units, Intravenous, Once per day on Mon Wed Fri, Diane Tovar MD, 6,000 Units at 03/02/18 1030    hydrALAZINE (APRESOLINE) tablet 25 mg, 25 mg, Oral, Q12H, Nusrat Agrawal MD, 25 mg at 03/04/18 0830    HYDROmorphone (DILAUDID) injection 0 2 mg, 0 2 mg, Intravenous, Q4H PRN, Nusrat Agrawal MD, 0 2 mg at 03/04/18 0432    insulin lispro (HumaLOG) 100 units/mL subcutaneous injection 1-6 Units, 1-6 Units, Subcutaneous, TID AC, 1 Units at 03/04/18 0831 **AND** Fingerstick Glucose (POCT), , , TID SREEKANTH, Nusrat Agrawal MD    insulin lispro (HumaLOG) 100 units/mL subcutaneous injection 1-6 Units, 1-6 Units, Subcutaneous, HS, Raegan Coles MD, 1 Units at 03/04/18 0145    ipratropium-albuterol (DUO-NEB) 0 5-2 5 mg/3 mL inhalation solution 3 mL, 3 mL, Nebulization, BID, Raegan Coles MD, 3 mL at 03/04/18 0800    losartan (COZAAR) tablet 100 mg, 100 mg, Oral, Daily, Raegan Coles MD, 100 mg at 03/04/18 0829    ondansetron (ZOFRAN) injection 4 mg, 4 mg, Intravenous, Q6H PRN, Raegan Coles MD    oxyCODONE (ROXICODONE) oral solution 2 5 mg, 2 5 mg, Oral, Q4H PRN, Raegan Coles MD, 2 5 mg at 03/04/18 0311    pantoprazole (PROTONIX) EC tablet 20 mg, 20 mg, Oral, BID AC, Raegan Coles MD, 20 mg at 03/04/18 0830    Laboratory Results:    Results from last 7 days  Lab Units 03/03/18  0537 03/02/18  0436 03/01/18  0539 02/28/18  1748 02/28/18  1207   WBC Thousand/uL 14 75* 11 10* 10 85* 11 18* 12 00*   HEMOGLOBIN g/dL 10 0* 9 0* 8 3* 8 7* 9 0*   HEMATOCRIT % 30 9* 28 0* 25 2* 26 8* 27 4*   PLATELETS Thousands/uL 316 259 220 215 217   SODIUM mmol/L 140 138 134* 135* 135*   POTASSIUM mmol/L 3 4* 4 0 3 6 3 2* 3 9   CHLORIDE mmol/L 97* 98* 95* 96* 95*   CO2 mmol/L 32 28 26 25 26   BUN mg/dL 33* 32* 78* 71* 67*   CREATININE mg/dL 2 96* 3 09* 5 91* 5 32* 5 07*   CALCIUM mg/dL 11 1* 10 3* 10 5* 10 1 10 6*   PHOSPHORUS mg/dL 2 1*  --   --   --   --    TOTAL PROTEIN g/dL  --  7 2  --  6 8  --    GLUCOSE RANDOM mg/dL 165* 119 112 157* 176*            Portions of the record may have been created with voice recognition software  Occasional wrong word or "sound a like" substitutions may have occurred due to the inherent limitations of voice recognition software  Read the chart carefully and recognize, using context, where substitutions have occurred

## 2018-03-05 PROBLEM — D62 ACUTE BLOOD LOSS AS CAUSE OF POSTOPERATIVE ANEMIA: Status: ACTIVE | Noted: 2018-01-01

## 2018-03-05 NOTE — PROGRESS NOTES
HEMODIALYSIS ROUNDING NOTE    Patient: January Rowley               Sex: female          DOA: 2/28/2018  1:20 PM   YOB: 1940        Age:  68 y o         LOS:  LOS: 5 days     REASON FOR THE CONSULTATION:  Further management of ESRD    HPI     This is a 40-year-old female admitted for left femur fracture now s/p surgery  SUBJECTIVE     - Patient was seen during hemodialysis today  Patient was confused    - Reviewed last 24 hrs events     CURRENT MEDICATIONS       Current Facility-Administered Medications:     acetaminophen (TYLENOL) tablet 650 mg, 650 mg, Oral, Q4H PRN, Alexandra Gibbons MD    amLODIPine (NORVASC) tablet 10 mg, 10 mg, Oral, Daily, Alexandra Gibbons MD, 10 mg at 03/04/18 0830    aspirin tablet 325 mg, 325 mg, Oral, Daily, Alexandra Gibbons MD, 325 mg at 03/04/18 1305    atorvastatin (LIPITOR) tablet 20 mg, 20 mg, Oral, Daily, Maryann Chaudhry MD, 20 mg at 03/04/18 0830    b complex-vitamin C-folic acid (NEPHROCAPS) capsule 1 capsule, 1 capsule, Oral, Daily With Doris Bishop MD, 1 capsule at 03/04/18 1757    epoetin babak (EPOGEN,PROCRIT) injection 10,000 Units, 10,000 Units, Intravenous, Once per day on Mon Wed Fri, Brady Schulz MD    hydrALAZINE (APRESOLINE) tablet 25 mg, 25 mg, Oral, Q12H, Alexandra Gibbons MD, 25 mg at 03/04/18 0830    HYDROmorphone (DILAUDID) injection 0 5 mg, 0 5 mg, Intravenous, Q4H PRN, Dorina Lowry MD, 0 5 mg at 03/05/18 0616    insulin lispro (HumaLOG) 100 units/mL subcutaneous injection 1-6 Units, 1-6 Units, Subcutaneous, TID AC, 2 Units at 03/05/18 0629 **AND** Fingerstick Glucose (POCT), , , TID AC, Alexandra Gibbons MD    insulin lispro (HumaLOG) 100 units/mL subcutaneous injection 1-6 Units, 1-6 Units, Subcutaneous, HS, Alexandra Gibbons MD, 2 Units at 03/04/18 2200    ipratropium-albuterol (DUO-NEB) 0 5-2 5 mg/3 mL inhalation solution 3 mL, 3 mL, Nebulization, BID, Alexandra Gibbons MD, 3 mL at 03/04/18 2015    losartan (COZAAR) tablet 100 mg, 100 mg, Oral, Daily, Marshall Dillard MD, 100 mg at 03/04/18 0829    ondansetron [facility]) injection 4 mg, 4 mg, Intravenous, Q6H PRN, Marshall Dillard MD    oxyCODONE (ROXICODONE) oral solution 2 5 mg, 2 5 mg, Oral, Q4H PRN, Marshall Dillard MD, 2 5 mg at 03/05/18 0034    pantoprazole (PROTONIX) EC tablet 20 mg, 20 mg, Oral, BID AC, Marshall Dillard MD, 20 mg at 03/04/18 1757    OBJECTIVE     Current Weight: Weight - Scale: 49 5 kg (109 lb 2 oz)  Vitals:    03/05/18 1011   BP: (!) 72/40   Pulse: (!) 113   Resp: 18   Temp: 97 9 °F (36 6 °C)   SpO2:      Body mass index is 21 31 kg/m²  Intake/Output Summary (Last 24 hours) at 03/05/18 1029  Last data filed at 03/05/18 1000   Gross per 24 hour   Intake             1780 ml   Output                0 ml   Net             1780 ml       PHYSICAL EXAMINATION     Physical Exam   Constitutional: She appears lethargic  No distress  HENT:   Head: Normocephalic and atraumatic  Eyes: No scleral icterus  Neck: Neck supple  No JVD present  Cardiovascular: Normal rate, S1 normal and S2 normal     Pulmonary/Chest: Effort normal  No accessory muscle usage  No respiratory distress  She has no wheezes  Abdominal: Soft  She exhibits no distension  Musculoskeletal:        Right hand: She exhibits no laceration and no swelling  Left hand: She exhibits no laceration and no swelling  Neurological: She appears lethargic  Skin: Skin is warm  No cyanosis  Psychiatric: She is not combative     Confused       LAB RESULTS       Results from last 7 days  Lab Units 03/05/18  0506 03/03/18  0537 03/02/18  0436 03/01/18  0539 02/28/18  1748 02/28/18  1207   WBC Thousand/uL 14 30* 14 75* 11 10* 10 85* 11 18* 12 00*   HEMOGLOBIN g/dL 7 1* 10 0* 9 0* 8 3* 8 7* 9 0*   HEMATOCRIT % 22 2* 30 9* 28 0* 25 2* 26 8* 27 4*   PLATELETS Thousands/uL 254 316 259 220 215 217   SODIUM mmol/L 137 140 138 134* 135* 135*   POTASSIUM mmol/L 3 8 3 4* 4 0 3 6 3 2* 3 9   CHLORIDE mmol/L 95* 97* 98* 95* 96* 95*   CO2 mmol/L 28 32 28 26 25 26   BUN mg/dL 91* 33* 32* 78* 71* 67*   CREATININE mg/dL 5 59* 2 96* 3 09* 5 91* 5 32* 5 07*   EGFR ml/min/1 73sq m 8 17 16 7 8 9   CALCIUM mg/dL 10 2* 11 1* 10 3* 10 5* 10 1 10 6*   PHOSPHORUS mg/dL 4 4* 2 1*  --   --   --   --    TOTAL PROTEIN g/dL  --   --  7 2  --  6 8  --    GLUCOSE RANDOM mg/dL 206* 165* 119 112 157* 176*       RADIOLOGY RESULTS     No results found for this or any previous visit  Results for orders placed during the hospital encounter of 11/09/17   XR chest 2 views    Narrative CHEST     INDICATION:  Weakness  History taken directly from the electronic ordering system  COMPARISON:  Chest x-ray of 9/19/2017    VIEWS:  Frontal and lateral projections    IMAGES:  2    FINDINGS:  Right-sided dialysis catheter tip overlies the right atrium  The heart is enlarged  Patient is somewhat rotated to the left  There is persistent pulmonary vascular congestion, appears chronic  No pneumothorax or pleural effusion  Degenerative spurring is noted of the spine  There are marked arthritic changes of the right shoulder  Impression Persistent pulmonary vascular congestion, appears chronic  Workstation performed: [id number]         PLAN / RECOMMENDATIONS     1  End Stage Renal Disease  Plan HD today to keep on Monday Wednesday Friday dialysis schedule  Patient was found to have worsening anemia with a hemoglobin of 7 1 after left hip surgery  Plan to increase Epogen to 27273 Units with dialysis  Plan to give 2 Units of blood transfusion with hemodialysis today  At 9:27 AM on 3/5/2018, I saw and examined patient during hemodialysis treatment  The patient was receiving hemodialysis for treatment of end stage renal disease  I have also reviewed vital signs, intake and output, lab results and recent events, and agreed with today's dialysis order      Access: No issue      Trinidad Palomo MD  Nephrology  3/5/2018        Portions of the record may have been created with voice recognition software  Occasional wrong word or "sound a like" substitutions may have occurred due to the inherent limitations of voice recognition software  Read the chart carefully and recognize, using context, where substitutions have occurred

## 2018-03-05 NOTE — PROGRESS NOTES
During infusion of second unit of PRBC pt had slight change in resp rate and increase heart rate  Dr Madison Montalvo for Nephrology on the unit informed we pause  the transfusion and the Pt returned to he room  He further advised waiting 40 mins to an hour to see if the Pt presentation improved then resume the transfusion at low infusion rate to infuse remainder of PRBC within 4 hours  It was also observed that Pt appeared to have an increased facial droop on the RHS  This also appeared to resolve on it own with face returning to its previous appearance  Dr Singh, primary care hospitalist  Was informed of developments

## 2018-03-05 NOTE — TREATMENT PLAN
Received a call from Dr Madison Montalvo with Nephrology  Patient's hemoglobin down to 7 1  He recommended patient to be transfused 2 units of packed RBCs with hemodialysis  I had already obtained consent from patient's daughter at the time of admission for blood products transfusion if needed

## 2018-03-05 NOTE — PROGRESS NOTES
Patient not meeting estimated protein needs @ current TF rate of Nepro @ 30 ml/hour  Recommend adding 2 packets Prosource to tube feeding order in chart as this will provide patient with adequate protein compared to estimated needs

## 2018-03-05 NOTE — PROGRESS NOTES
Orthopedics   [facility] Egu 68 y o  female MRN: [id number]  Unit/Bed#: -01      Subjective:  68 y  o female post operative day 2 left femoral intramedullary nail  Patient seen today on the 4th floor during hemodialysis today  Patient is nonverbal   Patient also needs blood transfusion  Patient does cry out in pain with any attempts of touching around the incision      Labs:    0  Lab Value Date/Time   HCT 22 2 (L) 03/05/2018 0506   HCT 30 9 (L) 03/03/2018 0537   HCT 28 0 (L) 03/02/2018 0436   HGB 7 1 (L) 03/05/2018 0506   HGB 10 0 (L) 03/03/2018 0537   HGB 9 0 (L) 03/02/2018 0436   INR 0 98 02/28/2018 1748   WBC 14 30 (H) 03/05/2018 0506   WBC 14 75 (H) 03/03/2018 0537   WBC 11 10 (H) 03/02/2018 0436       Meds:    Current Facility-Administered Medications:     acetaminophen (TYLENOL) tablet 650 mg, 650 mg, Oral, Q4H PRN, Gavino Reynoso MD    amLODIPine (NORVASC) tablet 10 mg, 10 mg, Oral, Daily, Gavino Reynoso MD, 10 mg at 03/04/18 0830    aspirin tablet 325 mg, 325 mg, Oral, Daily, Gavino Reynoso MD, 325 mg at 03/04/18 3907    atorvastatin (LIPITOR) tablet 20 mg, 20 mg, Oral, Daily, Naima Mendez MD, 20 mg at 03/04/18 0830    b complex-vitamin C-folic acid (NEPHROCAPS) capsule 1 capsule, 1 capsule, Oral, Daily With Catalino Noel MD, 1 capsule at 03/04/18 1757    epoetin babak (EPOGEN,PROCRIT) injection 10,000 Units, 10,000 Units, Intravenous, Once per day on Mon Wed Fri, Myranda Henderson MD    hydrALAZINE (APRESOLINE) tablet 25 mg, 25 mg, Oral, Q12H, Gavino Reynoso MD, 25 mg at 03/04/18 0830    HYDROmorphone (DILAUDID) injection 0 5 mg, 0 5 mg, Intravenous, Q4H PRN, Brooklyn Acuna MD, 0 5 mg at 03/05/18 0616    insulin lispro (HumaLOG) 100 units/mL subcutaneous injection 1-6 Units, 1-6 Units, Subcutaneous, TID AC, 2 Units at 03/05/18 0629 **AND** Fingerstick Glucose (POCT), , , TID AC, Gavino Reynoso MD    insulin lispro (HumaLOG) 100 units/mL subcutaneous injection 1-6 Units, 1-6 Units, Subcutaneous, HS, Kahlil Hagan MD, 2 Units at 03/04/18 2200    ipratropium-albuterol (DUO-NEB) 0 5-2 5 mg/3 mL inhalation solution 3 mL, 3 mL, Nebulization, BID, Kahlil Hagan MD, 3 mL at 03/04/18 2015    losartan (COZAAR) tablet 100 mg, 100 mg, Oral, Daily, Kahlil Hagan MD, 100 mg at 03/04/18 0829    ondansetron (ZOFRAN) injection 4 mg, 4 mg, Intravenous, Q6H PRN, Kahlil Hagan MD    oxyCODONE (ROXICODONE) oral solution 2 5 mg, 2 5 mg, Oral, Q4H PRN, Kahlil Hagan MD, 2 5 mg at 03/05/18 0034    pantoprazole (PROTONIX) EC tablet 20 mg, 20 mg, Oral, BID AC, Kahlil Hagan MD, 20 mg at 03/04/18 1757    Blood Culture:   No results found for: BLOODCX    Wound Culture:   No results found for: WOUNDCULT    Ins and Outs:  I/O last 24 hours: In: 2130 [I V :200; Blood:350; Other:300; NG/GT:200; IV Piggyback:50; Feedings:1030]  Out: -           Physical exam:  Vitals:    03/05/18 1115   BP: (!) 87/43   Pulse: (!) 117   Resp: 18   Temp: (!) 96 6 °F (35 9 °C)   SpO2:      left lower extremity  · Dressings are clean dry intact  Thigh is soft and compressible  She does cry out in pain with any movement of the leg or any contact with the area  · Unable to assess motor and sensation, patient is non verbal  · 2+ dorsalis pedis pulse    _*_*_*_*_*_*_*_*_*_*_*_*_*_*_*_*_*_*_*_*_*_*_*_*_*_*_*_*_*_*_*_*_*_*_*_*_*_*_*_*_*    Assessment: 68 y  o female post operative day 2 left femur IMN  Patient's hemoglobin down to 7 1 and patient noted to have hypotension  Patient to have blood transfusion with dialysis today    Plan:  · Weightbearing as tolerated left lower extremity    Patient has been bed ridden for one year as her baseline  · PT/OT to evaluate and treat  · DVT prophylaxis per medical team  · Dispo: Ortho will follow  · Patient noted to have acute blood loss anemia due to a drop in Hbg of > 2 0g from preop levels, will monitor vital signs and resuscitate with IV fluids as needed   · Will need postop follow-up with   Benji for staple removal    Brosnon Mcarthur PA-C

## 2018-03-05 NOTE — TREATMENT PLAN
Received call from RN about family here to talk to me  Met with family in patient's room  Patient not able to participate  Patient keeps screaming in pain  Her blood pressure-systolic in high 87F  This is not unusual for her specially other dialysis days  She already has received 2 units of packed RBCs  She is tachycardic  Not sure how much fluid was taken out at hemodialysis  I have asked the staff to contact Nephrology for any IV fluids if patient could be given  Daughter wants patient to have no pain, however, patient is still full code and with her blood pressure on the lower side-after hemodialysis, I have mentioned to her that we would be hesitant to give her any stronger medications like narcotics  Would give her an extra dose of Tylenol  Daughter to discuss with family about code status further  All questions were answered    Discussed with case management as well

## 2018-03-05 NOTE — PROGRESS NOTES
[facility] Internal Medicine Progress Note  Patient: Giles Dukes 68 y o  female   MRN: [id number]  PCP: Vanessa Cuenca MD  Unit/Bed#: -01 Encounter: [id number]  Date Of Visit: 03/05/18    Assessment/Plan:    Principal Problem:    Subtrochanteric fracture of left femur, closed, initial encounter ([facility] )  Active Problems:    ESRD (end stage renal disease) on dialysis ([facility] )    Closed intertrochanteric fracture of left femur (H[facility])    Anemia of chronic kidney failure, stage 5 ([facility] )    Multiple myeloma not having achieved remission ([facility] )    Essential hypertension    Diabetes mellitus type 2 in nonobese ([facility] )    Hemiparesis due to old stroke ([facility] )    Closed intertrochanteric fracture of hip, left, initial encounter ([facility] )    Functional quadriplegia ([facility] )    Acute blood loss as cause of postoperative anemia    Present on Admission:   Closed intertrochanteric fracture of left femur ([facility] )   Anemia of chronic kidney failure, stage 5 ([facility] )   Multiple myeloma not having achieved remission ([facility] )   Essential hypertension   Diabetes mellitus type 2 in nonobese ([facility] )   Closed intertrochanteric fracture of hip, left, initial encounter ([facility] )   Functional quadriplegia ([facility] )      · Left femur fracture-patient status post surgery  Main purpose is for pain control and comfort although she is still full code  Continue with current treatment  Would need to go to rehab hopefully in the next day or 2  · Acute postoperative blood-loss anemia on top of anemia of chronic disease and underlying multiple myeloma  Received call from Nephrology this morning  Ordered 2 units of packed RBCs  Follow-up hemoglobin afterwards  · Old stroke with left-sided hemiparesis/functional cardioplegia  Patient basically is bed bound and needs help for everything  Her facial droop which staff thought maybe worse, is not actually worse    She is having some shaking of her left hand and on moving her legs a little bit and when she is more awake, this shaking of left arm also stops  May be positioning causing it  With repositioning, as per staff her shaking very much stopped  She also received some Tylenol as she is receiving blood transfusion  No further need for any workup at this point  · Poor nutritional status  Patient on tube feeds  She is tolerating current tube feeds  Would increase the rate to 45 mL/hour-her usual rate at home  · End-stage renal disease  On hemodialysis      VTE Pharmacologic Prophylaxis:   Pharmacologic: Other Medication: Only aspirin as patient's family declined to have any other anticoagulant given her history of significant GI bleed requiring 10 units of packed RBCs  Mechanical VTE Prophylaxis in Place:  Right leg only    Patient Centered Rounds: I have performed bedside rounds with nursing staff today  Discussions with Specialists or Other Care Team Provider:  Yes    Education and Discussions with Family / Patient:  Yes    Time Spent for Care: 40+ minutes  More than 50% of total time spent on counseling and coordination of care as described above  Current Length of Stay: 5 day(s)    Current Patient Status: Inpatient   Certification Statement: The patient will continue to require additional inpatient hospital stay due to Postop care/acute blood loss anemia/hemodialysis    Discharge Plan:  1-2 days-possible short-term rehab if family agreeable    Code Status: Level 1 - Full Code      Subjective:   Patient in bed  Receiving blood transfusion  She is aphasic  She is having some abnormal activity of her left arm which basically stops when she is more awake and in somewhat pain  Objective:     Vitals:   Temp (24hrs), Av 4 °F (36 3 °C), Min:96 5 °F (35 8 °C), Max:98 3 °F (36 8 °C)    HR:  [] 123  Resp:  [18-20] 18  BP: ()/(37-69) 113/59  SpO2:  [96 %-100 %] 98 %  Body mass index is 21 31 kg/m²  Input and Output Summary (last 24 hours):        Intake/Output Summary (Last 24 hours) at 18 [facility] filed at 03/05/18 1200   Gross per 24 hour   Intake             2430 ml   Output             2450 ml   Net              -20 ml           Physical Exam:     Vital signs are reviewed as above  Constitutional:  Patient in bed  She is chronically aphasic  She only screams when her leg is moved or she is moved because of the pain    Eyes:  Conjunctivae are pale   HENT:  Chronic facial droop/dry lips  It is not worse than before  Patient is a face    Neck:  Right-sided PermCath   Cardiac: I did not hear any rubs or gallop  Patient appears to be in sinus rhythm  Respiratory: Patient not in significant respiratory distress  Air entry in general anterolaterally although inspiratory effort is poor  GI: Abdomen is soft  Patient has PEG tube in  Neurologic:  Was sleepy earlier when she was having abnormal movement of her left arm  On waking up, she started screaming with pain  The tremor she was having of her left arm also stopped  Getting blood transfuse  Skin:  As above  Psychiatric:  Unable to assess   Musculoskeletal   Status post left femur surgery   Extremities:  As above    Additional Data:     Labs:      Results from last 7 days  Lab Units 03/05/18  0506   WBC Thousand/uL 14 30*   HEMOGLOBIN g/dL 7 1*   HEMATOCRIT % 22 2*   PLATELETS Thousands/uL 254   NEUTROS PCT % 75   LYMPHS PCT % 16   MONOS PCT % 7   EOS PCT % 0       Results from last 7 days  Lab Units 03/05/18  0506  03/02/18  0436   SODIUM mmol/L 137  < > 138   POTASSIUM mmol/L 3 8  < > 4 0   CHLORIDE mmol/L 95*  < > 98*   CO2 mmol/L 28  < > 28   BUN mg/dL 91*  < > 32*   CREATININE mg/dL 5 59*  < > 3 09*   CALCIUM mg/dL 10 2*  < > 10 3*   TOTAL PROTEIN g/dL  --   --  7 2   BILIRUBIN TOTAL mg/dL  --   --  0 60   ALK PHOS U/L  --   --  200*   ALT U/L  --   --  34   AST U/L  --   --  23   GLUCOSE RANDOM mg/dL 206*  < > 119   < > = values in this interval not displayed      Results from last 7 days  Lab Units 02/28/18  1748   INR  0 98 * I Have Reviewed All Lab Data Listed Above  * Additional Pertinent Lab Tests Reviewed: All Labs Within Last 24 Hours Reviewed          Last 24 Hours Medication List:     Current Facility-Administered Medications:  acetaminophen 650 mg Oral Q4H PRN Raegan Coles MD   amLODIPine 10 mg Oral Daily Raegan Coles MD   aspirin 325 mg Oral Daily Raegan Coles MD   atorvastatin 20 mg Oral Daily Lupe Ni MD   b complex-vitamin C-folic acid 1 capsule Oral Daily With Harshad Downing MD   epoetin babak 10,000 Units Intravenous Once per day on Mon Wed Fri Bridger Norton MD   hydrALAZINE 25 mg Oral Q12H Raegan Coles MD   HYDROmorphone 0 5 mg Intravenous Q4H PRN Alfonso Bermeo MD   insulin lispro 1-6 Units Subcutaneous TID Sharonda Christie MD   insulin lispro 1-6 Units Subcutaneous HS Raegan Coles MD   ipratropium-albuterol 3 mL Nebulization BID Raegan Coles MD   losartan 100 mg Oral Daily Raegan Coles MD   ondansetron 4 mg Intravenous Q6H PRN Raegan Coles MD   oxyCODONE 2 5 mg Oral Q4H PRN Raegan Coles MD   pantoprazole 20 mg Oral BID Sharonda Christie MD        Today, Patient Was Seen By: Raegan Coles MD    ** Please Note: Dragon 360 Dictation voice to text software may have been used in the creation of this document   **

## 2018-03-05 NOTE — WOUND OSTOMY CARE
Progress Note - Wound   Giuliana Slaughter 68 y o  female MRN: [id number]  Unit/Bed#: -01 Encounter: [id number]      Assessment:  Patient seen for follow up with wound care, prior to dialysis treatment  Patient seen in bed, dependent with care  Cooperative with assessment  Patient is anuric  Incontinent of stool  Assist of 2 with turning  Patient went to OR for repair of her L leg fracture  Patient is on bed with accu-max bed pump  L heel is intact with no redness or wounds noted  R heel with DTI, skin is intact with non-blanchable deep maroon/light purple colored skin  Wound is non-evolving at time of assessment  This wound is hospital acquired  This wound has the potential to evolve to a full thickness injury stage 3 or 4  No redness, induration, drainage or fluctuance noted  Recommend offloading of pressure using pillows and hydraguard cream      Patient with MASD noted on admission, however it is a pressure injury noted to R buttock stage 2  This wound is POA  Confirmed by clinical coordinator on unit  This wound is likely mixed etiology pressure/moisture  Wound bed is 100% beefy red pink tissue, edge is fragile attached intact with no maceration, vidya-wound is intact with hypopigmented scarring no induration or fluctuance  No purulence noted  Hydrocolloid was in place, but was soiled/dislodged at time of assessment  Due to fragile skin - recommend to use calazime cream TID and PRN  See flow sheets for more detailed assessment findings  Primary nurse aware of plan of care  CC aware of hospital acquired status  Wound care will follow along weekly  Plan:   1  Apply calazime to sacrum and buttocks TID and PRN   2  Apply hydraguard to b/l heels BID and PRN for prevention and protection  3  Apply skin nourishing cream to the entire skin daily for moisture   4  Soft care cushion to chair when OOB   5  Turn and reposition patient every 2 hours   6  Elevate heels off of bed with pillows to offload pressure  7  Wound care will follow along with patient weekly  Please call with questions or concerns [phone]    Objective:    Vitals: Blood pressure (!) 137/40, pulse (!) 121, temperature 97 9 °F (36 6 °C), temperature source Axillary, resp  rate 18, height 5' (1 524 m), weight 49 5 kg (109 lb 2 oz), SpO2 96 %  ,Body mass index is 21 31 kg/m²  Pressure Ulcer 03/05/18 Buttocks Right (Active)   Staging Stage II 3/5/2018  9:00 AM   Wound Description Beefy red;Pink 3/5/2018  9:00 AM   Araseli-wound Assessment Clean;Dry; Intact;Fragile 3/5/2018  9:00 AM   Wound Length (cm) 5 cm 3/5/2018  9:00 AM   Wound Width (cm) 4 cm 3/5/2018  9:00 AM   Wound Depth (cm) 0 1 3/5/2018  9:00 AM   Calculated Wound Area (cm^2) 20 cm^2 3/5/2018  9:00 AM   Calculated Wound Volume (cm^3) 2 cm^3 3/5/2018  9:00 AM   Tunneling 0 cm 3/5/2018  9:00 AM   Undermining 0 3/5/2018  9:00 AM   Drainage Amount None 3/5/2018  9:00 AM   Treatment Cleansed;Elevated with pillow(s); Offload;Softcare cushion;Turn & reposition 3/5/2018  9:00 AM   Dressing Protective barrier 3/5/2018  9:00 AM   Wound packed? No 3/5/2018  9:00 AM   Packing- # removed 0 3/5/2018  9:00 AM   Packing- # inserted 0 3/5/2018  9:00 AM   Patient Tolerance Tolerated well 3/5/2018  9:00 AM   Dressing Status Open to air 3/5/2018  9:00 AM       Pressure Ulcer 03/05/18 Heel Right DTI (Active)   Staging Deep Tissue Injury 3/5/2018  9:00 AM   Wound Description Light purple;Dry; Intact 3/5/2018  9:00 AM   Araseli-wound Assessment Clean;Dry; Intact 3/5/2018  9:00 AM   Wound Length (cm) 2 cm 3/5/2018  9:00 AM   Wound Width (cm) 2 cm 3/5/2018  9:00 AM   Wound Depth (cm) 0 3/5/2018  9:00 AM   Calculated Wound Area (cm^2) 4 cm^2 3/5/2018  9:00 AM   Calculated Wound Volume (cm^3) 0 cm^3 3/5/2018  9:00 AM   Tunneling 0 cm 3/5/2018  9:00 AM   Undermining 0 3/5/2018  9:00 AM   Drainage Amount None 3/5/2018  9:00 AM   Treatment Cleansed;Elevated with pillow(s); Offload; Turn & reposition 3/5/2018  9:00 AM   Dressing Moisture barrier 3/5/2018  9:00 AM   Wound packed? No 3/5/2018  9:00 AM   Packing- # removed 0 3/5/2018  9:00 AM   Packing- # inserted 0 3/5/2018  9:00 AM   Patient Tolerance Tolerated well 3/5/2018  9:00 AM   Dressing Status Open to air 3/5/2018  9:00 AM       Recommendations written as orders    Blake Baca RN BSN

## 2018-03-05 NOTE — CASE MANAGEMENT
Continued Stay Review    Date: 3/4    Vital Signs:   Temp (24hrs), Av 8 °F (36 6 °C), Min:97 4 °F (36 3 °C), Max:98 3 °F (36 8 °C)     HR:  [] 111  Resp:  [16-24] 20  BP: (115-137)/(58-76) 115/62  SpO2:  [96 %-100 %] 100 %  Body mass index is 21 31 kg/m²       Medications:   Scheduled Meds:   Current Facility-Administered Medications:  acetaminophen 650 mg Oral Q4H PRN Teodoro Casillas MD   acetaminophen 650 mg Oral Once MD Fatimah Greenberg ON 3/6/2018] amLODIPine 5 mg Oral Daily Teodoro Casillas MD   aspirin 325 mg Oral Daily Teodoro Casillas MD   atorvastatin 20 mg Oral Daily Lorenza Vazquez MD   b complex-vitamin C-folic acid 1 capsule Oral Daily With Montana Stephens MD   epoetin babak 10,000 Units Intravenous Once per day on  Adam Berrios MD   hydrALAZINE 25 mg Oral Q12H Teodoro Casillas MD   HYDROmorphone 0 5 mg Intravenous Q4H PRN Joleen Joseph MD   insulin lispro 1-6 Units Subcutaneous TID Mary Lee MD   insulin lispro 1-6 Units Subcutaneous HS Teodoro Casillas MD   ipratropium-albuterol 3 mL Nebulization BID Teodoro Casillas MD   losartan 100 mg Oral Daily Teodoro Casillas MD   ondansetron 4 mg Intravenous Q6H PRN Teodoro Casillas MD   oxyCODONE 2 5 mg Oral Q4H PRN Teodoro Casillas MD   pantoprazole 20 mg Oral BID AC Toedoro Casillas MD     Continuous Infusions:    PRN Meds:   acetaminophen    HYDROmorphone    ondansetron    oxyCODONE    Abnormal Labs/Diagnostic Results: N/A    Age/Sex: 68 y o  female     Assessment/Plan:   Principal Problem:    Subtrochanteric fracture of left femur, closed, initial encounter ([facility] )  Active Problems:    ESRD (end stage renal disease) on dialysis ([facility] )    Closed intertrochanteric fracture of left femur (HCC)    Anemia of chronic kidney failure, stage 5 ([facility] )    Multiple myeloma not having achieved remission ([facility] )    Essential hypertension    Diabetes mellitus type 2 in nonobese [facility])    Hemiparesis due to old stroke ([facility] )    Closed intertrochanteric fracture of hip, left, initial encounter ([facility] )    Functional quadriplegia ([facility] )     Present on Admission:   Closed intertrochanteric fracture of left femur (HCC)   Anemia of chronic kidney failure, stage 5 ([facility] )   Multiple myeloma not having achieved remission ([facility] )   Essential hypertension   Diabetes mellitus type 2 in nonobese ([facility] )   Closed intertrochanteric fracture of hip, left, initial encounter ([facility] )   Functional quadriplegia ([facility] )        · Left femur fracture-patient with history of multiple myeloma  Patient status post placement of nail mostly for comfort and pain control  Patient appears comfortable although she moans off and on with pain  Short-term rehab hopefully in the next day or so  Today is postop day 1   · End-stage renal disease  Has also anemia of chronic kidney disease with underlying multiple myeloma  Continue with hemodialysis  Nephrology following  · 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  Patient is also aphasic  · Nutrition-on tube feeds  Would increase the rate to her home rate 45 mL/hour        VTE Pharmacologic Prophylaxis:   Pharmacologic: No anticoagulants other than aspirin as per family  Mechanical VTE Prophylaxis in Place:  Right leg only     Patient Centered Rounds: I have performed bedside rounds with nursing staff today      Discussions with Specialists or Other Care Team Provider: Yes     Education and Discussions with Family / Patient:   Yes        Current Length of Stay: 4 day(s)     Current Patient Status: Inpatient   Certification Statement: The patient will continue to require additional inpatient hospital stay due to Postop care  Discharge Plan: TBD

## 2018-03-06 PROBLEM — I12.0 HYPERTENSIVE CKD, ESRD ON DIALYSIS (HCC): Status: ACTIVE | Noted: 2018-01-01

## 2018-03-06 PROBLEM — Z99.2 HYPERTENSIVE CKD, ESRD ON DIALYSIS (HCC): Status: ACTIVE | Noted: 2018-01-01

## 2018-03-06 PROBLEM — D64.9 ANEMIA: Status: ACTIVE | Noted: 2018-01-01

## 2018-03-06 PROBLEM — N18.6 HYPERTENSIVE CKD, ESRD ON DIALYSIS (HCC): Status: ACTIVE | Noted: 2018-01-01

## 2018-03-06 NOTE — PLAN OF CARE
Problem: Potential for Falls  Goal: Patient will remain free of falls  INTERVENTIONS:  - Assess patient frequently for physical needs  -  Identify cognitive and physical deficits and behaviors that affect risk of falls    -  [place] fall precautions as indicated by assessment   - Educate patient/family on patient safety including physical limitations  - Instruct patient to call for assistance with activity based on assessment  - Modify environment to reduce risk of injury  - Consider OT/PT consult to assist with strengthening/mobility   Outcome: Progressing      Problem: Prexisting or High Potential for Compromised Skin Integrity  Goal: Skin integrity is maintained or improved  INTERVENTIONS:  - Identify patients at risk for skin breakdown  - Assess and monitor skin integrity  - Assess and monitor nutrition and hydration status  - Monitor labs (i e  albumin)  - Assess for incontinence   - Turn and reposition patient  - Assist with mobility/ambulation  - Relieve pressure over bony prominences  - Avoid friction and shearing  - Provide appropriate hygiene as needed including keeping skin clean and dry  - Evaluate need for skin moisturizer/barrier cream  - Collaborate with interdisciplinary team (i e  Nutrition, Rehabilitation, etc )   - Patient/family teaching   Outcome: Progressing      Problem: PAIN - ADULT  Goal: Verbalizes/displays adequate comfort level or baseline comfort level  Interventions:  - Encourage patient to monitor pain and request assistance  - Assess pain using appropriate pain scale  - Administer analgesics based on type and severity of pain and evaluate response  - Implement non-pharmacological measures as appropriate and evaluate response  - Consider cultural and social influences on pain and pain management  - Notify physician/advanced practitioner if interventions unsuccessful or patient reports new pain   Outcome: Progressing      Problem: DISCHARGE PLANNING  Goal: Discharge to home or other facility with appropriate resources  INTERVENTIONS:  - Identify barriers to discharge w/patient and caregiver  - Arrange for needed discharge resources and transportation as appropriate  - Identify discharge learning needs (meds, wound care, etc )  - Arrange for interpretive services to assist at discharge as needed  - Refer to Case Management Department for coordinating discharge planning if the patient needs post-hospital services based on physician/advanced practitioner order or complex needs related to functional status, cognitive ability, or social support system   Outcome: Progressing      Problem: Knowledge Deficit  Goal: Patient/family/caregiver demonstrates understanding of disease process, treatment plan, medications, and discharge instructions  Complete learning assessment and assess knowledge base  Interventions:  - Provide teaching at level of understanding  - Provide teaching via preferred learning methods   Outcome: Progressing      Problem: CARDIOVASCULAR - ADULT  Goal: Maintains optimal cardiac output and hemodynamic stability  INTERVENTIONS:  - Monitor I/O, vital signs and rhythm  - Monitor for S/S and trends of decreased cardiac output i e  bleeding, hypotension  - Administer and titrate ordered vasoactive medications to optimize hemodynamic stability  - Assess quality of pulses, skin color and temperature  - Assess for signs of decreased coronary artery perfusion - ex   Angina  - Instruct patient to report change in severity of symptoms   Outcome: Progressing    Goal: Absence of cardiac dysrhythmias or at baseline rhythm  INTERVENTIONS:  - Continuous cardiac monitoring, monitor vital signs, obtain 12 lead EKG if indicated  - Administer antiarrhythmic and heart rate control medications as ordered  - Monitor electrolytes and administer replacement therapy as ordered   Outcome: Progressing      Problem: GASTROINTESTINAL - ADULT  Goal: Maintains adequate nutritional intake  INTERVENTIONS:  - Monitor percentage of each meal consumed  - Identify factors contributing to decreased intake, treat as appropriate  - Assist with meals as needed  - Monitor I&O, WT and lab values  - Obtain nutrition services referral as needed   Outcome: Progressing      Problem: SKIN/TISSUE INTEGRITY - ADULT  Goal: Skin integrity remains intact  INTERVENTIONS  - Identify patients at risk for skin breakdown  - Assess and monitor skin integrity  - Assess and monitor nutrition and hydration status  - Monitor labs (i e  albumin)  - Assess for incontinence   - Turn and reposition patient  - Assist with mobility/ambulation  - Relieve pressure over bony prominences  - Avoid friction and shearing  - Provide appropriate hygiene as needed including keeping skin clean and dry  - Evaluate need for skin moisturizer/barrier cream  - Collaborate with interdisciplinary team (i e  Nutrition, Rehabilitation, etc )   - Patient/family teaching   Outcome: Progressing      Problem: DISCHARGE PLANNING - CARE MANAGEMENT  Goal: Discharge to post-acute care or home with appropriate resources  INTERVENTIONS:  - Conduct assessment to determine patient/family and health care team treatment goals, and need for post-acute services based on payer coverage, community resources, and patient preferences, and barriers to discharge  - Address psychosocial, clinical, and financial barriers to discharge as identified in assessment in conjunction with the patient/family and health care team  - Arrange appropriate level of post-acute services according to patients   needs and preference and payer coverage in collaboration with the physician and health care team  - Communicate with and update the patient/family, physician, and health care team regarding progress on the discharge plan  - Arrange appropriate transportation to post-acute venues   Outcome: Progressing      Problem: Nutrition/Hydration-ADULT  Goal: Nutrient/Hydration intake appropriate for improving, restoring or maintaining nutritional needs  Monitor and assess patient's nutrition/hydration status for malnutrition (ex- brittle hair, bruises, dry skin, pale skin and conjunctiva, muscle wasting, smooth red tongue, and disorientation)  Collaborate with interdisciplinary team and initiate plan and interventions as ordered  Monitor patient's weight and dietary intake as ordered or per policy  Utilize nutrition screening tool and intervene per policy     INTERVENTIONS:  - Monitor oral intake, urinary output, labs, and treatment plans  - Assess nutrition and hydration status and recommend course of action  -- Recommend/ encourage appropriate diets, oral nutritional supplements, and vitamin/mineral supplements  - Order, calculate, and assess calorie counts as needed  - Recommend, monitor, and adjust tube feedings based on assessed needs  - Assess need for intravenous fluids  - Provide specific nutrition/hydration education as appropriate  - Include patient/family/caregiver in decisions related to nutrition   Outcome: Progressing

## 2018-03-06 NOTE — PROGRESS NOTES
Orthopedics   Ana Loredo Egu 68 y o  female MRN: [id number]  Unit/Bed#: -01      Subjective:  68 y  o female post operative day 3 left femoral intramedullary nail  Patient is nonverbal   In bed comfortable  No overnight issues per nursing staff  Had transfusion with dialysis yesterday with improvements   Still screams out in pain with any movement with left leg    Labs:    0  Lab Value Date/Time   HCT 35 5 03/05/2018 1747   HCT 22 2 (L) 03/05/2018 0506   HCT 30 9 (L) 03/03/2018 0537   HGB 12 1 03/05/2018 1747   HGB 7 1 (L) 03/05/2018 0506   HGB 10 0 (L) 03/03/2018 0537   INR 0 98 02/28/2018 1748   WBC 14 30 (H) 03/05/2018 0506   WBC 14 75 (H) 03/03/2018 0537   WBC 11 10 (H) 03/02/2018 0436       Meds:    Current Facility-Administered Medications:     acetaminophen (TYLENOL) tablet 650 mg, 650 mg, Oral, Q4H PRN, Raegan Coles MD, 650 mg at 03/06/18 0425    amLODIPine (NORVASC) tablet 5 mg, 5 mg, Oral, Daily, Raegan Coles MD    aspirin tablet 325 mg, 325 mg, Oral, Daily, Raegan Coles MD, 325 mg at 03/05/18 1548    atorvastatin (LIPITOR) tablet 20 mg, 20 mg, Oral, Daily, Lupe Ni MD, 20 mg at 03/05/18 1548    b complex-vitamin C-folic acid (NEPHROCAPS) capsule 1 capsule, 1 capsule, Oral, Daily With Joya Mathew MD, 1 capsule at 03/05/18 1558    epoetin babak (EPOGEN,PROCRIT) injection 10,000 Units, 10,000 Units, Intravenous, Once per day on Mon Wed Fri, Bridger Norton MD, 10,000 Units at 03/05/18 1204    hydrALAZINE (APRESOLINE) tablet 25 mg, 25 mg, Oral, Q12H, Raegan Coles MD, 25 mg at 03/04/18 0830    HYDROmorphone (DILAUDID) injection 0 5 mg, 0 5 mg, Intravenous, Q4H PRN, Alfonso Bermeo MD, 0 5 mg at 03/05/18 0616    insulin lispro (HumaLOG) 100 units/mL subcutaneous injection 1-6 Units, 1-6 Units, Subcutaneous, TID AC, 2 Units at 03/05/18 0629 **AND** Fingerstick Glucose (POCT), , , TID AC, Raegan Coles MD    insulin lispro (HumaLOG) 100 units/mL subcutaneous injection 1-6 Units, 1-6 Units, Subcutaneous, HS, Kirit Acosta MD, 1 Units at 03/05/18 2127    ipratropium (ATROVENT) 0 02 % inhalation solution 0 5 mg, 0 5 mg, Nebulization, BID, Kirit Acosta MD    levalbuterol [facility]) inhalation solution 1 25 mg, 1 25 mg, Nebulization, BID, Kirit Acosta MD    losartan (COZAAR) tablet 100 mg, 100 mg, Oral, Daily, Kirit Acosta MD, 100 mg at 03/04/18 0829    ondansetron (ZOFRAN) injection 4 mg, 4 mg, Intravenous, Q6H PRN, Kirit Acosta MD    oxyCODONE (ROXICODONE) oral solution 2 5 mg, 2 5 mg, Oral, Q4H PRN, Kirit Acosta MD, 2 5 mg at 03/05/18 0034    pantoprazole (PROTONIX) EC tablet 20 mg, 20 mg, Oral, BID AC, Kirit Acosta MD, 20 mg at 03/04/18 1757    Blood Culture:   No results found for: BLOODCX    Wound Culture:   No results found for: WOUNDCULT    Ins and Outs:  I/O last 24 hours: In: 8828 [I V :500; Blood:1050; Other:600; ZU/KY:4302; Feedings:621]  Out: 2450 [Other:2450]          Physical exam:  Vitals:    03/06/18 0745   BP:    Pulse:    Resp:    Temp:    SpO2: 96%     left lower extremity  · Dressings are clean dry intact  Thigh is soft and compressible  Pain with any movement of left leg  · Unable to assess motor and sensation, patient is non verbal  · 2+ dorsalis pedis pulse    _*_*_*_*_*_*_*_*_*_*_*_*_*_*_*_*_*_*_*_*_*_*_*_*_*_*_*_*_*_*_*_*_*_*_*_*_*_*_*_*_*    Assessment: 68 y  o female post operative day 3 left femur IMN  hemoglobin normal today    Plan:  · Weightbearing as tolerated left lower extremity  Patient has been bed ridden for one year as her baseline  · PT/OT to evaluate and treat  · DVT prophylaxis per medical team  · Hemoglobin normal today     · Will need postop follow-up with Dr ZUÑIGA [facility] for staple removal 14 days post-op  · D/C planning    Christian Mario PA-C

## 2018-03-06 NOTE — PROGRESS NOTES
Consulted for tube feeding recommendations  Recommend continuing EN as ordered at this time: Nepro @ 45 ml/hour up to 1080 mL total daily volume  Provides 1944 kcal, 87 grams protein, 788 mL free water  Current rate of 45 mL/hour provides >100% estimated calorie needs; however this may be appropriate at this time given history of recent weight loss and malnutrition findings  Provides 100% estimated protein needs  H2O flush per physician/nephrology  If to continue w/ current flush of 100 mL every 6 hours, total free water =1188 mL  Please update diet order in chart to reflect current tube feeding being administered  Recommend close monitoring of weights and chemical profile labs

## 2018-03-06 NOTE — PROGRESS NOTES
NEPHROLOGY PROGRESS NOTE    Patient: Jose Lacey               Sex: female          DOA: 2/28/2018  1:20 PM   YOB: 1940        Age:  68 y o         LOS:  LOS: 6 days     REASON FOR THE CONSULTATION:  Further management of ESRD    HPI      Thisis a 66-year-old female with a past medical history of ESRD admitted for left femur fracture now s/p surgery  SUBJECTIVE     - found tachycardic with heart rate in 120's yesterday at the end of dialysis  Patient was also receiving 2nd Unit of blood transfusion during that time  Patient's tachycardia has slightly improved although heart rate remained elevated in 110's range overnight  Received total 2 Units of blood transfusion in last 24 hours    Patient was sleepy and lethargic this morning   -Reviewed last 24 hrs events     CURRENT MEDICATIONS       Current Facility-Administered Medications:     acetaminophen (TYLENOL) tablet 650 mg, 650 mg, Oral, Q4H PRN, Vale Pinedo MD, 650 mg at 03/06/18 1024    amLODIPine (NORVASC) tablet 5 mg, 5 mg, Oral, Daily, Vale Pinedo MD    aspirin tablet 325 mg, 325 mg, Oral, Daily, Vale Pinedo MD, 325 mg at 03/06/18 1025    atorvastatin (LIPITOR) tablet 20 mg, 20 mg, Oral, Daily, Geovanni Jasmine MD, 20 mg at 03/06/18 1024    b complex-vitamin C-folic acid (NEPHROCAPS) capsule 1 capsule, 1 capsule, Oral, Daily With Anuradha Villalobos MD, 1 capsule at 03/05/18 1558    epoetin babak (EPOGEN,PROCRIT) injection 10,000 Units, 10,000 Units, Intravenous, Once per day on Mon Wed Fri, Nyasia Green MD, 10,000 Units at 03/05/18 1204    hydrALAZINE (APRESOLINE) tablet 25 mg, 25 mg, Oral, Q12H, Vale Pinedo MD, 25 mg at 03/04/18 0830    HYDROmorphone (DILAUDID) injection 0 5 mg, 0 5 mg, Intravenous, Q4H PRN, Ignacio Dash MD, 0 5 mg at 03/05/18 0616    insulin lispro (HumaLOG) 100 units/mL subcutaneous injection 1-6 Units, 1-6 Units, Subcutaneous, TID AC, 1 Units at 03/06/18 1340 **AND** Fingerstick Glucose (POCT), , , TID ACAlexandra MD    insulin lispro (HumaLOG) 100 units/mL subcutaneous injection 1-6 Units, 1-6 Units, Subcutaneous, HS, Alexandra Gibbons MD, 1 Units at 03/05/18 2127    ipratropium (ATROVENT) 0 02 % inhalation solution 0 5 mg, 0 5 mg, Nebulization, BID, Alexandra Gibbons MD    levalbuterol Malachy Kareem) inhalation solution 1 25 mg, 1 25 mg, Nebulization, BID, Alexandra Gibbons MD    losartan (COZAAR) tablet 100 mg, 100 mg, Oral, Daily, Alexandra Gibbons MD, 100 mg at 03/04/18 0829    ondansetron (ZOFRAN) injection 4 mg, 4 mg, Intravenous, Q6H PRN, Alexandra Gibbons MD    oxyCODONE (ROXICODONE) oral solution 2 5 mg, 2 5 mg, Oral, Q4H PRN, Alexandra Gibbons MD, 2 5 mg at 03/05/18 0034    pantoprazole (PROTONIX) EC tablet 20 mg, 20 mg, Oral, BID ACAlexandra MD, 20 mg at 03/04/18 1757    OBJECTIVE     Current Weight: Weight - Scale: 49 5 kg (109 lb 2 oz)  Vitals:    03/06/18 1025   BP: 120/72   Pulse:    Resp:    Temp:    SpO2:      Body mass index is 21 31 kg/m²  Intake/Output Summary (Last 24 hours) at 03/06/18 1441  Last data filed at 03/06/18 0957   Gross per 24 hour   Intake             1874 ml   Output                0 ml   Net             1874 ml       PHYSICAL EXAMINATION     Physical Exam   Constitutional: No distress  Sleepy and lethargic, ill looking   HENT:   Head: Normocephalic and atraumatic  Eyes: Conjunctivae are normal  No scleral icterus  Neck: Neck supple  No JVD present  Cardiovascular: Normal rate and regular rhythm  Pulmonary/Chest: Effort normal  She has no wheezes  She has no rales  Abdominal: Soft  She exhibits no distension  Musculoskeletal: She exhibits no edema  Neurological:   Unable to perform full CNS examination as patient is lethargic and not following commands   Skin: Skin is warm  Psychiatric:   Unable to perform full psychiatric examination as patient is lethargic and not following commands           LAB RESULTS       Results from last 7 days  Lab Units 03/05/18  1747 03/05/18  0506 03/03/18  0537 03/02/18  0436 03/01/18  0539 02/28/18  1748 02/28/18  1207   WBC Thousand/uL  --  14 30* 14 75* 11 10* 10 85* 11 18* 12 00*   HEMOGLOBIN g/dL 12 1 7 1* 10 0* 9 0* 8 3* 8 7* 9 0*   HEMATOCRIT % 35 5 22 2* 30 9* 28 0* 25 2* 26 8* 27 4*   PLATELETS Thousands/uL  --  254 316 259 220 215 217   SODIUM mmol/L  --  137 140 138 134* 135* 135*   POTASSIUM mmol/L  --  3 8 3 4* 4 0 3 6 3 2* 3 9   CHLORIDE mmol/L  --  95* 97* 98* 95* 96* 95*   CO2 mmol/L  --  28 32 28 26 25 26   BUN mg/dL  --  91* 33* 32* 78* 71* 67*   CREATININE mg/dL  --  5 59* 2 96* 3 09* 5 91* 5 32* 5 07*   EGFR ml/min/1 73sq m  --  8 17 16 7 8 9   CALCIUM mg/dL  --  10 2* 11 1* 10 3* 10 5* 10 1 10 6*   PHOSPHORUS mg/dL  --  4 4* 2 1*  --   --   --   --    TOTAL PROTEIN g/dL  --   --   --  7 2  --  6 8  --    GLUCOSE RANDOM mg/dL  --  206* 165* 119 112 157* 176*       RADIOLOGY RESULTS      Results for orders placed during the hospital encounter of 11/09/17   XR chest 2 views    Narrative CHEST     INDICATION:  Weakness  History taken directly from the electronic ordering system  COMPARISON:  Chest x-ray of 9/19/2017    VIEWS:  Frontal and lateral projections    IMAGES:  2    FINDINGS:  Right-sided dialysis catheter tip overlies the right atrium  The heart is enlarged  Patient is somewhat rotated to the left  There is persistent pulmonary vascular congestion, appears chronic  No pneumothorax or pleural effusion  Degenerative spurring is noted of the spine  There are marked arthritic changes of the right shoulder  Impression Persistent pulmonary vascular congestion, appears chronic  Workstation performed: [id number]         PLAN / RECOMMENDATIONS      1  ESRD  Patient is on chronic hemodialysis at [place] [address] [facility] on Monday Wednesday Friday dialysis schedule and was admitted with left hip fracture and now s/p surgery    Patient underwent last dialysis was yesterday in the hospital and has also received blood transfusion with dialysis  No urgent need for dialysis today  Due to inclement weather, will plan next dialysis on Thursday if patient remains stable     Patient is persistently tachycardic which I think it can be due to underlying pain due to recent hip surgery and not volume depletion       2   Anemia  Multifactorial   Patient was admitted with left hip fracture and had underwent hip fracture repair earlier  Yesterday morning hemoglobin was 7 1 and patient has received total 2 Units of blood transfusion yesterday  Repeat hemoglobin was 12 1  Currently patient is on Epogen and will plan to repeat hemoglobin level tomorrow and may consider holding Epogen if hemoglobin level stays > 11 5     3   Hypertension in ESRD  Post dialysis yesterday patient was found to have relatively low blood pressure which has improved overnight  We will plan to stop amlodipine for time being and continue hydralazine and losartan with higher holding parameters (patient has not received any antihypertensive medications in last 24 hours)  Plan was also d/w Brad Slaughter MD  Nephrology  3/6/2018        Portions of the record may have been created with voice recognition software  Occasional wrong word or "sound a like" substitutions may have occurred due to the inherent limitations of voice recognition software  Read the chart carefully and recognize, using context, where substitutions have occurred

## 2018-03-06 NOTE — PROGRESS NOTES
[facility] Progress Note  Patient: Ana Braun 68 y o  female   MRN: [id number]  PCP: Kg Hernandez MD  Unit/Bed#: -01 Encounter: [id number]  Date Of Visit: 03/06/18    Assessment:    Principal Problem:    Subtrochanteric fracture of left femur, closed, initial encounter ([place] [address] )  Active Problems:    ESRD (end stage renal disease) on dialysis ([place] [address] )    Closed intertrochanteric fracture of left femur (HCC)    Anemia of chronic kidney failure, stage 5 ([place] [address] )    Multiple myeloma not having achieved remission ([place] [address] )    Essential hypertension    Diabetes mellitus type 2 in nonobese ([place] [address] )    Hemiparesis due to old stroke ([place] [address] )    Closed intertrochanteric fracture of hip, left, initial encounter ([place] [address] )    Functional quadriplegia ([place] [address] )    Acute blood loss as cause of postoperative anemia      Plan:        · Left femur fracture-patient status post surgery  Main purpose is for pain control and comfort; likely will need STR  · Acute postoperative blood-loss anemia on top of anemia of chronic disease and underlying multiple myeloma  Received call from Nephrology this morning  received 2 units of packed RBCs  Follow-up hemoglobin 12  · Old stroke with left-sided hemiparesis  she is full care  · Poor nutritional status  Patient on tube feeds  She is tolerating current tube feeds  increase the rate to 45 mL/hour-her usual rate at home  Nutrition consult  · End-stage renal disease  On hemodialysis  · Sacral decubitus-wound care following  · Heel decubitus wound care following       VTE Pharmacologic Prophylaxis:   Pharmacologic: Pharmacologic VTE Prophylaxis contraindicated due to h/o significant gi bleed-family declines  Mechanical VTE Prophylaxis in Place: Yes    Patient Centered Rounds: I have performed bedside rounds with nursing staff today  Discussions with Specialists or Other Care Team Provider:     Education and Discussions with Family / Patient:     Time Spent for Care: 45 minutes    More than 50% of total time spent on counseling and coordination of care as described above  Current Length of Stay: 6 day(s)    Current Patient Status: Inpatient   Certification Statement: The patient will continue to require additional inpatient hospital stay due to likely will need STR    Discharge Plan: pending possible STR    Code Status: Level 1 - Full Code      Subjective:   Aphasic    Objective:     Vitals:   Temp (24hrs), Av 3 °F (36 8 °C), Min:97 7 °F (36 5 °C), Max:98 9 °F (37 2 °C)    HR:  [106-112] 106  Resp:  [18] 18  BP: ()/(44-75) 120/72  SpO2:  [96 %-100 %] 96 %  Body mass index is 21 31 kg/m²  Input and Output Summary (last 24 hours): Intake/Output Summary (Last 24 hours) at 18 1354  Last data filed at 18 0957   Gross per 24 hour   Intake             1874 ml   Output                0 ml   Net             1874 ml       Physical Exam:     Physical Exam   Constitutional: She appears well-developed and well-nourished  HENT:   Head: Normocephalic and atraumatic  Eyes: Pupils are equal, round, and reactive to light  Cardiovascular: Normal rate and regular rhythm  Pulmonary/Chest: Effort normal and breath sounds normal    Abdominal: Soft  Musculoskeletal: She exhibits no edema     Skin:   Sacrum with erythema and denuded area       Additional Data:     Labs:      Results from last 7 days  Lab Units 18  1747 18  0506   WBC Thousand/uL  --  14 30*   HEMOGLOBIN g/dL 12 1 7 1*   HEMATOCRIT % 35 5 22 2*   PLATELETS Thousands/uL  --  254   NEUTROS PCT %  --  75   LYMPHS PCT %  --  16   MONOS PCT %  --  7   EOS PCT %  --  0       Results from last 7 days  Lab Units 18  0506  18  0436   SODIUM mmol/L 137  < > 138   POTASSIUM mmol/L 3 8  < > 4 0   CHLORIDE mmol/L 95*  < > 98*   CO2 mmol/L 28  < > 28   BUN mg/dL 91*  < > 32*   CREATININE mg/dL 5 59*  < > 3 09*   CALCIUM mg/dL 10 2*  < > 10 3*   TOTAL PROTEIN g/dL  --   --  7 2   BILIRUBIN TOTAL mg/dL  -- --  0 60   ALK PHOS U/L  --   --  200*   ALT U/L  --   --  34   AST U/L  --   --  23   GLUCOSE RANDOM mg/dL 206*  < > 119   < > = values in this interval not displayed  Results from last 7 days  Lab Units 02/28/18  1748   INR  0 98       * I Have Reviewed All Lab Data Listed Above  * Additional Pertinent Lab Tests Reviewed: All Labs Within Last 24 Hours Reviewed    Imaging:    Imaging Reports Reviewed Today Include:   Imaging Personally Reviewed by Myself Includes:      Recent Cultures (last 7 days):           Last 24 Hours Medication List:     Current Facility-Administered Medications:  acetaminophen 650 mg Oral Q4H PRN Keyon Burgess MD   amLODIPine 5 mg Oral Daily Keyon Burgess MD   aspirin 325 mg Oral Daily Keyon Burgess MD   atorvastatin 20 mg Oral Daily Jalil Velez MD   b complex-vitamin C-folic acid 1 capsule Oral Daily With Ariella Cain MD   epoetin babak 10,000 Units Intravenous Once per day on Mon Wed Fri Deann Falcon MD   hydrALAZINE 25 mg Oral Q12H Keyon Burgess MD   HYDROmorphone 0 5 mg Intravenous Q4H PRN Aby Dubon MD   insulin lispro 1-6 Units Subcutaneous TID Alberto Hernandez MD   insulin lispro 1-6 Units Subcutaneous HS Keyon Burgess MD   ipratropium 0 5 mg Nebulization BID Keyon Burgess MD   levalbuterol 1 25 mg Nebulization BID Keyon Burgess MD   losartan 100 mg Oral Daily Keyon Burgess MD   ondansetron 4 mg Intravenous Q6H PRN Keyon Burgess MD   oxyCODONE 2 5 mg Oral Q4H PRN Keyon Burgess MD   pantoprazole 20 mg Oral BID Alberto Hernandez MD        Today, Patient Was Seen By: Nathan Kearney MD    ** Please Note: Dragon 360 Dictation voice to text software may have been used in the creation of this document   **

## 2018-03-07 PROBLEM — E87.1 HYPONATREMIA: Status: ACTIVE | Noted: 2018-01-01

## 2018-03-07 NOTE — PROGRESS NOTES
[facility] Progress Note  Patient: Jose Luis Hem 68 y o  female   MRN: [id number]  PCP: Ke Shelton MD  Unit/Bed#: -01 Encounter: [id number]  Date Of Visit: 03/07/18    Assessment:    Principal Problem:    Subtrochanteric fracture of left femur, closed, initial encounter ([facility] )  Active Problems:    ESRD (end stage renal disease) on dialysis ([facility] )    Closed intertrochanteric fracture of left femur (HCC)    Anemia of chronic kidney failure, stage 5 ([facility] )    Multiple myeloma not having achieved remission ([place] [address] )    Essential hypertension    Diabetes mellitus type 2 in nonobese ([place] [address] )    Hemiparesis due to old stroke ([place] [address] )    Closed intertrochanteric fracture of hip, left, initial encounter ([place] [address] )    Functional quadriplegia ([place] [address] )    Acute blood loss as cause of postoperative anemia    Anemia    Hypertensive CKD, ESRD on dialysis ([place] [address] )    Hyponatremia      Plan:    Case d/w daughter extensively by telephone    · Left femur fracture-patient status post surgery   Main purpose is for pain control and comfort; likely will need STR  · Acute postoperative blood-loss anemia on top of anemia of chronic disease and underlying multiple myeloma   Received call from Nephrology this morning   received 2 units of packed RBCs   Follow-up hemoglobin 11 1  · Old stroke with left-sided hemiparesis  she is full care  · Poor nutritional status   Patient on tube feeds   She is tolerating current tube feeds  increase the rate to 45 mL/hour-her usual rate at home  Nutrition consult  · End-stage renal disease   On hemodialysis  · Sacral decubitus-wound care following  · Heel decubitus wound care following    bp has been low-decreased hydralazine and holding cozaar    Severe protein-calorie malnutrition in setting of chronic illness as evidenced by  Muscle loss, Fat loss, Weight loss (related to inadequate energy intake as evidenced by 13% weight loss since 11/9/17 ED visit, clavicle visible  , dark circles orbital Interventions enteral feeding and protein modular via PEG  ) treated with nutrition consult    Tachycardia-sinus per ekg rate 103, ua c/s venous doppler lower ext r/o dvt not on prophylaxis; cxr pending    VTE Pharmacologic Prophylaxis:   Pharmacologic: Pharmacologic VTE Prophylaxis contraindicated due to refused by family for gi bleed  Mechanical VTE Prophylaxis in Place: Yes    Patient Centered Rounds: I have performed bedside rounds with nursing staff today  Discussions with Specialists or Other Care Team Provider:     Education and Discussions with Family / Patient:     Time Spent for Care: 30 minutes  More than 50% of total time spent on counseling and coordination of care as described above  Current Length of Stay: 7 day(s)    Current Patient Status: Inpatient   Certification Statement: The patient will continue to require additional inpatient hospital stay due to femur fx    Discharge Plan: STR    Code Status: Level 1 - Full Code      Subjective:   Moans to pain witn repositioning      Objective:     Vitals:   Temp (24hrs), Av 1 °F (36 7 °C), Min:97 9 °F (36 6 °C), Max:98 4 °F (36 9 °C)    HR:  [103-108] 108  Resp:  [16-18] 17  BP: (104-133)/(56-77) 104/56  SpO2:  [96 %-97 %] 96 %  Body mass index is 21 31 kg/m²  Input and Output Summary (last 24 hours): Intake/Output Summary (Last 24 hours) at 18 1021  Last data filed at 18 0603   Gross per 24 hour   Intake             1106 ml   Output                0 ml   Net             1106 ml       Physical Exam:     Physical Exam   Constitutional: She appears well-developed and well-nourished  HENT:   Head: Normocephalic and atraumatic  Eyes: Pupils are equal, round, and reactive to light     Cardiovascular:   tachycardic       Additional Data:     Labs:      Results from last 7 days  Lab Units 18  0448   WBC Thousand/uL 16 45*   HEMOGLOBIN g/dL 11 1*   HEMATOCRIT % 33 1*   PLATELETS Thousands/uL 228   NEUTROS PCT % 79* LYMPHS PCT % 14   MONOS PCT % 5   EOS PCT % 1       Results from last 7 days  Lab Units 03/07/18  0448  03/02/18  0436   SODIUM mmol/L 133*  < > 138   POTASSIUM mmol/L 4 2  < > 4 0   CHLORIDE mmol/L 92*  < > 98*   CO2 mmol/L 28  < > 28   BUN mg/dL 81*  < > 32*   CREATININE mg/dL 4 35*  < > 3 09*   CALCIUM mg/dL 10 0  < > 10 3*   TOTAL PROTEIN g/dL  --   --  7 2   BILIRUBIN TOTAL mg/dL  --   --  0 60   ALK PHOS U/L  --   --  200*   ALT U/L  --   --  34   AST U/L  --   --  23   GLUCOSE RANDOM mg/dL 174*  < > 119   < > = values in this interval not displayed  Results from last 7 days  Lab Units 02/28/18  1748   INR  0 98       * I Have Reviewed All Lab Data Listed Above  * Additional Pertinent Lab Tests Reviewed:  All Labs Within Last 24 Hours Reviewed    Imaging:    Imaging Reports Reviewed Today Include:   Imaging Personally Reviewed by Myself Includes:      Recent Cultures (last 7 days):           Last 24 Hours Medication List:     Current Facility-Administered Medications:  acetaminophen 650 mg Oral Q4H PRN Edna Jernigan MD   aspirin 325 mg Oral Daily Edna Jernigan MD   atorvastatin 20 mg Oral Daily Zaynab Rodriguez MD   b complex-vitamin C-folic acid 1 capsule Oral Daily With Aleksander Corrales MD   epoetin babak 10,000 Units Intravenous Once per day on Mon Wed Fri Josue Cardoza MD   [START ON 3/8/2018] epoetin babak 10,000 Units Intravenous Once Josue Cardoza MD   hydrALAZINE 25 mg Oral Q12H Edna Jernigan MD   HYDROmorphone 0 5 mg Intravenous Q4H PRN Huang Mckinley MD   insulin lispro 1-6 Units Subcutaneous TID Marlene Vance MD   insulin lispro 1-6 Units Subcutaneous HS Edna Jernigan MD   ipratropium 0 5 mg Nebulization BID Edna Jernigan MD   levalbuterol 1 25 mg Nebulization BID Edna Jernigan MD   losartan 100 mg Oral Daily Edna Jernigan MD   ondansetron 4 mg Intravenous Q6H PRN Edna Jernigan MD   oxyCODONE 2 5 mg Oral Q4H PRN Edna Jernigan MD   pantoprazole 20 mg Oral BID AC Thuy Poster Alayna Nevarez MD        Today, Patient Was Seen By: Cyrus Joseph MD    ** Please Note: Dragon 360 Dictation voice to text software may have been used in the creation of this document   **

## 2018-03-07 NOTE — PROGRESS NOTES
NEPHROLOGY PROGRESS NOTE    Patient: Giuliana Slaughter               Sex: female          DOA: 2/28/2018  1:20 PM   YOB: 1940        Age:  68 y o         LOS:  LOS: 7 days     REASON FOR THE CONSULTATION:  Further management of ESRD    HPI      This is a a38-year-old female with a past medical history of ESRD admitted with left hip fracture    SUBJECTIVE     - patient was awake this morning but non conversive   Patient denies nausea / vomiting / headache / dizziness / SOB / chest pain today    - Reviewed last 24 hrs events     CURRENT MEDICATIONS       Current Facility-Administered Medications:     acetaminophen (TYLENOL) tablet 650 mg, 650 mg, Oral, Q4H PRN, Tri Urena MD, 650 mg at 03/06/18 2104    aspirin tablet 325 mg, 325 mg, Oral, Daily, Tri Urena MD, 325 mg at 03/06/18 1025    atorvastatin (LIPITOR) tablet 20 mg, 20 mg, Oral, Daily, Rigo Block MD, 20 mg at 03/06/18 1024    b complex-vitamin C-folic acid (NEPHROCAPS) capsule 1 capsule, 1 capsule, Oral, Daily With Bisi Madrid MD, 1 capsule at 03/06/18 1544    epoetin babak (EPOGEN,PROCRIT) injection 10,000 Units, 10,000 Units, Intravenous, Once per day on Mon Wed Fri, Mario Lucas MD, 10,000 Units at 03/05/18 1204    hydrALAZINE (APRESOLINE) tablet 25 mg, 25 mg, Oral, Q12H, Tri Urena MD, 25 mg at 03/04/18 0830    HYDROmorphone (DILAUDID) injection 0 5 mg, 0 5 mg, Intravenous, Q4H PRN, Tha Neil MD, 0 5 mg at 03/05/18 0616    insulin lispro (HumaLOG) 100 units/mL subcutaneous injection 1-6 Units, 1-6 Units, Subcutaneous, TID AC, 1 Units at 03/07/18 0607 **AND** Fingerstick Glucose (POCT), , , TID AC, Tri Urena MD    insulin lispro (HumaLOG) 100 units/mL subcutaneous injection 1-6 Units, 1-6 Units, Subcutaneous, HS, Tri Urena MD, 1 Units at 03/06/18 2139    ipratropium (ATROVENT) 0 02 % inhalation solution 0 5 mg, 0 5 mg, Nebulization, BID, Tri Urena MD, 0 5 mg at 03/07/18 0801    levalbuterol [facility]) inhalation solution 1 25 mg, 1 25 mg, Nebulization, BID, Garland Raymundo MD, 1 25 mg at 03/07/18 0801    losartan (COZAAR) tablet 100 mg, 100 mg, Oral, Daily, Garland Raymundo MD, 100 mg at 03/04/18 0829    ondansetron (ZOFRAN) injection 4 mg, 4 mg, Intravenous, Q6H PRN, Garland Raymundo MD    oxyCODONE (ROXICODONE) oral solution 2 5 mg, 2 5 mg, Oral, Q4H PRN, Garland Raymundo MD, 2 5 mg at 03/05/18 0034    pantoprazole (PROTONIX) EC tablet 20 mg, 20 mg, Oral, BID AC, Garland Raymundo MD, 20 mg at 03/04/18 1757    OBJECTIVE     Current Weight: Weight - Scale: 49 5 kg (109 lb 2 oz)  Vitals:    03/07/18 0801   BP:    Pulse:    Resp:    Temp:    SpO2: 96%     Body mass index is 21 31 kg/m²  Intake/Output Summary (Last 24 hours) at 03/07/18 0912  Last data filed at 03/07/18 0603   Gross per 24 hour   Intake             1313 ml   Output                0 ml   Net             1313 ml       PHYSICAL EXAMINATION     Physical Exam   Constitutional: She appears well-nourished  No distress  Non conversive   HENT:   Head: Normocephalic and atraumatic  Eyes: No scleral icterus  Right eye exhibits normal extraocular motion  Left eye exhibits normal extraocular motion  Neck: Neck supple  No JVD present  Cardiovascular: Normal rate, S1 normal and S2 normal     Pulmonary/Chest: Effort normal  No accessory muscle usage  No respiratory distress  She has no wheezes  Abdominal: Soft  She exhibits no distension  Musculoskeletal:        Right hand: She exhibits no laceration and no swelling  Left hand: She exhibits no laceration and no swelling  Neurological: She is alert  She is not disoriented  Skin: Skin is warm  No cyanosis  Psychiatric: She is not combative  She does not exhibit a depressed mood  She expresses no suicidal ideation           LAB RESULTS       Results from last 7 days  Lab Units 03/07/18  0448 03/05/18  1747 03/05/18  1111 03/03/18  0710 03/02/18  5632 03/01/18  5997 02/28/18  1748 02/28/18  1207   WBC Thousand/uL 16 45*  --  14 30* 14 75* 11 10* 10 85* 11 18* 12 00*   HEMOGLOBIN g/dL 11 1* 12 1 7 1* 10 0* 9 0* 8 3* 8 7* 9 0*   HEMATOCRIT % 33 1* 35 5 22 2* 30 9* 28 0* 25 2* 26 8* 27 4*   PLATELETS Thousands/uL 228  --  254 316 259 220 215 217   SODIUM mmol/L 133*  --  137 140 138 134* 135* 135*   POTASSIUM mmol/L 4 2  --  3 8 3 4* 4 0 3 6 3 2* 3 9   CHLORIDE mmol/L 92*  --  95* 97* 98* 95* 96* 95*   CO2 mmol/L 28  --  28 32 28 26 25 26   BUN mg/dL 81*  --  91* 33* 32* 78* 71* 67*   CREATININE mg/dL 4 35*  --  5 59* 2 96* 3 09* 5 91* 5 32* 5 07*   EGFR ml/min/1 73sq m 11  --  8 17 16 7 8 9   CALCIUM mg/dL 10 0  --  10 2* 11 1* 10 3* 10 5* 10 1 10 6*   PHOSPHORUS mg/dL  --   --  4 4* 2 1*  --   --   --   --    TOTAL PROTEIN g/dL  --   --   --   --  7 2  --  6 8  --    GLUCOSE RANDOM mg/dL 174*  --  206* 165* 119 112 157* 176*         RADIOLOGY RESULTS      Results for orders placed during the hospital encounter of 11/09/17   XR chest 2 views    Narrative CHEST     INDICATION:  Weakness  History taken directly from the electronic ordering system  COMPARISON:  Chest x-ray of 9/19/2017    VIEWS:  Frontal and lateral projections    IMAGES:  2    FINDINGS:  Right-sided dialysis catheter tip overlies the right atrium  The heart is enlarged  Patient is somewhat rotated to the left  There is persistent pulmonary vascular congestion, appears chronic  No pneumothorax or pleural effusion  Degenerative spurring is noted of the spine  There are marked arthritic changes of the right shoulder  Impression Persistent pulmonary vascular congestion, appears chronic  Workstation performed: [id number]         PLAN / RECOMMENDATIONS      1  ESRD  Patient is on chronic hemodialysis at [place] [address] [facility] on Monday Wednesday Friday dialysis schedule and her last dialysis was on Monday    Currently patient is hemodynamically stable and we will plan to hold dialysis today in the light of inclement weather  Will plan dialysis tomorrow  2   Anemia  Multifactorial   Patient has received blood transfusion earlier  Current hemoglobin is 11 1  Will continue Epogen with dialysis  3   Hyponatremia  Mild, suspect secondary to dilutional in the setting of ESRD  Current sodium is 133  Patient's mentation is better this morning and currently hyponatremia is asymptomatic  Follow fluid restriction of 1 5 L today and monitor sodium level  4   Hypertension in ESRD  Overnight patient's blood pressure has remained controlled and at goal and will plan to monitor hypertension with hydralazine 25 mg PO BID and losartan 100 mg PO daily with holding parameters  Plan was also d/w Jose De Los Santos MD  Nephrology  3/7/2018        Portions of the record may have been created with voice recognition software  Occasional wrong word or "sound a like" substitutions may have occurred due to the inherent limitations of voice recognition software  Read the chart carefully and recognize, using context, where substitutions have occurred

## 2018-03-08 NOTE — PROGRESS NOTES
Orthopedics   Antonio Zhang Egu 68 y o  female MRN: [id number]  Unit/Bed#: MO XRAY      Subjective:  68 y  o female post operative day 5 left femoral intramedullary nail  Patient is nonverbal   In bed comfortable  No overnight issues per nursing staff  Patient seems like she is improving  Patient did not yell out in pain today when I examined her leg      Labs:    0  Lab Value Date/Time   HCT 33 2 (L) 03/08/2018 0345   HCT 33 1 (L) 03/07/2018 0448   HCT 35 5 03/05/2018 1747   HGB 11 3 (L) 03/08/2018 0345   HGB 11 1 (L) 03/07/2018 0448   HGB 12 1 03/05/2018 1747   INR 0 98 02/28/2018 1748   WBC 14 96 (H) 03/08/2018 0345   WBC 16 45 (H) 03/07/2018 0448   WBC 14 30 (H) 03/05/2018 0506       Meds:    Current Facility-Administered Medications:     acetaminophen (TYLENOL) tablet 650 mg, 650 mg, Oral, Q4H PRN, Clemencia Kulkarni MD, 650 mg at 03/08/18 1714    albuterol inhalation solution 2 5 mg, 2 5 mg, Nebulization, Q6H PRN, Clemencia Kulkarni MD    aspirin tablet 325 mg, 325 mg, Oral, Daily, Clemencia Kulkarni MD, 325 mg at 03/08/18 1251    atorvastatin (LIPITOR) tablet 20 mg, 20 mg, Oral, Daily, Ann Watters MD, 20 mg at 03/08/18 1252    b complex-vitamin C-folic acid (NEPHROCAPS) capsule 1 capsule, 1 capsule, Oral, Daily With Zeny Serrano MD, 1 capsule at 03/08/18 1715    bisacodyl (DULCOLAX) rectal suppository 10 mg, 10 mg, Rectal, Daily PRN, Fouzia Naylor MD, 10 mg at 03/07/18 1708    cefepime (MAXIPIME) IVPB (premix) 1,000 mg, 1,000 mg, Intravenous, Q24H, BENNY Lester, Stopped at 03/08/18 0216    epoetin babak (EPOGEN,PROCRIT) injection 10,000 Units, 10,000 Units, Intravenous, Once per day on Mon Wed Fri, Amado Hazel MD, 10,000 Units at 03/08/18 1011    epoetin babak (EPOGEN,PROCRIT) injection 10,000 Units, 10,000 Units, Intravenous, Once, Amado Hazel MD    HYDROmorphone (DILAUDID) injection 0 5 mg, 0 5 mg, Intravenous, Q4H PRN, Ildefonso Fontaine MD, 0 5 mg at 03/05/18 0616    insulin lispro (HumaLOG) 100 units/mL subcutaneous injection 1-6 Units, 1-6 Units, Subcutaneous, TID AC, 1 Units at 03/08/18 1716 **AND** Fingerstick Glucose (POCT), , , TID ACEron MD    insulin lispro (HumaLOG) 100 units/mL subcutaneous injection 1-6 Units, 1-6 Units, Subcutaneous, HS, Eron Avalos MD, 1 Units at 03/07/18 2120    ondansetron (ZOFRAN) injection 4 mg, 4 mg, Intravenous, Q6H PRN, Eron Avalos MD    oxyCODONE (ROXICODONE) oral solution 2 5 mg, 2 5 mg, Oral, Q4H PRN, Eron Avalos MD, 2 5 mg at 03/05/18 0034    pantoprazole (PROTONIX) EC tablet 20 mg, 20 mg, Oral, BID AC, Eron Avalos MD, 20 mg at 03/04/18 1757    senna-docusate sodium (SENOKOT S) 8 6-50 mg per tablet 1 tablet, 1 tablet, Oral, HS, Shellie Jennings MD, 1 tablet at 03/07/18 2121    Blood Culture:   No results found for: BLOODCX    Wound Culture:   No results found for: WOUNDCULT    Ins and Outs:  I/O last 24 hours: In: 2252 [I V :500; NG/GT:400; Feedings:1320]  Out: 9912 [Other:1282]          Physical exam:  Vitals:    03/08/18 1525   BP: 113/61   Pulse: 90   Resp: 18   Temp: 97 5 °F (36 4 °C)   SpO2: 95%     left lower extremity  · Mepilex dressings C/D/I  Thigh is soft and compressible  · Does not yell out in pain when leg is moved   · Unable to assess motor and sensation, patient is non verbal  · 2+ dorsalis pedis pulse    X-rays of left femur taken shows orthopedic hardware in place with severe osteoarthritis of the hip with possible avascular necrosis   _*_*_*_*_*_*_*_*_*_*_*_*_*_*_*_*_*_*_*_*_*_*_*_*_*_*_*_*_*_*_*_*_*_*_*_*_*_*_*_*_*    Assessment: 68 y  o female post operative day 5 left femur IMN  X-rays today show orthopedic hardware in place  Dr Marla Silva will review x-rays  Plan:  · Weightbearing as tolerated left lower extremity    Patient has been bed ridden for one year as her baseline  · PT/OT to evaluate and treat  · DVT prophylaxis per medical team  · Will need postop follow-up with Dr Marla Silva for staple removal 14 days post-op  · D/C planning  · Orthopedics will sign off     Maeve Whiteside PA-C

## 2018-03-08 NOTE — PROGRESS NOTES
HEMODIALYSIS ROUNDING NOTE    Patient: Khanh Gamez               Sex: female          DOA: 2/28/2018  1:20 PM   YOB: 1940        Age:  68 y o         LOS:  LOS: 8 days     REASON FOR THE CONSULTATION:  Further management of ESRD    HPI     This is a 70-year-old female with a past medical history of ESRD initially admitted for left hip fracture now s/p surgery  SUBJECTIVE     - Patient was seen during hemodialysis today  Patient was sleepy but arousable    - Reviewed last 24 hrs events     CURRENT MEDICATIONS       Current Facility-Administered Medications:     acetaminophen (TYLENOL) tablet 650 mg, 650 mg, Oral, Q4H PRN, Omar Cole MD, 650 mg at 03/08/18 0119    albuterol inhalation solution 2 5 mg, 2 5 mg, Nebulization, Q6H PRN, Omar Cole MD    aspirin tablet 325 mg, 325 mg, Oral, Daily, Omar Cole MD, 325 mg at 03/07/18 2761    atorvastatin (LIPITOR) tablet 20 mg, 20 mg, Oral, Daily, Tha Willis MD, 20 mg at 03/07/18 7394    b complex-vitamin C-folic acid (NEPHROCAPS) capsule 1 capsule, 1 capsule, Oral, Daily With Nichole Weston MD, 1 capsule at 03/07/18 1708    bisacodyl (DULCOLAX) rectal suppository 10 mg, 10 mg, Rectal, Daily PRN, Mauri Davis MD, 10 mg at 03/07/18 1708    cefepime (MAXIPIME) IVPB (premix) 1,000 mg, 1,000 mg, Intravenous, Q24H, BENNY Lester, Stopped at 03/08/18 0216    epoetin babak (EPOGEN,PROCRIT) injection 10,000 Units, 10,000 Units, Intravenous, Once per day on Mon Wed Fri, Kassie Xie MD, 10,000 Units at 03/08/18 1011    epoetin babak (EPOGEN,PROCRIT) injection 10,000 Units, 10,000 Units, Intravenous, Once, Kassie Xie MD    hydrALAZINE (APRESOLINE) tablet 10 mg, 10 mg, Oral, Q12H, Mauri Davis MD    HYDROmorphone (DILAUDID) injection 0 5 mg, 0 5 mg, Intravenous, Q4H PRN, Emilia Branch MD, 0 5 mg at 03/05/18 0616    insulin lispro (HumaLOG) 100 units/mL subcutaneous injection 1-6 Units, 1-6 Units, Subcutaneous, TID AC, 2 Units at 03/07/18 1153 **AND** Fingerstick Glucose (POCT), , , TID Marie STACK MD    insulin lispro (HumaLOG) 100 units/mL subcutaneous injection 1-6 Units, 1-6 Units, Subcutaneous, HS, Marie Fuentes MD, 1 Units at 03/07/18 2120    ondansetron (ZOFRAN) injection 4 mg, 4 mg, Intravenous, Q6H PRN, Marie Fuentes MD    oxyCODONE (ROXICODONE) oral solution 2 5 mg, 2 5 mg, Oral, Q4H PRN, Marie Fuentes MD, 2 5 mg at 03/05/18 0034    pantoprazole (PROTONIX) EC tablet 20 mg, 20 mg, Oral, BID AC, Marie Fuentes MD, 20 mg at 03/04/18 1757    senna-docusate sodium (SENOKOT S) 8 6-50 mg per tablet 1 tablet, 1 tablet, Oral, HS, Julio Song MD, 1 tablet at 03/07/18 2121    OBJECTIVE     Current Weight: Weight - Scale: 49 5 kg (109 lb 2 oz)  Vitals:    03/08/18 1030   BP: (!) 98/47   Pulse: 105   Resp:    Temp:    SpO2:      Body mass index is 21 31 kg/m²  Intake/Output Summary (Last 24 hours) at 03/08/18 1036  Last data filed at 03/08/18 0900   Gross per 24 hour   Intake             1499 ml   Output                0 ml   Net             1499 ml       PHYSICAL EXAMINATION     Physical Exam   Constitutional: No distress  HENT:   Head: Normocephalic and atraumatic  Eyes: Conjunctivae are normal  No scleral icterus  Neck: Neck supple  No JVD present  Cardiovascular: Normal rate and regular rhythm  Pulmonary/Chest: Effort normal  She has no wheezes  Abdominal: Soft  She exhibits no distension  Neurological:   Sleepy but arousable, noncommunicative   Skin: Skin is warm  Psychiatric: She has a normal mood and affect         LAB RESULTS       Results from last 7 days  Lab Units 03/08/18  0345 03/07/18  0448 03/05/18  1747 03/05/18  0506 03/03/18  0537 03/02/18  0436   WBC Thousand/uL 14 96* 16 45*  --  14 30* 14 75* 11 10*   HEMOGLOBIN g/dL 11 3* 11 1* 12 1 7 1* 10 0* 9 0*   HEMATOCRIT % 33 2* 33 1* 35 5 22 2* 30 9* 28 0*   PLATELETS Thousands/uL 223 228  --  254 316 259   SODIUM mmol/L 131* 133*  --  137 140 138   POTASSIUM mmol/L 4 6 4 2  --  3 8 3 4* 4 0   CHLORIDE mmol/L 90* 92*  --  95* 97* 98*   CO2 mmol/L 28 28  --  28 32 28   BUN mg/dL 105* 81*  --  91* 33* 32*   CREATININE mg/dL 5 23* 4 35*  --  5 59* 2 96* 3 09*   EGFR ml/min/1 73sq m 8 11  --  8 17 16   CALCIUM mg/dL 10 0 10 0  --  10 2* 11 1* 10 3*   PHOSPHORUS mg/dL  --   --   --  4 4* 2 1*  --    TOTAL PROTEIN g/dL  --   --   --   --   --  7 2   GLUCOSE RANDOM mg/dL 194* 174*  --  206* 165* 119       RADIOLOGY RESULTS     Results for orders placed during the hospital encounter of 02/28/18   XR chest portable    Narrative CHEST     INDICATION:  Tachycardia  COMPARISON:  Chest x-ray of 2/28/2018    EXAM PERFORMED/VIEWS:  XR CHEST PORTABLE      FINDINGS:  Right-sided dialysis catheter tip overlies the right atrium  Patient's head partially obscures evaluation of the lung apices  Stable cardiomegaly noted  Central fullness of the pulmonary vasculature is unchanged  The lungs are clear  No focal infiltrate, definite pneumothorax or pleural effusion  Marked degenerative spurring at both shoulders  Impression Stable cardiomegaly  No active pulmonary disease  Workstation performed: [id number]       Results for orders placed during the hospital encounter of 11/09/17   XR chest 2 views    Narrative CHEST     INDICATION:  Weakness  History taken directly from the electronic ordering system  COMPARISON:  Chest x-ray of 9/19/2017    VIEWS:  Frontal and lateral projections    IMAGES:  2    FINDINGS:  Right-sided dialysis catheter tip overlies the right atrium  The heart is enlarged  Patient is somewhat rotated to the left  There is persistent pulmonary vascular congestion, appears chronic  No pneumothorax or pleural effusion  Degenerative spurring is noted of the spine  There are marked arthritic changes of the right shoulder        Impression Persistent pulmonary vascular congestion, appears chronic  Workstation performed: [id number]         PLAN / RECOMMENDATIONS     1  End Stage Renal Disease  Plan HD today as her dialysis was hold yesterday due to inclement weather  Patient routinely gets dialysis on Monday Wednesday Friday schedule  At 10:09 AM on 3/8/2018, I saw and examined patient during hemodialysis treatment  The patient was receiving hemodialysis for treatment of end stage renal disease  I have also reviewed vital signs, intake and output, lab results and recent events, and agreed with today's dialysis order  Access: No issue    Pooja Eric MD  Nephrology  3/8/2018        Portions of the record may have been created with voice recognition software  Occasional wrong word or "sound a like" substitutions may have occurred due to the inherent limitations of voice recognition software  Read the chart carefully and recognize, using context, where substitutions have occurred

## 2018-03-08 NOTE — PROGRESS NOTES
[facility] Progress Note  Patient: Kenroy Loss 68 y o  female   MRN: [id number]  PCP: Asha Moore MD  Unit/Bed#: -01 Encounter: [id number]  Date Of Visit: 03/08/18    Assessment:    Principal Problem:    Subtrochanteric fracture of left femur, closed, initial encounter ([place] [address] )  Active Problems:    ESRD (end stage renal disease) on dialysis ([place] [address] )    Closed intertrochanteric fracture of left femur (HCC)    Anemia of chronic kidney failure, stage 5 ([place] [address] )    Multiple myeloma not having achieved remission ([place] [address] )    Essential hypertension    Diabetes mellitus type 2 in nonobese ([place] [address] )    Hemiparesis due to old stroke ([place] [address] )    Closed intertrochanteric fracture of hip, left, initial encounter ([place] [address] )    Functional quadriplegia ([place] [address] )    Acute blood loss as cause of postoperative anemia    Anemia    Hypertensive CKD, ESRD on dialysis ([place] [address] )    Hyponatremia      Plan:      Case d/w daughter extensively by telephone     · Left femur fracture-patient status post surgery-orthopaedics following hip xray pending  · Acute postoperative blood-loss anemia on top of anemia of chronic disease and underlying multiple myeloma   Received call from Nephrology this morning   received 2 units of packed RBCs  hgb stable  · Old stroke with left-sided hemiparesis  she is full care  · Poor nutritional status   Patient on tube feeds   She is tolerating current tube feeds  At goal rate  · End-stage renal disease   On hemodialysis  · Sacral decubitus-wound care following  · Heel decubitus wound care following     bp has been low-holding bp meds       Severe protein-calorie malnutrition in setting of chronic illness as evidenced by  Muscle loss, Fat loss, Weight loss (related to inadequate energy intake as evidenced by 13% weight loss since 11/9/17 ED visit, clavicle visible  , dark circles orbital Interventions enteral feeding and protein modular via PEG  ) treated with nutrition consult     Tachycardia-sinus per ekg rate 103, ua c/s-unable to obtain anuric;  venous doppler lower extneg for dvt and cxr without infiltrate; xray of hip pending and ortho following wound    VTE Pharmacologic Prophylaxis:   Pharmacologic: Pharmacologic VTE Prophylaxis contraindicated due to h/o gi bleed  Mechanical VTE Prophylaxis in Place: Yes    Patient Centered Rounds: I have performed bedside rounds with nursing staff today  Discussions with Specialists or Other Care Team Provider:     Education and Discussions with Family / Patient:     Time Spent for Care: 30 minutes  More than 50% of total time spent on counseling and coordination of care as described above  Current Length of Stay: 8 day(s)    Current Patient Status: Inpatient   Certification Statement: The patient will continue to require additional inpatient hospital stay due to awaiting str    Discharge Plan: STR    Code Status: Level 1 - Full Code      Subjective:   ,ore awake and alert today  Objective:     Vitals:   Temp (24hrs), Av °F (36 7 °C), Min:96 9 °F (36 1 °C), Max:99 4 °F (37 4 °C)    HR:  [] 109  Resp:  [17-20] 17  BP: ()/(47-72) 127/61  SpO2:  [94 %-100 %] 96 %  Body mass index is 21 31 kg/m²  Input and Output Summary (last 24 hours): Intake/Output Summary (Last 24 hours) at 18 1347  Last data filed at 18 1200   Gross per 24 hour   Intake             1799 ml   Output             1282 ml   Net              517 ml       Physical Exam:     Physical Exam   HENT:   Head: Normocephalic and atraumatic  Neck: Neck supple  Cardiovascular:   Hr -regular     Pulmonary/Chest: Effort normal and breath sounds normal    Abdominal: Soft  Musculoskeletal: She exhibits no edema         Additional Data:     Labs:      Results from last 7 days  Lab Units 18  0345   WBC Thousand/uL 14 96*   HEMOGLOBIN g/dL 11 3*   HEMATOCRIT % 33 2*   PLATELETS Thousands/uL 223   NEUTROS PCT % 80*   LYMPHS PCT % 13*   MONOS PCT % 4   EOS PCT % 1       Results from last 7 days  Lab Units 03/08/18  0345  03/02/18  0436   SODIUM mmol/L 131*  < > 138   POTASSIUM mmol/L 4 6  < > 4 0   CHLORIDE mmol/L 90*  < > 98*   CO2 mmol/L 28  < > 28   BUN mg/dL 105*  < > 32*   CREATININE mg/dL 5 23*  < > 3 09*   CALCIUM mg/dL 10 0  < > 10 3*   TOTAL PROTEIN g/dL  --   --  7 2   BILIRUBIN TOTAL mg/dL  --   --  0 60   ALK PHOS U/L  --   --  200*   ALT U/L  --   --  34   AST U/L  --   --  23   GLUCOSE RANDOM mg/dL 194*  < > 119   < > = values in this interval not displayed  * I Have Reviewed All Lab Data Listed Above  * Additional Pertinent Lab Tests Reviewed:  All Labs Within Last 24 Hours Reviewed    Imaging:    Imaging Reports Reviewed Today Include:   Imaging Personally Reviewed by Myself Includes:      Recent Cultures (last 7 days):           Last 24 Hours Medication List:     Current Facility-Administered Medications:  acetaminophen 650 mg Oral Q4H PRN Sammy Vitale MD    albuterol 2 5 mg Nebulization Q6H PRN Sammy Vitale MD    aspirin 325 mg Oral Daily Sammy Vitale MD    atorvastatin 20 mg Oral Daily Lior Rainey MD    b complex-vitamin C-folic acid 1 capsule Oral Daily With Melvin Bernal MD    bisacodyl 10 mg Rectal Daily PRN Hilshilpa Lowry MD    cefepime 1,000 mg Intravenous Q24H BENNY Lester Last Rate: Stopped (03/08/18 0216)   epoetin babak 10,000 Units Intravenous Once per day on Mon Wed Fri Channing Hogue MD    epoetin babak 10,000 Units Intravenous Once Channing Hogue MD    hydrALAZINE 10 mg Oral Q12H Hildred Quiet MD Alen    HYDROmorphone 0 5 mg Intravenous Q4H PRN Aniyah Coughlin MD    insulin lispro 1-6 Units Subcutaneous TID Kalli Reyes MD    insulin lispro 1-6 Units Subcutaneous HS Sammy Vitale MD    ondansetron 4 mg Intravenous Q6H PRN Sammy Vitale MD    oxyCODONE 2 5 mg Oral Q4H PRN Sammy Vitale MD    pantoprazole 20 mg Oral BID AC MD valentin Sanders-docusate sodium 1 tablet Oral HS Little MARQUEZ MD Alen         Today, Patient Was Seen By: Nahomy Rock MD    ** Please Note: Dragon 360 Dictation voice to text software may have been used in the creation of this document   **

## 2018-03-08 NOTE — CASE MANAGEMENT
Continued Stay Review    Date: 3/8    Vital Signs: /61 (BP Location: Left arm)   Pulse (!) 109   Temp 97 5 °F (36 4 °C) (Tympanic)   Resp 17   Ht 5' (1 524 m)   Wt 49 5 kg (109 lb 2 oz)   SpO2 96%   BMI 21 31 kg/m²     Medications:   Scheduled Meds:   Current Facility-Administered Medications:  acetaminophen 650 mg Oral Q4H PRN Renée Nazario MD    albuterol 2 5 mg Nebulization Q6H PRN Renée Nazario MD    aspirin 325 mg Oral Daily Renée Nazario MD    atorvastatin 20 mg Oral Daily Griselda Kimble MD    b complex-vitamin C-folic acid 1 capsule Oral Daily With Milton Roberts MD    bisacodyl 10 mg Rectal Daily PRN Milagros Sandy MD    cefepime 1,000 mg Intravenous Q24H BENNY Lester Last Rate: Stopped (03/08/18 0216)   epoetin babak 10,000 Units Intravenous Once per day on Mon Wed Fri Bernabe Velazco MD    epoetin babak 10,000 Units Intravenous Once Bernabe Velazco MD    HYDROmorphone 0 5 mg Intravenous Q4H PRN Jose Cortez MD    insulin lispro 1-6 Units Subcutaneous TID [facility] Renée Nazario MD    insulin lispro 1-6 Units Subcutaneous HS Renée Nazario MD    ondansetron 4 mg Intravenous Q6H PRN Renée Nazario MD    oxyCODONE 2 5 mg Oral Q4H PRN Renée Nazario MD    pantoprazole 20 mg Oral BID AC Renée Nazario MD    senna-docusate sodium 1 tablet Oral HS Vidal Lowry MD      Continuous Infusions:    PRN Meds:   acetaminophen    albuterol    bisacodyl    HYDROmorphone    ondansetron    oxyCODONE    Abnormal Labs/Diagnostic Results:      Sodium 131 (L) 136 - 145 mmol/L     Potassium 4 6 3 5 - 5 3 mmol/L     Comment: Slightly Hemolyzed;  Results May be Affected       Chloride 90 (L) 100 - 108 mmol/L     CO2 28 21 - 32 mmol/L     Anion Gap 13 4 - 13 mmol/L      (H) 5 - 25 mg/dL     Creatinine 5 23 (H) 0 60 - 1 30 mg/dL     Comment:       Glucose 194 (H) 65 - 140 mg/dL     Comment:       Calcium 10 0 8 3 - 10 1 mg/dL     eGFR 8 ml/min/1 73sq m       Specimen: Blood Updated: 03/08/18 0347    WBC 14 96 (H) 4 31 - 10 16 Thousand/uL     RBC 3 31 (L) 3 81 - 5 12 Million/uL     Hemoglobin 11 3 (L) 11 5 - 15 4 g/dL     Hematocrit 33 2 (L) 34 8 - 46 1 %      (H) 82 - 98 fL          Assessment and Plan  Principal Problem:    Subtrochanteric fracture of left femur, closed, initial encounter ([facility] )  Active Problems:    ESRD (end stage renal disease) on dialysis ([facility] )    Closed intertrochanteric fracture of left femur ([facility])    Anemia of chronic kidney failure, stage 5 ([facility])    Multiple myeloma not having achieved remission ([facility] )    Essential hypertension    Diabetes mellitus type 2 in nonobese ([facility] )    Hemiparesis due to old stroke ([facility] )    Closed intertrochanteric fracture of hip, left, initial encounter ([facility] )    Functional quadriplegia ([place] [address] )    Acute blood loss as cause of postoperative anemia    Anemia    Hypertensive CKD, ESRD on dialysis ([facility] )    Hyponatremia      Plan:    Case d/w daughter extensively by telephone   · Left femur fracture-patient status post surgery-orthopaedics following hip xray pending  · Acute postoperative blood-loss anemia on top of anemia of chronic disease and underlying multiple myeloma   Received call from Nephrology this morning   received 2 units of packed RBCs  hgb stable  · Old stroke with left-sided hemiparesis  she is full care  · Poor nutritional status   Patient on tube feeds   She is tolerating current tube feeds  At goal rate  · End-stage renal disease   On hemodialysis  · Sacral decubitus-wound care following  · Heel decubitus wound care following     bp has been low-holding bp meds      Severe protein-calorie malnutrition in setting of chronic illness as evidenced by  Muscle loss, Fat loss, Weight loss (related to inadequate energy intake as evidenced by 13% weight loss since 11/9/17 ED visit, clavicle visible  , dark circles orbital Interventions enteral feeding and protein modular via PEG  ) treated with nutrition consult     Tachycardia-sinus per ekg rate 103, ua c/s-unable to obtain anuric;  venous doppler lower extneg for dvt and cxr without infiltrate; xray of hip pending and ortho following wound     VTE Pharmacologic Prophylaxis:   Pharmacologic: Pharmacologic VTE Prophylaxis contraindicated due to h/o gi bleed  Mechanical VTE Prophylaxis in Place: Yes     Patient Centered Rounds: I have performed bedside rounds with nursing staff today      Discussions with Specialists or Other Care Team Provider:      Education and Discussions with Family / Patient:      Time Spent for Care: 30 minutes  More than 50% of total time spent on counseling and coordination of care as described above      Current Length of Stay: 8 day(s)     Current Patient Status: Inpatient   Certification Statement: The patient will continue to require additional inpatient hospital stay due to awaiting str     Discharge Plan: STR     -------------------------------------------------------------------------------------------------------------------    Nephrology note  1  End Stage Renal Disease   Plan HD today as her dialysis was hold yesterday due to inclement weather  Patient routinely gets dialysis on Monday Wednesday Friday schedule  At 10:09 AM on 3/8/2018, I saw and examined patient during hemodialysis treatment  The patient was receiving hemodialysis for treatment of end stage renal disease  I have also reviewed vital signs, intake and output, lab results and recent events, and agreed with today's dialysis order

## 2018-03-09 NOTE — PROGRESS NOTES
HEMODIALYSIS ROUNDING NOTE    Patient: Jannette Veloz               Sex: female          DOA: 2/28/2018  1:20 PM   YOB: 1940        Age:  68 y o         LOS:  LOS: 9 days     REASON FOR THE CONSULTATION:  Further management of ESRD    HPI     This is a 58-year-old female admitted after left hip fracture    SUBJECTIVE     - Patient was seen during hemodialysis today    Patient was awake but nonverbal   - Reviewed last 24 hrs events     CURRENT MEDICATIONS       Current Facility-Administered Medications:     acetaminophen (TYLENOL) tablet 650 mg, 650 mg, Oral, Q4H PRN, Garland Raymundo MD, 650 mg at 03/08/18 2205    albuterol inhalation solution 2 5 mg, 2 5 mg, Nebulization, Q6H PRN, Garland Raymundo MD    aspirin tablet 325 mg, 325 mg, Oral, Daily, Garland Raymundo MD, 325 mg at 03/09/18 0830    atorvastatin (LIPITOR) tablet 20 mg, 20 mg, Oral, Daily, Matilda Tucker MD, 20 mg at 03/09/18 0830    b complex-vitamin C-folic acid (NEPHROCAPS) capsule 1 capsule, 1 capsule, Oral, Daily With Gillian Finn MD, 1 capsule at 03/08/18 1715    bisacodyl (DULCOLAX) rectal suppository 10 mg, 10 mg, Rectal, Daily PRN, Cesar Briggs MD, 10 mg at 03/07/18 1708    epoetin babak (EPOGEN,PROCRIT) injection 10,000 Units, 10,000 Units, Intravenous, Once per day on Mon Wed Fri, Adwoa Cardoza MD, 10,000 Units at 03/09/18 1041    epoetin babak (EPOGEN,PROCRIT) injection 10,000 Units, 10,000 Units, Intravenous, Once, Adwoa Cardoza MD    HYDROmorphone (DILAUDID) injection 0 5 mg, 0 5 mg, Intravenous, Q4H PRN, Rigoberto Black MD, 0 5 mg at 03/05/18 0616    insulin lispro (HumaLOG) 100 units/mL subcutaneous injection 1-6 Units, 1-6 Units, Subcutaneous, TID AC, 1 Units at 03/09/18 0740 **AND** Fingerstick Glucose (POCT), , , TID AC, Garland Raymundo MD    insulin lispro (HumaLOG) 100 units/mL subcutaneous injection 1-6 Units, 1-6 Units, Subcutaneous, HS, Garland Raymundo MD, 1 Units at 03/08/18 6268   ondansetron (ZOFRAN) injection 4 mg, 4 mg, Intravenous, Q6H PRN, Eron Avalos MD    oxyCODONE (ROXICODONE) oral solution 2 5 mg, 2 5 mg, Oral, Q4H PRN, Eron Avalos MD, 2 5 mg at 03/05/18 0034    pantoprazole (PROTONIX) EC tablet 20 mg, 20 mg, Oral, BID AC, Eron Avalos MD, 20 mg at 03/04/18 1757    senna-docusate sodium (SENOKOT S) 8 6-50 mg per tablet 1 tablet, 1 tablet, Oral, HS, Shellie Jennings MD, 1 tablet at 03/08/18 2205    OBJECTIVE     Current Weight: Weight - Scale: 49 5 kg (109 lb 2 oz)  Vitals:    03/09/18 1030   BP: 125/60   Pulse: (!) 107   Resp:    Temp:    SpO2:      Body mass index is 21 31 kg/m²  Intake/Output Summary (Last 24 hours) at 03/09/18 1052  Last data filed at 03/09/18 0854   Gross per 24 hour   Intake             1796 ml   Output             1282 ml   Net              514 ml       PHYSICAL EXAMINATION     Physical Exam   Constitutional:   Awake but nonverbal   HENT:   Head: Normocephalic and atraumatic  Eyes: Conjunctivae are normal  No scleral icterus  Neck: Neck supple  No JVD present  Cardiovascular: Normal rate and regular rhythm  Pulmonary/Chest: Effort normal  No respiratory distress  She has no wheezes  Abdominal: Soft  She exhibits no distension  Musculoskeletal: She exhibits no edema  Neurological:   Awake but nonverbal and unable to perform full CNS examination   Skin: Skin is warm     Psychiatric:   Awake but nonverbal and unable to perform full psychiatric examination       LAB RESULTS       Results from last 7 days  Lab Units 03/09/18  0556 03/08/18  0345 03/07/18  0448 03/05/18  1747 03/05/18  0506 03/03/18  0537   WBC Thousand/uL 14 54* 14 96* 16 45*  --  14 30* 14 75*   HEMOGLOBIN g/dL 11 0* 11 3* 11 1* 12 1 7 1* 10 0*   HEMATOCRIT % 33 2* 33 2* 33 1* 35 5 22 2* 30 9*   PLATELETS Thousands/uL 212 223 228  --  254 316   SODIUM mmol/L 134* 131* 133*  --  137 140   POTASSIUM mmol/L 3 8 4 6 4 2  --  3 8 3 4*   CHLORIDE mmol/L 94* 90* 92*  -- 95* 97*   CO2 mmol/L 28 28 28  --  28 32   BUN mg/dL 55* 105* 81*  --  91* 33*   CREATININE mg/dL 3 40* 5 23* 4 35*  --  5 59* 2 96*   EGFR ml/min/1 73sq m 14 8 11  --  8 17   CALCIUM mg/dL 10 5* 10 0 10 0  --  10 2* 11 1*   PHOSPHORUS mg/dL  --   --   --   --  4 4* 2 1*   GLUCOSE RANDOM mg/dL 222* 194* 174*  --  206* 165*       RADIOLOGY RESULTS     Results for orders placed during the hospital encounter of 02/28/18   XR chest portable    Narrative CHEST     INDICATION:  Tachycardia  COMPARISON:  Chest x-ray of 2/28/2018    EXAM PERFORMED/VIEWS:  XR CHEST PORTABLE      FINDINGS:  Right-sided dialysis catheter tip overlies the right atrium  Patient's head partially obscures evaluation of the lung apices  Stable cardiomegaly noted  Central fullness of the pulmonary vasculature is unchanged  The lungs are clear  No focal infiltrate, definite pneumothorax or pleural effusion  Marked degenerative spurring at both shoulders  Impression Stable cardiomegaly  No active pulmonary disease  Workstation performed: [id number]       Results for orders placed during the hospital encounter of 11/09/17   XR chest 2 views    Narrative CHEST     INDICATION:  Weakness  History taken directly from the electronic ordering system  COMPARISON:  Chest x-ray of 9/19/2017    VIEWS:  Frontal and lateral projections    IMAGES:  2    FINDINGS:  Right-sided dialysis catheter tip overlies the right atrium  The heart is enlarged  Patient is somewhat rotated to the left  There is persistent pulmonary vascular congestion, appears chronic  No pneumothorax or pleural effusion  Degenerative spurring is noted of the spine  There are marked arthritic changes of the right shoulder  Impression Persistent pulmonary vascular congestion, appears chronic  Workstation performed: [id number]         PLAN / RECOMMENDATIONS     1  End Stage Renal Disease    Plan HD today to keep on Monday Wednesday Friday dialysis schedule    At 10:39 AM on 3/9/2018, I saw and examined patient during hemodialysis treatment  The patient was receiving hemodialysis for treatment of end stage renal disease  I have also reviewed vital signs, intake and output, lab results and recent events, and agreed with today's dialysis order  Access: No issue    Christiano Wang MD  Nephrology  3/9/2018        Portions of the record may have been created with voice recognition software  Occasional wrong word or "sound a like" substitutions may have occurred due to the inherent limitations of voice recognition software  Read the chart carefully and recognize, using context, where substitutions have occurred

## 2018-03-09 NOTE — CONSULTS
Consultation - Infectious Disease   Eli Escobedo 68 y o  female MRN: [id number]  Unit/Bed#: -01 Encounter: [id number]      IMPRESSION & RECOMMENDATIONS:   1  Systemic inflammatory response syndrome-leukocytosis and tachycardia  Likely postoperative in nature with associated anemia and possible volume issues  No clinical evidence of an active infectious process at this time  The patient has remained afebrile and hemodynamically stable  Her blood cultures are negative at 24 hours and her chest x-ray does not reveal any pneumonia  Her leg incision does not reveal any evidence of a wound infection  Suspect this is a noninfectious issue   -discontinue cefepime  -monitor off all antibiotics  -treatment of metabolic issues since anemia  -monitor CBC with diff  -consider workup for thromboembolic disease if the SIRS would continue    2  Left hip fracture-status post open reduction internal fixation  The follow-up chest x-ray reveals postoperative changes as well as chronic problems including avascular necrosis and degenerative joint disease  No clinical evidence of an active infection at this time  -close orthopedics follow-up  -serial exams  -no additional ID workup for now    3  Anemia of chronic disease-in the setting of end-stage renal disease  H&H is relatively stable   -monitor CBC with diff  -supportive care    4  End-stage renal disease-on hemodialysis    5  Multiple myeloma    HISTORY OF PRESENT ILLNESS:  Reason for Consult:  Leukocytosis  HPI: Eli Escobedo is a 68y o  year old female with history of end-stage renal disease admitted with left hip swelling and found to have a hip fracture who is now status post left femoral intramedullary nail who I am asked to assist with management of the leukocytosis  Patient had been admitted on the 28th of February after being found to have a inter trochanteric fracture of the left femur    She underwent clearance for surgery and on the 3rd of March underwent open reduction internal fixation  Her postoperative course has been complicated with anemia but the patient has remained afebrile and hemodynamically stable  She has had a persistent leukocytosis and a mild tachycardia but has remained otherwise without any systemic abnormalities  She has remained awake and alert but has any able to communicate  Therefore the entire history is through discussion with the primary service and the staff  The patient has not been having any diarrhea or vomiting  She has not dropped her blood pressure on hemodialysis  She has not had any respiratory issues and is not requiring any oxygen support  She has been followed by Orthopedics in the incision is may remain clean dry and intact  REVIEW OF SYSTEMS:  A complete 12 point system-based review of systems is otherwise negative  PAST MEDICAL HISTORY:  Past Medical History:   Diagnosis Date    Anemia     Arthritis     Chronic kidney disease     [facility] patient ([facility] )     monday, wednesday, friday    Hypertension     Multiple myeloma ([facility] )     Stroke [facility])      Past Surgical History:   Procedure Laterality Date     SECTION      JOINT REPLACEMENT Right     hip    ORIF FEMUR FRACTURE Left 3/3/2018    Procedure: OPEN REDUCTION W/ INTERNAL FIXATION (ORIF) FEMUR;  Surgeon: Marlene Hatch MD;  Location: [facility];  Service: Orthopedics    OK OPEN RX FEMUR FX+INTRAMED LAURENCE Left 3/3/2018    Procedure: INSERTION NAIL IM FEMUR ANTEGRADE (TROCHANTERIC); Surgeon: Marlene Hatch MD;  Location: [facility];  Service: Orthopedics       FAMILY HISTORY:  Non-contributory    SOCIAL HISTORY:  Social History   History   Alcohol Use No     History   Drug Use No     History   Smoking Status    Never Smoker   Smokeless Tobacco    Never Used       ALLERGIES:  No Known Allergies    MEDICATIONS:  All current active medications have been reviewed    Antibiotics:  Cefepime 2    PHYSICAL EXAM:  HR:  [] 95  Resp:  [17-18] 18  BP: ()/(47-85) 162/80  SpO2:  [95 %-96 %] 96 %  Temp (24hrs), Av 7 °F (36 5 °C), Min:97 5 °F (36 4 °C), Max:98 1 °F (36 7 °C)  Current: Temperature: 98 1 °F (36 7 °C)    Intake/Output Summary (Last 24 hours) at 18 8796  Last data filed at 18 0854   Gross per 24 hour   Intake             1796 ml   Output             1282 ml   Net              514 ml       General Appearance:  Elderly, debilitated, on hemodialysis   Head:  Normocephalic, without obvious abnormality, atraumatic   Eyes:  Conjunctiva pink and sclera anicteric, both eyes   Nose: Nares normal, mucosa normal, no drainage   Throat: Oropharynx dry without lesions   Neck: Supple, symmetrical, no adenopathy, no tenderness/mass/nodules   Back:   Symmetric, no curvature, ROM normal, no CVA tenderness   Lungs:   Clear to auscultation bilaterally, respirations unlabored   Chest Wall:  No tenderness or deformity   Heart:  Tachycardic; no murmur, rub or gallop   Abdomen:   Soft, non-tender, non-distended, positive bowel sounds  Peg tube in place without erythema or drainage    Extremities: No cyanosis, clubbing or edema   Skin: No rashes or lesions  Numerous superficial ulcerations without purulence or drainage   Lymph nodes: Cervical, supraclavicular nodes normal   Neurologic: Alert, nonverbal, does not really follow commands  LABS, IMAGING, & OTHER STUDIES:  Lab Results:  I have personally reviewed pertinent labs      Results from last 7 days  Lab Units 18  0556 18  0345 18  0448   WBC Thousand/uL 14 54* 14 96* 16 45*   HEMOGLOBIN g/dL 11 0* 11 3* 11 1*   PLATELETS Thousands/uL 212 223 228       Results from last 7 days  Lab Units 18  0556 18  0345 18  0448   SODIUM mmol/L 134* 131* 133*   POTASSIUM mmol/L 3 8 4 6 4 2   CHLORIDE mmol/L 94* 90* 92*   CO2 mmol/L 28 28 28   ANION GAP mmol/L 12 13 13   BUN mg/dL 55* 105* 81*   CREATININE mg/dL 3 40* 5 23* 4 35*   EGFR ml/min/1 73sq m 14 8 11   GLUCOSE RANDOM mg/dL 222* 194* 174*   CALCIUM mg/dL 10 5* 10 0 10 0       Results from last 7 days  Lab Units 03/07/18  1439 03/07/18  1438   BLOOD CULTURE  No Growth at 24 hrs  No Growth at 24 hrs  Imaging Studies:   I have personally reviewed pertinent imaging study reports and images in PACS  Chest x-ray-stable cardiomegaly, no active pulmonary disease    X-ray left femur-postoperative changes    Severe joint osteoarthritis/avascular necrosis, degenerative knee changes

## 2018-03-09 NOTE — PLAN OF CARE
Problem: Nutrition/Hydration-ADULT  Goal: Nutrient/Hydration intake appropriate for improving, restoring or maintaining nutritional needs  Monitor and assess patient's nutrition/hydration status for malnutrition (ex- brittle hair, bruises, dry skin, pale skin and conjunctiva, muscle wasting, smooth red tongue, and disorientation)  Collaborate with interdisciplinary team and initiate plan and interventions as ordered  Monitor patient's weight and dietary intake as ordered or per policy  Utilize nutrition screening tool and intervene per policy     INTERVENTIONS:  - Monitor oral intake, urinary output, labs, and treatment plans  - Assess nutrition and hydration status and recommend course of action  -- Recommend/ encourage appropriate diets, oral nutritional supplements, and vitamin/mineral supplements  - Order, calculate, and assess calorie counts as needed  - Recommend, monitor, and adjust tube feedings based on assessed needs  - Assess need for intravenous fluids  - Provide specific nutrition/hydration education as appropriate  - Include patient/family/caregiver in decisions related to nutrition   Outcome: Progressing

## 2018-03-09 NOTE — PROGRESS NOTES
[facility] Progress Note  Patient: Samson Bella 68 y o  female   MRN: [id number]  PCP: Ronan Keenan MD  Unit/Bed#: -01 Encounter: [id number]  Date Of Visit: 03/09/18    Assessment:    Principal Problem:    Subtrochanteric fracture of left femur, closed, initial encounter ([place] [address] )  Active Problems:    ESRD (end stage renal disease) on dialysis ([place] [address] )    Closed intertrochanteric fracture of left femur (HCC)    Anemia of chronic kidney failure, stage 5 ([place] [address] )    Multiple myeloma not having achieved remission ([place] [address] )    Essential hypertension    Diabetes mellitus type 2 in nonobese ([place] [address] )    Hemiparesis due to old stroke ([place] [address] )    Closed intertrochanteric fracture of hip, left, initial encounter ([place] [address] )    Functional quadriplegia ([place] [address] )    Acute blood loss as cause of postoperative anemia    Anemia    Hypertensive CKD, ESRD on dialysis ([place] [address] )    Hyponatremia      Plan:    · Left femur fracture-patient status post surgery-orthopaedics following hip xray reviewed and ortho following  · Acute postoperative blood-loss anemia on top of anemia of chronic disease and underlying multiple myeloma   Received call from Nephrology this morning   received 2 units of packed RBCs  hgb stable  · Old stroke with left-sided hemiparesis  she is full care  · Poor nutritional status   Patient on tube feeds   She is tolerating current tube feeds  At goal rate  · End-stage renal disease   On hemodialysis  · Sacral decubitus-wound care following  · Heel decubitus wound care following             Severe protein-calorie malnutrition in setting of chronic illness as evidenced by  Muscle loss, Fat loss, Weight loss (related to inadequate energy intake as evidenced by 13% weight loss since 11/9/17 ED visit, clavicle visible  , dark circles orbital Interventions enteral feeding and protein modular via PEG  ) treated with nutrition consult     Tachycardia-sinus-improving, ua c/s-unable to obtain anuric;  venous doppler lower extneg for dvt and cxr without infiltrate; xray of hip reviewed awaiting any ortho recommendations; ID consult appreciated SIRS and monitor off cefepime      VTE Pharmacologic Prophylaxis:   Pharmacologic: Pharmacologic VTE Prophylaxis contraindicated due to h/o gi bleed  Mechanical VTE Prophylaxis in Place: Yes    Patient Centered Rounds: I have performed bedside rounds with nursing staff today  Discussions with Specialists or Other Care Team Provider:     Education and Discussions with Family / Patient:     Time Spent for Care: 30 minutes  More than 50% of total time spent on counseling and coordination of care as described above  Current Length of Stay: 9 day(s)    Current Patient Status: Inpatient   Certification Statement: The patient will continue to require additional inpatient hospital stay due to leucocytosis    Discharge Plan: STR    Code Status: Level 1 - Full Code      Subjective:   More awake and alert today    Objective:     Vitals:   Temp (24hrs), Av 5 °F (36 4 °C), Min:96 9 °F (36 1 °C), Max:98 1 °F (36 7 °C)    HR:  [] 90  Resp:  [17-18] 18  BP: ()/(47-71) 110/68  SpO2:  [95 %-96 %] 96 %  Body mass index is 21 31 kg/m²  Input and Output Summary (last 24 hours): Intake/Output Summary (Last 24 hours) at 18 0836  Last data filed at 18 0701   Gross per 24 hour   Intake             1596 ml   Output             1282 ml   Net              314 ml       Physical Exam:     Physical Exam   Constitutional: She appears well-developed and well-nourished  HENT:   Head: Normocephalic and atraumatic  Eyes: Pupils are equal, round, and reactive to light  Cardiovascular:   tachycardic   Pulmonary/Chest: Effort normal and breath sounds normal    Abdominal: Soft  Musculoskeletal: She exhibits no edema         Additional Data:     Labs:      Results from last 7 days  Lab Units 18  0556   WBC Thousand/uL 14 54*   HEMOGLOBIN g/dL 11 0*   HEMATOCRIT % 33 2*   PLATELETS Thousands/uL 212   NEUTROS PCT % 78*   LYMPHS PCT % 12*   MONOS PCT % 6   EOS PCT % 1       Results from last 7 days  Lab Units 03/09/18  0556   SODIUM mmol/L 134*   POTASSIUM mmol/L 3 8   CHLORIDE mmol/L 94*   CO2 mmol/L 28   BUN mg/dL 55*   CREATININE mg/dL 3 40*   CALCIUM mg/dL 10 5*   GLUCOSE RANDOM mg/dL 222*           * I Have Reviewed All Lab Data Listed Above  * Additional Pertinent Lab Tests Reviewed: All Labs Within Last 24 Hours Reviewed    Imaging:    Imaging Reports Reviewed Today Include:   Imaging Personally Reviewed by Myself Includes:      Recent Cultures (last 7 days):       Results from last 7 days  Lab Units 03/07/18  1439 03/07/18  1438   BLOOD CULTURE  No Growth at 24 hrs  No Growth at 24 hrs         Last 24 Hours Medication List:     Current Facility-Administered Medications:  acetaminophen 650 mg Oral Q4H PRN Aubree García MD    albuterol 2 5 mg Nebulization Q6H PRN Aubree García MD    aspirin 325 mg Oral Daily Aubree Gacría MD    atorvastatin 20 mg Oral Daily Seble Sigala MD    b complex-vitamin C-folic acid 1 capsule Oral Daily With Elaine Bermeo MD    bisacodyl 10 mg Rectal Daily PRN Amilcar Lowry MD    cefepime 1,000 mg Intravenous Q24H BENNY Lester Last Rate: Stopped (03/09/18 0108)   epoetin babak 10,000 Units Intravenous Once per day on Mon Wed Fri Lupe Negron MD    epoetin babak 10,000 Units Intravenous Once Lupe Negron MD    HYDROmorphone 0 5 mg Intravenous Q4H PRN Lincoln Schultz MD    insulin lispro 1-6 Units Subcutaneous TID Jovana Aguilar MD    insulin lispro 1-6 Units Subcutaneous HS Aubree García MD    ondansetron 4 mg Intravenous Q6H PRN Aubree García MD    oxyCODONE 2 5 mg Oral Q4H PRN Aubree García MD    pantoprazole 20 mg Oral BID AC Aubree García MD    senna-docusate sodium 1 tablet Oral HS Nahomy Rock MD         Today, Patient Was Seen By: Nahomy Rock MD    ** Please Note: Dragon 360 Dictation voice to text software may have been used in the creation of this document   **

## 2018-03-09 NOTE — PROGRESS NOTES
Pt Has large loose brown BM while on HD in am  Pt reported her abdomen felt more relaxed and she was more comfortable

## 2018-03-10 NOTE — PLAN OF CARE
CARDIOVASCULAR - ADULT     Maintains optimal cardiac output and hemodynamic stability Progressing     Absence of cardiac dysrhythmias or at baseline rhythm Progressing        GASTROINTESTINAL - ADULT     Maintains adequate nutritional intake Progressing        Knowledge Deficit     Patient/family/caregiver demonstrates understanding of disease process, treatment plan, medications, and discharge instructions Progressing        Nutrition/Hydration-ADULT     Nutrient/Hydration intake appropriate for improving, restoring or maintaining nutritional needs Progressing        PAIN - ADULT     Verbalizes/displays adequate comfort level or baseline comfort level Progressing        Potential for Falls     Patient will remain free of falls Progressing        Prexisting or High Potential for Compromised Skin Integrity     Skin integrity is maintained or improved Progressing        SKIN/TISSUE INTEGRITY - ADULT     Skin integrity remains intact Progressing

## 2018-03-10 NOTE — PROGRESS NOTES
NEPHROLOGY PROGRESS NOTE    Patient: Nargis Greco               Sex: female          DOA: 2/28/2018  1:20 PM   YOB: 1940        Age:  68 y o         LOS:  LOS: 10 days     REASON FOR THE CONSULTATION:  Further management of ESRD    HPI      This is a 63-year-old female admitted with left hip fracture now s/p surgery    SUBJECTIVE     - patient is sleepy but arousable and non verbal   Tolerated dialysis well yesterday  Found to have low blood pressure post dialysis which has improved    - Reviewed last 24 hrs events     CURRENT MEDICATIONS       Current Facility-Administered Medications:     acetaminophen (TYLENOL) tablet 650 mg, 650 mg, Oral, Q4H PRN, Ced Chaparro MD, 650 mg at 03/08/18 2205    albuterol inhalation solution 2 5 mg, 2 5 mg, Nebulization, Q6H PRN, Ced Chaparro MD    aspirin tablet 325 mg, 325 mg, Oral, Daily, Ced Chaparro MD, 325 mg at 03/10/18 7036    atorvastatin (LIPITOR) tablet 20 mg, 20 mg, Oral, Daily, Francheska Carrillo MD, 20 mg at 03/10/18 1215    b complex-vitamin C-folic acid (NEPHROCAPS) capsule 1 capsule, 1 capsule, Oral, Daily With Ry Alberto MD, 1 capsule at 03/08/18 1715    bisacodyl (DULCOLAX) rectal suppository 10 mg, 10 mg, Rectal, Daily PRN, Aj Arora MD, 10 mg at 03/07/18 1708    epoetin babka (EPOGEN,PROCRIT) injection 10,000 Units, 10,000 Units, Intravenous, Once per day on Mon Wed Fri, Vijay Hill MD, 10,000 Units at 03/09/18 1041    epoetin babak (EPOGEN,PROCRIT) injection 10,000 Units, 10,000 Units, Intravenous, Once, Vijay Hill MD    HYDROmorphone (DILAUDID) injection 0 5 mg, 0 5 mg, Intravenous, Q4H PRN, Enrico Sullivan MD, 0 5 mg at 03/05/18 0616    insulin lispro (HumaLOG) 100 units/mL subcutaneous injection 1-6 Units, 1-6 Units, Subcutaneous, TID AC, 2 Units at 03/10/18 0617 **AND** Fingerstick Glucose (POCT), , , TID AC, Ced Chaparro MD    insulin lispro (HumaLOG) 100 units/mL subcutaneous injection 1-6 Units, 1-6 Units, Subcutaneous, HS, Garland Raymundo MD, 1 Units at 03/09/18 2310    ondansetron [facility]) injection 4 mg, 4 mg, Intravenous, Q6H PRN, Garland Raymundo MD    oxyCODONE (ROXICODONE) oral solution 2 5 mg, 2 5 mg, Oral, Q4H PRN, Garland Raymundo MD, 2 5 mg at 03/05/18 0034    pantoprazole (PROTONIX) EC tablet 20 mg, 20 mg, Oral, BID AC, Garland Raymundo MD, 20 mg at 03/04/18 1757    senna-docusate sodium (SENOKOT S) 8 6-50 mg per tablet 1 tablet, 1 tablet, Oral, HS, Cesar Briggs MD, 1 tablet at 03/09/18 2245    OBJECTIVE     Current Weight: Weight - Scale: 49 5 kg (109 lb 2 oz)  Vitals:    03/10/18 0700   BP: 131/65   Pulse: (!) 107   Resp: 18   Temp: 97 9 °F (36 6 °C)   SpO2: 97%     Body mass index is 21 31 kg/m²  Intake/Output Summary (Last 24 hours) at 03/10/18 1147  Last data filed at 03/10/18 4636   Gross per 24 hour   Intake             1771 ml   Output             1500 ml   Net              271 ml       PHYSICAL EXAMINATION     Physical Exam   Constitutional:   Sleepy but arousable and nonverbal   HENT:   Head: Normocephalic and atraumatic  Eyes: Conjunctivae are normal  No scleral icterus  Neck: Neck supple  No JVD present  Cardiovascular: Normal rate and regular rhythm  Pulmonary/Chest: Effort normal  No respiratory distress  She has no wheezes  Abdominal: Soft  She exhibits no distension  Musculoskeletal: She exhibits no edema  Neurological:   Nonverbal and not following command and unable to perform full CNS examination   Skin: Skin is warm  Psychiatric:   Nonverbal and not following command and unable to perform full psychiatric examination           LAB RESULTS       Results from last 7 days  Lab Units 03/10/18  0527 03/09/18  0556 03/08/18  0345 03/07/18  0448 03/05/18  1747 03/05/18  0506   WBC Thousand/uL 15 29* 14 54* 14 96* 16 45*  --  14 30*   HEMOGLOBIN g/dL 10 9* 11 0* 11 3* 11 1* 12 1 7 1*   HEMATOCRIT % 33 1* 33 2* 33 2* 33 1* 35 5 22 2*   PLATELETS Thousands/uL 226 212 223 228  --  254   SODIUM mmol/L 136 134* 131* 133*  --  137   POTASSIUM mmol/L 3 8 3 8 4 6 4 2  --  3 8   CHLORIDE mmol/L 97* 94* 90* 92*  --  95*   CO2 mmol/L 27 28 28 28  --  28   BUN mg/dL 38* 55* 105* 81*  --  91*   CREATININE mg/dL 2 51* 3 40* 5 23* 4 35*  --  5 59*   EGFR ml/min/1 73sq m 21 14 8 11  --  8   CALCIUM mg/dL 10 3* 10 5* 10 0 10 0  --  10 2*   PHOSPHORUS mg/dL  --   --   --   --   --  4 4*   GLUCOSE RANDOM mg/dL 241* 222* 194* 174*  --  206*       RADIOLOGY RESULTS      Results for orders placed during the hospital encounter of 02/28/18   XR chest portable    Narrative CHEST     INDICATION:  Tachycardia  COMPARISON:  Chest x-ray of 2/28/2018    EXAM PERFORMED/VIEWS:  XR CHEST PORTABLE      FINDINGS:  Right-sided dialysis catheter tip overlies the right atrium  Patient's head partially obscures evaluation of the lung apices  Stable cardiomegaly noted  Central fullness of the pulmonary vasculature is unchanged  The lungs are clear  No focal infiltrate, definite pneumothorax or pleural effusion  Marked degenerative spurring at both shoulders  Impression Stable cardiomegaly  No active pulmonary disease  Workstation performed: [id number]       Results for orders placed during the hospital encounter of 11/09/17   XR chest 2 views    Narrative CHEST     INDICATION:  Weakness  History taken directly from the electronic ordering system  COMPARISON:  Chest x-ray of 9/19/2017    VIEWS:  Frontal and lateral projections    IMAGES:  2    FINDINGS:  Right-sided dialysis catheter tip overlies the right atrium  The heart is enlarged  Patient is somewhat rotated to the left  There is persistent pulmonary vascular congestion, appears chronic  No pneumothorax or pleural effusion  Degenerative spurring is noted of the spine  There are marked arthritic changes of the right shoulder        Impression Persistent pulmonary vascular congestion, appears chronic  Workstation performed: [id number]         PLAN / RECOMMENDATIONS      1  ESRD  Patient is on chronic hemodialysis at [facility] on Monday Wednesday Friday dialysis schedule and has tolerated dialysis well yesterday in the hospital   Currently hemodynamically stable and no urgent need for dialysis today  Plan next dialysis on Monday if remains in the hospital and stable  2   Anemia  Multifactorial, had underwent left hip repair earlier  Received 2 Units of blood transfusion during the hospital stay  Currently no active signs of bleeding seen  Overnight stable hemoglobin at 10 9 and plan to continue Epogen 02815 Units with dialysis  3   Hypertension in ESRD  Patient was found to have low blood pressure post dialysis which has improved now  Patient is off all antihypertensive medication for time being  Monitor hypertension today  Plan was also d/w Rose Mary Davila MD  Nephrology  3/10/2018        Portions of the record may have been created with voice recognition software  Occasional wrong word or "sound a like" substitutions may have occurred due to the inherent limitations of voice recognition software  Read the chart carefully and recognize, using context, where substitutions have occurred

## 2018-03-10 NOTE — PROGRESS NOTES
[facility] Progress Note  Patient: Tony Amado 68 y o  female   MRN: [id number]  PCP: Guerita Farnsworth MD  Unit/Bed#: -01 Encounter: [id number]  Date Of Visit: 03/10/18    Assessment:    Principal Problem:    Subtrochanteric fracture of left femur, closed, initial encounter ([facility] )  Active Problems:    ESRD (end stage renal disease) on dialysis ([place] [address] )    Closed intertrochanteric fracture of left femur (HCC)    Anemia of chronic kidney failure, stage 5 ([facility] )    Multiple myeloma not having achieved remission ([place] [address] )    Essential hypertension    Diabetes mellitus type 2 in nonobese ([place] [address] )    Hemiparesis due to old stroke ([place] [address] )    Closed intertrochanteric fracture of hip, left, initial encounter ([place] [address] )    Functional quadriplegia ([place] [address] )    Acute blood loss as cause of postoperative anemia    Anemia    Hypertensive CKD, ESRD on dialysis ([place] [address] )    Hyponatremia      Plan:    · Left femur fracture-patient status post surgery-orthopaedics following hip xray reviewed and ortho following  · Acute postoperative blood-loss anemia on top of anemia of chronic disease and underlying multiple myeloma   Received call from Nephrology this morning   received 2 units of packed RBCs   hgb stable  · Old stroke with left-sided hemiparesis  she is full care  · Poor nutritional status   Patient on tube feeds   She is tolerating current tube feeds  At goal rate  · End-stage renal disease   On hemodialysis  · Sacral decubitus-wound care following  · Heel decubitus wound care following        I had a long discussion with daughter and she is well aware of patient tachycardia-she has been declining heparin as patient with his of ulcer bleeding requiring a large transfusion-I discussed with her need to r/o PE as d/w her previously-patient has had negative venous doppler of legs but as tachycardia persistent wish to rule out PE-daughter is agreeable to CT chest to r/o PE but is unsure if she will proceed with anticoagulation   Discussed IVC filter with her but told her it would not help with any clot potentially in lungs  She wishes to proceed with ct and she will consider treatment based on result          Severe protein-calorie malnutrition in setting of chronic illness as evidenced by  Muscle loss, Fat loss, Weight loss (related to inadequate energy intake as evidenced by 13% weight loss since 17 ED visit, clavicle visible  , dark circles orbital Interventions enteral feeding and protein modular via PEG  ) treated with nutrition consult     Tachycardia-sinus-improving, ua c/s-unable to obtain anuric;  venous doppler lower extneg for dvt and cxr without infiltrate; xray of hip reviewed awaiting any ortho recommendations; ID consult appreciated SIRS and monitor off cefepime; wbc elevated-as persistent will do ct angio chest r/o PE;d/w nephrology         VTE Pharmacologic Prophylaxis:   Pharmacologic: Pharmacologic VTE Prophylaxis contraindicated due to gi bleed  Mechanical VTE Prophylaxis in Place: Yes    Patient Centered Rounds: I have performed bedside rounds with nursing staff today  Discussions with Specialists or Other Care Team Provider:     Education and Discussions with Family / Patient:     Time Spent for Care: 30 minutes  More than 50% of total time spent on counseling and coordination of care as described above  Current Length of Stay: 10 day(s)    Current Patient Status: Inpatient   Certification Statement: The patient will continue to require additional inpatient hospital stay due to tachycardia    Discharge Plan: STR    Code Status: Level 1 - Full Code      Subjective:   No c/o    Objective:     Vitals:   Temp (24hrs), Av 5 °F (36 9 °C), Min:97 9 °F (36 6 °C), Max:99 5 °F (37 5 °C)    HR:  [106-112] 107  Resp:  [16-18] 18  BP: ()/(37-66) 131/65  SpO2:  [97 %-100 %] 97 %  Body mass index is 21 31 kg/m²  Input and Output Summary (last 24 hours):        Intake/Output Summary (Last 24 hours) at 03/10/18 1226  Last data filed at 03/10/18 1100   Gross per 24 hour   Intake             1831 ml   Output             1500 ml   Net              331 ml       Physical Exam:     Physical Exam   HENT:   Head: Normocephalic and atraumatic  Eyes: Pupils are equal, round, and reactive to light  Cardiovascular:   tahycardic   Pulmonary/Chest: Effort normal and breath sounds normal    Abdominal: Soft  Bowel sounds are normal    Musculoskeletal: She exhibits no edema  Additional Data:     Labs:      Results from last 7 days  Lab Units 03/10/18  0527 03/09/18  0556   WBC Thousand/uL 15 29* 14 54*   HEMOGLOBIN g/dL 10 9* 11 0*   HEMATOCRIT % 33 1* 33 2*   PLATELETS Thousands/uL 226 212   NEUTROS PCT %  --  78*   LYMPHS PCT %  --  12*   LYMPHO PCT % 14  --    MONOS PCT %  --  6   MONO PCT MAN % 10  --    EOS PCT %  --  1   EOSINO PCT MANUAL % 0  --        Results from last 7 days  Lab Units 03/10/18  0527   SODIUM mmol/L 136   POTASSIUM mmol/L 3 8   CHLORIDE mmol/L 97*   CO2 mmol/L 27   BUN mg/dL 38*   CREATININE mg/dL 2 51*   CALCIUM mg/dL 10 3*   GLUCOSE RANDOM mg/dL 241*           * I Have Reviewed All Lab Data Listed Above  * Additional Pertinent Lab Tests Reviewed: All Labs Within Last 24 Hours Reviewed    Imaging:    Imaging Reports Reviewed Today Include:   Imaging Personally Reviewed by Myself Includes:      Recent Cultures (last 7 days):       Results from last 7 days  Lab Units 03/07/18  1439 03/07/18  1438   BLOOD CULTURE  No Growth at 48 hrs  No Growth at 48 hrs         Last 24 Hours Medication List:     Current Facility-Administered Medications:  acetaminophen 650 mg Oral Q4H PRN Keyon Burgess MD   albuterol 2 5 mg Nebulization Q6H PRN Keyon Burgess MD   aspirin 325 mg Oral Daily Keyon Burgess MD   atorvastatin 20 mg Oral Daily Jalil Velez MD   b complex-vitamin C-folic acid 1 capsule Oral Daily With Ariella Cain MD   bisacodyl 10 mg Rectal Daily PRN Nathan Kearney MD   epoetin babak 10,000 Units Intravenous Once per day on Mon Wed Fri Lobo Mcneal MD   epoetin babak 10,000 Units Intravenous Once Lobo Mcneal MD   HYDROmorphone 0 5 mg Intravenous Q4H PRN Majo Petit MD   insulin lispro 1-6 Units Subcutaneous TID Dilshad Domingo MD   insulin lispro 1-6 Units Subcutaneous HS Jamie Yancey MD   ondansetron 4 mg Intravenous Q6H PRN Jamie Yancey MD   oxyCODONE 2 5 mg Oral Q4H PRN Jamie Yancey MD   pantoprazole 20 mg Oral BID AC Jamie Yancey MD   senna-docusate sodium 1 tablet Oral HS Fabiana Sapp MD        Today, Patient Was Seen By: Fabiana Sapp MD    ** Please Note: Dragon 360 Dictation voice to text software may have been used in the creation of this document   **

## 2018-03-11 NOTE — PROGRESS NOTES
NEPHROLOGY PROGRESS NOTE    Patient: Kunal Blair               Sex: female          DOA: 2/28/2018  1:20 PM   YOB: 1940        Age:  68 y o         LOS:  LOS: 11 days     REASON FOR THE CONSULTATION:      HPI          SUBJECTIVE     - Patient denies nausea / vomiting / headache / dizziness / SOB / chest pain today    - Reviewed last 24 hrs events     CURRENT MEDICATIONS       Current Facility-Administered Medications:     acetaminophen (TYLENOL) tablet 650 mg, 650 mg, Oral, Q4H PRN, Teodoro Casillas MD, 650 mg at 03/11/18 1007    albuterol inhalation solution 2 5 mg, 2 5 mg, Nebulization, Q4H PRN, Teodoro Casillas MD    aspirin tablet 325 mg, 325 mg, Oral, Daily, Teodoro Casillas MD, 325 mg at 03/11/18 1006    atorvastatin (LIPITOR) tablet 20 mg, 20 mg, Oral, Daily, Lorenza Vazquez MD, 20 mg at 03/11/18 1006    b complex-vitamin C-folic acid (NEPHROCAPS) capsule 1 capsule, 1 capsule, Oral, Daily With Zoila Velarde MD, 1 capsule at 03/08/18 1715    bisacodyl (DULCOLAX) rectal suppository 10 mg, 10 mg, Rectal, Daily PRN, David Gilman MD, 10 mg at 03/07/18 1708    epoetin babak (EPOGEN,PROCRIT) injection 10,000 Units, 10,000 Units, Intravenous, Once per day on Mon Wed Fri, Adam Berrios MD, 10,000 Units at 03/09/18 1041    epoetin babak (EPOGEN,PROCRIT) injection 10,000 Units, 10,000 Units, Intravenous, Once, Adam Berrios MD    HYDROmorphone (DILAUDID) injection 0 5 mg, 0 5 mg, Intravenous, Q4H PRN, Joleen Joseph MD, 0 5 mg at 03/05/18 0616    insulin lispro (HumaLOG) 100 units/mL subcutaneous injection 1-6 Units, 1-6 Units, Subcutaneous, TID AC, 2 Units at 03/11/18 1228 **AND** Fingerstick Glucose (POCT), , , TID AC, Teodoro Casillas MD    insulin lispro (HumaLOG) 100 units/mL subcutaneous injection 1-6 Units, 1-6 Units, Subcutaneous, HS, Teodoro Casillas MD, 1 Units at 03/10/18 2127    metoprolol tartrate (LOPRESSOR) partial tablet 12 5 mg, 12 5 mg, Oral, Q12H [facility], Rocio Agrawal MD Alen, 12 5 mg at 03/11/18 1227    omeprazole (PRILOSEC) suspension 2 mg/mL, 20 mg, Oral, Daily, Chris Lowry MD, 20 mg at 03/11/18 1010    ondansetron (ZOFRAN) injection 4 mg, 4 mg, Intravenous, Q6H PRN, Kirit Acosta MD    oxyCODONE (ROXICODONE) oral solution 2 5 mg, 2 5 mg, Oral, Q4H PRN, Kirit Acosta MD, 2 5 mg at 03/05/18 0034    senna-docusate sodium (SENOKOT S) 8 6-50 mg per tablet 1 tablet, 1 tablet, Oral, HS, Chris Lowry MD, 1 tablet at 03/10/18 2127    OBJECTIVE     Current Weight: Weight - Scale: 49 5 kg (109 lb 2 oz)  Vitals:    03/11/18 1227   BP: 130/80   Pulse: (!) 112   Resp:    Temp:    SpO2:      Body mass index is 21 31 kg/m²  Intake/Output Summary (Last 24 hours) at 03/11/18 1242  Last data filed at 03/11/18 1201   Gross per 24 hour   Intake             1925 ml   Output                0 ml   Net             1925 ml       PHYSICAL EXAMINATION     Physical Exam   Constitutional: No distress  Awake but nonverbal   HENT:   Head: Normocephalic and atraumatic  Eyes: Conjunctivae are normal  No scleral icterus  Neck: Neck supple  No JVD present  No tracheal deviation present  Cardiovascular: Normal rate and regular rhythm  Pulmonary/Chest: Effort normal  No respiratory distress  She has no wheezes  Abdominal: Soft  She exhibits no distension  Musculoskeletal: She exhibits no edema  Lymphadenopathy:        Right cervical: No superficial cervical and no deep cervical adenopathy present  Left cervical: No superficial cervical and no deep cervical adenopathy present  Neurological:   Awake but nonverbal and unable to perform full CNS examination   Skin: Skin is warm  No cyanosis  Psychiatric:   Awake but nonverbal and unable to perform full psychiatric examination           LAB RESULTS       Results from last 7 days  Lab Units 03/11/18  1152 03/10/18  0527 03/09/18  0556 03/08/18  0345 03/07/18  0448 03/05/18  1747 03/05/18  0506   WBC Thousand/uL 14 86* 15 29* 14 54* 14 96* 16 45*  --  14 30*   HEMOGLOBIN g/dL 11 2* 10 9* 11 0* 11 3* 11 1* 12 1 7 1*   HEMATOCRIT % 34 4* 33 1* 33 2* 33 2* 33 1* 35 5 22 2*   PLATELETS Thousands/uL 244 226 212 223 228  --  254   SODIUM mmol/L 138 136 134* 131* 133*  --  137   POTASSIUM mmol/L 4 3 3 8 3 8 4 6 4 2  --  3 8   CHLORIDE mmol/L 95* 97* 94* 90* 92*  --  95*   CO2 mmol/L 30 27 28 28 28  --  28   BUN mg/dL 77* 38* 55* 105* 81*  --  91*   CREATININE mg/dL 4 20* 2 51* 3 40* 5 23* 4 35*  --  5 59*   EGFR ml/min/1 73sq m 11 21 14 8 11  --  8   CALCIUM mg/dL 11 0* 10 3* 10 5* 10 0 10 0  --  10 2*   PHOSPHORUS mg/dL  --   --   --   --   --   --  4 4*   TOTAL PROTEIN g/dL 7 4  --   --   --   --   --   --    GLUCOSE RANDOM mg/dL 208* 241* 222* 194* 174*  --  206*       RADIOLOGY RESULTS      CTA chest done on 3/10/2018 showed no signs of PE  PLAN / RECOMMENDATIONS      1  ESRD  Patient is on chronic hemodialysis at [place] [id number] dialysis Unit  Patient's last dialysis in the hospital was on Friday  Patient was admitted with left hip fracture and now s/p surgery  Currently hemodynamically stable and plan next dialysis tomorrow if remains in the hospital     2   Hypertension in ESRD  Due to low blood pressure all antihypertensive medications were stopped  Patient has persistent tachycardia and had underwent CTA PE protocol which did not show any signs of PE  Plan to start low-dose Coreg in the light of persistent tachycardia  Will plan to monitor hypertension with low-dose Coreg  3   Anemia  Multifactorial, patient has received 2 Units of blood transfusion earlier during the hospital stay  Patient was admitted with left hip fracture and had underwent surgery  Last known hemoglobin is 10 9 which is at goal for ESRD  Currently no active signs of bleeding seen  Continue Epogen 29464 Units with dialysis  Disposition:  Stable from renal standpoint for discharge    In my personal opinion, patient is high risk for readmission and would be benefitted from inpatient rehab/skilled nursing Unit placement prior to going home  Plan was also d/w Vivi Cunningham MD  Nephrology  3/11/2018        Portions of the record may have been created with voice recognition software  Occasional wrong word or "sound a like" substitutions may have occurred due to the inherent limitations of voice recognition software  Read the chart carefully and recognize, using context, where substitutions have occurred

## 2018-03-11 NOTE — SOCIAL WORK
Per MD pt medically cleared  Cm spoke w daughter who still plans to bring patient home  However, daughter refusing to take patient home today as she has an event in [place] and cannot do a transfer today  Cm to inform MD      Prioritized w PT to meet with pt on 3/12 at 10am  VM left for pts daughter to be present for the meeting to come up with realistic d c goals   Cm to follow

## 2018-03-11 NOTE — PROGRESS NOTES
[facility] Internal Medicine Progress Note  Patient: Ayesha Alvarez 68 y o  female   MRN: [id number]  PCP: Daphne Vazquez MD  Unit/Bed#: -01 Encounter: [id number]  Date Of Visit: 03/11/18    Assessment:    Principal Problem:    Subtrochanteric fracture of left femur, closed, initial encounter ([facility] )  Active Problems:    ESRD (end stage renal disease) on dialysis ([facility] )    Closed intertrochanteric fracture of left femur (HCC)    Anemia of chronic kidney failure, stage 5 ([facility] )    Multiple myeloma not having achieved remission ([facility] )    Essential hypertension    Diabetes mellitus type 2 in nonobese ([facility]  )    Hemiparesis due to old stroke ([facility]  )    Closed intertrochanteric fracture of hip, left, initial encounter ([facility] )    Functional quadriplegia ([facility]  )    Acute blood loss as cause of postoperative anemia    Anemia    Hypertensive CKD, ESRD on dialysis ([facility] )    Hyponatremia      Plan:    · Left femur fracture-patient status post surgery-orthopaedics following hip xray reviewed and ortho following  · Acute postoperative blood-loss anemia on top of anemia of chronic disease and underlying multiple myeloma   follow hgb   · Old stroke with left-sided hemiparesis  she is full care  · Poor nutritional status   Patient on tube feeds   She is tolerating current tube feeds   At goal rate  · End-stage renal disease   On hemodialysis  · Sacral decubitus-wound care following  · Heel decubitus wound care following        I had a long discussion with daughter and she is well aware of patient tachycardia-she has been declining heparin as patient with his of ulcer bleeding requiring a large transfusion-I discussed with her need to r/o PE as d/w her previously-patient has had negative venous doppler of legs but as tachycardia persistent wish to rule out PE-daughter is agreeable to CT chest negative for PE 3/10/18; tsh; low dose metoprolol           Severe protein-calorie malnutrition in setting of chronic illness as evidenced by [facility] loss, Fat loss, Weight loss (related to inadequate energy intake as evidenced by 13% weight loss since 17 ED visit, clavicle visible  , dark circles orbital Interventions enteral feeding and protein modular via PEG  ) treated with nutrition consult     Tachycardia-sinus-improving, ua c/s-unable to obtain anuric;  venous doppler lower extneg for dvt and cxr without infiltrate; xray of hip reviewed awaiting any ortho recommendations; ID consult appreciated SIRS and monitor off cefepime; wbc elevated-as persistent will do ct angio chest r/o PE;d/w nephrology ; afebrile             VTE Pharmacologic Prophylaxis:   Pharmacologic: Pharmacologic VTE Prophylaxis contraindicated due to family declines due to h/o GI bleed  Mechanical VTE Prophylaxis in Place: Yes    Patient Centered Rounds: I have performed bedside rounds with nursing staff today  Discussions with Specialists or Other Care Team Provider:     Education and Discussions with Family / Patient:     Time Spent for Care: 30 minutes  More than 50% of total time spent on counseling and coordination of care as described above  Current Length of Stay: 11 day(s)    Current Patient Status: Inpatient   Certification Statement: The patient will continue to require additional inpatient hospital stay due to possible discharge    Discharge Plan:     Code Status: Level 1 - Full Code      Subjective:   Rouses to voice  Objective:     Vitals:   Temp (24hrs), Av 8 °F (36 6 °C), Min:97 5 °F (36 4 °C), Max:98 1 °F (36 7 °C)    HR:  [101-116] 110  Resp:  [18] 18  BP: (114-138)/(58-84) 138/84  SpO2:  [95 %-98 %] 98 %  Body mass index is 21 31 kg/m²  Input and Output Summary (last 24 hours):        Intake/Output Summary (Last 24 hours) at 18 1027  Last data filed at 18 0601   Gross per 24 hour   Intake             1705 ml   Output                0 ml   Net             1705 ml       Physical Exam:     Physical Exam   HENT:   Head: Normocephalic and atraumatic  Eyes: Pupils are equal, round, and reactive to light  Cardiovascular:   tachycardic   Pulmonary/Chest: Effort normal and breath sounds normal    Abdominal: Soft  Musculoskeletal: She exhibits no edema  Additional Data:     Labs:      Results from last 7 days  Lab Units 03/10/18  0527 03/09/18  0556   WBC Thousand/uL 15 29* 14 54*   HEMOGLOBIN g/dL 10 9* 11 0*   HEMATOCRIT % 33 1* 33 2*   PLATELETS Thousands/uL 226 212   NEUTROS PCT %  --  78*   LYMPHS PCT %  --  12*   LYMPHO PCT % 14  --    MONOS PCT %  --  6   MONO PCT MAN % 10  --    EOS PCT %  --  1   EOSINO PCT MANUAL % 0  --        Results from last 7 days  Lab Units 03/10/18  0527   SODIUM mmol/L 136   POTASSIUM mmol/L 3 8   CHLORIDE mmol/L 97*   CO2 mmol/L 27   BUN mg/dL 38*   CREATININE mg/dL 2 51*   CALCIUM mg/dL 10 3*   GLUCOSE RANDOM mg/dL 241*           * I Have Reviewed All Lab Data Listed Above  * Additional Pertinent Lab Tests Reviewed: All Labs Within Last 24 Hours Reviewed    Imaging:    Imaging Reports Reviewed Today Include:   Imaging Personally Reviewed by Myself Includes:      Recent Cultures (last 7 days):       Results from last 7 days  Lab Units 03/07/18  1439 03/07/18  1438   BLOOD CULTURE  No Growth at 72 hrs  No Growth at 72 hrs         Last 24 Hours Medication List:     Current Facility-Administered Medications:  acetaminophen 650 mg Oral Q4H PRN Miguel Bess MD   albuterol 2 5 mg Nebulization Q6H PRN Miguel Bess MD   aspirin 325 mg Oral Daily Miguel Bess MD   atorvastatin 20 mg Oral Daily Jannette Kim MD   b complex-vitamin C-folic acid 1 capsule Oral Daily With Tito Tovar MD   bisacodyl 10 mg Rectal Daily PRN Aniyah Oneill MD   epoetin babak 10,000 Units Intravenous Once per day on Mon Wed Fri Ty Medrano MD   epoetin babak 10,000 Units Intravenous Once Ty Medrano MD   HYDROmorphone 0 5 mg Intravenous Q4H PRN Lana Hughes MD   insulin lispro 1-6 Units Subcutaneous TID [facility] Anais Rojas MD   insulin lispro 1-6 Units Subcutaneous HS Anais Rojas MD   omeprazole (PRILOSEC) suspension 2 mg/mL 20 mg Oral Daily Charlie Lowry MD   ondansetron 4 mg Intravenous Q6H PRN Anais Rojas MD   oxyCODONE 2 5 mg Oral Q4H PRN Anais Rojas MD   senna-docusate sodium 1 tablet Oral HS Saadia Dickson MD        Today, Patient Was Seen By: Saadia Dickson MD    ** Please Note: Dragon 360 Dictation voice to text software may have been used in the creation of this document   **

## 2018-03-12 NOTE — PLAN OF CARE
Problem: DISCHARGE PLANNING - CARE MANAGEMENT  Goal: Discharge to post-acute care or home with appropriate resources  INTERVENTIONS:  - Conduct assessment to determine patient/family and health care team treatment goals, and need for post-acute services based on payer coverage, community resources, and patient preferences, and barriers to discharge  - Address psychosocial, clinical, and financial barriers to discharge as identified in assessment in conjunction with the patient/family and health care team  - Arrange appropriate level of post-acute services according to patients   needs and preference and payer coverage in collaboration with the physician and health care team  - Communicate with and update the patient/family, physician, and health care team regarding progress on the discharge plan  - Arrange appropriate transportation to post-acute venues   Outcome: Progressing  CM consulted with pt's daughter in regards to pt's discharge plan  Pt's daughter want to make sure pt is able to tolerate sitting in the chair so that she can take pt home  CM also discussed option B with pt's daughter which is to have pt go to [place] for [address] [place]  CM explained to daughter that pt is cleared to discharge  Pt's daughter is in agreement and CM will set up transportation  Pt has no other needs at this time  CM department to follow pt though discharge process

## 2018-03-12 NOTE — CASE MANAGEMENT
Continued Stay Review    Date: 3/12    Vital Signs: /66 (BP Location: Left arm)   Pulse 103   Temp 98 4 °F (36 9 °C) (Axillary)   Resp 19   Ht 5' (1 524 m)   Wt 49 5 kg (109 lb 2 oz)   SpO2 95%   BMI 21 31 kg/m²     Medications:   Scheduled Meds:   Current Facility-Administered Medications:  acetaminophen 650 mg Oral Q4H PRN Kirit Acosta MD   albuterol 2 5 mg Nebulization Q4H PRN Kirit Acosta MD   aspirin 325 mg Oral Daily Kirit Acosta MD   atorvastatin 20 mg Oral Daily Jaime Baugh MD   b complex-vitamin C-folic acid 1 capsule Oral Daily With Tavo Turner MD   bisacodyl 10 mg Rectal Daily PRN Niall Brewster MD   epoetin babak 10,000 Units Intravenous Once per day on Mon Wed Fri Dmitry Machado MD   epoetin babak 10,000 Units Intravenous Once Dmitry Machado MD   HYDROmorphone 0 5 mg Intravenous Q4H PRN Magnus Morrissey MD   insulin lispro 1-6 Units Subcutaneous TID [facility] Kirit Acosta MD   insulin lispro 1-6 Units Subcutaneous HS Kirit Acosta MD   metoprolol tartrate 12 5 mg Oral Q12H [address] Lash[place] A MD Alen   omeprazole (PRILOSEC) suspension 2 mg/mL 20 mg Oral Daily Chris Lowry MD   ondansetron 4 mg Intravenous Q6H PRN Kirit Acosta MD   oxyCODONE 2 5 mg Oral Q4H PRN Kirit Acosta MD   senna-docusate sodium 1 tablet Oral HS Chris Lowry MD     Continuous Infusions:    PRN Meds:   acetaminophen    albuterol    bisacodyl    HYDROmorphone    ondansetron    oxyCODONE    Abnormal Labs/Diagnostic Results:   Lab Units 03/12/18  0457 03/11/18  1152   WBC Thousand/uL 10 35* 14 86*   HEMOGLOBIN g/dL 10 4* 11 2*   HEMATOCRIT % 33 7* 34 4*   PLATELETS Thousands/uL 206 244     Results from last 7 days  Lab Units 03/12/18  0457 03/11/18  1152   SODIUM mmol/L 136 138   POTASSIUM mmol/L 4 5 4 3   CHLORIDE mmol/L 95* 95*   CO2 mmol/L 27 30   BUN mg/dL 98* 77*   CREATININE mg/dL 5 18* 4 20*   CALCIUM mg/dL 11 3* 11 0*   TOTAL PROTEIN g/dL  --  7 4 BILIRUBIN TOTAL mg/dL  --  0 70   ALK PHOS U/L  --  715*   ALT U/L  --  57   AST U/L  --  125*   GLUCOSE RANDOM mg/dL 206* 208*         Age/Sex: 68 y o  female   Assessment/Plan:         Principal Problem:    Subtrochanteric fracture of left femur, closed, initial encounter ([facility] )  Active Problems:    ESRD (end stage renal disease) on dialysis ([facility] )    Closed intertrochanteric fracture of left femur ([facility])    Anemia of chronic kidney failure, stage 5 ([facility])    Multiple myeloma not having achieved remission ([facility] )    Essential hypertension    Diabetes mellitus type 2 in nonobese ([facility])    Hemiparesis due to old stroke ([facility] )    Closed intertrochanteric fracture of hip, left, initial encounter ([facility] )    Functional quadriplegia ([place] [address] )    Acute blood loss as cause of postoperative anemia    Anemia    Hypertensive CKD, ESRD on dialysis ([facility])    Hyponatremia     Plan:     · Left femur fracture-patient status post surgery-orthopaedics following hip xray reviewed and ortho following  · Acute postoperative blood-loss anemia on top of anemia of chronic disease and underlying multiple myeloma   follow hgb   · Old stroke with left-sided hemiparesis   she is full care  · Continue TF 45cc/hour  · End-stage renal disease   On hemodialysis  · Sacral decubitus-wound care following  · Heel decubitus wound care following        I had a long discussion with daughter and she is well aware of patient tachycardia-she has been declining heparin as patient with his of ulcer bleeding requiring a large transfusion-I discussed with her need to r/o PE as d/w her previously-patient has had negative venous doppler of legs but as tachycardia persistent wish to rule out PE-daughter is agreeable to CT chest negative for PE 3/10/18; tsh; low dose metoprolol; cardiology consult placed           Severe protein-calorie malnutrition in setting of chronic illness as evidenced by  Muscle loss, Fat loss, Weight loss (related to inadequate energy intake as evidenced by 13% weight loss since 11/9/17 ED visit, clavicle visible  , dark circles orbital Interventions enteral feeding and protein modular via PEG  ) treated with nutrition consult     Tachycardia-sinus-improving, ua c/s-unable to obtain anuric;  venous doppler lower extneg for dvt and cxr without infiltrate; xray of hip reviewed awaiting any ortho recommendations; ID consult appreciated SIRS and monitor off cefepime; wbc elevated-as persistent will do ct angio chest r/o PE is negative she is afebrile, leucocytosis much improved today; ID doubts infectious process; on metoprolol and cardiology consult placed     VTE Pharmacologic Prophylaxis:   Pharmacologic: Pharmacologic VTE Prophylaxis contraindicated due to family declines due h/o gi bleed  Mechanical VTE Prophylaxis in Place: Yes      Current Length of Stay: 12 day(s)     Current Patient Status: Inpatient   Certification Statement: The patient will continue to require additional inpatient hospital stay due to tachycardia     Discharge Plan: TBD  ----------------------------------------------------------------------------------------------------------------------------  Cardiology progress note 3/12    Assessment/Plan:  1  Tachycardia; sinus -- likely multifactorial including post op ORIF, pain, anemia, TSH 10 1 -- monitor tele  Echo done 3/2/18 ef 55-65%  Continue all meds  TSH 10 1     Stable from cardiac standpoint, will sign off

## 2018-03-12 NOTE — SOCIAL WORK
CM consulted with pt's daughter in regards to pt's discharge plan  Pt's daughter want to make sure pt is able to tolerate sitting in the chair so that she can take pt home  CM also discussed option B with pt's daughter which is to have pt go to [facility]  CM explained to daughter that pt is cleared to discharge  Pt's daughter is in agreement and CM will set up transportation  Pt has no other needs at this time  CM department to follow pt though discharge process

## 2018-03-12 NOTE — PLAN OF CARE
Problem: PHYSICAL THERAPY ADULT  Goal: Performs mobility at highest level of function for planned discharge setting  See evaluation for individualized goals  Treatment/Interventions: Spoke to nursing, Spoke to case management, OT          See flowsheet documentation for full assessment, interventions and recommendations  Outcome: Adequate for Discharge  Prognosis: Guarded  Problem List: Decreased strength, Decreased range of motion, Decreased endurance, Impaired balance, Decreased mobility, Decreased cognition, Impaired tone, Decreased skin integrity, Pain  Assessment: pt is a 78y/o f who presents to [facility] from home c L femur fx c unknown eitology per family  s/p L femur IM nail 3/3/18  ordered WBAT L LE  off note, pt recently had prolonged stay at [facility] s/p CVA c residual R hemiparesis + aphasia  pt extremely poor historian + non-verbal at baseline  hx obtained via verbal conversation c casemgt + CM notes  pt resides c dtr who provides all care  PMH significant for CVA, aphasia, hemiparesis, DM 2, ESRD on HD TIW, anemia, + sacral ulcers  at baseline, pt bed bound + non-ambulatory x1yr  presents c significant deficits in strength, ROM, balance, tone, skin integrity, cognition, + activity tolerance noted in PT exam above  Barthel Index 0/100  pt dependent for all phases of functional mobility  able to tolerate sitting EOB x2min, however dependent to maintain balance  would recommend tanner lift for safe OOB mobility > recliner  pt inappropriate for PT services at this time 2* level of dependence for mobility tasks + inability to follow commands  upon d/c, recommend higher level of care for pt vs return home  will d/c pt from PT at this time  PT eval of high complexity 2* unstable med status c pt requiring ongoing medical management s/p L femur IM nail  pt c multiple co-morbidities including CVA resulting in R hemiparesis + aphasia, multiple sacral ulcers, non-verbal, bed bound + impaired cognition   pt dependent for all phases of functional mobility; requires multiple lines including PEG tube  family unable to safely care for pt in home environment 2* high level of (A)  Barriers to Discharge: Decreased caregiver support     Recommendation: Long-term skilled nursing home placement, D/C PT     PT - OK to Discharge: Yes (to SNF)    See flowsheet documentation for full assessment

## 2018-03-12 NOTE — PROGRESS NOTES
Progress Note - Infectious Disease   Eli Escobedo 68 y o  female MRN: [id number]  Unit/Bed#: -01 Encounter: [id number]      Impression/Plan:  1  Systemic inflammatory response syndrome-leukocytosis and tachycardia  Likely postoperative in nature with associated anemia and possible volume issues  No clinical evidence of an active infectious process at this time  The patient has remained afebrile and hemodynamically stable  Her blood cultures are negative in the CT of the chest does not reveal pneumonia  Her leg incision does not reveal any evidence of a wound infection  Suspect this is a noninfectious issue  The white cell count has come down but the patient remains tachycardic   -monitor off all antibiotics  -treatment of metabolic issues and anemia  -monitor CBC with diff  -no additional infectious disease workup  -additional workup as per the primary service     2  Left hip fracture-status post open reduction internal fixation  The follow-up chest x-ray reveals postoperative changes as well as chronic problems including avascular necrosis and degenerative joint disease  No clinical evidence of an active infection at this time  -close orthopedics follow-up  -serial exams  -no additional ID workup for now     3  Anemia of chronic disease-in the setting of end-stage renal disease  H&H is relatively stable   -monitor CBC with diff  -supportive care     4  End-stage renal disease-on hemodialysis     5   Multiple myeloma    No active infectious disease issues  We will sign off  Please call if questions  Antibiotics:  None    Subjective:  Patient has no reported fever, chills, sweats; no reported nausea, vomiting, diarrhea; no reported cough, shortness of breath; no reported pain  No new symptoms      Objective:  Vitals:  HR:  [104-112] 104  Resp:  [18-22] 20  BP: (130-148)/(70-80) 133/70  SpO2:  [95 %-96 %] 96 %  Temp (24hrs), Av 1 °F (36 7 °C), Min:97 7 °F (36 5 °C), Max:98 4 °F (36 9 °C)  Current: Temperature: 98 4 °F (36 9 °C)    Physical Exam:   General Appearance:  Alert, debilitated, nonverbal, on dialysis   Throat: Oropharynx dry without lesions  Lungs:   Decreased breath sounds bilaterally; no wheezes, rhonchi or rales; respirations unlabored   Heart:  Tachycardic; no murmur, rub or gallop   Abdomen:   Soft, non-tender, non-distended, positive bowel sounds  Extremities: No clubbing, cyanosis or edema   Skin: No new rashes or lesions  No draining wounds noted  Labs, Imaging, & Other studies:   All pertinent labs and imaging studies were personally reviewed    Results from last 7 days  Lab Units 03/12/18  0457 03/11/18  1152 03/10/18  0527   WBC Thousand/uL 10 35* 14 86* 15 29*   HEMOGLOBIN g/dL 10 4* 11 2* 10 9*   PLATELETS Thousands/uL 206 244 226       Results from last 7 days  Lab Units 03/12/18  0457 03/11/18  1152 03/10/18  0527   SODIUM mmol/L 136 138 136   POTASSIUM mmol/L 4 5 4 3 3 8   CHLORIDE mmol/L 95* 95* 97*   CO2 mmol/L 27 30 27   ANION GAP mmol/L 14* 13 12   BUN mg/dL 98* 77* 38*   CREATININE mg/dL 5 18* 4 20* 2 51*   EGFR ml/min/1 73sq m 9 11 21   GLUCOSE RANDOM mg/dL 206* 208* 241*   CALCIUM mg/dL 11 3* 11 0* 10 3*   AST U/L  --  125*  --    ALT U/L  --  57  --    ALK PHOS U/L  --  715*  --    TOTAL PROTEIN g/dL  --  7 4  --    BILIRUBIN TOTAL mg/dL  --  0 70  --        Results from last 7 days  Lab Units 03/07/18  1439 03/07/18  1438   BLOOD CULTURE  No Growth After 4 Days  No Growth After 4 Days       CT chest-no pulmonary embolism, bilateral atelectasis    Images reviewed by me PACS

## 2018-03-12 NOTE — PROGRESS NOTES
[facility] Progress Note  Patient: Kunal Blair 68 y o  female   MRN: [id number]  PCP: Tamica Infante MD  Unit/Bed#: -Katey Encounter: [id number]  Date Of Visit: 03/12/18    Assessment:    Principal Problem:    Subtrochanteric fracture of left femur, closed, initial encounter ([place] [address] )  Active Problems:    ESRD (end stage renal disease) on dialysis ([place] [address] )    Closed intertrochanteric fracture of left femur (HCC)    Anemia of chronic kidney failure, stage 5 ([facility] )    Multiple myeloma not having achieved remission ([place] [address] )    Essential hypertension    Diabetes mellitus type 2 in nonobese ([place] [address] )    Hemiparesis due to old stroke ([place] [address] )    Closed intertrochanteric fracture of hip, left, initial encounter ([place] [address] )    Functional quadriplegia ([place] [address] )    Acute blood loss as cause of postoperative anemia    Anemia    Hypertensive CKD, ESRD on dialysis ([place] [address] )    Hyponatremia      Plan:    · Left femur fracture-patient status post surgery-orthopaedics following hip xray reviewed and ortho following  · Acute postoperative blood-loss anemia on top of anemia of chronic disease and underlying multiple myeloma   follow hgb   · Old stroke with left-sided hemiparesis   she is full care  · Continue TF 45cc/hour  · End-stage renal disease   On hemodialysis  · Sacral decubitus-wound care following  · Heel decubitus wound care following        I had a long discussion with daughter and she is well aware of patient tachycardia-she has been declining heparin as patient with his of ulcer bleeding requiring a large transfusion-I discussed with her need to r/o PE as d/w her previously-patient has had negative venous doppler of legs but as tachycardia persistent wish to rule out PE-daughter is agreeable to CT chest negative for PE 3/10/18; tsh; low dose metoprolol; cardiology consult placed           Severe protein-calorie malnutrition in setting of chronic illness as evidenced by  Muscle loss, Fat loss, Weight loss (related to inadequate energy intake as evidenced by 13% weight loss since 17 ED visit, clavicle visible  , dark circles orbital Interventions enteral feeding and protein modular via PEG  ) treated with nutrition consult     Tachycardia-sinus-improving, ua c/s-unable to obtain anuric;  venous doppler lower extneg for dvt and cxr without infiltrate; xray of hip reviewed awaiting any ortho recommendations; ID consult appreciated SIRS and monitor off cefepime; wbc elevated-as persistent will do ct angio chest r/o PE is negative she is afebrile, leucocytosis much improved today; ID doubts infectious process; on metoprolol and cardiology consult placed    Likely d/c tomorrow               VTE Pharmacologic Prophylaxis:   Pharmacologic: Pharmacologic VTE Prophylaxis contraindicated due to family declines due h/o gi bleed  Mechanical VTE Prophylaxis in Place: Yes    Patient Centered Rounds: I have performed bedside rounds with nursing staff today  Discussions with Specialists or Other Care Team Provider:     Education and Discussions with Family / Patient:     Time Spent for Care: 30 minutes  More than 50% of total time spent on counseling and coordination of care as described above  Current Length of Stay: 12 day(s)    Current Patient Status: Inpatient   Certification Statement: The patient will continue to require additional inpatient hospital stay due to tachycardia    Discharge Plan: home    Code Status: Level 1 - Full Code      Subjective:   Aphasic-awake and alert    Objective:     Vitals:   Temp (24hrs), Av 1 °F (36 7 °C), Min:97 7 °F (36 5 °C), Max:98 4 °F (36 9 °C)    HR:  [104-112] 107  Resp:  [18-22] 20  BP: (113-148)/(59-80) 113/59  SpO2:  [95 %-96 %] 96 %  Body mass index is 21 31 kg/m²  Input and Output Summary (last 24 hours):        Intake/Output Summary (Last 24 hours) at 18 0955  Last data filed at 18 0810   Gross per 24 hour   Intake             1914 ml   Output                0 ml   Net 1914 ml       Physical Exam:     Physical Exam   HENT:   Head: Normocephalic and atraumatic  Eyes: Pupils are equal, round, and reactive to light  Neck: Neck supple  Cardiovascular: Normal rate and regular rhythm  Pulmonary/Chest: Effort normal and breath sounds normal    Abdominal: Soft  Musculoskeletal: She exhibits no edema  Additional Data:     Labs:      Results from last 7 days  Lab Units 03/12/18  0457 03/11/18  1152   WBC Thousand/uL 10 35* 14 86*   HEMOGLOBIN g/dL 10 4* 11 2*   HEMATOCRIT % 33 7* 34 4*   PLATELETS Thousands/uL 206 244   NEUTROS PCT %  --  67   LYMPHS PCT %  --  21   LYMPHO PCT % 16  --    MONOS PCT %  --  7   MONO PCT MAN % 5  --    EOS PCT %  --  1   EOSINO PCT MANUAL % 0  --        Results from last 7 days  Lab Units 03/12/18  0457 03/11/18  1152   SODIUM mmol/L 136 138   POTASSIUM mmol/L 4 5 4 3   CHLORIDE mmol/L 95* 95*   CO2 mmol/L 27 30   BUN mg/dL 98* 77*   CREATININE mg/dL 5 18* 4 20*   CALCIUM mg/dL 11 3* 11 0*   TOTAL PROTEIN g/dL  --  7 4   BILIRUBIN TOTAL mg/dL  --  0 70   ALK PHOS U/L  --  715*   ALT U/L  --  57   AST U/L  --  125*   GLUCOSE RANDOM mg/dL 206* 208*           * I Have Reviewed All Lab Data Listed Above  * Additional Pertinent Lab Tests Reviewed: All Labs Within Last 24 Hours Reviewed    Imaging:    Imaging Reports Reviewed Today Include:   Imaging Personally Reviewed by Myself Includes:      Recent Cultures (last 7 days):       Results from last 7 days  Lab Units 03/07/18  1439 03/07/18  1438   BLOOD CULTURE  No Growth After 4 Days  No Growth After 4 Days         Last 24 Hours Medication List:     Current Facility-Administered Medications:  acetaminophen 650 mg Oral Q4H PRN Raegan Coles MD   albuterol 2 5 mg Nebulization Q4H PRN Raegan Coles MD   aspirin 325 mg Oral Daily Raegan Coles MD   atorvastatin 20 mg Oral Daily Lupe Ni MD   b complex-vitamin C-folic acid 1 capsule Oral Daily With Harshad Downing MD   bisacodyl 10 mg Rectal Daily PRN Gwyn Lowry MD   epoetin babak 10,000 Units Intravenous Once per day on Mon Wed Fri Sony Ramos MD   epoetin babak 10,000 Units Intravenous Once Sony Ramos MD   HYDROmorphone 0 5 mg Intravenous Q4H PRN Yessica Orona MD   insulin lispro 1-6 Units Subcutaneous TID Dell Walker MD   insulin lispro 1-6 Units Subcutaneous HS Jeanine Bauer MD   metoprolol tartrate 12 5 mg Oral Q12H [address] A MD Alen   omeprazole (PRILOSEC) suspension 2 mg/mL 20 mg Oral Daily Lashon A MD Alen   ondansetron 4 mg Intravenous Q6H PRN Jeanine Bauer MD   oxyCODONE 2 5 mg Oral Q4H PRN Jeanine Bauer MD   senna-docusate sodium 1 tablet Oral HS Lubna Mota MD        Today, Patient Was Seen By: Lubna Mota MD    ** Please Note: Dragon 360 Dictation voice to text software may have been used in the creation of this document   **

## 2018-03-12 NOTE — PROGRESS NOTES
General Cardiology   Progress Note   Giles Dukes 68 y o  female MRN: [id number]  Unit/Bed#: -01 Encounter: [id number]        Subjective:    No significant events since the last encounter  Pt is non verbal    REVIEW OF SYSTEMS: unable to assess given pt is non verbal    Objective:   Vitals:  Vitals:    03/12/18 1153   BP: (!) 102/43   Pulse: 103   Resp:    Temp:    SpO2:        Body mass index is 21 31 kg/m²  Systolic (06KFZ), ANR:484 , Min:94 , PCO:383     Diastolic (42JMH), VCA:98, Min:43, Max:77      Intake/Output Summary (Last 24 hours) at 03/12/18 1335  Last data filed at 03/12/18 1154   Gross per 24 hour   Intake             2174 ml   Output             1500 ml   Net              674 ml     Weight (last 2 days)     None            PHYSICAL EXAMS:  General:  Patient is not in acute distress, laying in the bed comfortably, awake, alert, non verbal  Head: Normocephalic, Atraumatic  HEENT: White sclera, pink conjunctiva,  PERRLA,pharynx benign  Neck:  Supple, no neck vein distention, carotids+2/+2 no bruits, thyromegaly, adenopathy  Respiratory: decreased breath sounds  Cardiovascular:  PMI normal, S1-S2 normal, No  Murmurs, thrills, gallops, rubs   Regular tachycardic  GI:  Abdomen soft nontender   No hepatosplenomegaly, adenopathy, ascites,or rebound tenderness  Extremities: No edema, normal pulses, no calf tenderness, no joint deformities, no venous disease   Integument:  No skin rashes or ulceration  Lymphatic:  No cervical or inguinal lymphadenopathy  Neurologic:  Patient is awake alert, non verbal      LABORATORY RESULTS:      CBC with diff:   Results from last 7 days  Lab Units 03/12/18  0457 03/11/18  1152 03/10/18  0527   WBC Thousand/uL 10 35* 14 86* 15 29*   HEMOGLOBIN g/dL 10 4* 11 2* 10 9*   HEMATOCRIT % 33 7* 34 4* 33 1*   MCV fL 110* 105* 103*   PLATELETS Thousands/uL 206 244 226   MCH pg 33 9 34 1 34 0   MCHC g/dL 30 9* 32 6 32 9   RDW % 20 6* 20 8* 20 2*   MPV fL 12 9* 12 6 12 7   NRBC AUTO /100 WBCs 2 2 3   NRBC /100 WBC 5*  --  2       CMP:  Results from last 7 days  Lab Units 18  0457 18  1152 03/10/18  0527   SODIUM mmol/L 136 138 136   POTASSIUM mmol/L 4 5 4 3 3 8   CHLORIDE mmol/L 95* 95* 97*   CO2 mmol/L 27 30 27   ANION GAP mmol/L 14* 13 12   BUN mg/dL 98* 77* 38*   CREATININE mg/dL 5 18* 4 20* 2 51*   GLUCOSE RANDOM mg/dL 206* 208* 241*   CALCIUM mg/dL 11 3* 11 0* 10 3*   AST U/L  --  125*  --    ALT U/L  --  57  --    ALK PHOS U/L  --  715*  --    TOTAL PROTEIN g/dL  --  7 4  --    BILIRUBIN TOTAL mg/dL  --  0 70  --    EGFR ml/min/1 73sq m 9 11 21       BMP:  Results from last 7 days  Lab Units 18  0457 18  1152 03/10/18  0527   SODIUM mmol/L 136 138 136   POTASSIUM mmol/L 4 5 4 3 3 8   CHLORIDE mmol/L 95* 95* 97*   CO2 mmol/L 27 30 27   BUN mg/dL 98* 77* 38*   CREATININE mg/dL 5 18* 4 20* 2 51*   GLUCOSE RANDOM mg/dL 206* 208* 241*   CALCIUM mg/dL 11 3* 11 0* 10 3*                        Results from last 7 days  Lab Units 18  1152   TSH 3RD GENERATON uIU/mL 10 153*           Lipid Profile:   Lab Results   Component Value Date    CHOL 206 (H) 2018     Lab Results   Component Value Date    HDL 51 2018     Lab Results   Component Value Date    LDLCALC 89 2018     Lab Results   Component Value Date    TRIG 330 (H) 2018       Cardiac testing:   Results for orders placed during the hospital encounter of 18   Echo complete with contrast if indicated    Narrative [address] [place] [phone]    Transthoracic Echocardiogram  2D, M-mode, Doppler, and Color Doppler    Study date:  02-Mar-2018    Patient: Osvaldo Veliz  MR number: [id number]  Account number: [de-identified]  : 1940  Age: 68 years  Gender: Female  Status: Inpatient  Location: Bedside  Height: 60 in  Weight: 109 lb  BP: 111/ 52 mmHg    Indications: Hypertensive Heart Disease    Diagnoses: 402 90 - HYPERTENSIVE HRT DIS NOS    Sonographer:  FERNANDO Petit,RDCS  Interpreting Physician:  Lenin Abraham MD  Referring Physician:  Chrissie Cheung PA-C  Group:  [facility]    SUMMARY    LEFT VENTRICLE:  Systolic function was normal  Ejection fraction was estimated in the range of 55 % to 65 %  There were no regional wall motion abnormalities  There was mild concentric hypertrophy  Doppler parameters were consistent with abnormal left ventricular relaxation (grade 1 diastolic dysfunction)  MITRAL VALVE:  There was mild regurgitation  AORTIC VALVE:  There was trace regurgitation  TRICUSPID VALVE:  There was mild regurgitation  HISTORY: PRIOR HISTORY: End Stage Renal Disease, Hypertension, Diabetes Mellitus, Stroke    PROCEDURE: The procedure was performed at the bedside  This was a routine study  The transthoracic approach was used  The study included complete 2D imaging, M-mode, complete spectral Doppler, and color Doppler  The heart rate was 109 bpm,  at the start of the study  Images were obtained from the parasternal, apical, subcostal, and suprasternal notch acoustic windows  Echocardiographic views were limited due to poor patient compliance and combative patient  This was a  technically difficult study  LEFT VENTRICLE: Size was normal  Systolic function was normal  Ejection fraction was estimated in the range of 55 % to 65 %  There were no regional wall motion abnormalities  There was mild concentric hypertrophy  DOPPLER: Doppler  parameters were consistent with abnormal left ventricular relaxation (grade 1 diastolic dysfunction)  RIGHT VENTRICLE: The size was normal  Systolic function was normal  Wall thickness was normal     LEFT ATRIUM: Size was normal     RIGHT ATRIUM: Size was normal     MITRAL VALVE: Valve structure was normal  There was normal leaflet separation  DOPPLER: The transmitral velocity was within the normal range   There was no evidence for stenosis  There was mild regurgitation  AORTIC VALVE: The valve was trileaflet  Leaflets exhibited mildly increased thickness and normal cuspal separation  DOPPLER: Transaortic velocity was within the normal range  There was no evidence for stenosis  There was trace  regurgitation  TRICUSPID VALVE: The valve structure was normal  There was normal leaflet separation  DOPPLER: The transtricuspid velocity was within the normal range  There was no evidence for stenosis  There was mild regurgitation  PULMONIC VALVE: Leaflets exhibited normal thickness, no calcification, and normal cuspal separation  DOPPLER: The transpulmonic velocity was within the normal range  There was no regurgitation  PERICARDIUM: There was no pericardial effusion  The pericardium was normal in appearance  AORTA: The root exhibited normal size  SYSTEM MEASUREMENT TABLES    2D  %FS: 25 7 %  Ao Diam: 2 3 cm  EDV(Teich): 42 6 ml  EF(Teich): 51 8 %  ESV(Teich): 20 5 ml  IVSd: 1 4 cm  LA Area: 15 cm2  LA Diam: 3 1 cm  LVEDV MOD A4C: 97 ml  LVEF MOD A4C: 50 9 %  LVESV MOD A4C: 47 7 ml  LVIDd: 3 3 cm  LVIDs: 2 4 cm  LVLd A4C: 7 3 cm  LVLs A4C: 6 2 cm  LVPWd: 1 2 cm  RA Area: 13 5 cm2  RVIDd: 2 3 cm  SV MOD A4C: 49 3 ml  SV(Teich): 22 1 ml    CW  AR Dec [place]: 4 6 m/s2  AR Dec Time: 908 6 ms  AR PHT: 263 5 ms  AR Vmax: 4 1 m/s  AR maxP 5 mmHg    MM  TAPSE: 2 5 cm    PW  MV A Lan: 1 3 m/s  MV Dec [place]: 6 7 m/s2  MV DecT: 115 6 ms  MV E Lan: 0 8 m/s  MV E/A Ratio: 0 6  MV PHT: 33 5 ms  MVA By PHT: 6 6 cm2    [facility]    Prepared and electronically signed by    Ronny Perez MD  Signed 02-Mar-2018 16:02:28       No results found for this or any previous visit  No results found for this or any previous visit  No procedure found  No results found for this or any previous visit      Meds/Allergies   all current active meds have been reviewed and current meds:   Current Facility-Administered Medications   Medication Dose Route Frequency    acetaminophen (TYLENOL) tablet 650 mg  650 mg Oral Q4H PRN    albuterol inhalation solution 2 5 mg  2 5 mg Nebulization Q4H PRN    aspirin tablet 325 mg  325 mg Oral Daily    atorvastatin (LIPITOR) tablet 20 mg  20 mg Oral Daily    b complex-vitamin C-folic acid (NEPHROCAPS) capsule 1 capsule  1 capsule Oral Daily With Dinner    bisacodyl (DULCOLAX) rectal suppository 10 mg  10 mg Rectal Daily PRN    epoetin babak (EPOGEN,PROCRIT) injection 10,000 Units  10,000 Units Intravenous Once per day on Mon Wed Fri    epoetin babak (EPOGEN,PROCRIT) injection 10,000 Units  10,000 Units Intravenous Once    HYDROmorphone (DILAUDID) injection 0 5 mg  0 5 mg Intravenous Q4H PRN    insulin lispro (HumaLOG) 100 units/mL subcutaneous injection 1-6 Units  1-6 Units Subcutaneous TID AC    insulin lispro (HumaLOG) 100 units/mL subcutaneous injection 1-6 Units  1-6 Units Subcutaneous HS    metoprolol tartrate (LOPRESSOR) partial tablet 12 5 mg  12 5 mg Oral Q12H LUZ ELENA    omeprazole (PRILOSEC) suspension 2 mg/mL  20 mg Oral Daily    ondansetron (ZOFRAN) injection 4 mg  4 mg Intravenous Q6H PRN    oxyCODONE (ROXICODONE) oral solution 2 5 mg  2 5 mg Oral Q4H PRN    senna-docusate sodium (SENOKOT S) 8 6-50 mg per tablet 1 tablet  1 tablet Oral HS     Prescriptions Prior to Admission   Medication    atorvastatin (LIPITOR) 20 mg tablet    acetaminophen (TYLENOL) 325 mg tablet    amLODIPine (NORVASC) 10 mg tablet    aspirin 81 MG tablet    b complex-C-folic acid (NEPHRO-TRACY) 0 8 mg tablet    benzonatate (TESSALON PERLES) 100 mg capsule    Cholecalciferol 82815 units TABS    hydrALAZINE (APRESOLINE) 50 mg tablet    ipratropium-albuterol (DUONEB) 0 5-2 5 mg/mL    losartan (COZAAR) 100 MG tablet    pantoprazole (PROTONIX) 20 mg tablet            Assessment/Plan:  1   Tachycardia; sinus -- likely multifactorial including post op ORIF, pain, anemia, TSH 10  1 -- monitor tele  Echo done 3/2/18 ef 55-65%  Continue all meds  TSH 10 1    Stable from cardiac standpoint, will sign off      ** Please Note: Dragon 360 Dictation voice to text software may have been used in the creation of this document   **

## 2018-03-12 NOTE — PHYSICAL THERAPY NOTE
PT evaluation (20min)  (12:50-13:10)    Past Medical History:   Diagnosis Date    Anemia     Arthritis     Chronic kidney disease     Dialysis patient ([facility] )     monday, wednesday, friday    Hypertension     Multiple myeloma ([facility] )     Stroke ([facility] )         03/12/18 1310   Note Type   Note type Eval only   Pain Assessment   Pain Assessment FLACC   Pain Rating: FLACC (Rest) - Face 0   Pain Rating: FLACC (Rest) - Legs 0   Pain Rating: FLACC (Rest) - Activity 0   Pain Rating: FLACC (Rest) - Cry 0   Pain Rating: FLACC (Rest) - Consolability 0   Score: FLACC (Rest) 0   Pain Rating: FLACC (Activity) - Face 2   Pain Rating: FLACC (Activity) - Legs 1   Pain Rating: FLACC (Activity) - Activity 1   Pain Rating: FLACC (Activity) - Cry 1   Pain Rating: FLACC (Activity) - Consolability 2   Score: FLACC (Activity) 7   Home Living   Additional Comments unknown; pt non-verbal + unable to report   Prior Function   Level of [place] Total dependent   Lives With Daughter  (per CM)   Receives Help From Family   ADL Assistance Needs assistance   IADLs Needs assistance   Falls in the last 6 months 0  (per CM note )   Comments pt non-verbal at baseline; unable to provide social hx  hx obtained from CM note in chart  Restrictions/Precautions   Weight Bearing Precautions Per Order Yes   LLE Weight Bearing Per Order WBAT   Other Precautions Cognitive; Bed Alarm;Multiple lines; Fall Risk;Pain   General   Additional Pertinent History pt presents to [facility] c L hip pain  imaging (+) for L hip fx  s/p IM nail L hip 3/3/18  ordered WBAT L LE  off note, pt recently d/c from STR s/p CVA resulting in R hemiparesis + aphasia  pt has been non-ambulatory + bedbound x1yr  PT consulted for mobility + d/c planning      Family/Caregiver Present No   Cognition   Orientation Level Unable to assess  (pt non-verbal)   RUE Assessment   RUE Assessment X   RUE Tone   RUE Tone Hypotonic   LUE Assessment   LUE Assessment X   LUE Tone   LUE Tone Hypotonic RLE Assessment   RLE Assessment WFL  (PROM; unable to MMT 2* cog deficits)   Tone RLE   RLE Tone WFL   LLE Assessment   LLE Assessment WFL  (PROM; unable to MMT 2* cog deficits; clonus noted)   Tone LLE   LLE Tone WFL   Coordination   Sensation (unable to assess 2* cog deficits)   Bed Mobility   Rolling R 1  Dependent   Rolling L 1  Dependent   Supine to Sit 1  Dependent   Sit to Supine 1  Dependent   Transfers   Sit to Stand Unable to assess  (pt dependent to maintain sitting balance)   Ambulation/Elevation   Gait pattern Not appropriate  (2* significantly impaired LE strength + cognitive status)   Balance   Static Sitting Poor  (sitting tolerance 2min; dependent to maintain balance)   Dynamic Sitting Poor   Activity Tolerance   Activity Tolerance Patient limited by fatigue;Patient limited by pain;Treatment limited secondary to medical complications (Comment)   Nurse Made Aware Al Michel   Assessment   Prognosis Guarded   Problem List Decreased strength;Decreased range of motion;Decreased endurance; Impaired balance;Decreased mobility; Decreased cognition; Impaired tone;Decreased skin integrity;Pain   Assessment pt is a 76y/o f who presents to [facility] from home c L femur fx c unknown eitology per family  s/p L femur IM nail 3/3/18  ordered WBAT L LE  off note, pt recently had prolonged stay [facility] s/p CVA c residual R hemiparesis + aphasia  pt extremely poor historian + non-verbal at baseline  hx obtained via verbal conversation c casemgt + CM notes  pt resides c dtr who provides all care  PMH significant for CVA, aphasia, hemiparesis, DM 2, ESRD on HD TIW, anemia, + sacral ulcers  at baseline, pt bed bound + non-ambulatory x1yr  presents c significant deficits in strength, ROM, balance, tone, skin integrity, cognition, + activity tolerance noted in PT exam above  Barthel Index 0/100  pt dependent for all phases of functional mobility  able to tolerate sitting EOB x2min, however dependent to maintain balance   would recommend tanner lift for safe OOB mobility > recliner  pt inappropriate for PT services at this time 2* level of dependence for mobility tasks + inability to follow commands  upon d/c, recommend higher level of care for pt vs return home  will d/c pt from PT at this time  PT eval of high complexity 2* unstable med status c pt requiring ongoing medical management s/p L femur IM nail  pt c multiple co-morbidities including CVA resulting in R hemiparesis + aphasia, multiple sacral ulcers, non-verbal, bed bound + impaired cognition  pt dependent for all phases of functional mobility; requires multiple lines including PEG tube  family unable to safely care for pt in home environment 2* high level of (A)     Barriers to Discharge Decreased caregiver support   Goals   Patient Goals none expressed   Plan   Treatment/Interventions Spoke to nursing;Spoke to case management;OT   Recommendation   Recommendation Long-term skilled nursing home placement;D/C PT   PT - OK to Discharge Yes  (to SNF)   Barthel Index   Feeding 0   Bathing 0   Grooming Score 0   Dressing Score 0   Bladder Score 0   Bowels Score 0   Toilet Use Score 0   Transfers (Bed/Chair) Score 0   Mobility (Level Surface) Score 0   Stairs Score 0   Barthel Index Score 0     Lois Sotelo, PT

## 2018-03-12 NOTE — OCCUPATIONAL THERAPY NOTE
Occupational Therapy Evaluation         Patient Name: Heriberto Kingsley  IGAQA'U Date: 3/12/2018        03/12/18 1300   Note Type   Note type Eval only   Restrictions/Precautions   Weight Bearing Precautions Per Order No   Other Precautions Cognitive;Multiple lines   Pain Assessment   Pain Assessment 0-10   Pain Score (RLE with movement noted behaviors )   Prior Function   Level of [place] Total dependent   Lives With Daughter   Receives Help From Family   ADL Assistance Needs assistance   IADLs Needs assistance   Falls in the last 6 months (unknown)   ADL   Where Assessed Supine, bed   Eating Assistance Unable to assess  (G tube in place )   Grooming Assistance 1  Total Assistance   UB Bathing Assistance 1  Total Assistance   LB Bathing Assistance 1  Total Assistance   [address] 1  Total Assistance   LB Dressing Assistance 1  Total Assistance   Toileting Assistance  1  Total [address] 1  Total Assistance   Bed Mobility   Rolling R 1  Dependent   Rolling L 1  Dependent   Supine to Sit 1  Dependent   Transfers   Sit to Stand Unable to assess  (N/a)   Toilet transfer 1  Dependent   Balance   Static Sitting Poor   Dynamic Sitting Poor   Static Standing (unable to assess )   Activity Tolerance   Activity Tolerance Patient limited by fatigue;Patient limited by pain   RUE Assessment   RUE Assessment X   RUE Strength   RUE Overall Strength Due to cognitive deficits   LUE Assessment   LUE Assessment X   LUE Strength   LUE Overall Strength Due to cognitive deficits   Hand Function   Gross Motor Coordination Impaired   Fine Motor Coordination Impaired   Cognition   Orientation Level Unable to assess   Assessment   Limitation Decreased ADL status; Decreased UE strength;Decreased Safe judgement during ADL;Decreased cognition;Decreased endurance;Decreased self-care trans;Decreased high-level ADLs   Assessment Pt is a 68 y o  female seen for OT evaluation s/p admit [facility] w/ Subtrochanteric fracture of left femur, closed, initial encounter ([facility] )  Comorbidities affecting pt's functional performance at time of assessment include: ESRD, Closed intertrochanteric fx of left femer, anemia, multiple myeloma, HTN, DM2, Hemiparesis due to old stroke, functional quad, hyponatremia   Personal factors affecting pt at time of IE include:difficulty performing ADLS, difficulty performing IADLS , limited insight into deficits, decreased initiation and engagement  and health management   PLOF is limited, however chart reveals that pt was living home with family who was providing care  Upon evaluation: Pt not able to follow commands and or respond to cues to initiate and perform tasks  Pt is dependent with All ADLs, IADLs and mobility  She required max A/ dep to transfer supine<>sit with max support to sit at EOB  Pt presents with deficits in ROM, UB ms, trunk control, sitting balance and activity tolerance  Pt scoring a 0/100 on the Barthel Index  The following deficits impacting occupational performance: weakness, decreased ROM, decreased strength, decreased balance, decreased tolerance, impaired arousal, impaired attention, impaired initiation, impaired memory, impaired sequencing, impaired problem solving, decreased safety awareness, abnormal tone and increased pain  Pt appears to be at baseline functional level and has not acute OT needs at this time  However recommendation at time of d/c would be STR vs home with 24 hours care and assistance  D/C from OT casload at this time      Recommendation   OT Discharge Recommendation Short Term Rehab   Barthel Index   Feeding 0   Bathing 0   Grooming Score 0   Dressing Score 0   Bladder Score 0   Bowels Score 0   Toilet Use Score 0   Transfers (Bed/Chair) Score 0   Mobility (Level Surface) Score 0   Stairs Score 0   Barthel Index Score 0   Modified [place] Scale   Modified [place] Scale 5

## 2018-03-12 NOTE — PROGRESS NOTES
NEPHROLOGY PROGRESS NOTE    Patient: Giles Dukes               Sex: female          DOA: 2/28/2018  1:20 PM   YOB: 1940        Age:  68 y o         LOS:  LOS: 12 days       HPI     Patient with ESRD who admitted with fracture femur and had surgery done  SUBJECTIVE     She had dialysis today and tolerated very well  Still remained confused but awake    No acute complaint    CURRENT MEDICATIONS       Current Facility-Administered Medications:     acetaminophen (TYLENOL) tablet 650 mg, 650 mg, Oral, Q4H PRN, Khushi Mariee MD, 650 mg at 03/11/18 1007    albuterol inhalation solution 2 5 mg, 2 5 mg, Nebulization, Q4H PRN, Khushi Mariee MD    aspirin tablet 325 mg, 325 mg, Oral, Daily, Khushi Mariee MD, 325 mg at 03/12/18 1324    atorvastatin (LIPITOR) tablet 20 mg, 20 mg, Oral, Daily, Ed Perez MD, 20 mg at 03/12/18 1324    b complex-vitamin C-folic acid (NEPHROCAPS) capsule 1 capsule, 1 capsule, Oral, Daily With Jon Salinas MD, 1 capsule at 03/08/18 1715    bisacodyl (DULCOLAX) rectal suppository 10 mg, 10 mg, Rectal, Daily PRN, Bryon Washington MD, 10 mg at 03/07/18 1708    epoetin babak (EPOGEN,PROCRIT) injection 10,000 Units, 10,000 Units, Intravenous, Once per day on Mon Wed Fri, Domenico Bennett MD, 10,000 Units at 03/12/18 1111    epoetin babak (EPOGEN,PROCRIT) injection 10,000 Units, 10,000 Units, Intravenous, Once, Domenico Bennett MD    HYDROmorphone (DILAUDID) injection 0 5 mg, 0 5 mg, Intravenous, Q4H PRN, Nilton Shelley MD, 0 5 mg at 03/05/18 0616    insulin lispro (HumaLOG) 100 units/mL subcutaneous injection 1-6 Units, 1-6 Units, Subcutaneous, TID AC, 1 Units at 03/12/18 1325 **AND** Fingerstick Glucose (POCT), , , TID AC, Khushi Mariee MD    insulin lispro (HumaLOG) 100 units/mL subcutaneous injection 1-6 Units, 1-6 Units, Subcutaneous, HS, Khushi Mariee MD, 1 Units at 03/11/18 4054    metoprolol tartrate (LOPRESSOR) partial tablet 12 5 mg, 12 5 mg, Oral, Q12H [address], Hildrvalerie Lowry MD, 12 5 mg at 03/11/18 2127    omeprazole (PRILOSEC) suspension 2 mg/mL, 20 mg, Oral, Daily, Hildred Janak Lowry MD, 20 mg at 03/12/18 1335    ondansetron (ZOFRAN) injection 4 mg, 4 mg, Intravenous, Q6H PRN, Sammy Vitale MD    oxyCODONE (ROXICODONE) oral solution 2 5 mg, 2 5 mg, Oral, Q4H PRN, Sammy Vitale MD, 2 5 mg at 03/05/18 0034    senna-docusate sodium (SENOKOT S) 8 6-50 mg per tablet 1 tablet, 1 tablet, Oral, HS, Festus Lowry MD, 1 tablet at 03/11/18 2127    OBJECTIVE     Current Weight: Weight - Scale: 49 5 kg (109 lb 2 oz)  Vitals:    03/12/18 1153   BP: (!) 102/43   Pulse: 103   Resp:    Temp:    SpO2:        Intake/Output Summary (Last 24 hours) at 03/12/18 1506  Last data filed at 03/12/18 1154   Gross per 24 hour   Intake             2174 ml   Output             1500 ml   Net              674 ml       PHYSICAL EXAMINATION     Physical Exam   Constitutional: She is oriented to person, place, and time  No distress  Chronically ill   HENT:   Head: Normocephalic  Mouth/Throat: Oropharynx is clear and moist    Eyes: Conjunctivae are normal  Pupils are equal, round, and reactive to light  No scleral icterus  Neck: Normal range of motion  Neck supple  No JVD present  Cardiovascular: Normal rate and normal heart sounds  Pulmonary/Chest: Effort normal  She has no wheezes  She has no rales  Abdominal: Soft  Bowel sounds are normal  There is no tenderness  Musculoskeletal: Normal range of motion  She exhibits no edema  Neurological: She is alert and oriented to person, place, and time  Skin: Skin is warm  No rash noted  Psychiatric: She has a normal mood and affect   Her behavior is normal         LAB RESULTS       Results from last 7 days  Lab Units 03/12/18  0457 03/11/18  1152 03/10/18  0527 03/09/18  0556 03/08/18  0345 03/07/18  0448 03/05/18  1747   WBC Thousand/uL 10 35* 14 86* 15 29* 14 54* 14 96* 16 45*  -- HEMOGLOBIN g/dL 10 4* 11 2* 10 9* 11 0* 11 3* 11 1* 12 1   HEMATOCRIT % 33 7* 34 4* 33 1* 33 2* 33 2* 33 1* 35 5   PLATELETS Thousands/uL 206 244 226 212 223 228  --    SODIUM mmol/L 136 138 136 134* 131* 133*  --    POTASSIUM mmol/L 4 5 4 3 3 8 3 8 4 6 4 2  --    CHLORIDE mmol/L 95* 95* 97* 94* 90* 92*  --    CO2 mmol/L 27 30 27 28 28 28  --    BUN mg/dL 98* 77* 38* 55* 105* 81*  --    CREATININE mg/dL 5 18* 4 20* 2 51* 3 40* 5 23* 4 35*  --    EGFR ml/min/1 73sq m 9 11 21 14 8 11  --    CALCIUM mg/dL 11 3* 11 0* 10 3* 10 5* 10 0 10 0  --    TOTAL PROTEIN g/dL  --  7 4  --   --   --   --   --    GLUCOSE RANDOM mg/dL 206* 208* 241* 222* 194* 174*  --        RADIOLOGY RESULTS      Results for orders placed during the hospital encounter of 02/28/18   XR chest portable    Narrative CHEST     INDICATION:  Tachycardia  COMPARISON:  Chest x-ray of 2/28/2018    EXAM PERFORMED/VIEWS:  XR CHEST PORTABLE      FINDINGS:  Right-sided dialysis catheter tip overlies the right atrium  Patient's head partially obscures evaluation of the lung apices  Stable cardiomegaly noted  Central fullness of the pulmonary vasculature is unchanged  The lungs are clear  No focal infiltrate, definite pneumothorax or pleural effusion  Marked degenerative spurring at both shoulders  Impression Stable cardiomegaly  No active pulmonary disease  Workstation performed: [id number]       Results for orders placed during the hospital encounter of 11/09/17   XR chest 2 views    Narrative CHEST     INDICATION:  Weakness  History taken directly from the electronic ordering system  COMPARISON:  Chest x-ray of 9/19/2017    VIEWS:  Frontal and lateral projections    IMAGES:  2    FINDINGS:  Right-sided dialysis catheter tip overlies the right atrium  The heart is enlarged  Patient is somewhat rotated to the left  There is persistent pulmonary vascular congestion, appears chronic    No pneumothorax or pleural effusion  Degenerative spurring is noted of the spine  There are marked arthritic changes of the right shoulder  Impression Persistent pulmonary vascular congestion, appears chronic  Workstation performed: [id number]       No results found for this or any previous visit  No results found for this or any previous visit  No results found for this or any previous visit  No results found for this or any previous visit  PLAN / RECOMMENDATIONS      ESRD:  Had dialysis today will dialyze her again on Wednesday    Fracture femur:  Status post surgery    Dementia:  Seems to be stable    Will continue to follow    Michael Gaviria MD  Nephrology  3/12/2018        Portions of the record may have been created with voice recognition software  Occasional wrong word or "sound a like" substitutions may have occurred due to the inherent limitations of voice recognition software  Read the chart carefully and recognize, using context, where substitutions have occurred

## 2018-03-13 PROBLEM — E43 SEVERE PROTEIN-CALORIE MALNUTRITION (HCC): Status: ACTIVE | Noted: 2018-01-01

## 2018-03-13 NOTE — PLAN OF CARE
CARDIOVASCULAR - ADULT     Maintains optimal cardiac output and hemodynamic stability Adequate for Discharge     Absence of cardiac dysrhythmias or at baseline rhythm Adequate for Discharge        DISCHARGE PLANNING     Discharge to home or other facility with appropriate resources Adequate for Discharge        DISCHARGE PLANNING - CARE MANAGEMENT     Discharge to post-acute care or home with appropriate resources Adequate for Discharge        GASTROINTESTINAL - ADULT     Maintains adequate nutritional intake Adequate for Discharge        Knowledge Deficit     Patient/family/caregiver demonstrates understanding of disease process, treatment plan, medications, and discharge instructions Adequate for Discharge        Nutrition/Hydration-ADULT     Nutrient/Hydration intake appropriate for improving, restoring or maintaining nutritional needs Adequate for Discharge        PAIN - ADULT     Verbalizes/displays adequate comfort level or baseline comfort level Adequate for Discharge        Potential for Falls     Patient will remain free of falls Adequate for Discharge        Prexisting or High Potential for Compromised Skin Integrity     Skin integrity is maintained or improved Adequate for Discharge        SKIN/TISSUE INTEGRITY - ADULT     Skin integrity remains intact Adequate for Discharge

## 2018-03-13 NOTE — DISCHARGE SUMMARY
Discharge Summary - [facility]    Patient Information: Giles Dukes 68 y o  female MRN: [id number]  Unit/Bed#: -01 Encounter: [id number]    Discharging Physician / Practitioner: Khushi Mariee MD  PCP: Vanessa Cuenca MD  Admission Date: 2/28/2018  Discharge Date: 03/13/18    Reason for Admission:  Fracture left femur    Discharge Diagnoses:     Principal Problem:    Subtrochanteric fracture of left femur, closed, initial encounter [facility])  Active Problems:    ESRD (end stage renal disease) on dialysis [facility])    Closed intertrochanteric fracture of left femur (HCC)    Anemia of chronic kidney failure, stage 5 ([address] )    Multiple myeloma not having achieved remission ([facility] )    Essential hypertension    Diabetes mellitus type 2 in nonobese ([address] )    Hemiparesis due to old stroke ([facility] )    Closed intertrochanteric fracture of hip, left, initial encounter ([facility] )    Functional quadriplegia ([facility] )    Acute blood loss as cause of postoperative anemia    Hypertensive CKD, ESRD on dialysis ([address] )    Hyponatremia    Severe protein-calorie malnutrition ([facility] )  Resolved Problems:    * No resolved hospital problems   *    Present on Admission:   Closed intertrochanteric fracture of left femur (HCC)   Anemia of chronic kidney failure, stage 5 (HCC)   Multiple myeloma not having achieved remission ([address] )   Essential hypertension   Diabetes mellitus type 2 in nonobese ([address] )   Closed intertrochanteric fracture of hip, left, initial encounter ([facility] )   Functional quadriplegia ([facility] )   Subtrochanteric fracture of left femur, closed, initial encounter ([facility] )   Severe protein-calorie malnutrition ([facility] )    Consultations During Hospital Stay:  · Orthopedics  · Cardiology  · Nephrology  · Infectious disease consult  · Wound care team    Procedures Performed:     · Status post placement of chidi left hip/thigh for the fracture    Significant Findings:     · As above    Incidental Findings:   · None     Test Results Pending at Discharge (will require follow up): · None     Outpatient Tests Requested:  · None    Complications:  None    Hospital Course:     Jose Lacey is a 68 y o  female patient who originally presented to the hospital on 2/28/2018 due to a fracture in her left hip  She basically was at Interventional Radiology Department for placement of an AV fistula, however, she was noticed to have significant swelling of her left thigh  Found to have fracture as above  Underwent surgical intervention after clearance by Cardiology  She has made slow progress after the surgery  Patient is basically bedridden and functional cardioplegia because of her old stroke with left-sided hemiparesis  She is also severely malnourished because of her inability to swallow because of stroke and has a PEG tube  She has been running off and on low blood pressure and also being tachycardic  Still tachycardic, however less now  Also received blood transfusion after the surgery because of acute postop  blood loss anemia in addition to having anemia of chronic disease  She also has underlying multiple myeloma  Patient reports she is on hemodialysis and being followed up with Nephrology  Also seen by Infectious Disease consultant because of assistant him tachycardia  Likely secondary to SIRS after the surgery, however, not really infected  The dropping delete that   Cultures have been woken negative so  She is back on her tube feeds  Many of her other home medications have been resumed except her antihypertensive medications because of low blood pressure and tachycardia as she is on beta-blocker  This was discussed with Nephrology service as well  Condition at Discharge: poor     Discharge Day Visit / Exam:     Subjective:  Patient does not communicate  Lying in bed as usual   Does not appear to be in much distress    Vitals: Blood Pressure: 126/70 (03/13/18 0707)  Pulse: 103 (03/13/18 0707)  Temperature: 97 6 °F (36 4 °C) (03/13/18 3695)  Temp Source: Axillary (03/13/18 0707)  Respirations: 18 (03/13/18 0707)  Height: 5' (152 4 cm) (03/06/18 1234)  Weight - Scale: 49 5 kg (109 lb 2 oz) (03/06/18 1233)  SpO2: 97 % (03/13/18 0707)  Exam:        Vital signs are reviewed as above  Chronically sick appearing female who is basically bedridden and has functional cardioplegia  She needs help for everything  Not in any respiratory distress  Responded with just a moving her had an opening her eyes  Otherwise does not appear to be also in any significant pain at this moment  S1 and S2 are audible  Abdomen is soft  Has PEG tube in  Has pale conjunctiva  Discharge instructions/Information to patient and family:   See after visit summary for information provided to patient and family  Provisions for Follow-Up Care:  See after visit summary for information related to follow-up care and any pertinent home health orders  Disposition:     Acute Rehab at [address]:   · Not Applicable to this Patient - Not Applicable to this Patient    Planned Readmission:  None     Discharge Statement:  I spent more than 32 minutes discharging the patient  This time was spent on the day of discharge  I had direct contact with the patient on the day of discharge  Greater than 50% of the total time was spent examining patient, answering all patient questions, arranging and discussing plan of care with patient as well as directly providing post-discharge instructions  Additional time then spent on discharge activities  Discharge Medications:  See after visit summary for reconciled discharge medications provided to patient and family  ** Please Note: Dragon 360 Dictation voice to text software may have been used in the creation of this document   **

## 2018-03-13 NOTE — PROGRESS NOTES
NEPHROLOGY PROGRESS NOTE    Patient: Coby Chowdary               Sex: female          DOA: 2/28/2018  1:20 PM   YOB: 1940        Age:  68 y o         LOS:  LOS: 13 days       HPI     Patient with ESRD admitted the hospital with a fracture femur    SUBJECTIVE       Patient had surgery for the fractured femur  She is going to go for skilled nursing facility    She does not speaking English and remained confused    CURRENT MEDICATIONS       Current Facility-Administered Medications:     acetaminophen (TYLENOL) tablet 650 mg, 650 mg, Oral, Q4H PRN, Madison Eid MD, 650 mg at 03/12/18 1644    albuterol inhalation solution 2 5 mg, 2 5 mg, Nebulization, Q4H PRN, Madison Eid MD    aspirin tablet 325 mg, 325 mg, Oral, Daily, Madison Eid MD, 325 mg at 03/13/18 0910    atorvastatin (LIPITOR) tablet 20 mg, 20 mg, Oral, Daily, Lior Rainey MD, 20 mg at 03/13/18 0910    b complex-vitamin C-folic acid (NEPHROCAPS) capsule 1 capsule, 1 capsule, Oral, Daily With Ana Gale MD, 1 capsule at 03/12/18 1643    bisacodyl (DULCOLAX) rectal suppository 10 mg, 10 mg, Rectal, Daily PRN, Too Wood MD, 10 mg at 03/07/18 1708    epoetin babak (EPOGEN,PROCRIT) injection 10,000 Units, 10,000 Units, Intravenous, Once per day on Mon Wed Fri, Channing Hogue MD, 10,000 Units at 03/12/18 1111    epoetin babak (EPOGEN,PROCRIT) injection 10,000 Units, 10,000 Units, Intravenous, Once, Channing Hogue MD    HYDROmorphone (DILAUDID) injection 0 5 mg, 0 5 mg, Intravenous, Q4H PRN, Aniyah Coughlin MD, 0 5 mg at 03/05/18 0616    insulin lispro (HumaLOG) 100 units/mL subcutaneous injection 1-6 Units, 1-6 Units, Subcutaneous, TID AC, 1 Units at 03/13/18 0910 **AND** Fingerstick Glucose (POCT), , , TID AC, Madison Eid MD    insulin lispro (HumaLOG) 100 units/mL subcutaneous injection 1-6 Units, 1-6 Units, Subcutaneous, HS, Madison Eid MD, 1 Units at 03/12/18 1001    metoprolol tartrate (LOPRESSOR) partial tablet 12 5 mg, 12 5 mg, Oral, Q12H [facility], Frank Lowry MD, 12 5 mg at 03/13/18 0910    omeprazole (PRILOSEC) suspension 2 mg/mL, 20 mg, Oral, Daily, Frank Lowry MD, 20 mg at 03/12/18 1335    ondansetron (ZOFRAN) injection 4 mg, 4 mg, Intravenous, Q6H PRN, Jamie Yancey MD    oxyCODONE (ROXICODONE) oral solution 2 5 mg, 2 5 mg, Oral, Q4H PRN, Jamie Yancey MD, 2 5 mg at 03/05/18 0034    senna-docusate sodium (SENOKOT S) 8 6-50 mg per tablet 1 tablet, 1 tablet, Oral, HS, Frank Lowry MD, 1 tablet at 03/12/18 2125    OBJECTIVE     Current Weight: Weight - Scale: 49 5 kg (109 lb 2 oz)  Vitals:    03/13/18 0707   BP: 126/70   Pulse: 103   Resp: 18   Temp: 97 6 °F (36 4 °C)   SpO2: 97%       Intake/Output Summary (Last 24 hours) at 03/13/18 1057  Last data filed at 03/13/18 7230   Gross per 24 hour   Intake             1922 ml   Output             1500 ml   Net              422 ml       PHYSICAL EXAMINATION     Physical Exam   Constitutional: She is oriented to person, place, and time  She appears well-developed  No distress  Chronically ill   HENT:   Head: Normocephalic  Mouth/Throat: Oropharynx is clear and moist    Eyes: Conjunctivae are normal  No scleral icterus  Neck: Neck supple  No JVD present  Cardiovascular: Normal rate and normal heart sounds  Pulmonary/Chest: Effort normal  She has no wheezes  Abdominal: Soft  There is no tenderness  Musculoskeletal: Normal range of motion  She exhibits no edema  Neurological: She is oriented to person, place, and time  Skin: Skin is warm  No rash noted          LAB RESULTS       Results from last 7 days  Lab Units 03/13/18  0441 03/12/18  0457 03/11/18  1152 03/10/18  0527 03/09/18  0556 03/08/18  0345 03/07/18  0448   WBC Thousand/uL 12 59* 10 35* 14 86* 15 29* 14 54* 14 96* 16 45*   HEMOGLOBIN g/dL 10 5* 10 4* 11 2* 10 9* 11 0* 11 3* 11 1*   HEMATOCRIT % 32 8* 33 7* 34 4* 33 1* 33 2* 33 2* 33 1* PLATELETS Thousands/uL 210 206 244 226 212 223 228   SODIUM mmol/L 136 136 138 136 134* 131* 133*   POTASSIUM mmol/L 4 0 4 5 4 3 3 8 3 8 4 6 4 2   CHLORIDE mmol/L 96* 95* 95* 97* 94* 90* 92*   CO2 mmol/L 29 27 30 27 28 28 28   BUN mg/dL 49* 98* 77* 38* 55* 105* 81*   CREATININE mg/dL 3 00* 5 18* 4 20* 2 51* 3 40* 5 23* 4 35*   EGFR ml/min/1 73sq m 17 9 11 21 14 8 11   CALCIUM mg/dL 10 8* 11 3* 11 0* 10 3* 10 5* 10 0 10 0   TOTAL PROTEIN g/dL  --   --  7 4  --   --   --   --    GLUCOSE RANDOM mg/dL 210* 206* 208* 241* 222* 194* 174*       RADIOLOGY RESULTS      Results for orders placed during the hospital encounter of 02/28/18   XR chest portable    Narrative CHEST     INDICATION:  Tachycardia  COMPARISON:  Chest x-ray of 2/28/2018    EXAM PERFORMED/VIEWS:  XR CHEST PORTABLE      FINDINGS:  Right-sided dialysis catheter tip overlies the right atrium  Patient's head partially obscures evaluation of the lung apices  Stable cardiomegaly noted  Central fullness of the pulmonary vasculature is unchanged  The lungs are clear  No focal infiltrate, definite pneumothorax or pleural effusion  Marked degenerative spurring at both shoulders  Impression Stable cardiomegaly  No active pulmonary disease  Workstation performed: [id number]       Results for orders placed during the hospital encounter of 11/09/17   XR chest 2 views    Narrative CHEST     INDICATION:  Weakness  History taken directly from the electronic ordering system  COMPARISON:  Chest x-ray of 9/19/2017    VIEWS:  Frontal and lateral projections    IMAGES:  2    FINDINGS:  Right-sided dialysis catheter tip overlies the right atrium  The heart is enlarged  Patient is somewhat rotated to the left  There is persistent pulmonary vascular congestion, appears chronic  No pneumothorax or pleural effusion  Degenerative spurring is noted of the spine  There are marked arthritic changes of the right shoulder  Impression Persistent pulmonary vascular congestion, appears chronic  Workstation performed: [id number]       No results found for this or any previous visit  No results found for this or any previous visit  No results found for this or any previous visit  No results found for this or any previous visit  PLAN / RECOMMENDATIONS      ESRD:  Had dialysis yesterday and will be getting dialysis Monday minutes of Friday as outpatient    Fractured hip:  Status post surgery for comfort care    Anemia:  Will be on Procrit    Disposition :  Being discharged to skilled nursing facility and will follow her on outpatient dialysis unit    Discussed with nivia Muir MD  Nephrology  3/13/2018        Portions of the record may have been created with voice recognition software  Occasional wrong word or "sound a like" substitutions may have occurred due to the inherent limitations of voice recognition software  Read the chart carefully and recognize, using context, where substitutions have occurred

## 2018-03-14 PROBLEM — R06.02 SOB (SHORTNESS OF BREATH): Status: ACTIVE | Noted: 2018-01-01

## 2018-03-14 NOTE — ASSESSMENT & PLAN NOTE
· Present on admission, was present on last admission  CTA in the ER (-) for PE  Etiology unclear  · Bring patient in under observation, monitor overnight on telemetry, plan for dialysis in the morning  · Respiratory protocol, incentive spirometer  · Echo 3/2 really unremarkable, I will not repeat at this time  · Troponin mildly elevated, will cycle  This could be secondary to her CKD  · She is not appear overtly fluid overloaded, though BNP is elevated on admission  Hold off on diuretics, defer to Nephrology

## 2018-03-14 NOTE — H&P
H&P- Jalen Alvarado 1940, 68 y o  female MRN: [id number]    Unit/Bed#: ED 10 Encounter: [id number]    Primary Care Provider: Alphonso Cuenca MD   Date and time admitted to hospital: 3/14/2018  8:54 AM      * SOB (shortness of breath)   Assessment & Plan    · Present on admission, was present on last admission  CTA in the ER (-) for PE  Etiology unclear  · Bring patient in under observation, monitor overnight on telemetry, plan for dialysis in the morning  · Respiratory protocol, incentive spirometer  · Echo 3/2 really unremarkable, I will not repeat at this time  · Troponin mildly elevated, will cycle  This could be secondary to her CKD  · She is not appear overtly fluid overloaded, though BNP is elevated on admission  CTA not mentioning significant fluid  Hold off on diuretics, defer to Nephrology  · Leukocytosis - etiology unclear, afebrile, was last at 12K  Repeat in a m  - hold on antibiotics, no obvious rash  Lung exam clear  · Vitals with sinus tachy - known, no hypoxia, on O2 supplement presently  BP stable  ESRD (end stage renal disease) on dialysis [facility])   Assessment & Plan    · On dialysis, she only received 1 hour of her usual 3 5 today due to sudden-onset shortness of breath  · Dialysis tomorrow, monitor fluid status  · Nephrology consult  Closed intertrochanteric fracture of left femur [facility])   Assessment & Plan    · Patient recently discharged after close intertrochanteric fracture of the left leg  She is status post chidi  · Continue with therapy, patient was just discharged yesterday 3/13  Hemiparesis due to old stroke [facility])   Assessment & Plan    · Continue full-strength aspirin, as per the last admission, the family refused heparin due to history of GI bleeding requiring 10 units PRBCs  SCDs          Severe protein-calorie malnutrition ([facility] [address] )   Assessment & Plan    · This is a very frail-appearing patient, with history significant stroke and aphasia, currently on G-tube feedings  Will consult Nutrition to continue those, aspiration precautions  Known sacral and heel decub on admission - re-consult wound for tomorrow  Specialty bed  Frequent repositioning  VTE Prophylaxis: Family has refused VTE prophylaxis  / sequential compression device   Code Status: FULL CODE per chart  POLST: POLST form is not discussed and not completed at this time  Discussion with family: none at the bedside presently     Anticipated Length of Stay:  Patient will be admitted on an Observation basis with an anticipated length of stay of  < 2 midnights  Justification for Hospital Stay: acute SOB    Total Time for Visit, including Counseling / Coordination of Care: 30 minutes  Greater than 50% of this total time spent on direct patient counseling and coordination of care  Chief Complaint:   SOB    History of Present Illness:    Samson Bella is a 68 y o  female who presents with acute shortness of breath  She was in dialysis this morning when apparently she developed the symptoms  She was recently discharged yesterday to [facility], 3/13 after sustaining a left femoral fracture of unclear etiology  She has a history of stroke, with aphasia and essentially she is bed ridden with functional quadriplegia, left-sided hemiparesis  She is severely malnourished, has a PEG tube for nutrition  She has past medical history of hypertension, GI bleed, multiple myeloma, ESRD on hemodialysis with a Perma-Cath in the chest wall, anemia of chronic disease, arthritis      Review of Systems:    Review of Systems   Unable to perform ROS: Patient nonverbal       Past Medical and Surgical History:     Past Medical History:   Diagnosis Date    Anemia     Arthritis     Chronic kidney disease     Dialysis patient ([facility] [address] )     monday, wednesday, friday    Hypertension     Multiple myeloma ([facility] [address] )     Stroke [facility])        Past Surgical History:   Procedure Laterality Date     SECTION  JOINT REPLACEMENT Right     hip    ORIF FEMUR FRACTURE Left 3/3/2018    Procedure: OPEN REDUCTION W/ INTERNAL FIXATION (ORIF) FEMUR;  Surgeon: Kaur Balderas MD;  Location: [facility];  Service: Orthopedics    CT OPEN RX FEMUR FX+INTRAMED LAURENCE Left 3/3/2018    Procedure: INSERTION NAIL IM FEMUR ANTEGRADE (TROCHANTERIC); Surgeon: Kaur Balderas MD;  Location: [facility];  Service: Orthopedics       Meds/Allergies:    Prior to Admission medications    Medication Sig Start Date End Date Taking?  Authorizing Provider   acetaminophen (TYLENOL) 325 mg tablet Take 650 mg by mouth every 4 (four) hours as needed for mild pain    Historical Provider, MD   aspirin 325 mg tablet Take 1 tablet (325 mg total) by mouth daily for 14 days Then dose to be cut back  to her usual dose of 81 mg daily 3/13/18 3/27/18  Eron Avalos MD   atorvastatin (LIPITOR) 20 mg tablet Take 20 mg by mouth daily    Historical Provider, MD   b complex-SINCERE-folic acid (NEPHRO-TRACY) 0 8 mg tablet Take 0 8 mg by mouth daily with dinner    Historical Provider, MD   bisacodyl (DULCOLAX) 10 mg suppository Insert 1 suppository (10 mg total) into the rectum daily as needed for constipation 3/13/18   Eron Avalos MD   Cholecalciferol 39506 units TABS Take 50,000 Units by mouth Every Monday and Friday in AM    Historical Provider, MD   ERROR: CANNOT USE RATIO BASED PRESCRIPTION MIXTURE NAMING FOR A NON-MIXTURE Take 10 mL (20 mg total) by mouth daily 3/13/18   Eron Avalos MD   ipratropium-albuterol (DUONEB) 0 5-2 5 mg/mL Take 3 mL by nebulization 4 (four) times a day    Historical Provider, MD   metoprolol tartrate (LOPRESSOR) 25 mg tablet Take 1 tablet (25 mg total) by mouth every 12 (twelve) hours 3/13/18   Eron Avalos MD   oxyCODONE (ROXICODONE) 5 mg/5 mL solution Take 2 5 mL (2 5 mg total) by mouth every 4 (four) hours as needed for moderate pain for up to 10 days Max Daily Amount: 15 mg 3/13/18 3/23/18  Eron Avalos MD   senna-docusate sodium (SENOKOT S) 8 6-50 mg per tablet Take 1 tablet by mouth daily at bedtime 3/13/18   Nusrat Agrawal MD     I have reveiwed home medications using records provided by [facility]  Allergies: No Known Allergies    Social History:     Marital Status: Single   Occupation:  NA  Patient Pre-hospital Living Situation:  Discharge 03/13 to [facility]  Patient Pre-hospital Level of Mobility:  Bed ridden  Patient Pre-hospital Diet Restrictions: TUBE FEEDS, ?? Po intake   Substance Use History:   History   Alcohol Use No     History   Smoking Status    Never Smoker   Smokeless Tobacco    Never Used     History   Drug Use No       Family History:    non-contributory    Physical Exam:     Vitals:   Blood Pressure: 116/62 (03/14/18 0859)  Pulse: (!) 112 (03/14/18 1100)  Temperature: 98 4 °F (36 9 °C) (03/14/18 0859)  Temp Source: Axillary (03/14/18 0859)  Respirations: 12 (03/14/18 1100)  Weight - Scale: 51 4 kg (113 lb 5 1 oz) (03/14/18 0859)  SpO2: 100 % (03/14/18 1100)    Physical Exam   Constitutional: She appears well-developed  She appears lethargic  Frail appearing, cachectic, sunken fontanels   Eyes: Pupils are equal, round, and reactive to light  Neck: Neck supple  Cardiovascular: Normal rate, regular rhythm, S1 normal, S2 normal, normal heart sounds and intact distal pulses  No murmur heard  Pulmonary/Chest: Effort normal and breath sounds normal  No respiratory distress  She has no wheezes  She has no rhonchi  She has no rales  Auscultated anterior   Abdominal: Soft  Normal appearance and bowel sounds are normal  She exhibits no distension  There is no tenderness  Musculoskeletal: She exhibits no edema  Lymphadenopathy:     She has no cervical adenopathy  Neurological: She appears lethargic  Skin: Skin is warm, dry and intact  Nursing note and vitals reviewed  Additional Data:     Lab Results: I have personally reviewed pertinent reports          Results from last 7 days  Lab Units 03/14/18  0938  03/11/18  1152   WBC Thousand/uL 17 29*  < > 14 86*   HEMOGLOBIN g/dL 11 2*  < > 11 2*   HEMATOCRIT % 34 1*  < > 34 4*   PLATELETS Thousands/uL 240  < > 244   NEUTROS PCT %  --   --  67   LYMPHS PCT %  --   --  21   LYMPHO PCT % 12*  < >  --    MONOS PCT %  --   --  7   MONO PCT MAN % 6  < >  --    EOS PCT %  --   --  1   EOSINO PCT MANUAL % 2  < >  --    < > = values in this interval not displayed  Results from last 7 days  Lab Units 03/14/18  0938   SODIUM mmol/L 136   POTASSIUM mmol/L 4 1   CHLORIDE mmol/L 94*   CO2 mmol/L 28   BUN mg/dL 63*   CREATININE mg/dL 3 60*   CALCIUM mg/dL 10 8*   TOTAL PROTEIN g/dL 7 6   BILIRUBIN TOTAL mg/dL 0 70   ALK PHOS U/L 839*   ALT U/L 101*   AST U/L 144*   GLUCOSE RANDOM mg/dL 233*       Results from last 7 days  Lab Units 03/14/18  0938   INR  0 94       Imaging: I have personally reviewed pertinent reports  CTA ED chest PE study   Final Result by Gagan Avilez MD (03/14 0661)      No evidence of pulmonary embolus  Workstation performed: [id number]         XR chest 1 view portable   Final Result by Karley Valencia DO (03/14 0957)      No active pulmonary disease on examination which is somewhat limited secondary to low lung volumes  Workstation performed: [id number]             EKG, Pathology, and Other Studies Reviewed on Admission:   · EKG: no ST elevation, no acute change from prior    Allscripts / Epic Records Reviewed: Yes     ** Please Note: This note has been constructed using a voice recognition system   **

## 2018-03-14 NOTE — ASSESSMENT & PLAN NOTE
· Continue full-strength aspirin, as per the last admission, the family refused heparin due to history of GI bleeding requiring 10 units PRBCs  SCDs

## 2018-03-14 NOTE — SEPSIS NOTE
Not due to sepsis Sepsis Note   Ana Braun 68 y o  female MRN: [id number]  Unit/Bed#: FT 02 Encounter: [id number]            Initial Sepsis Screening     Row Name 03/14/18 1039                Is the patient's history suggestive of a new or worsening infection? No  -SK        Suspected source of infection          Are two or more of the following signs & symptoms of infection both present and new to the patient?         Indicate SIRS criteria          If the answer is yes to both questions, suspicion of sepsis is present          If severe sepsis is present AND tissue hypoperfusion perists in the hour after fluid resuscitation or lactate > 4, the patient meets criteria for SEPTIC SHOCK          Are any of the following organ dysfunction criteria present within 6 hours of suspected infection and SIRS criteria that are NOT considered to be chronic conditions?         Organ dysfunction          Date of presentation of severe sepsis          Time of presentation of severe sepsis          Tissue hypoperfusion persists in the hour after crystalloid fluid administration, evidenced, by either:          Was hypotension present within one hour of the conclusion of crystalloid fluid administration?           Date of presentation of septic shock          Time of presentation of septic shock            User Key  (r) = Recorded By, (t) = Taken By, (c) = Cosigned By    [address] Name Provider Iris Vásquez MD Physician

## 2018-03-14 NOTE — ASSESSMENT & PLAN NOTE
· On dialysis, she only received 1 hour of her usual 3 5 today due to sudden-onset shortness of breath  · Dialysis tomorrow, monitor fluid status  · Nephrology consult

## 2018-03-14 NOTE — CASE MANAGEMENT
Initial Clinical Review    Admission: Date/Time/Statement: OBS order  3/14  1042    Orders Placed This Encounter   Procedures    Place in Observation (expected length of stay for this patient is less than two midnights)     Standing Status:   Standing     Number of Occurrences:   1     Order Specific Question:   Admitting Physician     Answer:   Yarely Charles [[id number]]     Order Specific Question:   Level of Care     Answer:   Med Surg [16]         ED: Date/Time/Mode of Arrival:   ED Arrival Information     Expected Arrival Acuity Means of Arrival Escorted By Service Admission Type    - 3/14/2018 08:53 [facility] [address] Urgent    Arrival Complaint    SOB          Chief Complaint:   Chief Complaint   Patient presents with    Shortness of Breath     pt brought in by EMS from dialysis, pt resides at [facility], per EMS dialysis center stated that pt was having SOB, pt has h/o multiple strokes 3 months prior, pt appears non-verbal        History of Illness: This is a 77-year-old female with history of end-stage renal disease currently on dialysis, multiple strokes with right-sided hemiplegia, nonverbal at baseline, recent femur fracture status post surgery who presents today with shortness of breath  Patient was on her dialysis which she normally has Monday Wednesday and Friday  During dialysis patient attempted to pull out her Perma-Cath the nurse connected again but noticed that she was acutely short of breath  Patient was tachypneic and tachycardic  Her baseline her vitals are within normal limits  normally she completes about 3 and 0 5 hours of dialysis treatment  Today she only completed about 1 hour  EMS was called and patient was brought to the hospital   I spoke with the dialysis nurse stated she listened breath sounds they were pretty equal   She was concern for possible PE  Patient does not take any blood thinners at baseline  Patient is a full code    Also daughters were called regarding patient's transfer to the hospital   Unable to obtain history from the patient since she is nonverbal after the stroke  Patient appears tachypneic but comfortable  Equal breath sounds bilaterally with some rhonchi bilaterally  No obvious signs of trauma  PermCath in place PEG tube in place with no surrounding erythema or signs of infection  Impression:  68 year female coming with shortness of breath  Will get a portable x-ray to rule out at a pneumothorax  Will do a CT PE study along with lab work  Will obtain further history from daughter       ED Vital Signs:   ED Triage Vitals [03/14/18 0859]   Temperature Pulse Respirations Blood Pressure SpO2   98 4 °F (36 9 °C) (!) 110 (!) 24 116/62 98 %      Temp Source Heart Rate Source Patient Position - Orthostatic VS BP Location FiO2 (%)   Axillary -- Lying Right arm --      Pain Score       --        Wt Readings from Last 1 Encounters:   03/14/18 51 4 kg (113 lb 5 1 oz)       Vital Signs (abnormal): wnl     Abnormal Labs/Diagnostic Test Results:    WBC 17 29 (H) 4 31 - 10 16 Thousand/uL     RBC 3 29 (L) 3 81 - 5 12 Million/uL     Hemoglobin 11 2 (L) 11 5 - 15 4 g/dL     Hematocrit 34 1 (L) 34 8 - 46 1 %    Cl  94,    Anion Gap 14 (H) 4 - 13 mmol/L     BUN 63 (H) 5 - 25 mg/dL     Creatinine 3 60 (H) 0 60 - 1 30 mg/dL     Comment: Standardized to IDMS reference method       Glucose 233 (H) 65 - 140 mg/dL     Comment:          Calcium 10 8 (H) 8 3 - 10 1 mg/dL      (H) 5 - 45 U/L        (H) 12 - 78 U/L     Comment:   Specimen collection should occur prior to Sulfasalazine administration due to the potential for falsely depressed results          Alkaline Phosphatase 839 (H) 46 - 116 U/L      Albumin 3 1 (L) 3 5 - 5 0 g/dL      NT-proBNP 4,142 (H) <450 pg/mL      Troponin I 0 06 (H) <=0 04 ng/mL   CTA chest - wnl   CXR - wnl EKG-ST     ED Treatment:   Medication Administration from 03/14/2018 0853 to 03/14/2018 1537       Date/Time Order Dose Route Action Action by Comments     03/14/2018 0947 iohexol (OMNIPAQUE) 350 MG/ML injection (MULTI-DOSE) 85 mL 85 mL Intravenous Given Melina Ruizshire      03/14/2018 1036 HYDROmorphone (DILAUDID) injection 0 5 mg 0 5 mg Intravenous Given Anuradha Radnall RN           Past Medical/Surgical History: Active Ambulatory Problems     Diagnosis Date Noted    ESRD (end stage renal disease) on dialysis ([facility] ) 02/07/2018    Closed intertrochanteric fracture of left femur (HCC) 02/28/2018    Anemia of chronic kidney failure, stage 5 ([facility] ) 02/28/2018    Multiple myeloma not having achieved remission ([facility] ) 02/28/2018    Essential hypertension 02/28/2018    Diabetes mellitus type 2 in nonobese ([facility] ) 02/28/2018    Hemiparesis due to old stroke ([facility] ) 02/28/2018    Closed intertrochanteric fracture of hip, left, initial encounter ([place] [address] ) 02/28/2018    Functional quadriplegia ([facility] ) 03/01/2018    Subtrochanteric fracture of left femur, closed, initial encounter ([facility] ) 03/03/2018    Acute blood loss as cause of postoperative anemia 03/05/2018    Anemia 03/06/2018    Hypertensive CKD, ESRD on dialysis ([facility] ) 03/06/2018    Hyponatremia 03/07/2018    Severe protein-calorie malnutrition ([facility] ) 03/13/2018     Resolved Ambulatory Problems     Diagnosis Date Noted    No Resolved Ambulatory Problems     Past Medical History:   Diagnosis Date    Anemia     Arthritis     Chronic kidney disease     Dialysis patient ([facility] )     Hypertension     Multiple myeloma ([facility] )     Stroke ([place] [address] )        Admitting Diagnosis: SOB (shortness of breath) [R06 02]    Age/Sex: 68 y o  female    Assessment/Plan:     Admission Orders:  Scheduled Meds:   Continuous Infusions:   No current facility-administered medications for this encounter     PRN Meds:     Tele  Serial trop

## 2018-03-14 NOTE — ED PROVIDER NOTES
History  Chief Complaint   Patient presents with    Shortness of Breath     pt brought in by EMS from dialysis, pt resides at [facility], per EMS dialysis center stated that pt was having SOB, pt has h/o multiple strokes 3 months prior, pt appears non-verbal      This is a 80-year-old female with history of end-stage renal disease currently on dialysis, multiple strokes with right-sided hemiplegia, nonverbal at baseline, recent femur fracture status post surgery who presents today with shortness of breath  Patient was on her dialysis which she normally has Monday Wednesday and Friday  During dialysis patient attempted to pull out her Perma-Cath the nurse connected again but noticed that she was acutely short of breath  Patient was tachypneic and tachycardic  Her baseline her vitals are within normal limits  normally she completes about 3 and 0 5 hours of dialysis treatment  Today she only completed about 1 hour  EMS was called and patient was brought to the hospital   I spoke with the dialysis nurse stated she listened breath sounds they were pretty equal   She was concern for possible PE  Patient does not take any blood thinners at baseline  Patient is a full code  Also daughters were called regarding patient's transfer to the hospital   Unable to obtain history from the patient since she is nonverbal after the stroke  Patient appears tachypneic but comfortable  Equal breath sounds bilaterally with some rhonchi bilaterally  No obvious signs of trauma  PermCath in place PEG tube in place with no surrounding erythema or signs of infection  Impression:  68 year female coming with shortness of breath  Will get a portable x-ray to rule out at a pneumothorax  Will do a CT PE study along with lab work  Will obtain further history from daughter            Prior to Admission Medications   Prescriptions Last Dose Informant Patient Reported? Taking?    Cholecalciferol 44554 units TABS   Yes No   Sig: Take 50,000 Units by mouth Every Monday and Friday in AM   ERROR: CANNOT USE RATIO BASED PRESCRIPTION MIXTURE NAMING FOR A NON-MIXTURE   No No   Sig: Take 10 mL (20 mg total) by mouth daily   acetaminophen (TYLENOL) 325 mg tablet   Yes No   Sig: Take 650 mg by mouth every 4 (four) hours as needed for mild pain   aspirin 325 mg tablet   No No   Sig: Take 1 tablet (325 mg total) by mouth daily for 14 days Then dose to be cut back  to her usual dose of 81 mg daily   atorvastatin (LIPITOR) 20 mg tablet   Yes No   Sig: Take 20 mg by mouth daily   b complex-C-folic acid (NEPHRO-TRACY) 0 8 mg tablet   Yes No   Sig: Take 0 8 mg by mouth daily with dinner   bisacodyl (DULCOLAX) 10 mg suppository   No No   Sig: Insert 1 suppository (10 mg total) into the rectum daily as needed for constipation   ipratropium-albuterol (DUONEB) 0 5-2 5 mg/mL   Yes No   Sig: Take 3 mL by nebulization 4 (four) times a day   metoprolol tartrate (LOPRESSOR) 25 mg tablet   No No   Sig: Take 1 tablet (25 mg total) by mouth every 12 (twelve) hours   oxyCODONE (ROXICODONE) 5 mg/5 mL solution   No No   Sig: Take 2 5 mL (2 5 mg total) by mouth every 4 (four) hours as needed for moderate pain for up to 10 days Max Daily Amount: 15 mg   senna-docusate sodium (SENOKOT S) 8 6-50 mg per tablet   No No   Sig: Take 1 tablet by mouth daily at bedtime      Facility-Administered Medications: None       Past Medical History:   Diagnosis Date    Anemia     Arthritis     Chronic kidney disease     [facility] )     monday, wednesday, friday    Hypertension     Multiple myeloma ([facility] )     Stroke [facility])        Past Surgical History:   Procedure Laterality Date     SECTION      JOINT REPLACEMENT Right     hip    ORIF FEMUR FRACTURE Left 3/3/2018    Procedure: OPEN REDUCTION W/ INTERNAL FIXATION (ORIF) FEMUR;  Surgeon: Azucena Kirkpatrick MD;  Location: [facility];  Service: Orthopedics    PA OPEN RX FEMUR FX+INTRAMED LAURENCE Left 3/3/2018    Procedure: INSERTION NAIL IM FEMUR ANTEGRADE (TROCHANTERIC); Surgeon: Lupe Ni MD;  Location: [place] [facility];  Service: Orthopedics       Family History   Problem Relation Age of Onset    Hypertension Mother      I have reviewed and agree with the history as documented  Social History   Substance Use Topics    Smoking status: Never Smoker    Smokeless tobacco: Never Used    Alcohol use No        Review of Systems   Unable to perform ROS: Patient nonverbal       Physical Exam  ED Triage Vitals [03/14/18 0859]   Temperature Pulse Respirations Blood Pressure SpO2   98 4 °F (36 9 °C) (!) 110 (!) 24 116/62 98 %      Temp Source Heart Rate Source Patient Position - Orthostatic VS BP Location FiO2 (%)   Axillary -- Lying Right arm --      Pain Score       --           Orthostatic Vital Signs  Vitals:    03/14/18 0859 03/14/18 1100   BP: 116/62    Pulse: (!) 110 (!) 112   Patient Position - Orthostatic VS: Lying        Physical Exam   Constitutional: No distress  Frail appearing   HENT:   Head: Normocephalic and atraumatic  Nose: Nose normal    Mouth/Throat: Oropharynx is clear and moist    Facial droop prior   Eyes: Conjunctivae and EOM are normal  Pupils are equal, round, and reactive to light  Neck: Normal range of motion  Neck supple  Cardiovascular: Regular rhythm and normal heart sounds  No murmur heard  Sinus tachycardia   Pulmonary/Chest: No respiratory distress  She has no wheezes  She has no rales  She exhibits no tenderness  Tachypnea with some rhonchi in the lower lung bases equal breath sounds bilaterally   Abdominal: Soft  Bowel sounds are normal  She exhibits no distension  There is no tenderness  There is no guarding  PEG tube in place with no surrounding erythema   Musculoskeletal: She exhibits no edema, tenderness or deformity  Neurological:   Patient is alert looking around but nonverbal   Patient is not moving her right side minimally moving her left side   Skin: Skin is warm  Capillary refill takes less than 2 seconds  She is not diaphoretic  Vitals reviewed  ED Medications  Medications   iohexol (OMNIPAQUE) 350 MG/ML injection (MULTI-DOSE) 85 mL (85 mL Intravenous Given 3/14/18 0947)   HYDROmorphone (DILAUDID) injection 0 5 mg (0 5 mg Intravenous Given 3/14/18 1036)       Diagnostic Studies  Results Reviewed     Procedure Component Value Units Date/Time    Troponin I [[id number]]  (Abnormal) Collected:  03/14/18 1523    Lab Status:  Final result Specimen:  Blood from Arm, Right Updated:  03/14/18 1632     Troponin I 0 29 (H) ng/mL     Narrative:         Siem[employer] Chemistry analyzer 99% cutoff is > 0 04 ng/mL in network labs    o cTnI 99% cutoff is useful only when applied to patients in the clinical setting of myocardial ischemia  o cTnI 99% cutoff should be interpreted in the context of clinical history, ECG findings and possibly cardiac imaging to establish correct diagnosis  o cTnI 99% cutoff may be suggestive but clearly not indicative of a coronary event without the clinical setting of myocardial ischemia  Troponin I [[id number]] Collected:  03/14/18 1511    Lab Status:  No result Specimen:  Blood from Arm, Left     Blood culture #2 [[id number]] Collected:  03/14/18 1145    Lab Status:   In process Specimen:  Blood from Arm, Left Updated:  03/14/18 1148    CBC and differential [[id number]]  (Abnormal) Collected:  03/14/18 0938    Lab Status:  Final result Specimen:  Blood from Arm, Left Updated:  03/14/18 1023     WBC 17 29 (H) Thousand/uL      RBC 3 29 (L) Million/uL      Hemoglobin 11 2 (L) g/dL      Hematocrit 34 1 (L) %       (H) fL      MCH 34 0 pg      MCHC 32 8 g/dL      RDW 20 4 (H) %      MPV 13 0 (H) fL      Platelets 321 Thousands/uL      nRBC 2 /100 WBCs     Comprehensive metabolic panel [[id number]]  (Abnormal) Collected:  03/14/18 0938    Lab Status:  Final result Specimen:  Blood from Arm, Left Updated:  03/14/18 1021     Sodium 136 mmol/L      Potassium 4 1 mmol/L      Chloride 94 (L) mmol/L      CO2 28 mmol/L      Anion Gap 14 (H) mmol/L      BUN 63 (H) mg/dL      Creatinine 3 60 (H) mg/dL      Glucose 233 (H) mg/dL      Calcium 10 8 (H) mg/dL       (H) U/L       (H) U/L      Alkaline Phosphatase 839 (H) U/L      Total Protein 7 6 g/dL      Albumin 3 1 (L) g/dL      Total Bilirubin 0 70 mg/dL      eGFR 13 ml/min/1 73sq m     Narrative:         National Kidney Disease Education Program recommendations are as follows:  GFR calculation is accurate only with a steady state creatinine  Chronic Kidney disease less than 60 ml/min/1 73 sq  meters  Kidney failure less than 15 ml/min/1 73 sq  meters  B-type natriuretic peptide [[id number]]  (Abnormal) Collected:  03/14/18 0938    Lab Status:  Final result Specimen:  Blood from Arm, Left Updated:  03/14/18 1021     NT-proBNP 4,142 (H) pg/mL     Lactic acid, plasma [[id number]]  (Normal) Collected:  03/14/18 0938    Lab Status:  Final result Specimen:  Blood from Arm, Left Updated:  03/14/18 1015     LACTIC ACID 1 2 mmol/L     Narrative:         Result may be elevated if tourniquet was used during collection  Troponin I [[id number]]  (Abnormal) Collected:  03/14/18 0938    Lab Status:  Final result Specimen:  Blood from Arm, Left Updated:  03/14/18 1013     Troponin I 0 06 (H) ng/mL     Narrative:         [employer] Chemistry analyzer 99% cutoff is > 0 04 ng/mL in network labs    o cTnI 99% cutoff is useful only when applied to patients in the clinical setting of myocardial ischemia  o cTnI 99% cutoff should be interpreted in the context of clinical history, ECG findings and possibly cardiac imaging to establish correct diagnosis  o cTnI 99% cutoff may be suggestive but clearly not indicative of a coronary event without the clinical setting of myocardial ischemia      Claybon Homans [[id number]]  (Normal) Collected:  03/14/18 0938    Lab Status:  Final result Specimen:  Blood from Arm, Left Updated:  03/14/18 1007 Protime 12 8 seconds      INR 0 94    APTT [[id number]]  (Normal) Collected:  03/14/18 0938    Lab Status:  Final result Specimen:  Blood from Arm, Left Updated:  03/14/18 1007     PTT 29 seconds     Narrative: Therapeutic Heparin Range = 60-90 seconds    Blood culture #1 [[id number]] Collected:  03/14/18 0938    Lab Status: In process Specimen:  Blood from Arm, Left Updated:  03/14/18 0946    POCT urinalysis dipstick [[id number]]     Lab Status:  No result Specimen:  Urine                  CTA ED chest PE study   Final Result by Kee Moreland MD (03/14 7112)      No evidence of pulmonary embolus  Workstation performed: [id number]         XR chest 1 view portable   Final Result by Mary Cronin DO (03/14 0957)      No active pulmonary disease on examination which is somewhat limited secondary to low lung volumes  Workstation performed: [id number]                    Procedures  ECG 12 Lead Documentation  Date/Time: 3/14/2018 9:27 AM  Performed by: Viktoriya Rodriguez  Authorized by: Viktoriya Rodriguez     Indications / Diagnosis:  Shortness of breath  Patient location:  ED  Previous ECG:     Previous ECG:  Compared to current    Similarity:  No change  Interpretation:     Interpretation: non-specific    Rate:     ECG rate:  112    ECG rate assessment: tachycardic    Rhythm:     Rhythm: sinus tachycardia    Ectopy:     Ectopy: none    QRS:     QRS axis:  Normal  ST segments:     ST segments:  Normal  T waves:     T waves: non-specific             Phone Contacts  ED Phone Contact    ED Course  ED Course as of Mar 14 1718   Wed Mar 14, 2018   0944 The called daughter who states she will be coming in shortly      0945 Portable x-ray no evidence of pneumothorax    1019 Troponin I: (!) 0 06   1025 NT-proBNP: (!) 4,142   1033 Spoke with daughter dariana  who states patient is a full code    I discussed the results with the patient's daughter  who is acceptable with an admission Initial Sepsis Screening     Row Name 03/14/18 1039                Is the patient's history suggestive of a new or worsening infection? No  -SK        Suspected source of infection          Are two or more of the following signs & symptoms of infection both present and new to the patient?         Indicate SIRS criteria          If the answer is yes to both questions, suspicion of sepsis is present          If severe sepsis is present AND tissue hypoperfusion perists in the hour after fluid resuscitation or lactate > 4, the patient meets criteria for SEPTIC SHOCK          Are any of the following organ dysfunction criteria present within 6 hours of suspected infection and SIRS criteria that are NOT considered to be chronic conditions?         Organ dysfunction          Date of presentation of severe sepsis          Time of presentation of severe sepsis          Tissue hypoperfusion persists in the hour after crystalloid fluid administration, evidenced, by either:          Was hypotension present within one hour of the conclusion of crystalloid fluid administration?         Date of presentation of septic shock          Time of presentation of septic shock            User Key  (r) = Recorded By, (t) = Taken By, (c) = Cosigned By    [address] Name Provider Kirsten Diallo MD Physician                  MDM  Number of Diagnoses or Management Options  NSTEMI (non-ST elevated myocardial infarction) [facility]): new and requires workup  Shortness of breath: new and requires workup  Diagnosis management comments: Shortness of breath:  CT PE study was performed which was negative for PE  Patient still states slightly tachypneic along with tachycardic which is new  States she patient has an elevated troponin with abnormal EKG  I discussed with meg regarding admission  I discussed also with patient's family daughter regarding workup         Amount and/or Complexity of Data Reviewed  Clinical lab tests: ordered  Tests in the radiology section of CPT®: ordered  Tests in the medicine section of CPT®: ordered  Decide to obtain previous medical records or to obtain history from someone other than the patient: yes  Review and summarize past medical records: yes  Independent visualization of images, tracings, or specimens: yes    Risk of Complications, Morbidity, and/or Mortality  Presenting problems: high  Diagnostic procedures: high  Management options: high      CritCare Time    Disposition  Final diagnoses:   NSTEMI (non-ST elevated myocardial infarction) ([place] [address] )   Shortness of breath     Time reflects when diagnosis was documented in both MDM as applicable and the Disposition within this note     Time User Action Codes Description Comment    3/14/2018 10:41 AM Nilesh Ramos Add [I21 4] NSTEMI (non-ST elevated myocardial infarction) ([place] [address] )     3/14/2018 10:42 AM Nilesh Ramos Add [R06 02] Shortness of breath     3/14/2018  3:46 PM Piper Justice Mc Add [E43] Severe protein-calorie malnutrition ([place] [address] )     3/14/2018  4:02 PM Piper Justice Mc Add [N18 6,  Z99 2] ESRD (end stage renal disease) on dialysis ([place] [address] )     3/14/2018  4:02 PM Clara Justice [N18 6,  Z99 2] ESRD (end stage renal disease) on dialysis [facility])       ED Disposition     ED Disposition Condition Comment    Admit  Case was discussed with slim and the patient's admission status was agreed to be Admission Status: observation status to the service of Dr Mirza Gonzalez   Follow-up Information    None       Patient's Medications   Discharge Prescriptions    No medications on file     No discharge procedures on file      ED Provider  Electronically Signed by           Isaac Gonzalez MD  03/14/18 5570

## 2018-03-14 NOTE — ASSESSMENT & PLAN NOTE
· This is a very frail-appearing patient, with history significant stroke and aphasia, currently on G-tube feedings  Will consult Nutrition to continue those, aspiration precautions

## 2018-03-14 NOTE — ASSESSMENT & PLAN NOTE
· Patient recently discharged after close intertrochanteric fracture of the left leg  She is status post chidi  · Continue with therapy, patient was just discharged yesterday 3/13

## 2018-03-14 NOTE — PLAN OF CARE
CARDIOVASCULAR - ADULT     Maintains optimal cardiac output and hemodynamic stability Progressing     Absence of cardiac dysrhythmias or at baseline rhythm Progressing        DISCHARGE PLANNING     Discharge to home or other facility with appropriate resources Progressing        GASTROINTESTINAL - ADULT     Maintains adequate nutritional intake Progressing        GENITOURINARY - ADULT     Maintains or returns to baseline urinary function Progressing        INFECTION - ADULT     Absence or prevention of progression during hospitalization Progressing        Knowledge Deficit     Patient/family/caregiver demonstrates understanding of disease process, treatment plan, medications, and discharge instructions Progressing        MUSCULOSKELETAL - ADULT     Maintain or return mobility to safest level of function Progressing        NEUROSENSORY - ADULT     Achieves stable or improved neurological status Progressing        PAIN - ADULT     Verbalizes/displays adequate comfort level or baseline comfort level Progressing        Potential for Falls     Patient will remain free of falls Progressing        Prexisting or High Potential for Compromised Skin Integrity     Skin integrity is maintained or improved Progressing        RESPIRATORY - ADULT     Achieves optimal ventilation and oxygenation Progressing        SAFETY ADULT     Maintain or return to baseline ADL function Progressing     Maintain or return mobility status to optimal level Progressing        SKIN/TISSUE INTEGRITY - ADULT     Skin integrity remains intact Progressing     Incision(s), wounds(s) or drain site(s) healing without S/S of infection Progressing     Oral mucous membranes remain intact Progressing

## 2018-03-15 NOTE — PLAN OF CARE
Problem: DISCHARGE PLANNING - CARE MANAGEMENT  Goal: Discharge to post-acute care or home with appropriate resources  INTERVENTIONS:  - Conduct assessment to determine patient/family and health care team treatment goals, and need for post-acute services based on payer coverage, community resources, and patient preferences, and barriers to discharge  - Address psychosocial, clinical, and financial barriers to discharge as identified in assessment in conjunction with the patient/family and health care team  - Arrange appropriate level of post-acute services according to patient's   needs and preference and payer coverage in collaboration with the physician and health care team  - Communicate with and update the patient/family, physician, and health care team regarding progress on the discharge plan  - Arrange appropriate transportation to post-acute venues  Outcome: Progressing  CM attempted to speak to pt at bedside, but is unable due to aphasia  Cm contacted daughter Baron Woo via phone  Pt was transported to 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, tube feedings, Dm, ESRD-she goes to dialysis through [facility] on M-W-F, and L femur fracture  Cm discussed discharge plan with daughter and she does not want her mother in a [address] including [place]  States that she has spoken to Dr Rosa Stephens who is looking into home dialysis  She also is going to be receiving 11 1/2 nursing hrs on non dialysis days [facility]  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

## 2018-03-15 NOTE — CONSULTS
Consultation - Nephrology   Ana Braun 68 y o  female MRN: [id number]  Unit/Bed#: -01 Encounter: [id number]    Referring PHYSICIAN: Joanna Boyd     REASON FOR THE CONSULTATION:  ESRD    DATE OF CONSULTATION:  March 15, 2018    ADMISSION DIAGNOSIS: SOB (shortness of breath)     CHIEF COMPLAINT     80-year-old woman who was recently discharged to rehab place after repair of her femur  Went to dialysis unit got confused so dialysis was terminated and she was sent back to the hospital and she is being admitted with shortness of breath    HPI     Patient is confused does not speak English cannot give any history so water history which I got is from the daughter  Apparently patient had a problem hypertension and diabetes many years used to live in [place] came here sometime in August and started on dialysis because of worsening renal function and anemia she came here in December further rehab and was discharged home  According to the daughter she developed acute stroke after that with aphasia and Bell's palsy  She is basically bed ridden  Last month she came in with fracture femur which was repaired    She is not doing much of the activity and 100% taken care by the family  She has a PEG tube for the feeding purpose  PAST MEDICAL HISTORY     Past Medical History:   Diagnosis Date    Anemia     Arthritis     Chronic kidney disease     [facility] )     monday, wednesday, friday    Hypertension     Multiple myeloma ([facility] )     Stroke ([facility] )        PAST SURGICAL HISTORY     Past Surgical History:   Procedure Laterality Date     SECTION      JOINT REPLACEMENT Right     hip    ORIF FEMUR FRACTURE Left 3/3/2018    Procedure: OPEN REDUCTION W/ INTERNAL FIXATION (ORIF) FEMUR;  Surgeon: Naima Mendez MD;  Location: [facility];  Service: Orthopedics    DE OPEN RX FEMUR FX+INTRAMED LAURENCE Left 3/3/2018    Procedure: INSERTION NAIL IM FEMUR ANTEGRADE (TROCHANTERIC);   Surgeon: Naima Mendez MD;  Location: [facility];  Service: Orthopedics       ALLERGIES     No Known Allergies    SOCIAL HISTORY     History   Alcohol Use No     History   Drug Use No     History   Smoking Status    Never Smoker   Smokeless Tobacco    Never Used       FAMILY HISTORY     Family History   Problem Relation Age of Onset    Hypertension Mother        CURRENT MEDICATIONS       Current Facility-Administered Medications:     acetaminophen (TYLENOL) tablet 650 mg, 650 mg, Oral, Q8H PRN, Artie Norman PA-C, 650 mg at 03/14/18 2115    aspirin tablet 325 mg, 325 mg, Oral, Daily, Peyton Justice PA-C, 325 mg at 03/15/18 1021    atorvastatin (LIPITOR) tablet 20 mg, 20 mg, Oral, Daily, Peyton Justice PA-C, 20 mg at 03/15/18 1021    b complex-vitamin C-folic acid (NEPHROCAPS) capsule 1 capsule, 1 capsule, Oral, Daily With Dinner, Artie Norman PA-C, 1 capsule at 03/14/18 2114    bisacodyl (DULCOLAX) rectal suppository 10 mg, 10 mg, Rectal, Daily PRN, Peyton Justice PA-C    calcium carbonate (TUMS) chewable tablet 1,000 mg, 1,000 mg, Oral, Daily PRN, Peyton Justice PA-C    insulin lispro (HumaLOG) 100 units/mL subcutaneous injection 1-5 Units, 1-5 Units, Subcutaneous, TID AC, 1 Units at 03/15/18 1409 **AND** Fingerstick Glucose (POCT), , , TID AC, Lauren Salas MD    metoprolol tartrate (LOPRESSOR) tablet 25 mg, 25 mg, Oral, Q12H [address], Peyton Justice PA-C, 25 mg at 03/14/18 2116    omeprazole (PRILOSEC) suspension 2 mg/mL, 20 mg, Oral, Daily, Peyton Justice PA-C, 20 mg at 03/15/18 1022    ondansetron (ZOFRAN) injection 4 mg, 4 mg, Intravenous, Q6H PRN, Peyton Justice PA-C    oxyCODONE (ROXICODONE) oral solution 2 5 mg, 2 5 mg, Oral, Q4H PRN, Peyton Justice PA-C    senna-docusate sodium (SENOKOT S) 8 6-50 mg per tablet 1 tablet, 1 tablet, Oral, HS, Peyton Justice PA-C, 1 tablet at 03/14/18 2116    simethicone (MYLICON) chewable tablet 80 mg, 80 mg, Oral, 4x Daily PRN, Artie Norman PA-C    REVIEW OF SYSTEMS Review of Systems   Constitutional: Negative for activity change and fatigue  HENT: Positive for drooling  Negative for congestion and ear discharge  Eyes: Positive for redness and visual disturbance  Negative for photophobia and pain  Respiratory: Positive for shortness of breath  Negative for apnea and choking  Cardiovascular: Positive for leg swelling  Negative for chest pain and palpitations  Gastrointestinal: Negative for abdominal distention and blood in stool  Endocrine: Negative for heat intolerance and polyphagia  Genitourinary: Negative for flank pain and urgency  Musculoskeletal: Positive for gait problem  Negative for neck pain and neck stiffness  Skin: Negative for color change and wound  Allergic/Immunologic: Negative for food allergies and immunocompromised state  Neurological: Positive for speech difficulty, weakness and numbness  Negative for seizures and facial asymmetry  Hematological: Negative for adenopathy  Does not bruise/bleed easily  Psychiatric/Behavioral: Positive for behavioral problems, confusion and sleep disturbance  Negative for self-injury and suicidal ideas         LAB RESULTS          Results from last 7 days  Lab Units 03/15/18  0549 03/14/18  0938 03/13/18  0441 03/12/18  0457 03/11/18  1152 03/10/18  0527 03/09/18  0556   WBC Thousand/uL 12 19* 17 29* 12 59* 10 35* 14 86* 15 29* 14 54*   HEMOGLOBIN g/dL 10 1* 11 2* 10 5* 10 4* 11 2* 10 9* 11 0*   HEMATOCRIT % 30 1* 34 1* 32 8* 33 7* 34 4* 33 1* 33 2*   PLATELETS Thousands/uL 216 240 210 206 244 226 212   SODIUM mmol/L 136 136 136 136 138 136 134*   POTASSIUM mmol/L 4 7 4 1 4 0 4 5 4 3 3 8 3 8   CHLORIDE mmol/L 94* 94* 96* 95* 95* 97* 94*   CO2 mmol/L 27 28 29 27 30 27 28   BUN mg/dL 85* 63* 49* 98* 77* 38* 55*   CREATININE mg/dL 4 73* 3 60* 3 00* 5 18* 4 20* 2 51* 3 40*   EGFR ml/min/1 73sq m 10 13 17 9 11 21 14   CALCIUM mg/dL 11 2* 10 8* 10 8* 11 3* 11 0* 10 3* 10 5*   TOTAL PROTEIN g/dL 7 0 7 6 --   --  7 4  --   --    GLUCOSE RANDOM mg/dL 180* 233* 210* 206* 208* 241* 222*       RADIOLOGY RESULTS     Results for orders placed during the hospital encounter of 03/14/18   XR chest 1 view portable    Narrative CHEST     INDICATION:   acute shortness of breath at dialysis  COMPARISON:  Chest radiographs March 17, 2018    EXAM PERFORMED/VIEWS:  XR CHEST PORTABLE      FINDINGS:    The heart is enlarged  Atherosclerotic changes in the aorta  Right internal jugular dialysis catheter with tip at cavoatrial junction  Lung markings are crowded secondary to low lung volumes  Within limitations of this examination there is no focal airspace opacity to suggest pneumonia  No pneumothorax or pleural effusion  Osseous structures appear within normal limits for patient age  Impression No active pulmonary disease on examination which is somewhat limited secondary to low lung volumes  Workstation performed: [id number]       Results for orders placed during the hospital encounter of 11/09/17   XR chest 2 views    Narrative CHEST     INDICATION:  Weakness  History taken directly from the electronic ordering system  COMPARISON:  Chest x-ray of 9/19/2017    VIEWS:  Frontal and lateral projections    IMAGES:  2    FINDINGS:  Right-sided dialysis catheter tip overlies the right atrium  The heart is enlarged  Patient is somewhat rotated to the left  There is persistent pulmonary vascular congestion, appears chronic  No pneumothorax or pleural effusion  Degenerative spurring is noted of the spine  There are marked arthritic changes of the right shoulder  Impression Persistent pulmonary vascular congestion, appears chronic  Workstation performed: [id number]       No results found for this or any previous visit  No results found for this or any previous visit  No results found for this or any previous visit  No results found for this or any previous visit      OBJECTIVE Current Weight: Weight - Scale: 51 4 kg (113 lb 5 1 oz)  Vitals:    03/15/18 1100   BP: 138/82   Pulse: 99   Resp: 16   Temp: 97 8 °F (36 6 °C)   SpO2: 98%       Intake/Output Summary (Last 24 hours) at 03/15/18 1446  Last data filed at 03/15/18 1446   Gross per 24 hour   Intake              897 ml   Output                0 ml   Net              897 ml       PHYSICAL EXAMINATION     Physical Exam   Constitutional: No distress  Chronically ill   HENT:   Head: Normocephalic  Eyes: Conjunctivae are normal  Pupils are equal, round, and reactive to light  No scleral icterus  Neck: Normal range of motion  Neck supple  No JVD present  Cardiovascular: Normal rate and normal heart sounds  Pulmonary/Chest: Effort normal  She has no wheezes  She has rales  Abdominal: Soft  She exhibits no mass  There is no tenderness  Positive for a pack tube   Musculoskeletal: Normal range of motion  She exhibits edema  Neurological: She is alert  Does have Bell's palsy on right side with weakness on same side aphasic   Skin: Skin is warm  No rash noted  PLAN / RECOMMENDATIONS      ESRD:  On dialysis  Long discussion with the patient daughter who is not very happy with dialysis claims every time see get dialysis see get more weak  I had discussed at length with her  Discussed about quality of the life and stopping dialysis  She is quite adamant she want to continue dialysis  I will try to reduce dialysis twice a week as she is very frail woman may not need 3 times a week dialysis  I did discuss with her to be monitored very closely on dialysis and she may need to increase dialysis  Patient overall prognosis quite poor with poor quality of life that I discussed with her    History of CVA:  Stable    Altered mental status:  Due to CVA    Hypertension:  Very well control    Time spent with the patient family about 70 minutes    Will dialyze her tomorrow and monitor closely    Thank you for the consultation to participate in patient's care  I have personally discussed my plan with the referring physician  Nivia Prieto MD  Nephrology  3/15/2018        Portions of the record may have been created with voice recognition software  Occasional wrong word or "sound a like" substitutions may have occurred due to the inherent limitations of voice recognition software  Read the chart carefully and recognize, using context, where substitutions have occurred

## 2018-03-15 NOTE — SOCIAL WORK
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 and aphasia, tube feedings, Dm, ESRD-she goes to dialysis through [facility] on M-W-F, and L femur fracture  Cm discussed discharge plan with daughter and she does not want her mother in a [address] including [place]  States that she has spoken to Dr Najma Baxter who is looking into home dialysis  She also is going to be receiving 11 1/2 nursing hrs on non dialysis days [facility]  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

## 2018-03-15 NOTE — CASE MANAGEMENT
Initial Clinical Review    OBSERVATION 3/14 1042 CHANGED TO INPATIENT 3/15 @ 6830     Admission: Date/Time/Statement:   03/15/18 1649  Inpatient Admission Once     Transfer Service: General Medicine       Question Answer Comment   Admitting Physician MARY BOO    Level of Care Med Surg    Estimated length of stay More than 2 Midnights    Certification I certify that inpatient services are medically necessary for this patient for a duration of greater than two midnights  See H&P and MD Progress Notes for additional information about the patient's course of treatment  ED: Date/Time/Mode of Arrival:   ED Arrival Information     Expected Arrival Acuity Means of Arrival Escorted By Service Admission Type    - 3/14/2018 08:53 Emergent Ambulance [address] Urgent    Arrival Complaint    SOB        Chief Complaint:   Chief Complaint   Patient presents with    Shortness of Breath     pt brought in by EMS from dialysis, pt resides at [facility], per EMS dialysis center stated that pt was having SOB, pt has h/o multiple strokes 3 months prior, pt appears non-verbal        History of Illness: This is a 70-year-old female with history of end-stage renal disease currently on dialysis, multiple strokes with right-sided hemiplegia, nonverbal at baseline, recent femur fracture status post surgery who presents today with shortness of breath  Patient was on her dialysis which she normally has Monday Wednesday and Friday  During dialysis patient attempted to pull out her Perma-Cath the nurse connected again but noticed that she was acutely short of breath  Patient was tachypneic and tachycardic  Her baseline her vitals are within normal limits  normally she completes about 3 and 0 5 hours of dialysis treatment  Today she only completed about 1 hour    EMS was called and patient was brought to the hospital   I spoke with the dialysis nurse stated she listened breath sounds they were pretty equal   She was concern for possible PE  Patient does not take any blood thinners at baseline  Patient is a full code  Also daughters were called regarding patient's transfer to the hospital   Unable to obtain history from the patient since she is nonverbal after the stroke  Patient appears tachypneic but comfortable  Equal breath sounds bilaterally with some rhonchi bilaterally  No obvious signs of trauma  PermCath in place PEG tube in place with no surrounding erythema or signs of infection  Impression:  68 year female coming with shortness of breath  Will get a portable x-ray to rule out at a pneumothorax  Will do a CT PE study along with lab work  Will obtain further history from daughter       ED Vital Signs:   ED Triage Vitals [03/14/18 0859]   Temperature Pulse Respirations Blood Pressure SpO2   98 4 °F (36 9 °C) (!) 110 (!) 24 116/62 98 %      Temp Source Heart Rate Source Patient Position - Orthostatic VS BP Location FiO2 (%)   Axillary -- Lying Right arm --      Pain Score       --          Wt Readings from Last 1 Encounters:   03/14/18 51 4 kg (113 lb 5 1 oz)       Vital Signs (abnormal):   Pt on 4L O2 NC initially, , O2 sat 100%  O2 NC 2 L  3/14 @ 1925, 1L NC @ 2108      Abnormal Labs/Diagnostic Test Results:     Troponin  0 06, 0 29, 0 27, 0 22    BNP 4,142    Blood cx x 2 pending     WBC 17 29 (H) 4 31 - 10 16 Thousand/uL     RBC 3 29 (L) 3 81 - 5 12 Million/uL     Hemoglobin 11 2 (L) 11 5 - 15 4 g/dL     Hematocrit 34 1 (L) 34 8 - 46 1 %    Cl  94,    Anion Gap 14 (H) 4 - 13 mmol/L     BUN 63 (H) 5 - 25 mg/dL     Creatinine 3 60 (H) 0 60 - 1 30 mg/dL     Comment: Standardized to IDMS reference method       Glucose 233 (H) 65 - 140 mg/dL     Comment:          Calcium 10 8 (H) 8 3 - 10 1 mg/dL      (H) 5 - 45 U/L        (H) 12 - 78 U/L     Comment:   Specimen collection should occur prior to Sulfasalazine administration due to the potential for falsely depressed results  Alkaline Phosphatase 839 (H) 46 - 116 U/L      Albumin 3 1 (L) 3 5 - 5 0 g/dL      NT-proBNP 4,142 (H) <450 pg/mL      Troponin I 0 06 (H) <=0 04 ng/mL   CTA chest - wnl   CXR - wnl EKG-ST     ED Treatment:   Medication Administration from 03/14/2018 0853 to 03/14/2018 1537       Date/Time Order Dose Route Action Comments     03/14/2018 0947 iohexol (OMNIPAQUE) 350 MG/ML injection (MULTI-DOSE) 85 mL 85 mL Intravenous Given      03/14/2018 1036 HYDROmorphone (DILAUDID) injection 0 5 mg 0 5 mg Intravenous Given           Past Medical/Surgical History:    Active Ambulatory Problems     Diagnosis Date Noted    ESRD (end stage renal disease) on dialysis ([facility] ) 02/07/2018    Closed intertrochanteric fracture of left femur (HCC) 02/28/2018    Anemia of chronic kidney failure, stage 5 ([place] [address] ) 02/28/2018    Multiple myeloma not having achieved remission ([place] [address] ) 02/28/2018    Essential hypertension 02/28/2018    Diabetes mellitus type 2 in nonobese ([facility] ) 02/28/2018    Hemiparesis due to old stroke ([place] [address] ) 02/28/2018    Closed intertrochanteric fracture of hip, left, initial encounter ([facility] ) 02/28/2018    Functional quadriplegia ([place] [address] ) 03/01/2018    Subtrochanteric fracture of left femur, closed, initial encounter ([place] [address] ) 03/03/2018    Acute blood loss as cause of postoperative anemia 03/05/2018    Anemia 03/06/2018    Hypertensive CKD, [facility] ) 03/06/2018    Hyponatremia 03/07/2018    Severe protein-calorie malnutrition ([facility] ) 03/13/2018     Resolved Ambulatory Problems     Diagnosis Date Noted    No Resolved Ambulatory Problems     Past Medical History:   Diagnosis Date    Anemia     Arthritis     Chronic kidney disease     Dialysis patient ([facility] )     Hypertension     Multiple myeloma ([facility] )     Stroke ([place] [address] )        Admitting Diagnosis: Shortness of breath [R06 02]  Severe protein-calorie malnutrition (HCC) [E43]  SOB (shortness of breath) [R06 02]  NSTEMI (non-ST elevated myocardial infarction) ([facility] ) [I21 4]  ESRD (end stage renal disease) on dialysis ([place] [address] ) [N18 6, Z99 2]    Age/Sex: 68 y o  female    Assessment/Plan:   * SOB (shortness of breath)   Assessment & Plan     · Present on admission, was present on last admission  CTA in the ER (-) for PE  Etiology unclear  · Bring patient in under observation, monitor overnight on telemetry, plan for dialysis in the morning  · Respiratory protocol, incentive spirometer  · Echo 3/2 really unremarkable, I will not repeat at this time  · Troponin mildly elevated, will cycle  This could be secondary to her CKD  · She is not appear overtly fluid overloaded, though BNP is elevated on admission  CTA not mentioning significant fluid  Hold off on diuretics, defer to Nephrology  · Leukocytosis - etiology unclear, afebrile, was last at 12K  Repeat in a m  - hold on antibiotics, no obvious rash  Lung exam clear  · Vitals with sinus tachy - known, no hypoxia, on O2 supplement presently  BP stable           ESRD (end stage renal disease) on dialysis [facility])   Assessment & Plan     · On dialysis, she only received 1 hour of her usual 3 5 today due to sudden-onset shortness of breath  · Dialysis tomorrow, monitor fluid status  · Nephrology consult           Closed intertrochanteric fracture of left femur [facility])   Assessment & Plan     · Patient recently discharged after close intertrochanteric fracture of the left leg  She is status post chidi  · Continue with therapy, patient was just discharged yesterday 3/13           Hemiparesis due to old stroke [facility])   Assessment & Plan     · Continue full-strength aspirin, as per the last admission, the family refused heparin due to history of GI bleeding requiring 10 units PRBCs  SCDs           Severe protein-calorie malnutrition ([facility] )   Assessment & Plan     · This is a very frail-appearing patient, with history significant stroke and aphasia, currently on G-tube feedings    Will consult Nutrition to continue those, aspiration precautions              Known sacral and heel decub on admission - re-consult wound for tomorrow  Specialty bed    Frequent repositioning       VTE Prophylaxis: Family has refused VTE prophylaxis  / sequential compression device   Code Status: FULL CODE per chart       Certification Statement: The patient will continue to require additional inpatient hospital stay due to needs dialysis    Admission Orders:  Scheduled Meds:  Current Facility-Administered Medications:  acetaminophen 650 mg Oral Q8H PRN Tawanda Wynn PA-C   aspirin 325 mg Oral Daily Peyton Justice PA-C   atorvastatin 20 mg Oral Daily Peyton Justice PA-C   b complex-vitamin C-folic acid 1 capsule Oral Daily With Dinner Peyton Justice PA-C   bisacodyl 10 mg Rectal Daily PRN Tawanda Wynn PA-C   calcium carbonate 1,000 mg Oral Daily PRN Peyton Justice PA-C   metoprolol tartrate 25 mg Oral Q12H [address] Peyton Justice PA-C   omeprazole (PRILOSEC) suspension 2 mg/mL 20 mg Oral Daily Peyton Justice PA-C   ondansetron 4 mg Intravenous Q6H PRN Peyton Justice PA-C   oxyCODONE 2 5 mg Oral Q4H PRN Tawanda Wynn PA-C   senna-docusate sodium 1 tablet Oral HS Peyton Justice PA-C   simethicone 80 mg Oral 4x Daily PRN Peyton Justice PA-C     Continuous Infusions:      PRN Meds:   acetaminophen    bisacodyl    calcium carbonate    ondansetron    oxyCODONE    simethicone      Tele  Serial trop   Tube feeding Nepro 45 ml/hr, 100 ml flush water q 6h  Bed rest  Incentive spirometry  [facility] rom P500 Therapy surface d/t large, multiple StageIII/IV pressure ulcers  Consult Nephrology  SCD's  Telemetry

## 2018-03-15 NOTE — MALNUTRITION/BMI
This medical record reflects one or more clinical indicators suggestive of malnutrition and/or morbid obesity  Malnutrition Findings:   Malnutrition type: Chronic illness  Degree of Malnutrition: Other severe protein calorie malnutrition  Malnutrition Characteristics: Fat loss, Weight loss (as evidenced by per chart review, 16% wt loss since 9/19/17(6 months), hollow orbital  Interventions in place to meet nutrient needs via PEG )    BMI Findings: Body mass index is 22 89 kg/m²  See Nutrition note dated 3/15/18 for additional details  Completed nutrition assessment is viewable in the nutrition documentation

## 2018-03-16 NOTE — CONSULTS
Progress Note - Wound   Scott Cushing 68 y o  female MRN: [id number]  Unit/Bed#: -01 Encounter: [id number]        Assessment:  Patient is 68year old female, recently d/c from hospital and readmitted with SOB from the [address] [place] [address]  Known to wound care nurse  Wound care consulted for pressure injuries noted on admission  Patient seen in bed, P-500 mattress in place  Dependent with care  Cooperative with assessment  Patient is anuric  Incontinent of stool  Assist of 2 with turning  Patient seen this morning prior to HD treatment  Nutrition is following along with patient  L heel is intact with no redness or wounds noted  R heel with DTI, skin is intact with reabsorbing DTI  This wound is POA, noted on previous admission as hospital acquired  Skin is non-blanchable fading light purple colored skin  Wound is non-evolving at time of assessment  This wound is hospital acquired  This wound has the potential to evolve to a full thickness injury stage 3 or 4  No redness, induration, drainage or fluctuance noted  Recommend offloading of pressure using pillows and hydraguard cream      Stage 3 noted to R buttock  This wound is POA  Accurate staging by nursing on admission  This wound is likely mixed etiology pressure/moisture  Wound bed is 60% beefy red granular tissue 40% thin appearing moist adhered yellow slough, edge is fragile attached intact with no maceration, vidya-wound is intact with areas of blanchable hyperpigmented and hypopigmented scarring no induration or fluctuance  No purulence noted  Due to incontinence of stool recommend to dust the wound with stoma powder and apply calazime cream  Sacrum and L buttock are intact with areas of hypopigmented scarring      See flow sheets for more detailed assessment findings  Primary nurse aware of plan of care  Wound care will follow along weekly       Plan:   1  Cleanse R buttock with NSS, pat dry, dust with stoma powder and apply calazime TID and PRN   2  Apply hydraguard to b/l heels, sacrum and L buttock BID and PRN for prevention and protection  3  Apply skin nourishing cream to the entire skin daily for moisture   4  Soft care cushion to chair when OOB   5  Turn and reposition patient every 2 hours   6  Elevate heels off of bed with pillows to offload pressure  7  Wound care will follow along with patient weekly  Please call with questions or concerns [phone]    Objective:    Vitals: Blood pressure 135/69, pulse 96, temperature 97 9 °F (36 6 °C), temperature source Axillary, resp  rate 18, height 4' 11" (1 499 m), weight 51 4 kg (113 lb 5 1 oz), SpO2 94 %  ,Body mass index is 22 89 kg/m²  Pressure Ulcer 03/05/18 Buttocks Right (Active)   Staging Stage III 3/16/2018  7:00 AM   Wound Description Yellow;Slough;Granulation tissue; Beefy red 3/16/2018  7:00 AM   Araseli-wound Assessment Dry; Intact;Fragile 3/16/2018  7:00 AM   Shape Irregular 3/14/2018  7:15 PM   Wound Length (cm) 4 5 cm 3/16/2018  7:00 AM   Wound Width (cm) 3 5 cm 3/16/2018  7:00 AM   Wound Depth (cm) 0 2 3/16/2018  7:00 AM   Calculated Wound Area (cm^2) 15 75 cm^2 3/16/2018  7:00 AM   Calculated Wound Volume (cm^3) 3 15 cm^3 3/16/2018  7:00 AM   Change in Wound Size % -57 5 3/16/2018  7:00 AM   Tunneling 0 cm 3/16/2018  7:00 AM   Undermining 0 3/16/2018  7:00 AM   Drainage Amount Small 3/16/2018  7:00 AM   Drainage Description Bloody 3/16/2018  7:00 AM   Treatment Cleansed;Elevated with pillow(s); Offload;Softcare cushion;Turn & reposition 3/16/2018  7:00 AM   Dressing Protective barrier 3/16/2018  7:00 AM   Wound packed? No 3/16/2018  7:00 AM   Packing- # removed 0 3/16/2018  7:00 AM   Packing- # inserted 0 3/16/2018  7:00 AM   Patient Tolerance Tolerated well 3/16/2018  7:00 AM   Dressing Status Open to air 3/16/2018  7:00 AM       Pressure Ulcer 03/05/18 Heel Right DTI (Active)   Staging Deep Tissue Injury 3/16/2018  7:00 AM   Wound Description Dry; Intact; Light purple 3/16/2018  7:00 AM   Araseli-wound Assessment Clean;Dry; Intact 3/16/2018  7:00 AM   Shape round 3/16/2018  7:00 AM   Wound Length (cm) 2 cm 3/16/2018  7:00 AM   Wound Width (cm) 2 cm 3/16/2018  7:00 AM   Wound Depth (cm) 0 3/16/2018  7:00 AM   Calculated Wound Area (cm^2) 4 cm^2 3/16/2018  7:00 AM   Calculated Wound Volume (cm^3) 0 cm^3 3/16/2018  7:00 AM   Tunneling 0 cm 3/16/2018  7:00 AM   Undermining 0 3/16/2018  7:00 AM   Drainage Amount None 3/16/2018  7:00 AM   Treatment Cleansed;Elevated with pillow(s); Offload; Turn & reposition 3/16/2018  7:00 AM   Dressing Moisture barrier 3/16/2018  7:00 AM   Wound packed? No 3/16/2018  7:00 AM   Packing- # removed 0 3/16/2018  7:00 AM   Packing- # inserted 0 3/16/2018  7:00 AM   Patient Tolerance Tolerated well 3/16/2018  7:00 AM   Dressing Status Open to air 3/16/2018  7:00 AM       Recommendations written as orders    Kathy Burch RN BSN

## 2018-03-16 NOTE — SOCIAL WORK
ERI Cunningham spoke to daughter Nieves Wetzel via phone  Daughter is refusing STR with [facility]  She understands that Dr Carlos Cadena is not ruling out home dialysis, but wants her to try OP dialysis twice weekly first   Daughter disagrees and will speak to Dr Carlos Cadena herself  She wishes for pt to come home with [facility] and the 13 1/2 hrs of caregivers that has been approved by [employer] waiver program   CM will continue to follow

## 2018-03-16 NOTE — ASSESSMENT & PLAN NOTE
· Patient recently discharged after close intertrochanteric fracture of the left leg  She is status post chidi  · Continue with therapy, patient was just discharged  3/13

## 2018-03-16 NOTE — PROGRESS NOTES
HEMODIALYSIS ROUNDING NOTE    Patient: Luna Hughes               Sex: female          DOA: 3/14/2018  8:54 AM   YOB: 1940        Age:  68 y o         LOS:  LOS: 1 day             SUBJECTIVE     - Patient was seen during hemodialysis today     - Reviewed last 24 hrs events     CURRENT MEDICATIONS       Current Facility-Administered Medications:     acetaminophen (TYLENOL) tablet 650 mg, 650 mg, Oral, Q8H PRN, Tobi Johnson PA-C, 650 mg at 03/14/18 2115    aspirin tablet 325 mg, 325 mg, Oral, Daily, Peyton Justice PA-C, 325 mg at 03/15/18 1021    atorvastatin (LIPITOR) tablet 20 mg, 20 mg, Oral, Daily, Peyton Justice PA-C, 20 mg at 03/15/18 1021    b complex-vitamin C-folic acid (NEPHROCAPS) capsule 1 capsule, 1 capsule, Oral, Daily With Dinner, Tobi Johnson PA-C, 1 capsule at 03/15/18 1636    bisacodyl (DULCOLAX) rectal suppository 10 mg, 10 mg, Rectal, Daily PRN, Peyton Justice PA-C    calcium carbonate (TUMS) chewable tablet 1,000 mg, 1,000 mg, Oral, Daily PRN, Peyton Justice PA-C    epoetin babak (EPOGEN,PROCRIT) injection 3,000 Units, 3,000 Units, Intravenous, Once, Cody Warren MD    insulin lispro (HumaLOG) 100 units/mL subcutaneous injection 1-5 Units, 1-5 Units, Subcutaneous, TID AC, 1 Units at 03/15/18 1637 **AND** Fingerstick Glucose (POCT), , , TID AC, Oscar Buck MD    metoprolol tartrate (LOPRESSOR) tablet 25 mg, 25 mg, Oral, Q12H [address], Peyton Justice PA-C, 25 mg at 03/15/18 2106    omeprazole (PRILOSEC) suspension 2 mg/mL, 20 mg, Oral, Daily, Peyton Justice PA-C, 20 mg at 03/15/18 1022    ondansetron (ZOFRAN) injection 4 mg, 4 mg, Intravenous, Q6H PRN, Peyton Justice PA-C    oxyCODONE (ROXICODONE) oral solution 2 5 mg, 2 5 mg, Oral, Q4H PRN, Peyton Justice PA-C    senna-docusate sodium (SENOKOT S) 8 6-50 mg per tablet 1 tablet, 1 tablet, Oral, HS, Peyton Justice PA-C, 1 tablet at 03/15/18 2107    simethicone (MYLICON) chewable tablet 80 mg, 80 mg, Oral, 4x Daily PRN, Demarcus Tirado PA-C    OBJECTIVE     Current Weight: Weight - Scale: 51 4 kg (113 lb 5 1 oz)  Vitals:    03/16/18 0930   BP: 115/68   Pulse: 104   Resp:    Temp:    SpO2:        Intake/Output Summary (Last 24 hours) at 03/16/18 1000  Last data filed at 03/16/18 7041   Gross per 24 hour   Intake             1106 ml   Output                0 ml   Net             1106 ml       PHYSICAL EXAMINATION     Physical Exam   Constitutional: She appears well-developed  No distress  Chronically ill   HENT:   Head: Normocephalic  Mouth/Throat: Oropharynx is clear and moist    Eyes: Conjunctivae are normal  No scleral icterus  Neck: Neck supple  No JVD present  Cardiovascular: Normal rate and normal heart sounds  Pulmonary/Chest: Effort normal  She has no wheezes  Abdominal: Soft  There is no tenderness  Musculoskeletal: Normal range of motion  She exhibits no edema  Neurological:   Lethargic confused and has hemiparesis   Skin: Skin is warm  No rash noted  Psychiatric: She has a normal mood and affect   Her behavior is normal          LAB RESULTS       Results from last 7 days  Lab Units 03/15/18  0549 03/14/18  0938 03/13/18  0441 03/12/18  0457 03/11/18  1152 03/10/18  0527   WBC Thousand/uL 12 19* 17 29* 12 59* 10 35* 14 86* 15 29*   HEMOGLOBIN g/dL 10 1* 11 2* 10 5* 10 4* 11 2* 10 9*   HEMATOCRIT % 30 1* 34 1* 32 8* 33 7* 34 4* 33 1*   PLATELETS Thousands/uL 216 240 210 206 244 226   SODIUM mmol/L 136 136 136 136 138 136   POTASSIUM mmol/L 4 7 4 1 4 0 4 5 4 3 3 8   CHLORIDE mmol/L 94* 94* 96* 95* 95* 97*   CO2 mmol/L 27 28 29 27 30 27   BUN mg/dL 85* 63* 49* 98* 77* 38*   CREATININE mg/dL 4 73* 3 60* 3 00* 5 18* 4 20* 2 51*   EGFR ml/min/1 73sq m 10 13 17 9 11 21   CALCIUM mg/dL 11 2* 10 8* 10 8* 11 3* 11 0* 10 3*   TOTAL PROTEIN g/dL 7 0 7 6  --   --  7 4  --    GLUCOSE RANDOM mg/dL 180* 233* 210* 206* 208* 241*       RADIOLOGY RESULTS     Results for orders placed during the hospital encounter of 03/14/18   XR chest 1 view portable    Narrative CHEST     INDICATION:   acute shortness of breath at dialysis  COMPARISON:  Chest radiographs March 17, 2018    EXAM PERFORMED/VIEWS:  XR CHEST PORTABLE      FINDINGS:    The heart is enlarged  Atherosclerotic changes in the aorta  Right internal jugular dialysis catheter with tip at cavoatrial junction  Lung markings are crowded secondary to low lung volumes  Within limitations of this examination there is no focal airspace opacity to suggest pneumonia  No pneumothorax or pleural effusion  Osseous structures appear within normal limits for patient age  Impression No active pulmonary disease on examination which is somewhat limited secondary to low lung volumes  Workstation performed: [id number]       Results for orders placed during the hospital encounter of 11/09/17   XR chest 2 views    Narrative CHEST     INDICATION:  Weakness  History taken directly from the electronic ordering system  COMPARISON:  Chest x-ray of 9/19/2017    VIEWS:  Frontal and lateral projections    IMAGES:  2    FINDINGS:  Right-sided dialysis catheter tip overlies the right atrium  The heart is enlarged  Patient is somewhat rotated to the left  There is persistent pulmonary vascular congestion, appears chronic  No pneumothorax or pleural effusion  Degenerative spurring is noted of the spine  There are marked arthritic changes of the right shoulder  Impression Persistent pulmonary vascular congestion, appears chronic  Workstation performed: [id number]       No results found for this or any previous visit  No results found for this or any previous visit  No results found for this or any previous visit  No results found for this or any previous visit  PLAN / RECOMMENDATIONS     1  End Stage Renal Disease:       I saw and examined patient during hemodialysis treatment   The patient was receiving hemodialysis for treatment of end stage renal disease  I have also reviewed vital signs, intake and output, lab results and recent events, and agreed with today's dialysis order  Access: No issue  Has dialysis catheter    2  Anemia:   on Procrit and stable    Maura Ortiz MD  Nephrology  3/16/2018        Portions of the record may have been created with voice recognition software  Occasional wrong word or "sound a like" substitutions may have occurred due to the inherent limitations of voice recognition software  Read the chart carefully and recognize, using context, where substitutions have occurred

## 2018-03-16 NOTE — PROGRESS NOTES
Progress Note - Yaritza Mock 1940, 68 y o  female MRN: [id number]    Unit/Bed#: -01 Encounter: [id number]    Primary Care Provider: Luigi Bess MD   Date and time admitted to hospital: 3/14/2018  8:54 AM        Severe protein-calorie malnutrition ([address] )   Assessment & Plan    · This is a very frail-appearing patient, with history significant stroke and aphasia, currently on G-tube feedings  Will consult Nutrition to continue those, aspiration precautions  Hemiparesis due to old stroke [facility])   Assessment & Plan    · Continue full-strength aspirin, as per the last admission, the family refused heparin due to history of GI bleeding requiring 10 units PRBCs  SCDs  Diabetes mellitus type 2 in nonobese [facility])   Assessment & Plan    Added accuchecks q 6 with coverage        Closed intertrochanteric fracture of left femur [facility])   Assessment & Plan    · Patient recently discharged after close intertrochanteric fracture of the left leg  She is status post chidi  · Continue with therapy, patient was just discharged  3/13  ESRD (end stage renal disease) on dialysis [facility])   Assessment & Plan    · On dialysis, she only received 1 hour of her usual 3 5 today due to sudden-onset shortness of breath  · Dialysis Friday, monitor fluid status  · Nephrology consult appreciated        * SOB (shortness of breath)   Assessment & Plan    · Present on admission, was present on last admission  CTA in the ER (-) for PE  Etiology unclear  · Bring patient in under observation, monitor overnight on telemetry, plan for dialysis in the morning  · Respiratory protocol, incentive spirometer  · Echo 3/2 really unremarkable, I will not repeat at this time  · Troponin mildly elevated, will cycle  This could be secondary to her CKD  · She is not appear overtly fluid overloaded, though BNP is elevated on admission  Hold off on diuretics, defer to Nephrology  · Patient does not appear to have tachypnea  When stimulated she will start to pant            VTE Pharmacologic Prophylaxis:   Pharmacologic: family refuses heparin due to previous gi bleeding  Mechanical: Mechanical VTE prophylaxis in place  Patient Centered Rounds: I have evaluated patient without nursing staff present due to discussed with outside room  Discussions with Specialists or Other Care Team Provider: Nephrology  Education and Discussions with Family / Patient: attempted to call daughter x 2 got voice mail, they did not call when family was at the bedside earlier  Time Spent for Care: 20 minutes  More than 50% of total time spent on counseling and coordination of care as described above  Current Length of Stay: 1 day(s)  Current Patient Status: Inpatient   Certification Statement: The patient will continue to require additional inpatient hospital stay due to needs dialysis    Discharge Plan: hopefull for next 24 hours  Code Status: Level 1 - Full Code    Subjective:   Patient unable to communicate  Opens eyes some  Objective:   Vitals:   Temp (24hrs), Av 9 °F (36 6 °C), Min:97 7 °F (36 5 °C), Max:98 2 °F (36 8 °C)    HR:  [] 96  Resp:  [16-20] 18  BP: (110-167)/(59-88) 167/78  SpO2:  [94 %-100 %] 94 %  Body mass index is 22 89 kg/m²  Input and Output Summary (last 24 hours): Intake/Output Summary (Last 24 hours) at 18 0603  Last data filed at 03/15/18 1937   Gross per 24 hour   Intake             1185 ml   Output                0 ml   Net             1185 ml       Physical Exam:     Physical Exam:cachectic elderly female , opens eye but can't communicate, no acute distress, mm dry   Chest decreased but clear, no RRW  CVS: RRR S1, S2  Abd scaphoid, not tender or distended, peg intact  Ext: contracted upper extremy on left, spastic contracted other extremities  Neuro: non verbal but will lateralize voice      Additional Data:   Labs:    Results from last 7 days  Lab Units 03/15/18  0549 18  5874 03/11/18  1152   WBC Thousand/uL 12 19* 17 29*  < > 14 86*   HEMOGLOBIN g/dL 10 1* 11 2*  < > 11 2*   HEMATOCRIT % 30 1* 34 1*  < > 34 4*   PLATELETS Thousands/uL 216 240  < > 244   NEUTROS PCT %  --   --   --  67   LYMPHS PCT %  --   --   --  21   LYMPHO PCT %  --  12*  < >  --    MONOS PCT %  --   --   --  7   MONO PCT MAN %  --  6  < >  --    EOS PCT %  --   --   --  1   EOSINO PCT MANUAL %  --  2  < >  --    < > = values in this interval not displayed  Results from last 7 days  Lab Units 03/15/18  0549   SODIUM mmol/L 136   POTASSIUM mmol/L 4 7   CHLORIDE mmol/L 94*   CO2 mmol/L 27   BUN mg/dL 85*   CREATININE mg/dL 4 73*   CALCIUM mg/dL 11 2*   TOTAL PROTEIN g/dL 7 0   BILIRUBIN TOTAL mg/dL 0 60   ALK PHOS U/L 698*   ALT U/L 112*   AST U/L 111*   GLUCOSE RANDOM mg/dL 180*       Results from last 7 days  Lab Units 03/14/18  0938   INR  0 94       * I Have Reviewed All Lab Data Listed Above  * Additional Pertinent Lab Tests Reviewed: All Labs Within Last 24 Hours Reviewed    Imaging:    Imaging Reports Reviewed Today Include: none    Cultures:   Blood Culture:   Lab Results   Component Value Date    BLOODCX No Growth at 24 hrs  03/14/2018    BLOODCX No Growth at 24 hrs  03/14/2018    BLOODCX No Growth After 5 Days  03/07/2018    BLOODCX No Growth After 5 Days   03/07/2018     Urine Culture:   Lab Results   Component Value Date    URINECX No Growth <1000 cfu/mL 11/09/2017     Sputum Culture: No components found for: SPUTUMCX  Wound Culture: No results found for: WOUNDCULT    Last 24 Hours Medication List:     Current Facility-Administered Medications:  acetaminophen 650 mg Oral Q8H PRN Afshan Combs PA-C   aspirin 325 mg Oral Daily Peyton Justice PA-C   atorvastatin 20 mg Oral Daily Peyton Justice PA-C   b complex-vitamin C-folic acid 1 capsule Oral Daily With Dinner Peyton Justice PA-C   bisacodyl 10 mg Rectal Daily PRN Afshan Combs PA-C   calcium carbonate 1,000 mg Oral Daily PRN Afshan Combs PA-C epoetin babak 3,000 Units Intravenous Once Dora Cleaning MD   insulin lispro 1-5 Units Subcutaneous TID AC Elham Gillette MD   metoprolol tartrate 25 mg Oral Q12H [address] Peyton Justice PA-C   omeprazole (PRILOSEC) suspension 2 mg/mL 20 mg Oral Daily Peyton Justice PA-C   ondansetron 4 mg Intravenous Q6H PRN Peyton Justice PA-C   oxyCODONE 2 5 mg Oral Q4H PRN Fanny Bear PA-C   senna-docusate sodium 1 tablet Oral HS Peyton Justice PA-C   simethicone 80 mg Oral 4x Daily PRN Fanny Bear PA-C        Today, Patient Was Seen By: Elham Gillette MD    ** Please Note: Dragon 360 Dictation voice to text software may have been used in the creation of this document   **

## 2018-03-16 NOTE — PLAN OF CARE
Problem: DISCHARGE PLANNING - CARE MANAGEMENT  Goal: Discharge to post-acute care or home with appropriate resources  INTERVENTIONS:  - Conduct assessment to determine patient/family and health care team treatment goals, and need for post-acute services based on payer coverage, community resources, and patient preferences, and barriers to discharge  - Address psychosocial, clinical, and financial barriers to discharge as identified in assessment in conjunction with the patient/family and health care team  - Arrange appropriate level of post-acute services according to patient's   needs and preference and payer coverage in collaboration with the physician and health care team  - Communicate with and update the patient/family, physician, and health care team regarding progress on the discharge plan  - Arrange appropriate transportation to post-acute venues   Outcome: [facility] spoke to daughter Leonard Ruiz via phone  Daughter is refusing STR with [facility]  She understands that Dr Halle Hester is not ruling out home dialysis, but wants her to try OP dialysis twice weekly first   Daughter disagrees and will speak to Dr Halle Hester herself  She wishes for pt to come home with [facility] and the 13 1/2 hrs of caregivers that has been approved by [employer] Aging waiver program   CM will continue to follow

## 2018-03-16 NOTE — ASSESSMENT & PLAN NOTE
· On dialysis, she only received 1 hour of her usual 3 5 today due to sudden-onset shortness of breath  · Dialysis Friday, monitor fluid status    · Nephrology consult appreciated

## 2018-03-16 NOTE — ASSESSMENT & PLAN NOTE
· Present on admission, was present on last admission  CTA in the ER (-) for PE  Etiology unclear  · Bring patient in under observation, monitor overnight on telemetry, plan for dialysis in the morning  · Respiratory protocol, incentive spirometer  · Echo 3/2 really unremarkable, I will not repeat at this time  · Troponin mildly elevated, will cycle  This could be secondary to her CKD  · She is not appear overtly fluid overloaded, though BNP is elevated on admission  Hold off on diuretics, defer to Nephrology  · Patient does not appear to have tachypnea   When stimulated she will start to pant

## 2018-03-16 NOTE — PLAN OF CARE
Knowledge Deficit     Patient/family/caregiver demonstrates understanding of disease process, treatment plan, medications, and discharge instructions Not Progressing        MUSCULOSKELETAL - ADULT     Maintain or return mobility to safest level of function Not Progressing        NEUROSENSORY - ADULT     Achieves stable or improved neurological status Not Progressing        Prexisting or High Potential for Compromised Skin Integrity     Skin integrity is maintained or improved Not Progressing        SKIN/TISSUE INTEGRITY - ADULT     Skin integrity remains intact Not Progressing          CARDIOVASCULAR - ADULT     Maintains optimal cardiac output and hemodynamic stability Progressing     Absence of cardiac dysrhythmias or at baseline rhythm Progressing        DISCHARGE PLANNING     Discharge to home or other facility with appropriate resources Progressing        DISCHARGE PLANNING - CARE MANAGEMENT     Discharge to post-acute care or home with appropriate resources Progressing        GASTROINTESTINAL - ADULT     Maintains adequate nutritional intake Progressing        GENITOURINARY - ADULT     Maintains or returns to baseline urinary function Progressing        INFECTION - ADULT     Absence or prevention of progression during hospitalization Progressing        Nutrition/Hydration-ADULT     Nutrient/Hydration intake appropriate for improving, restoring or maintaining nutritional needs Progressing        PAIN - ADULT     Verbalizes/displays adequate comfort level or baseline comfort level Progressing        Potential for Falls     Patient will remain free of falls Progressing        RESPIRATORY - ADULT     Achieves optimal ventilation and oxygenation Progressing        SAFETY ADULT     Maintain or return to baseline ADL function Progressing     Maintain or return mobility status to optimal level Progressing        SKIN/TISSUE INTEGRITY - ADULT     Incision(s), wounds(s) or drain site(s) healing without S/S of infection Progressing     Oral mucous membranes remain intact Progressing

## 2018-03-16 NOTE — PHYSICIAN ADVISOR
This patient is a  68 y o  female patient who originally presented to the hospital on 2/28/2018 due to a intertrochanteric fracture in her left hip  Pt had been in IR Department for placement of an AV fistula, when she was noticed to have significant swelling of her left thigh  After diagnostic imaging confirmed fracture she underwent surgical intervention (chidi) after clearance by Cardiology  She had an ECHO at that time which was unremarkable with an EF of 55%  She had made slow progress after the surgery and was discharged home 3/13/18  Of note, patient has a distant hx of CVA which basically left her bedridden and nonverbal with left-sided hemiparesis  She is also severely malnourished because of her inability to swallow because of stroke and has a PEG tube  During her initial admit 2/28 - 3/13 she was having intermittent episodes of hypotension and tachycardia  Pt also received blood transfusion after the surgery because of acute postop blood loss anemia in addition to having anemia of chronic disease and has underlying multiple myeloma  She is an ESRD and on dialysis MWF  She presented 3/14/18 with shortness of breath  Per notes patient was at her dialysis when they noticed that she was acutely SOB  Patient was tachypneic and tachycardic  Normally she completes 3 5 hrs of dialysis but on day in question she only completed ~1 hour  EMS was called and patient was brought to the hospital   In the ER pt underwent chest xray which was neg for acute findings and CT angio chest which was neg for PE or other acute findings  So far during stay she has not appeared fluid overloaded, troponins have been mildly elevated but stable and trending down (0 29 -> 0 27 - > 0 22) which could be related to her CKD and the etiology for her SOB remains unclear       ED Triage Vitals [03/14/18 0859]   Temperature Pulse Respirations Blood Pressure SpO2   98 4 °F (36 9 °C) (!) 110 (!) 24 116/62 98 %      Temp Source Heart Rate Source Patient Position - Orthostatic VS BP Location FiO2 (%)   Axillary -- Lying Right arm --      Pain Score       --         Treatment in the ER included labs, EKG, chest xray and CT angio chest   The patient is admitted as INPATIENT  and has remained hospitalized for 1 day(s)  Last vital signs were Blood pressure 111/66, pulse 102, temperature 97 9 °F (36 6 °C), temperature source Axillary, resp  rate 18, height 4' 11" (1 499 m), weight 51 4 kg (113 lb 5 1 oz), SpO2 94 %      The patient is appropriate for  Inpatient Admission  The rationale is as follows: she will need more than 2 midnights for eval of acute SOB  This admission is UNRELATED to previous admit

## 2018-03-16 NOTE — PLAN OF CARE
CARDIOVASCULAR - ADULT     Maintains optimal cardiac output and hemodynamic stability Progressing     Absence of cardiac dysrhythmias or at baseline rhythm Progressing        DISCHARGE PLANNING     Discharge to home or other facility with appropriate resources Progressing        DISCHARGE PLANNING - CARE MANAGEMENT     Discharge to post-acute care or home with appropriate resources Progressing        GASTROINTESTINAL - ADULT     Maintains adequate nutritional intake Progressing        GENITOURINARY - ADULT     Maintains or returns to baseline urinary function Progressing        INFECTION - ADULT     Absence or prevention of progression during hospitalization Progressing        Knowledge Deficit     Patient/family/caregiver demonstrates understanding of disease process, treatment plan, medications, and discharge instructions Progressing        MUSCULOSKELETAL - ADULT     Maintain or return mobility to safest level of function Progressing        NEUROSENSORY - ADULT     Achieves stable or improved neurological status Progressing        Nutrition/Hydration-ADULT     Nutrient/Hydration intake appropriate for improving, restoring or maintaining nutritional needs Progressing        PAIN - ADULT     Verbalizes/displays adequate comfort level or baseline comfort level Progressing        Potential for Falls     Patient will remain free of falls Progressing        Prexisting or High Potential for Compromised Skin Integrity     Skin integrity is maintained or improved Progressing        RESPIRATORY - ADULT     Achieves optimal ventilation and oxygenation Progressing        SAFETY ADULT     Maintain or return to baseline ADL function Progressing     Maintain or return mobility status to optimal level Progressing        SKIN/TISSUE INTEGRITY - ADULT     Skin integrity remains intact Progressing     Incision(s), wounds(s) or drain site(s) healing without S/S of infection Progressing     Oral mucous membranes remain intact Progressing

## 2018-03-17 NOTE — PROGRESS NOTES
NEPHROLOGY PROGRESS NOTE    Patient: January Rowley               Sex: female          DOA: 3/14/2018  8:54 AM   YOB: 1940        Age:  68 y o         LOS:  LOS: 2 days       HPI     Patient with ESRD and with history of fracture femur came to the hospital with shortness of breath and altered mental status    SUBJECTIVE     She is awake    Because of aphasia and language problem difficult to communicate with her but does seems comfortable    CURRENT MEDICATIONS       Current Facility-Administered Medications:     acetaminophen (TYLENOL) tablet 650 mg, 650 mg, Oral, Q8H PRN, Camilo Cox PA-C, 650 mg at 03/14/18 2115    aspirin tablet 325 mg, 325 mg, Oral, Daily, Peyton Justice PA-C, 325 mg at 03/17/18 7531    atorvastatin (LIPITOR) tablet 20 mg, 20 mg, Oral, Daily, Peyton Justice PA-C, 20 mg at 03/17/18 5637    b complex-vitamin C-folic acid (NEPHROCAPS) capsule 1 capsule, 1 capsule, Oral, Daily With Dinner, Camilo Cox PA-C, 1 capsule at 03/16/18 1735    bisacodyl (DULCOLAX) rectal suppository 10 mg, 10 mg, Rectal, Daily PRN, Peyton Justice PA-C    calcium carbonate (TUMS) chewable tablet 1,000 mg, 1,000 mg, Oral, Daily PRN, Peyton Justice PA-C    insulin lispro (HumaLOG) 100 units/mL subcutaneous injection 1-5 Units, 1-5 Units, Subcutaneous, TID AC, 1 Units at 03/17/18 0922 **AND** Fingerstick Glucose (POCT), , , TID AC, Lola MD Zaki    metoprolol tartrate (LOPRESSOR) tablet 25 mg, 25 mg, Oral, Q12H [address], Peyton Justice PA-C, 25 mg at 03/17/18 8758    omeprazole (PRILOSEC) suspension 2 mg/mL, 20 mg, Oral, Daily, Peyton Justice PA-C, 20 mg at 03/17/18 0912    ondansetron (ZOFRAN) injection 4 mg, 4 mg, Intravenous, Q6H PRN, Peyton Justice PA-C    oxyCODONE (ROXICODONE) oral solution 2 5 mg, 2 5 mg, Oral, Q4H PRN, Peyton Justice PA-C, 2 5 mg at 03/17/18 0909    senna-docusate sodium (SENOKOT S) 8 6-50 mg per tablet 1 tablet, 1 tablet, Oral, HS, Peyton Justice PA-C, 1 tablet at 03/16/18 2142    simethicone (MYLICON) chewable tablet 80 mg, 80 mg, Oral, 4x Daily PRN, Fantasma Tinoco PA-C    OBJECTIVE     Current Weight: Weight - Scale: 51 4 kg (113 lb 5 1 oz)  Vitals:    03/17/18 0700   BP: 126/62   Pulse: 101   Resp: 18   Temp: 98 6 °F (37 °C)   SpO2: 97%       Intake/Output Summary (Last 24 hours) at 03/17/18 1102  Last data filed at 03/16/18 1200   Gross per 24 hour   Intake              600 ml   Output             3505 ml   Net            -2905 ml       PHYSICAL EXAMINATION     Physical Exam   Constitutional: She appears well-developed  No distress  HENT:   Head: Normocephalic  Mouth/Throat: Oropharynx is clear and moist    Eyes: Conjunctivae are normal  No scleral icterus  Neck: Neck supple  No JVD present  Cardiovascular: Normal rate and normal heart sounds  Pulmonary/Chest: Effort normal  She has no wheezes  Abdominal: Soft  There is no tenderness  Musculoskeletal: Normal range of motion  She exhibits no edema  Neurological: She is alert  Skin: Skin is warm  No rash noted          LAB RESULTS       Results from last 7 days  Lab Units 03/15/18  0549 03/14/18  0938 03/13/18  0441 03/12/18  0457 03/11/18  1152   WBC Thousand/uL 12 19* 17 29* 12 59* 10 35* 14 86*   HEMOGLOBIN g/dL 10 1* 11 2* 10 5* 10 4* 11 2*   HEMATOCRIT % 30 1* 34 1* 32 8* 33 7* 34 4*   PLATELETS Thousands/uL 216 240 210 206 244   SODIUM mmol/L 136 136 136 136 138   POTASSIUM mmol/L 4 7 4 1 4 0 4 5 4 3   CHLORIDE mmol/L 94* 94* 96* 95* 95*   CO2 mmol/L 27 28 29 27 30   BUN mg/dL 85* 63* 49* 98* 77*   CREATININE mg/dL 4 73* 3 60* 3 00* 5 18* 4 20*   EGFR ml/min/1 73sq m 10 13 17 9 11   CALCIUM mg/dL 11 2* 10 8* 10 8* 11 3* 11 0*   TOTAL PROTEIN g/dL 7 0 7 6  --   --  7 4   GLUCOSE RANDOM mg/dL 180* 233* 210* 206* 208*       RADIOLOGY RESULTS      Results for orders placed during the hospital encounter of 03/14/18   XR chest 1 view portable    Narrative CHEST     INDICATION:   acute shortness of breath at dialysis  COMPARISON:  Chest radiographs March 17, 2018    EXAM PERFORMED/VIEWS:  XR CHEST PORTABLE      FINDINGS:    The heart is enlarged  Atherosclerotic changes in the aorta  Right internal jugular dialysis catheter with tip at cavoatrial junction  Lung markings are crowded secondary to low lung volumes  Within limitations of this examination there is no focal airspace opacity to suggest pneumonia  No pneumothorax or pleural effusion  Osseous structures appear within normal limits for patient age  Impression No active pulmonary disease on examination which is somewhat limited secondary to low lung volumes  Workstation performed: [id number]       Results for orders placed during the hospital encounter of 11/09/17   XR chest 2 views    Narrative CHEST     INDICATION:  Weakness  History taken directly from the electronic ordering system  COMPARISON:  Chest x-ray of 9/19/2017    VIEWS:  Frontal and lateral projections    IMAGES:  2    FINDINGS:  Right-sided dialysis catheter tip overlies the right atrium  The heart is enlarged  Patient is somewhat rotated to the left  There is persistent pulmonary vascular congestion, appears chronic  No pneumothorax or pleural effusion  Degenerative spurring is noted of the spine  There are marked arthritic changes of the right shoulder  Impression Persistent pulmonary vascular congestion, appears chronic  Workstation performed: [id number]       No results found for this or any previous visit  No results found for this or any previous visit  No results found for this or any previous visit  No results found for this or any previous visit  PLAN / RECOMMENDATIONS      ESRD:  Will continue to provide dialysis  As discussed with her daughter will do dialyzed twice a week  Next dialysis will be on Tuesday  Daughter once a to start her home dialysis    I had discussed that with home dialysis unit and they will be contacting her  For eligibility and if necessary for training  Until then she will need to continue dialysis in the clinic  I did try to call her and left a message on her machine    Generalized weakness: Will continue dialyzing    Anemia:  Will continue with Procrit    Overall prognosis guarded this has been discussed with the daughter at length and will continue to provide dialysis which family wants    Noble Alonso MD  Nephrology  3/17/2018        Portions of the record may have been created with voice recognition software  Occasional wrong word or "sound a like" substitutions may have occurred due to the inherent limitations of voice recognition software  Read the chart carefully and recognize, using context, where substitutions have occurred

## 2018-03-18 NOTE — PROGRESS NOTES
Progress Note - Heriberto Kingsley 1940, 68 y o  female MRN: [id number]    Unit/Bed#: -01 Encounter: [id number]    Primary Care Provider: Kanu Shultz MD   Date and time admitted to hospital: 3/14/2018  8:54 AM        Severe protein-calorie malnutrition [facility])   Assessment & Plan    This is a very frail-appearing patient, with history significant stroke and aphasia, currently on G-tube feedings  Hemiparesis due to old stroke [facility])   Assessment & Plan    · Continue full-strength aspirin, as per the last admission, the family refused heparin due to history of GI bleeding requiring 10 units PRBCs  SCDs  Diabetes mellitus type 2 in nonobese [facility])   Assessment & Plan    Added accuchecks q 6 with coverage        Closed intertrochanteric fracture of left femur [facility])   Assessment & Plan    · Patient recently discharged after close intertrochanteric fracture of the left leg  She is status post chidi  · Continue with therapy, patient was just discharged  3/13  ESRD (end stage renal disease) on dialysis [facility])   Assessment & Plan    · On dialysis, she only received 1 hour of her usual 3 5 today due to sudden-onset shortness of breath  · Dialysis Friday, monitor fluid status  · Nephrology consult appreciated, plan is to try to dialyze her twice a week in transition to home dialysis  I have been attempting all we to obtain a meeting with the patient's daughter but she does not answer my phone calls  Will continue to try to contact her for discharged home        * SOB (shortness of breath)   Assessment & Plan    · Present on admission, was present on last admission  CTA in the ER (-) for PE  Etiology unclear  · Bring patient in under observation, monitor overnight on telemetry, plan for dialysis in the morning  · Respiratory protocol, incentive spirometer  · Echo 3/2 really unremarkable, I will not repeat at this time  · Troponin mildly elevated, will cycle    This could be secondary to her CKD  · She is not appear overtly fluid overloaded, though BNP is elevated on admission  Hold off on diuretics, defer to Nephrology  · Patient does not appear to have tachypnea  When stimulated she will start to pant  · Otherwise patient has been comfortable  Continuing to try to obtain discharge plan with family  VTE Pharmacologic Prophylaxis:   Pharmacologic: Pharmacologic VTE Prophylaxis contraindicated due to Severe GI bleeding family refuses  Mechanical: Mechanical VTE prophylaxis in place  Patient Centered Rounds: I have performed bedside rounds with nursing staff today  Discussions with Specialists or Other Care Team Provider:   None  Education and Discussion:  Multiple phone calls to the patient's daughter have been completed  She does not  the phone, I have asked staff to call me when family comes to the hospital but the family generally leaves  Time Spent for Care: 20 minutes  More than 50% of total time spent on counseling and coordination of care as described above  Current Length of Stay: 3 day(s)  Current Patient Status: Inpatient   Certification Statement: The patient will continue to require additional inpatient hospital stay due to Need safe discharge plan    Discharge Plan: To be determinedDischarge Plan:  Code Status: Level 1 - Full Code    Subjective:   Patient opens eyes but is generally non communicative    Objective:   Vitals:   Temp (24hrs), Av 3 °F (36 8 °C), Min:98 °F (36 7 °C), Max:98 8 °F (37 1 °C)    HR:  [] 108  Resp:  [17-18] 17  BP: (113-179)/(59-93) 165/80  SpO2:  [95 %-97 %] 95 %  Body mass index is 22 89 kg/m²  Input and Output Summary (last 24 hours):     No intake or output data in the 24 hours ending 18 0612    Physical Exam:     Physical Exam  Cachectic elderly female in no acute distress if not disturbed    When disturbed the patient will call out was unable to speak she is normocephalic atraumatic pupils equal round and reactive to light extraocular muscles intact to the best exam mucous membranes are dry no oral lesions  Chest decreased but clear to auscultation no rhonchi rales or wheezes  Cardiovascular regular rate rhythm positive S1 and S2 heard appreciate an S3-S4 murmur or gallop  Abdomen soft nontender nondistended PEG site in the packed  Neurologically patient has a left-sided hemiparesis with contracture the she is nonfocal cannot really follow commands  Additional Data:   Labs:    Results from last 7 days  Lab Units 03/15/18  0549 03/14/18  0938  03/11/18  1152   WBC Thousand/uL 12 19* 17 29*  < > 14 86*   HEMOGLOBIN g/dL 10 1* 11 2*  < > 11 2*   HEMATOCRIT % 30 1* 34 1*  < > 34 4*   PLATELETS Thousands/uL 216 240  < > 244   NEUTROS PCT %  --   --   --  67   LYMPHS PCT %  --   --   --  21   LYMPHO PCT %  --  12*  < >  --    MONOS PCT %  --   --   --  7   MONO PCT MAN %  --  6  < >  --    EOS PCT %  --   --   --  1   EOSINO PCT MANUAL %  --  2  < >  --    < > = values in this interval not displayed  Results from last 7 days  Lab Units 03/15/18  0549   SODIUM mmol/L 136   POTASSIUM mmol/L 4 7   CHLORIDE mmol/L 94*   CO2 mmol/L 27   BUN mg/dL 85*   CREATININE mg/dL 4 73*   CALCIUM mg/dL 11 2*   TOTAL PROTEIN g/dL 7 0   BILIRUBIN TOTAL mg/dL 0 60   ALK PHOS U/L 698*   ALT U/L 112*   AST U/L 111*   GLUCOSE RANDOM mg/dL 180*       Results from last 7 days  Lab Units 03/14/18  0938   INR  0 94       * I Have Reviewed All Lab Data Listed Above  * Additional Pertinent Lab Tests Reviewed: No New Labs Available For Today    Imaging:    Imaging Reports Reviewed Today Include:  None    Cultures:   Blood Culture:   Lab Results   Component Value Date    BLOODCX No Growth at 72 hrs  03/14/2018    BLOODCX No Growth at 72 hrs  03/14/2018    BLOODCX No Growth After 5 Days  03/07/2018    BLOODCX No Growth After 5 Days   03/07/2018     Urine Culture:   Lab Results   Component Value Date    URINECX No Growth <1000 cfu/mL 11/09/2017 Sputum Culture: No components found for: SPUTUMCX  Wound Culture: No results found for: WOUNDCULT    Last 24 Hours Medication List:     Current Facility-Administered Medications:  acetaminophen 650 mg Oral Q8H PRN Jessica Kraus PA-C   aspirin 325 mg Oral Daily Peyton Justice PA-C   atorvastatin 20 mg Oral Daily Peyton Justice PA-C   b complex-vitamin C-folic acid 1 capsule Oral Daily With Dinner Peyton Justice PA-C   bisacodyl 10 mg Rectal Daily PRN Jessica Kraus PA-C   calcium carbonate 1,000 mg Oral Daily PRN Peyton Justice PA-C   insulin lispro 1-5 Units Subcutaneous TID AC Tori Oneill MD   metoprolol tartrate 25 mg Oral Q12H [facility] Peyton Justice PA-C   omeprazole (PRILOSEC) suspension 2 mg/mL 20 mg Oral Daily Peyton Justice PA-C   ondansetron 4 mg Intravenous Q6H PRN Peyton Justice PA-C   oxyCODONE 2 5 mg Oral Q4H PRN Jessica Kraus PA-C   senna-docusate sodium 1 tablet Oral HS Peyton Justice PA-C   simethicone 80 mg Oral 4x Daily PRN Jessica Kraus PA-C        Today, Patient Was Seen By: Joon Short MD    ** Please Note: Dragon 360 Dictation voice to text software may have been used in the creation of this document   **

## 2018-03-18 NOTE — ASSESSMENT & PLAN NOTE
This is a very frail-appearing patient, with history significant stroke and aphasia, currently on G-tube feedings

## 2018-03-18 NOTE — ASSESSMENT & PLAN NOTE
· Present on admission, was present on last admission  CTA in the ER (-) for PE  Etiology unclear  · Bring patient in under observation, monitor overnight on telemetry, plan for dialysis in the morning  · Respiratory protocol, incentive spirometer  · Echo 3/2 really unremarkable, I will not repeat at this time  · Troponin mildly elevated, will cycle  This could be secondary to her CKD  · She is not appear overtly fluid overloaded, though BNP is elevated on admission  Hold off on diuretics, defer to Nephrology  · Patient does not appear to have tachypnea  When stimulated she will start to pant  · Otherwise patient has been comfortable  Continuing to try to obtain discharge plan with family

## 2018-03-18 NOTE — PROGRESS NOTES
NEPHROLOGY PROGRESS NOTE    Patient: Ana Braun               Sex: female          DOA: 3/14/2018  8:54 AM   YOB: 1940        Age:  68 y o         LOS:  LOS: 3 days       HPI     Nithya Walters is here with ESRD and shortness of breath    SUBJECTIVE     Feeling well    Remained confused and noncommunicative    CURRENT MEDICATIONS       Current Facility-Administered Medications:     acetaminophen (TYLENOL) tablet 650 mg, 650 mg, Oral, Q8H PRN, Estela Laird PA-C, 650 mg at 03/14/18 2115    aspirin tablet 325 mg, 325 mg, Oral, Daily, Peyton Justice PA-C, 325 mg at 03/18/18 0813    atorvastatin (LIPITOR) tablet 20 mg, 20 mg, Oral, Daily, Peyton Justice PA-C, 20 mg at 03/18/18 0813    b complex-vitamin C-folic acid (NEPHROCAPS) capsule 1 capsule, 1 capsule, Oral, Daily With Dinner, Estela Laird PA-C, 1 capsule at 03/17/18 1751    bisacodyl (DULCOLAX) rectal suppository 10 mg, 10 mg, Rectal, Daily PRN, Peyton Justice PA-C    calcium carbonate (TUMS) chewable tablet 1,000 mg, 1,000 mg, Oral, Daily PRN, Peyton Justice PA-C    [START ON 3/20/2018] epoetin babak (EPOGEN,PROCRIT) injection 3,000 Units, 3,000 Units, Intravenous, Once per day on Tue Thu Harjit Gunter MD    insulin lispro (HumaLOG) 100 units/mL subcutaneous injection 1-5 Units, 1-5 Units, Subcutaneous, TID AC, 2 Units at 03/18/18 0814 **AND** Fingerstick Glucose (POCT), , , TID AC, Olga Zhang MD    metoprolol tartrate (LOPRESSOR) tablet 25 mg, 25 mg, Oral, Q12H LUZ ELENA, Peyton Justice PA-C, 25 mg at 03/18/18 0228    omeprazole (PRILOSEC) suspension 2 mg/mL, 20 mg, Oral, Daily, Peyton Justice PA-C, 20 mg at 03/18/18 0814    ondansetron (ZOFRAN) injection 4 mg, 4 mg, Intravenous, Q6H PRN, Peyton Justice PA-C    oxyCODONE (ROXICODONE) oral solution 2 5 mg, 2 5 mg, Oral, Q4H PRN, Peyton Justice PA-C, 2 5 mg at 03/18/18 0340    senna-docusate sodium (SENOKOT S) 8 6-50 mg per tablet 1 tablet, 1 tablet, Oral, HS, Estela Laser, DIEGO, 1 tablet at 03/17/18 2139    simethicone (MYLICON) chewable tablet 80 mg, 80 mg, Oral, 4x Daily PRN, Na Tidwell PA-C    OBJECTIVE     Current Weight: Weight - Scale: 51 4 kg (113 lb 5 1 oz)  Vitals:    03/18/18 0700   BP: 158/86   Pulse: 105   Resp: 18   Temp: 99 3 °F (37 4 °C)   SpO2: 97%       Intake/Output Summary (Last 24 hours) at 03/18/18 1112  Last data filed at 03/18/18 0000   Gross per 24 hour   Intake               50 ml   Output                0 ml   Net               50 ml       PHYSICAL EXAMINATION     Physical Exam   Constitutional: She appears well-developed  No distress  HENT:   Head: Normocephalic  Mouth/Throat: Oropharynx is clear and moist    Eyes: Conjunctivae are normal  No scleral icterus  Neck: Neck supple  No JVD present  Cardiovascular: Normal rate and normal heart sounds  Pulmonary/Chest: Effort normal  She has no wheezes  Abdominal: Soft  There is no tenderness  Musculoskeletal: Normal range of motion  She exhibits no edema  Neurological: She is alert  Skin: Skin is warm  No rash noted          LAB RESULTS       Results from last 7 days  Lab Units 03/18/18  0647 03/15/18  0549 03/14/18  0938 03/13/18  0441 03/12/18  0457 03/11/18  1152   WBC Thousand/uL 12 77* 12 19* 17 29* 12 59* 10 35* 14 86*   HEMOGLOBIN g/dL 10 2* 10 1* 11 2* 10 5* 10 4* 11 2*   HEMATOCRIT % 31 6* 30 1* 34 1* 32 8* 33 7* 34 4*   PLATELETS Thousands/uL 204 216 240 210 206 244   SODIUM mmol/L 139 136 136 136 136 138   POTASSIUM mmol/L 4 2 4 7 4 1 4 0 4 5 4 3   CHLORIDE mmol/L 93* 94* 94* 96* 95* 95*   CO2 mmol/L 32 27 28 29 27 30   BUN mg/dL 84* 85* 63* 49* 98* 77*   CREATININE mg/dL 4 97* 4 73* 3 60* 3 00* 5 18* 4 20*   EGFR ml/min/1 73sq m 9 10 13 17 9 11   CALCIUM mg/dL 11 5* 11 2* 10 8* 10 8* 11 3* 11 0*   TOTAL PROTEIN g/dL  --  7 0 7 6  --   --  7 4   GLUCOSE RANDOM mg/dL 260* 180* 233* 210* 206* 208*       RADIOLOGY RESULTS      Results for orders placed during the hospital encounter of 03/14/18   XR chest 1 view portable    Narrative CHEST     INDICATION:   acute shortness of breath at dialysis  COMPARISON:  Chest radiographs March 17, 2018    EXAM PERFORMED/VIEWS:  XR CHEST PORTABLE      FINDINGS:    The heart is enlarged  Atherosclerotic changes in the aorta  Right internal jugular dialysis catheter with tip at cavoatrial junction  Lung markings are crowded secondary to low lung volumes  Within limitations of this examination there is no focal airspace opacity to suggest pneumonia  No pneumothorax or pleural effusion  Osseous structures appear within normal limits for patient age  Impression No active pulmonary disease on examination which is somewhat limited secondary to low lung volumes  Workstation performed: [id number]       Results for orders placed during the hospital encounter of 11/09/17   XR chest 2 views    Narrative CHEST     INDICATION:  Weakness  History taken directly from the electronic ordering system  COMPARISON:  Chest x-ray of 9/19/2017    VIEWS:  Frontal and lateral projections    IMAGES:  2    FINDINGS:  Right-sided dialysis catheter tip overlies the right atrium  The heart is enlarged  Patient is somewhat rotated to the left  There is persistent pulmonary vascular congestion, appears chronic  No pneumothorax or pleural effusion  Degenerative spurring is noted of the spine  There are marked arthritic changes of the right shoulder  Impression Persistent pulmonary vascular congestion, appears chronic  Workstation performed: [id number]       No results found for this or any previous visit  No results found for this or any previous visit  No results found for this or any previous visit  No results found for this or any previous visit  PLAN / RECOMMENDATIONS      ESRD:  Had a long discussion with the daughter about dialysis  She will prefer twice a week incenter hemodialysis    She will prefer home hemodialysis which she is going to do and she claims he has enough resources and she herself can do that  I discussed my home dialysis nurse and they are going to call her were discussion and further arrangement  I also talked to the daughter yesterday and inform her about that  Also told her that she will need to stay on incenter dialysis at least twice a week until all the arrangement can be done    Altered mental status:  Seems to be stable at this point    Anemia:  Sophia Orourke MD  Nephrology  3/18/2018        Portions of the record may have been created with voice recognition software  Occasional wrong word or "sound a like" substitutions may have occurred due to the inherent limitations of voice recognition software  Read the chart carefully and recognize, using context, where substitutions have occurred

## 2018-03-18 NOTE — ASSESSMENT & PLAN NOTE
· On dialysis, she only received 1 hour of her usual 3 5 today due to sudden-onset shortness of breath  · Dialysis Friday, monitor fluid status  · Nephrology consult appreciated, plan is to try to dialyze her twice a week in transition to home dialysis  I have been attempting all we to obtain a meeting with the patient's daughter but she does not answer my phone calls    Will continue to try to contact her for discharged home

## 2018-03-19 NOTE — PROGRESS NOTES
NEPHROLOGY PROGRESS NOTE    Patient: Jackeline Waller               Sex: female          DOA: 3/14/2018  8:54 AM   YOB: 1940        Age:  68 y o         LOS:  LOS: 4 days         3/19/2018    REASON FOR THE CONSULTATION:  Further management of ESRD    HPI     This is a 68 y o  female admitted for SOB (shortness of breath)     SUBJECTIVE     - patient is awake but nonverbal   Overnight found to be afebrile     -Reviewed last 24 hrs events     CURRENT MEDICATIONS       Current Facility-Administered Medications:     acetaminophen (TYLENOL) tablet 650 mg, 650 mg, Oral, Q8H PRN, Tawanda Wynn PA-C, 650 mg at 03/14/18 2115    aspirin tablet 325 mg, 325 mg, Oral, Daily, Peyton Justice PA-C, 325 mg at 03/19/18 5536    atorvastatin (LIPITOR) tablet 20 mg, 20 mg, Oral, Daily, Peyton Justice PA-C, 20 mg at 03/19/18 1355    b complex-vitamin C-folic acid (NEPHROCAPS) capsule 1 capsule, 1 capsule, Oral, Daily With Dinner, Tawanda Wynn PA-C, 1 capsule at 03/18/18 1620    bisacodyl (DULCOLAX) rectal suppository 10 mg, 10 mg, Rectal, Daily PRN, Peyton Justice PA-C    calcium carbonate (TUMS) chewable tablet 1,000 mg, 1,000 mg, Oral, Daily PRN, Peyton Justice PA-C    [START ON 3/20/2018] epoetin babak (EPOGEN,PROCRIT) injection 3,000 Units, 3,000 Units, Intravenous, Once per day on Tue Thu Nancy Gunter MD    insulin lispro (HumaLOG) 100 units/mL subcutaneous injection 1-5 Units, 1-5 Units, Subcutaneous, TID AC, 1 Units at 03/19/18 0835 **AND** Fingerstick Glucose (POCT), , , TID AC, Hang Herman MD    metoprolol tartrate (LOPRESSOR) tablet 25 mg, 25 mg, Oral, Q12H LUZ ELENA, Peyton Justice PA-C, 25 mg at 03/18/18 2058    omeprazole (PRILOSEC) suspension 2 mg/mL, 20 mg, Oral, Daily, Peyton Justice PA-C, 20 mg at 03/19/18 0923    ondansetron (ZOFRAN) injection 4 mg, 4 mg, Intravenous, Q6H PRN, Peyton Justice PA-C    oxyCODONE (ROXICODONE) oral solution 2 5 mg, 2 5 mg, Oral, Q4H PRN, Tawanda Wynn DIEGO, 2 5 mg at 03/18/18 0340    senna-docusate sodium (SENOKOT S) 8 6-50 mg per tablet 1 tablet, 1 tablet, Oral, HS, Peyton Justice PA-C, 1 tablet at 03/18/18 2100    simethicone (MYLICON) chewable tablet 80 mg, 80 mg, Oral, 4x Daily PRN, Mansoor Patel PA-C    OBJECTIVE     Current Weight: Weight - Scale: 51 4 kg (113 lb 5 1 oz)  Vitals:    03/19/18 0700   BP: 133/75   Pulse: 93   Resp: 18   Temp: 98 5 °F (36 9 °C)   SpO2: 100%     Body mass index is 22 89 kg/m²  Intake/Output Summary (Last 24 hours) at 03/19/18 1006  Last data filed at 03/19/18 0710   Gross per 24 hour   Intake              300 ml   Output                0 ml   Net              300 ml       PHYSICAL EXAMINATION     Physical Exam   Constitutional: No distress  Awake but nonverbal   HENT:   Head: Normocephalic and atraumatic  Eyes: Conjunctivae are normal  No scleral icterus  Neck: Neck supple  No JVD present  Cardiovascular: Normal rate and regular rhythm  Pulmonary/Chest: Effort normal  She has no wheezes  Abdominal: Soft  She exhibits no distension  Lymphadenopathy:     She has no cervical adenopathy  Neurological:   Unable to perform full Neurological examination as patient is nonverbal and not following commands   Psychiatric:   Unable to perform full psychiatric examination as patient is nonverbal and not following commands           LAB RESULTS       Results from last 7 days  Lab Units 03/18/18  0647 03/15/18  0549 03/14/18  0938 03/13/18  0441   WBC Thousand/uL 12 77* 12 19* 17 29* 12 59*   HEMOGLOBIN g/dL 10 2* 10 1* 11 2* 10 5*   HEMATOCRIT % 31 6* 30 1* 34 1* 32 8*   PLATELETS Thousands/uL 204 216 240 210   SODIUM mmol/L 139 136 136 136   POTASSIUM mmol/L 4 2 4 7 4 1 4 0   CHLORIDE mmol/L 93* 94* 94* 96*   CO2 mmol/L 32 27 28 29   BUN mg/dL 84* 85* 63* 49*   CREATININE mg/dL 4 97* 4 73* 3 60* 3 00*   EGFR ml/min/1 73sq m 9 10 13 17   CALCIUM mg/dL 11 5* 11 2* 10 8* 10 8*   TOTAL PROTEIN g/dL  --  7 0 7 6  -- GLUCOSE RANDOM mg/dL 260* 180* 233* 210*         RADIOLOGY RESULTS      Results for orders placed during the hospital encounter of 03/14/18   XR chest 1 view portable    Narrative CHEST     INDICATION:   acute shortness of breath at dialysis  COMPARISON:  Chest radiographs March 17, 2018    EXAM PERFORMED/VIEWS:  XR CHEST PORTABLE      FINDINGS:    The heart is enlarged  Atherosclerotic changes in the aorta  Right internal jugular dialysis catheter with tip at cavoatrial junction  Lung markings are crowded secondary to low lung volumes  Within limitations of this examination there is no focal airspace opacity to suggest pneumonia  No pneumothorax or pleural effusion  Osseous structures appear within normal limits for patient age  Impression No active pulmonary disease on examination which is somewhat limited secondary to low lung volumes  Workstation performed: [id number]       Results for orders placed during the hospital encounter of 11/09/17   XR chest 2 views    Narrative CHEST     INDICATION:  Weakness  History taken directly from the electronic ordering system  COMPARISON:  Chest x-ray of 9/19/2017    VIEWS:  Frontal and lateral projections    IMAGES:  2    FINDINGS:  Right-sided dialysis catheter tip overlies the right atrium  The heart is enlarged  Patient is somewhat rotated to the left  There is persistent pulmonary vascular congestion, appears chronic  No pneumothorax or pleural effusion  Degenerative spurring is noted of the spine  There are marked arthritic changes of the right shoulder  Impression Persistent pulmonary vascular congestion, appears chronic  Workstation performed: [id number]         PLAN / RECOMMENDATIONS      1  ESRD  Patient is on chronic hemodialysis on Tuesday Thursday Saturday schedule  According to previous discussion now family wants to consider home hemodialysis    Per previous discussion, due to disruptive behavior at outpatient dialysis Unit, we will now plan dialysis twice a week  Patient's last dialysis was last Friday  Currently hemodynamically stable  Plan next dialysis tomorrow  2   Anemia  Multifactorial with current hemoglobin of 10 2 which is at goal for ESRD  Continue Epogen 3000 Units with dialysis  3   Hypertension in ESRD  Overnight patient's blood pressure has remained controlled and at goal   Will plan to monitor hypertension with metoprolol 25 mg PO BID with holding parameters  Herber Dia MD  Nephrology  3/19/2018        Portions of the record may have been created with voice recognition software  Occasional wrong word or "sound a like" substitutions may have occurred due to the inherent limitations of voice recognition software  Read the chart carefully and recognize, using context, where substitutions have occurred

## 2018-03-19 NOTE — CASE MANAGEMENT
Continued Stay Review    Date: 3/19    Vital Signs: /66 (BP Location: Left arm)   Pulse 91   Temp 98 1 °F (36 7 °C) (Axillary)   Resp 18   Ht 4' 11" (1 499 m)   Wt 51 4 kg (113 lb 5 1 oz)   SpO2 94%   BMI 22 89 kg/m²     Medications:   Scheduled Meds:   Current Facility-Administered Medications:  acetaminophen 650 mg Oral Q8H PRN Peyton Justice PA-C   aspirin 325 mg Oral Daily Peyton Justice PA-C   atorvastatin 20 mg Oral Daily Peyton Justice PA-C   b complex-vitamin C-folic acid 1 capsule Oral Daily With Dinner Peyton Justice PA-C   bisacodyl 10 mg Rectal Daily PRN Peyton Justice PA-C   calcium carbonate 1,000 mg Oral Daily PRN Peyton Justice PA-C   [START ON 3/20/2018] epoetin babak 3,000 Units Intravenous Once per day on Tue Thu Sat Stacey Muir MD   insulin lispro 1-5 Units Subcutaneous TID [facility] Lindy Ro MD   metoprolol tartrate 25 mg Oral Q12H [address] Peyton Justice PA-C   omeprazole (PRILOSEC) suspension 2 mg/mL 20 mg Oral Daily Peyton Justice PA-C   ondansetron 4 mg Intravenous Q6H PRN Peyton Justice PA-C   oxyCODONE 2 5 mg Oral Q4H PRN Bisi Guidry PA-C   senna-docusate sodium 1 tablet Oral HS Peyton Justice PA-C   simethicone 80 mg Oral 4x Daily PRN Bisi Guidry PA-C     Continuous Infusions:    PRN Meds:   acetaminophen    bisacodyl    calcium carbonate    ondansetron    oxyCODONE    simethicone    Abnormal Labs/Diagnostic Results: Results from last 7 days  Lab Units 03/18/18  0647 03/15/18  0549 03/14/18  0938 03/13/18  0441   WBC Thousand/uL 12 77* 12 19* 17 29* 12 59*   HEMOGLOBIN g/dL 10 2* 10 1* 11 2* 10 5*   HEMATOCRIT % 31 6* 30 1* 34 1* 32 8*   PLATELETS Thousands/uL 204 216 240 210   SODIUM mmol/L 139 136 136 136   POTASSIUM mmol/L 4 2 4 7 4 1 4 0   CHLORIDE mmol/L 93* 94* 94* 96*   CO2 mmol/L 32 27 28 29   BUN mg/dL 84* 85* 63* 49*   CREATININE mg/dL 4 97* 4 73* 3 60* 3 00*   EGFR ml/min/1 73sq m 9 10 13 17   CALCIUM mg/dL 11 5* 11 2* 10 8* 10 8*   TOTAL PROTEIN g/dL  --  7 0 7 6  --    GLUCOSE RANDOM mg/dL 260* 180* 233* 210*          Age/Sex: 68 y o  female     Assessment/Plan:   1  ESRD  Patient is on chronic hemodialysis on Tuesday Thursday Saturday schedule  According to previous discussion now family wants to consider home hemodialysis  Per previous discussion, due to disruptive behavior at outpatient dialysis Unit, we will now plan dialysis twice a week  Patient's last dialysis was last Friday  Currently hemodynamically stable  Plan next dialysis tomorrow      2  Anemia  Multifactorial with current hemoglobin of 10 2 which is at goal for ESRD  Continue Epogen 3000 Units with dialysis      3  Hypertension in ESRD  Overnight patient's blood pressure has remained controlled and at goal   Will plan to monitor hypertension with metoprolol 25 mg PO BID with holding parameters      Discharge Plan: TBD

## 2018-03-19 NOTE — PLAN OF CARE
Problem: Potential for Falls  Goal: Patient will remain free of falls  INTERVENTIONS:  - Assess patient frequently for physical needs  -  Identify cognitive and physical deficits and behaviors that affect risk of falls    -  [place] fall precautions as indicated by assessment   - Educate patient/family on patient safety including physical limitations  - Instruct patient to call for assistance with activity based on assessment  - Modify environment to reduce risk of injury  - Consider OT/PT consult to assist with strengthening/mobility   Outcome: Progressing      Problem: Prexisting or High Potential for Compromised Skin Integrity  Goal: Skin integrity is maintained or improved  INTERVENTIONS:  - Identify patients at risk for skin breakdown  - Assess and monitor skin integrity  - Assess and monitor nutrition and hydration status  - Monitor labs (i e  albumin)  - Assess for incontinence   - Turn and reposition patient  - Assist with mobility/ambulation  - Relieve pressure over bony prominences  - Avoid friction and shearing  - Provide appropriate hygiene as needed including keeping skin clean and dry  - Evaluate need for skin moisturizer/barrier cream  - Collaborate with interdisciplinary team (i e  Nutrition, Rehabilitation, etc )   - Patient/family teaching   Outcome: Progressing      Problem: PAIN - ADULT  Goal: Verbalizes/displays adequate comfort level or baseline comfort level  Interventions:  - Encourage patient to monitor pain and request assistance  - Assess pain using appropriate pain scale  - Administer analgesics based on type and severity of pain and evaluate response  - Implement non-pharmacological measures as appropriate and evaluate response  - Consider cultural and social influences on pain and pain management  - Notify physician/advanced practitioner if interventions unsuccessful or patient reports new pain   Outcome: Progressing      Problem: INFECTION - ADULT  Goal: Absence or prevention of progression during hospitalization  INTERVENTIONS:  - Assess and monitor for signs and symptoms of infection  - Monitor lab/diagnostic results  - Monitor all insertion sites, i e  indwelling lines, tubes, and drains  - [place] appropriate cooling/warming therapies per order  - Administer medications as ordered  - Instruct and encourage patient and family to use good hand hygiene technique  - Identify and instruct in appropriate isolation precautions for identified infection/condition   Outcome: Progressing      Problem: SAFETY ADULT  Goal: Maintain or return to baseline ADL function  INTERVENTIONS:  -  Assess patient's ability to carry out ADLs; assess patient's baseline for ADL function and identify physical deficits which impact ability to perform ADLs (bathing, care of mouth/teeth, toileting, grooming, dressing, etc )  - Assess/evaluate cause of self-care deficits   - Assess range of motion  - Assess patient's mobility; develop plan if impaired  - Assess patient's need for assistive devices and provide as appropriate  - Encourage maximum independence but intervene and supervise when necessary  ¯ Involve family in performance of ADLs  ¯ Assess for home care needs following discharge   ¯ Request OT consult to assist with ADL evaluation and planning for discharge  ¯ Provide patient education as appropriate   Outcome: Progressing    Goal: Maintain or return mobility status to optimal level  INTERVENTIONS:  - Assess patient's baseline mobility status (ambulation, transfers, stairs, etc )    - Identify cognitive and physical deficits and behaviors that affect mobility  - Identify mobility aids required to assist with transfers and/or ambulation (gait belt, sit-to-stand, lift, walker, cane, etc )  - [place] fall precautions as indicated by assessment  - Record patient progress and toleration of activity level on Mobility SBAR; progress patient to next Phase/Stage  - Instruct patient to call for assistance with activity based on assessment  - Request Rehabilitation consult to assist with strengthening/weightbearing, etc    Outcome: Progressing      Problem: DISCHARGE PLANNING  Goal: Discharge to home or other facility with appropriate resources  INTERVENTIONS:  - Identify barriers to discharge w/patient and caregiver  - Arrange for needed discharge resources and transportation as appropriate  - Identify discharge learning needs (meds, wound care, etc )  - Refer to Case Management Department for coordinating discharge planning if the patient needs post-hospital services based on physician/advanced practitioner order or complex needs related to functional status, cognitive ability, or social support system   Outcome: Progressing      Problem: Knowledge Deficit  Goal: Patient/family/caregiver demonstrates understanding of disease process, treatment plan, medications, and discharge instructions  Complete learning assessment and assess knowledge base  Interventions:  - Provide teaching at level of understanding  - Provide teaching via preferred learning methods   Outcome: Progressing      Problem: NEUROSENSORY - ADULT  Goal: Achieves stable or improved neurological status  INTERVENTIONS  - Monitor and report changes in neurological status  - Initiate measures to prevent increased intracranial pressure  - Maintain blood pressure and fluid volume within ordered parameters to optimize cerebral perfusion  - Monitor temperature, glucose, and sodium or any other associated labs   Initiate appropriate interventions as ordered  - Monitor for seizure activity   - Administer anti-seizure medications as ordered   Outcome: Progressing      Problem: CARDIOVASCULAR - ADULT  Goal: Maintains optimal cardiac output and hemodynamic stability  INTERVENTIONS:  - Monitor I/O, vital signs and rhythm  - Monitor for S/S and trends of decreased cardiac output i e  bleeding, hypotension  - Administer and titrate ordered vasoactive medications to optimize hemodynamic stability  - Assess quality of pulses, skin color and temperature  - Assess for signs of decreased coronary artery perfusion - ex   Angina  - Instruct patient to report change in severity of symptoms   Outcome: Progressing    Goal: Absence of cardiac dysrhythmias or at baseline rhythm  INTERVENTIONS:  - Continuous cardiac monitoring, monitor vital signs, obtain 12 lead EKG if indicated  - Administer antiarrhythmic and heart rate control medications as ordered  - Monitor electrolytes and administer replacement therapy as ordered   Outcome: Progressing      Problem: RESPIRATORY - ADULT  Goal: Achieves optimal ventilation and oxygenation  INTERVENTIONS:  - Assess for changes in respiratory status  - Assess for changes in mentation and behavior  - Position to facilitate oxygenation and minimize respiratory effort  - Oxygen administration by appropriate delivery method based on oxygen saturation (per order) or ABGs  - Initiate smoking cessation education as indicated  - Encourage broncho-pulmonary hygiene including cough, deep breathe, Incentive Spirometry  - Assess the need for suctioning and aspirate as needed  - Assess and instruct to report SOB or any respiratory difficulty  - Respiratory Therapy support as indicated   Outcome: Progressing      Problem: GASTROINTESTINAL - ADULT  Goal: Maintains adequate nutritional intake  INTERVENTIONS:  - Monitor percentage of each meal consumed  - Identify factors contributing to decreased intake, treat as appropriate  - Assist with meals as needed  - Monitor I&O, WT and lab values  - Obtain nutrition services referral as needed   Outcome: Progressing      Problem: GENITOURINARY - ADULT  Goal: Maintains or returns to baseline urinary function  INTERVENTIONS:  - Assess urinary function  - Encourage oral fluids to ensure adequate hydration  - Administer IV fluids as ordered to ensure adequate hydration  - Administer ordered medications as needed  - Offer frequent toileting  - Follow urinary retention protocol if ordered   Outcome: Progressing      Problem: SKIN/TISSUE INTEGRITY - ADULT  Goal: Skin integrity remains intact  INTERVENTIONS  - Identify patients at risk for skin breakdown  - Assess and monitor skin integrity  - Assess and monitor nutrition and hydration status  - Monitor labs (i e  albumin)  - Assess for incontinence   - Turn and reposition patient  - Assist with mobility/ambulation  - Relieve pressure over bony prominences  - Avoid friction and shearing  - Provide appropriate hygiene as needed including keeping skin clean and dry  - Evaluate need for skin moisturizer/barrier cream  - Collaborate with interdisciplinary team (i e  Nutrition, Rehabilitation, etc )   - Patient/family teaching   Outcome: Progressing    Goal: Incision(s), wounds(s) or drain site(s) healing without S/S of infection  INTERVENTIONS  - Assess and document risk factors for skin impairment   - Assess and document dressing, incision, wound bed, drain sites and surrounding tissue  - Initiate Nutrition services consult and/or wound management as needed   Outcome: Progressing    Goal: Oral mucous membranes remain intact  INTERVENTIONS  - Assess oral mucosa and hygiene practices  - Implement preventative oral hygiene regimen  - Implement oral medicated treatments as ordered  - Initiate Nutrition services referral as needed   Outcome: Progressing      Problem: MUSCULOSKELETAL - ADULT  Goal: Maintain or return mobility to safest level of function  INTERVENTIONS:  - Assess patient's ability to carry out ADLs; assess patient's baseline for ADL function and identify physical deficits which impact ability to perform ADLs (bathing, care of mouth/teeth, toileting, grooming, dressing, etc )  - Assess/evaluate cause of self-care deficits   - Assess range of motion  - Assess patient's mobility; develop plan if impaired  - Assess patient's need for assistive devices and provide as appropriate  - Encourage maximum independence but intervene and supervise when necessary  - Involve family in performance of ADLs  - Assess for home care needs following discharge   - Request OT consult to assist with ADL evaluation and planning for discharge  - Provide patient education as appropriate   Outcome: Progressing      Problem: Nutrition/Hydration-ADULT  Goal: Nutrient/Hydration intake appropriate for improving, restoring or maintaining nutritional needs  Monitor and assess patient's nutrition/hydration status for malnutrition (ex- brittle hair, bruises, dry skin, pale skin and conjunctiva, muscle wasting, smooth red tongue, and disorientation)  Collaborate with interdisciplinary team and initiate plan and interventions as ordered  Monitor patient's weight and dietary intake as ordered or per policy     INTERVENTIONS:  - Monitor oral intake, urinary output, labs, and treatment plans  - Assess nutrition and hydration status and recommend course of action  - Evaluate amount of meals eaten  - Assist patient with eating if necessary   - Allow adequate time for meals  - Recommend/ encourage appropriate diets, oral nutritional supplements, and vitamin/mineral supplements  - Order, calculate, and assess calorie counts as needed  - Recommend, monitor, and adjust tube feedings  based on assessed needs  - Assess need for intravenous fluids  - Provide specific nutrition/hydration education as appropriate  - Include patient/family/caregiver in decisions related to nutrition    Outcome: Progressing      Problem: DISCHARGE PLANNING - CARE MANAGEMENT  Goal: Discharge to post-acute care or home with appropriate resources  INTERVENTIONS:  - Conduct assessment to determine patient/family and health care team treatment goals, and need for post-acute services based on payer coverage, community resources, and patient preferences, and barriers to discharge  - Address psychosocial, clinical, and financial barriers to discharge as identified in assessment in conjunction with the patient/family and health care team  - Arrange appropriate level of post-acute services according to patient's   needs and preference and payer coverage in collaboration with the physician and health care team  - Communicate with and update the patient/family, physician, and health care team regarding progress on the discharge plan  - Arrange appropriate transportation to post-acute venues   Outcome: Progressing

## 2018-03-19 NOTE — SOCIAL WORK
[place] spoke to daughter Xiao Alonzo via phone to discuss discharge plan  Xiao Alonzo does not want pt to be discharged to a facility  She still wants pt to go home with in- home dialysis, but understands that Dr Funmilaoy Liu wants her to go to dialysis center twice weekly in the beginning  She said that Dr Funmilayo Liu told her that the dialysis center would be calling her, but no one has yet   CM will continue to follow

## 2018-03-19 NOTE — PLAN OF CARE
Problem: DISCHARGE PLANNING - CARE MANAGEMENT  Goal: Discharge to post-acute care or home with appropriate resources  INTERVENTIONS:  - Conduct assessment to determine patient/family and health care team treatment goals, and need for post-acute services based on payer coverage, community resources, and patient preferences, and barriers to discharge  - Address psychosocial, clinical, and financial barriers to discharge as identified in assessment in conjunction with the patient/family and health care team  - Arrange appropriate level of post-acute services according to patient's   needs and preference and payer coverage in collaboration with the physician and health care team  - Communicate with and update the patient/family, physician, and health care team regarding progress on the discharge plan  - Arrange appropriate transportation to post-acute venues   Outcome: Mansi Valerio spoke to daughter George Serrano via phone to discuss discharge plan  George Serrano does not want pt to be discharged to a facility  She still wants pt to go home with in- home dialysis, but understands that Dr Aditi Nelson wants her to go to dialysis center twice weekly in the beginning  She said that Dr Aditi Nelson told her that the dialysis center would be calling her, but no one has yet   CM will continue to follow

## 2018-03-20 PROBLEM — R06.02 SOB (SHORTNESS OF BREATH): Status: RESOLVED | Noted: 2018-01-01 | Resolved: 2018-01-01

## 2018-03-20 NOTE — ASSESSMENT & PLAN NOTE
· On dialysis, she only received 1 hour of her usual 3 5 today due to sudden-onset shortness of breath  · Dialysis Friday, monitor fluid status  · Nephrology consult appreciated, plan is to try to dialyze her twice a week in transition to home dialysis  I was able to meet with the patient's daughter before midnight during her shift in the ED  She expressed to me that she does not desire to move her mother around due to her fragility and the fact that she fractured her hip during a session outside her house  In her mind she needs to be able to dialyze the patient home wants to start that immediately despite being told that she needs to transition from outpatient therapy dome therapy    Will discuss with care related to add nephrology in the am

## 2018-03-20 NOTE — PLAN OF CARE
Problem: DISCHARGE PLANNING - CARE MANAGEMENT  Goal: Discharge to post-acute care or home with appropriate resources  INTERVENTIONS:  - Conduct assessment to determine patient/family and health care team treatment goals, and need for post-acute services based on payer coverage, community resources, and patient preferences, and barriers to discharge  - Address psychosocial, clinical, and financial barriers to discharge as identified in assessment in conjunction with the patient/family and health care team  - Arrange appropriate level of post-acute services according to patient's   needs and preference and payer coverage in collaboration with the physician and health care team  - Communicate with and update the patient/family, physician, and health care team regarding progress on the discharge plan  - Arrange appropriate transportation to post-acute venues   Outcome: Progressing  Attempted to reach silvana Astudillo  x2

## 2018-03-20 NOTE — ASSESSMENT & PLAN NOTE
· On dialysis, she only received 1 hour of her usual 3 5 today due to sudden-onset shortness of breath  · Dialysis Friday, monitor fluid status  · Nephrology consult appreciated, plan is to try to dialyze her twice a week in transition to home dialysis  I was able to meet with the patient's daughter before midnight during her shift in the ED  She expressed to me that she does not desire to move her mother around due to her fragility and the fact that she fractured her hip during a session outside her house  In her mind she needs to be able to dialyze the patient home wants to start that immediately despite being told that she needs to transition from outpatient therapy dome therapy  Will discuss with care related to add nephrology in the am   · Plan for dialysis on Tuesday with discharge afterwards, if all is stable  Patient's daughter may need to a p o  the discharge if she is unwilling to take her home  I spoke with care management number working with her

## 2018-03-20 NOTE — PLAN OF CARE
Problem: DISCHARGE PLANNING - CARE MANAGEMENT  Goal: Discharge to post-acute care or home with appropriate resources  INTERVENTIONS:  - Conduct assessment to determine patient/family and health care team treatment goals, and need for post-acute services based on payer coverage, community resources, and patient preferences, and barriers to discharge  - Address psychosocial, clinical, and financial barriers to discharge as identified in assessment in conjunction with the patient/family and health care team  - Arrange appropriate level of post-acute services according to patient's   needs and preference and payer coverage in collaboration with the physician and health care team  - Communicate with and update the patient/family, physician, and health care team regarding progress on the discharge plan  - Arrange appropriate transportation to post-acute venues   Outcome: Progressing  Attempted to reach daughter Jose Martines via phone ([phone]) to discuss d/c, but had to leave message to call me back

## 2018-03-20 NOTE — ASSESSMENT & PLAN NOTE
Added accuchecks q 6 with coverage  changed values to q 6 because of tube feeding    May need to up titrate medications further if she continues to have elevated blood glucose the

## 2018-03-20 NOTE — ASSESSMENT & PLAN NOTE
Dialyzing Tuesday Thursday Saturday with the hope to go to twice weekly and transition and educate the patient's daughter and had to dialyze her at home

## 2018-03-20 NOTE — ASSESSMENT & PLAN NOTE
· On dialysis, she only received 1 hour of her usual 3 5 today due to sudden-onset shortness of breath  · Dialysis Friday, monitor fluid status  · Nephrology consult appreciated, plan is to try to dialyze her twice a week in transition to home dialysis    Left message for patient's daughter

## 2018-03-20 NOTE — PROGRESS NOTES
HEMODIALYSIS ROUNDING NOTE    Patient: Maggy Cronin               Sex: female          DOA: 3/14/2018  8:54 AM   YOB: 1940        Age:  68 y o         LOS:  LOS: 5 days   3/20/2018    REASON FOR THE CONSULTATION:  Further management of ESRD    HPI     This is a 68 y o  female admitted for SOB (shortness of breath)     SUBJECTIVE     - Patient was seen during hemodialysis today    Patient was awake but nonverbal  - Reviewed last 24 hrs events     CURRENT MEDICATIONS       Current Facility-Administered Medications:     acetaminophen (TYLENOL) tablet 650 mg, 650 mg, Oral, Q8H PRN, Nathaniel Perez, PA-C, 650 mg at 03/14/18 2115    aspirin tablet 325 mg, 325 mg, Oral, Daily, Peyton Justice PA-C, 325 mg at 03/20/18 1217    atorvastatin (LIPITOR) tablet 20 mg, 20 mg, Oral, Daily, Peyton Justice PA-C, 20 mg at 03/20/18 1217    b complex-vitamin C-folic acid (NEPHROCAPS) capsule 1 capsule, 1 capsule, Oral, Daily With Dinner, Nathaniel Perez PA-C, 1 capsule at 03/18/18 1620    bisacodyl (DULCOLAX) rectal suppository 10 mg, 10 mg, Rectal, Daily PRN, Peyton Justice PA-C    calcium carbonate (TUMS) chewable tablet 1,000 mg, 1,000 mg, Oral, Daily PRN, Peyton Justice PA-C    epoetin babak (EPOGEN,PROCRIT) injection 3,000 Units, 3,000 Units, Intravenous, Once per day on Tue Thu SatGabriel MD, 3,000 Units at 03/20/18 1150    insulin lispro (HumaLOG) 100 units/mL subcutaneous injection 1-5 Units, 1-5 Units, Subcutaneous, Q6H [address], 1 Units at 03/20/18 1222 **AND** Fingerstick Glucose (POCT), , , TID AC, Manisha Montemayor MD    metoprolol tartrate (LOPRESSOR) tablet 25 mg, 25 mg, Oral, Q12H [address], Peyton ADAL Andujar-C, 25 mg at 03/19/18 2101    omeprazole (PRILOSEC) suspension 2 mg/mL, 20 mg, Oral, Daily, Peyton Justice PA-C, 20 mg at 03/20/18 1232    ondansetron (ZOFRAN) injection 4 mg, 4 mg, Intravenous, Q6H PRN, Peyton Justice PA-C    oxyCODONE (ROXICODONE) oral solution 2 5 mg, 2 5 mg, Oral, Q4H PRN, Mi Martines PA-C, 2 5 mg at 03/18/18 0340    senna-docusate sodium (SENOKOT S) 8 6-50 mg per tablet 1 tablet, 1 tablet, Oral, HS, Peyton Justice PA-C, 1 tablet at 03/19/18 2101    simethicone (MYLICON) chewable tablet 80 mg, 80 mg, Oral, 4x Daily PRN, Mi Martines PA-C    OBJECTIVE     Current Weight: Weight - Scale: 51 4 kg (113 lb 5 1 oz)  Vitals:    03/20/18 1219   BP: 108/50   Pulse: 94   Resp:    Temp:    SpO2:      Body mass index is 22 89 kg/m²  Intake/Output Summary (Last 24 hours) at 03/20/18 1256  Last data filed at 03/20/18 1219   Gross per 24 hour   Intake             3296 ml   Output             2000 ml   Net             1296 ml       PHYSICAL EXAMINATION     Physical Exam   Constitutional:   Awake but nonverbal   HENT:   Head: Normocephalic and atraumatic  Eyes: Conjunctivae are normal  No scleral icterus  Neck: Neck supple  No JVD present  Cardiovascular: Normal rate and regular rhythm  Pulmonary/Chest: No respiratory distress  She has no wheezes  Abdominal: Soft  She exhibits no distension  Musculoskeletal: She exhibits no edema  Neurological:   Unable to perform full CNS examination as patient is nonverbal and not following commands  Skin: Skin is warm     Psychiatric:   Unable to perform full psychiatric examination as patient is nonverbal       LAB RESULTS       Results from last 7 days  Lab Units 03/20/18  0435 03/18/18  0647 03/15/18  0549 03/14/18  0938   WBC Thousand/uL 11 07* 12 77* 12 19* 17 29*   HEMOGLOBIN g/dL 10 0* 10 2* 10 1* 11 2*   HEMATOCRIT % 30 2* 31 6* 30 1* 34 1*   PLATELETS Thousands/uL 164 204 216 240   SODIUM mmol/L 138 139 136 136   POTASSIUM mmol/L 4 6 4 2 4 7 4 1   CHLORIDE mmol/L 92* 93* 94* 94*   CO2 mmol/L 31 32 27 28   BUN mg/dL 134* 84* 85* 63*   CREATININE mg/dL 7 22* 4 97* 4 73* 3 60*   EGFR ml/min/1 73sq m 6 9 10 13   CALCIUM mg/dL 10 5* 11 5* 11 2* 10 8*   TOTAL PROTEIN g/dL  --   --  7 0 7 6   GLUCOSE RANDOM mg/dL 239* 260* 180* 233*       RADIOLOGY RESULTS     Results for orders placed during the hospital encounter of 03/14/18   XR chest 1 view portable    Narrative CHEST     INDICATION:   acute shortness of breath at dialysis  COMPARISON:  Chest radiographs March 17, 2018    EXAM PERFORMED/VIEWS:  XR CHEST PORTABLE      FINDINGS:    The heart is enlarged  Atherosclerotic changes in the aorta  Right internal jugular dialysis catheter with tip at cavoatrial junction  Lung markings are crowded secondary to low lung volumes  Within limitations of this examination there is no focal airspace opacity to suggest pneumonia  No pneumothorax or pleural effusion  Osseous structures appear within normal limits for patient age  Impression No active pulmonary disease on examination which is somewhat limited secondary to low lung volumes  Workstation performed: [id number]       Results for orders placed during the hospital encounter of 11/09/17   XR chest 2 views    Narrative CHEST     INDICATION:  Weakness  History taken directly from the electronic ordering system  COMPARISON:  Chest x-ray of 9/19/2017    VIEWS:  Frontal and lateral projections    IMAGES:  2    FINDINGS:  Right-sided dialysis catheter tip overlies the right atrium  The heart is enlarged  Patient is somewhat rotated to the left  There is persistent pulmonary vascular congestion, appears chronic  No pneumothorax or pleural effusion  Degenerative spurring is noted of the spine  There are marked arthritic changes of the right shoulder  Impression Persistent pulmonary vascular congestion, appears chronic  Workstation performed: [id number]         PLAN / RECOMMENDATIONS     1  End Stage Renal Disease  Plan HD today to keep on Tuesday Thursday Saturday dialysis schedule  At 11:58 AM on 3/20/2018, I saw and examined patient during hemodialysis treatment  The patient was receiving hemodialysis for treatment of end stage renal disease  I have also reviewed vital signs, intake and output, lab results and recent events, and agreed with today's dialysis order  Access: No issue    Patient was admitted after getting agitated as outpatient Unit which can happen again in the future  Per previous discussion by Dr Pablito Paz and patient's daughter, patient's daughter is interested in doing home hemodialysis although this can be arranged as a outpatient    I have called outpatient home dialysis Unit today who will contact patient's daughter to help facilitate home hemodialysis process  Ideally home hemodialysis training can last for few weeks but until patient starts training on home hemodialysis she has to continue with outpatient incenter hemodialysis  Since patient is unable to do home hemodialysis by herself, I would guess that patient's daughter need to get trained on home hemodialysis before they can do home hemodialysis by themselves at home  Disposition:  Stable from renal standpoint for discharge  Trena Singh MD  Nephrology  3/20/2018        Portions of the record may have been created with voice recognition software  Occasional wrong word or "sound a like" substitutions may have occurred due to the inherent limitations of voice recognition software  Read the chart carefully and recognize, using context, where substitutions have occurred

## 2018-03-20 NOTE — SOCIAL WORK
Attempted to reach daughter Lesa Alves via phone ([phone]) to discuss d/c, but had to leave message to call me back

## 2018-03-20 NOTE — ASSESSMENT & PLAN NOTE
· Patient recently discharged after close intertrochanteric fracture of the left leg  She is status post chidi  · Patient's daughter states she needs to be carried ever and she is afraid of breaking other bones related to her multiple myeloma

## 2018-03-20 NOTE — DISCHARGE SUMMARY
Discharge Summary - [address] [facility]    Patient Information: Luna Hughes 68 y o  female MRN: [id number]  Unit/Bed#: -01 Encounter: [id number]    Discharging Physician / Practitioner: Jero Haynes MD  PCP: Gloria Angulo MD  Admission Date: 3/14/2018  Discharge Date: 03/20/18    Disposition:     Home with VNA Services (Reminder: Complete face to face encounter)    Reason for Admission:  Shortness of breath    Discharge Diagnoses:     Principal Problem (Resolved):    SOB (shortness of breath)  Active Problems:    ESRD (end stage renal disease) on dialysis ([facility] [address] )    Closed intertrochanteric fracture of left femur ([facility] [address] )    Multiple myeloma not having achieved remission ([facility] [address] )    Diabetes mellitus type 2 in nonobese ([facility] [address] )    Hemiparesis due to old stroke ([facility] [address] )    Anemia    Hypertensive CKD, ESRD on dialysis ([facility] [address] )    Severe protein-calorie malnutrition ([facility] [address] )      Consultations During Hospital Stay:  · [facility] Nephrology    Procedures Performed:     · Dialysis     Significant Findings / Test Results:     · None    Incidental Findings:   · None     Test Results Pending at Discharge (will require follow up): · None     Outpatient Tests Requested:  · None     Complications:  None    Hospital Course:     Luna Hughes is a 68 y o  female patient who originally presented to the hospital on 3/14/2018 due to shortness of breath  History of presenting Bridgette Bahena is a 68 y o  female who presents with acute shortness of breath  She was in dialysis this morning when apparently she developed the symptoms  She was recently discharged yesterday to [facility], 3/13 after sustaining a left femoral fracture of unclear etiology      She has a history of stroke, with aphasia and essentially she is bed ridden with functional quadriplegia, left-sided hemiparesis  She is severely malnourished, has a PEG tube for nutrition    She has past medical history of hypertension, GI bleed, multiple myeloma, ESRD on hemodialysis with a Perma-Cath in the chest wall, anemia of chronic disease, arthritis    Hospital course:  Patient was admitted for the above reasons  She was getting dialysis while she was here and everything has been stable from that standpoint  Patient was recently discharged from a skilled nursing facility after having a fracture  She was from that standpoint stable  I have spoken to case management as well as Dr Terrance Frost who was seeing the patient earlier today  After dialysis today the patient is medically stable for discharge she does have a lot of chronic medical problems which needs close follow-up as an outpatient but as for acute care needs in the hospital patient is medically stable for discharge after she gets her dialysis today  This will be as long as her pressures do remain stable  Case management has had difficulty for the most part getting in touch with the daughter since she works at night here in the emergency department  The daughter wants the patient sent home with home dialysis  I did discuss this with Dr Anup Rock with Nephrology and this is not an inpatient thing  The patient needs 2 weeks of an outpatient training regimen with the daughter to do dialysis at home  This is all an outpatient thing and not inpatient  This was discussed with the patient by case management  I tried to call the daughter and left a message  The daughter has a right appeal discharge  But as stated the patient does have a lot of medical issues but they are all chronic in nature and can have outpatient follow-up  The patient does have severe protein calorie malnutrition is already on tube feeds as an outpatient    Condition at Discharge: fair     Discharge Day Visit / Exam:     Subjective:  Patient seen examined    No acute events reported  Vitals: Blood Pressure: (!) 109/40 (03/20/18 1000)  Pulse: 99 (03/20/18 1000)  Temperature: 97 7 °F (36 5 °C) (03/20/18 0700)  Temp Source: Oral (03/20/18 0700)  Respirations: 18 (03/20/18 0700)  Height: 4' 11" (149 9 cm) (03/15/18 0300)  Weight - Scale: 51 4 kg (113 lb 5 1 oz) (03/14/18 0859)  SpO2: 96 % (03/20/18 0700)  Exam:   Physical Exam  (   General Appearance:    Alert, nonverbal   Cachectic appearing                               Lungs:     Clear to auscultation bilaterally, respirations unlabored       Heart:    Regular rate and rhythm, S1 and S2 normal, no murmur, rub    or gallop   Abdomen:     Soft, non-tender, bowel sounds active all four quadrants,     no masses, no organomegaly           Extremities:   Extremities normal, atraumatic, no cyanosis or edema                     Discussion with Family:  Left a message for the daughter    Discharge instructions/Information to patient and family:   See after visit summary for information provided to patient and family  Provisions for Follow-Up Care:  See after visit summary for information related to follow-up care and any pertinent home health orders  Planned Readmission:  None anticipated     Discharge Statement:  I spent 35 minutes discharging the patient  This time was spent on the day of discharge  I had direct contact with the patient on the day of discharge  Greater than 50% of the total time was spent examining patient, answering all patient questions, arranging and discussing plan of care with patient as well as directly providing post-discharge instructions  Additional time then spent on discharge activities  Time taken in speaking to case management formulating a plan reviewing the hospital course and speaking to specialists involved in the care    Discharge Medications:  See after visit summary for reconciled discharge medications provided to patient and family        ** Please Note: This note has been constructed using a voice recognition system **
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
## 2018-03-20 NOTE — ASSESSMENT & PLAN NOTE
· Present on admission, was present on last admission  CTA in the ER (-) for PE  Etiology unclear  · Respiratory protocol, incentive spirometer  · Echo 3/2 really unremarkable, I will not repeat at this time  · Troponin mildly elevated, will cycle  This could be secondary to her CKD  · She is not appear overtly fluid overloaded, though BNP is elevated on admission  Hold off on diuretics, defer to Nephrology  · Patient does not appear to have tachypnea  When stimulated she will start to pant  · Otherwise patient has been comfortable  · Plan will be for discharge after dialysis on Tuesday  If daughter decides not to take patient may exercise writes to a p o  Discharge order will be written after dialysis on Tuesday

## 2018-03-20 NOTE — PROGRESS NOTES
Progress Note - Giuliana Slaughter 1940, 68 y o  female MRN: [id number]    Unit/Bed#: -01 Encounter: [id number]    Primary Care Provider: Cherelle Johnson MD   Date and time admitted to hospital: 3/14/2018  8:54 AM        Severe protein-calorie malnutrition [facility])   Assessment & Plan    This is a very frail-appearing patient, with history significant stroke and aphasia, currently on G-tube feedings  Hypertensive CKD, ESRD on dialysis [facility])   Assessment & Plan    Dialyzing Tuesday Thursday Saturday with the hope to go to twice weekly and transition and educate the patient's daughter and had to dialyze her at home  Anemia   Assessment & Plan    Recheck CBC periodically, continue omeprazole        Hemiparesis due to old stroke [facility])   Assessment & Plan    · Continue full-strength aspirin, as per the last admission, the family refused heparin due to history of GI bleeding requiring 10 units PRBCs  SCDs  Patient is basically bed-bound and contracted  She will make eye contact and follow with her eyes  Diabetes mellitus type 2 in nonobese [facility])   Assessment & Plan    Added accuchecks q 6 with coverage  changed values to q 6 because of tube feeding  May need to up titrate medications further if she continues to have elevated blood glucose the        Closed intertrochanteric fracture of left femur [facility])   Assessment & Plan    · Patient recently discharged after close intertrochanteric fracture of the left leg  She is status post chidi  · Patient's daughter states she needs to be carried ever and she is afraid of breaking other bones related to her multiple myeloma  ESRD (end stage renal disease) on dialysis [facility])   Assessment & Plan    · On dialysis, she only received 1 hour of her usual 3 5 today due to sudden-onset shortness of breath  · Dialysis Friday, monitor fluid status    · Nephrology consult appreciated, plan is to try to dialyze her twice a week in transition to home dialysis  I was able to meet with the patient's daughter before midnight during her shift in the ED  She expressed to me that she does not desire to move her mother around due to her fragility and the fact that she fractured her hip during a session outside her house  In her mind she needs to be able to dialyze the patient home wants to start that immediately despite being told that she needs to transition from outpatient therapy dome therapy  Will discuss with care related to add nephrology in the am   · Plan for dialysis on Tuesday with discharge afterwards, if all is stable  Patient's daughter may need to a p o  the discharge if she is unwilling to take her home  I spoke with care management number working with her           * SOB (shortness of breath)   Assessment & Plan    · Present on admission, was present on last admission  CTA in the ER (-) for PE  Etiology unclear  · Respiratory protocol, incentive spirometer  · Echo 3/2 really unremarkable, I will not repeat at this time  · Troponin mildly elevated, will cycle  This could be secondary to her CKD  · She is not appear overtly fluid overloaded, though BNP is elevated on admission  Hold off on diuretics, defer to Nephrology  · Patient does not appear to have tachypnea  When stimulated she will start to pant  · Otherwise patient has been comfortable  · Plan will be for discharge after dialysis on Tuesday  If daughter decides not to take patient may exercise writes to a p o  Discharge order will be written after dialysis on Tuesday  Multiple myeloma not having achieved remission [facility])   Assessment & Plan    Patient is at risk for fracture which is concerning to her family            VTE Pharmacologic Prophylaxis:   Pharmacologic: Pharmacologic VTE Prophylaxis contraindicated due to Family disc declines heparin use due to GI bleed in past  Mechanical: Mechanical VTE prophylaxis in place      Patient Centered Rounds: I have performed bedside rounds with nursing staff today  Discussions with Specialists or Other Care Team Provider:   Case management, nephrology  Other Care Team Provider:  Education and Discussions with Family / Patient: Will attempt to discuss discharge plan after dialysis with patient's daughter  Time Spent for Care: 15 minutes  More than 50% of total time spent on counseling and coordination of care as described above  Current Length of Stay: 5 day(s)  Current Patient Status: Inpatient   Certification Statement: The patient will continue to require additional inpatient hospital stay due to Dialysis, and safe discharge plan    Discharge Plan:   Patient likel able to be discharged after dialysis in a m     Family may exercise right to appeal    Code Status: Level 1 - Full Code    Subjective:   No change in patient's status  She open her eyes and looked at me this evening  Does not appear to be in pain or short of breath    Objective:   Vitals:   Temp (24hrs), Av 3 °F (36 8 °C), Min:98 1 °F (36 7 °C), Max:98 5 °F (36 9 °C)    HR:  [84-93] 84  Resp:  [18] 18  BP: (133-147)/(60-75) 134/71  SpO2:  [94 %-100 %] 96 %  Body mass index is 22 89 kg/m²  Input and Output Summary (last 24 hours):        Intake/Output Summary (Last 24 hours) at 18 0536  Last data filed at 18   Gross per 24 hour   Intake             1806 ml   Output                0 ml   Net             1806 ml       Physical Exam:     Physical Exam  Cachectic elderly female no acute distress normocephalic atraumatic pupils are equal round and reactive to light extraocular muscles are intact appearing to be intact with best exam mucous membranes are dry there are no oral lesions neck is supple there is no JVD  Chest decreased very poor air entry bilaterally anteriorly  Cardiovascular regular rate rhythm positive S1 and S2 heard appreciate an S3-S4 murmur or gallop  Abdomen soft nontender nondistended with positive bowel sounds, she had a small bowel movement yesterday  Extremities hemiparesis with contracture  Additional Data:   Labs:    Results from last 7 days  Lab Units  03/14/18  0938   WBC Thousand/uL  < > 17 29*   HEMOGLOBIN g/dL  < > 11 2*   HEMATOCRIT %  < > 34 1*   PLATELETS Thousands/uL  < > 240   LYMPHO PCT %  --  12*   MONO PCT MAN %  --  6   EOSINO PCT MANUAL %  --  2   < > = values in this interval not displayed  Results from last 7 days  Lab Units  03/15/18  0549   SODIUM mmol/L  < > 136   POTASSIUM mmol/L  < > 4 7   CHLORIDE mmol/L  < > 94*   CO2 mmol/L  < > 27   BUN mg/dL  < > 85*   CREATININE mg/dL  < > 4 73*   CALCIUM mg/dL  < > 11 2*   TOTAL PROTEIN g/dL  --  7 0   BILIRUBIN TOTAL mg/dL  --  0 60   ALK PHOS U/L  --  698*   ALT U/L  --  112*   AST U/L  --  111*   GLUCOSE RANDOM mg/dL  < > 180*   < > = values in this interval not displayed  Results from last 7 days  Lab Units 03/14/18  0938   INR  0 94       * I Have Reviewed All Lab Data Listed Above  * Additional Pertinent Lab Tests Reviewed: No New Labs Available For Today    Imaging:    Imaging Reports Reviewed Today Include:   None:    Cultures:   Blood Culture:   Lab Results   Component Value Date    BLOODCX No Growth After 5 Days  03/14/2018    BLOODCX No Growth After 5 Days  03/14/2018    BLOODCX No Growth After 5 Days  03/07/2018    BLOODCX No Growth After 5 Days   03/07/2018     Urine Culture:   Lab Results   Component Value Date    URINECX No Growth <1000 cfu/mL 11/09/2017     Sputum Culture: No components found for: SPUTUMCX  Wound Culture: No results found for: WOUNDCULT    Last 24 Hours Medication List:     Current Facility-Administered Medications:  acetaminophen 650 mg Oral Q8H PRN Nathaniel Seed, PA-C   aspirin 325 mg Oral Daily Peyton Justice, PA-C   atorvastatin 20 mg Oral Daily Peytonkyela Justice PA-C   b complex-vitamin C-folic acid 1 capsule Oral Daily With Dinner Peyton Justice PA-C   bisacodyl 10 mg Rectal Daily PRN Nathaniel Seed, PA-C   calcium carbonate 1,000 mg Oral Daily PRN Camilo Cox PA-C   epoetin babak 3,000 Units Intravenous Once per day on Tue Thu Sat Jesus Manuel Valero MD   insulin lispro 1-5 Units Subcutaneous Q6H [address] Lola Haines MD   metoprolol tartrate 25 mg Oral Q12H [address] Peyton Justice PA-C   omeprazole (PRILOSEC) suspension 2 mg/mL 20 mg Oral Daily Peyton Justice PA-C   ondansetron 4 mg Intravenous Q6H PRN Peyton Justice PA-C   oxyCODONE 2 5 mg Oral Q4H PRN Camilo Cox PA-C   senna-docusate sodium 1 tablet Oral HS Peyton Justice PA-C   simethicone 80 mg Oral 4x Daily PRN Camilo Cox PA-C        Today, Patient Was Seen By: Robbi Bennett MD    ** Please Note: Dragon 360 Dictation voice to text software may have been used in the creation of this document   **

## 2018-03-20 NOTE — PROGRESS NOTES
Progress Note - Ayesha Alvarez 1940, 68 y o  female MRN: [id number]    Unit/Bed#: -01 Encounter: [id number]    Primary Care Provider: Daphne Vazquez MD   Date and time admitted to hospital: 3/14/2018  8:54 AM        Hypertensive CKD, ESRD on dialysis [facility])   Assessment & Plan    Dialyzing Tuesday Thursday Saturday with the hope to go to twice weekly and transition and educate the patient's daughter and had to dialyze her at home  Anemia   Assessment & Plan    Recheck CBC periodically, continue omeprazole        Hemiparesis due to old stroke [facility])   Assessment & Plan    · Continue full-strength aspirin, as per the last admission, the family refused heparin due to history of GI bleeding requiring 10 units PRBCs  SCDs  Patient is basically bed-bound and contracted  She will make eye contact and follow with her eyes  Diabetes mellitus type 2 in nonobese [facility])   Assessment & Plan    Added accuchecks q 6 with coverage  May need to change values to q 6 because of 2 feeding  Closed intertrochanteric fracture of left femur [facility])   Assessment & Plan    · Patient recently discharged after close intertrochanteric fracture of the left leg  She is status post chidi  · Continue with therapy, patient was just discharged  3/13  ESRD (end stage renal disease) on dialysis [facility])   Assessment & Plan    · On dialysis, she only received 1 hour of her usual 3 5 today due to sudden-onset shortness of breath  · Dialysis Friday, monitor fluid status  · Nephrology consult appreciated, plan is to try to dialyze her twice a week in transition to home dialysis  Left message for patient's daughter        * SOB (shortness of breath)   Assessment & Plan    · Present on admission, was present on last admission  CTA in the ER (-) for PE  Etiology unclear  · Bring patient in under observation, monitor overnight on telemetry, plan for dialysis in the morning    · Respiratory protocol, incentive spirometer  · Echo 3/ really unremarkable, I will not repeat at this time  · Troponin mildly elevated, will cycle  This could be secondary to her CKD  · She is not appear overtly fluid overloaded, though BNP is elevated on admission  Hold off on diuretics, defer to Nephrology  · Patient does not appear to have tachypnea  When stimulated she will start to pant  · Otherwise patient has been comfortable  Continuing to try to obtain discharge plan with family  Multiple myeloma not having achieved remission [facility])   Assessment & Plan    Patient is at risk for fracture which is concerning to her family            VTE Pharmacologic Prophylaxis:   Pharmacologic: Pharmacologic VTE Prophylaxis contraindicated due to Family refuses heparin due to previous GI bleed  Mechanical: Mechanical VTE prophylaxis in place  Patient Centered Rounds: I have performed bedside rounds with nursing staff today  Discussions with Specialists or Other Care Team Provider:   Discussed with Darvin  Education and Discussions with Family / Patient:  No family at bedside  Time Spent for Care: 20 minutes  More than 50% of total time spent on counseling and coordination of care as described above  Current Length of Stay: 2 effectivel day(s)  Current Patient Status: Inpatient   Certification Statement: The patient will continue to require additional inpatient hospital stay due to Addressing dialysis needs safe discharge plan needed    Discharge Plan: TBD  Code Status: Level 1 - Full Code    Subjective:   Patient will localize with eyes and make eye contact otherwise not able to communicate    Objective:   Vitals:   Temp (24hrs), Av 3 °F (36 8 °C), Min:98 1 °F (36 7 °C), Max:98 5 °F (36 9 °C)    HR:  [84-93] 84  Resp:  [18] 18  BP: (133-147)/(60-75) 134/71  SpO2:  [94 %-100 %] 96 %  Body mass index is 22 89 kg/m²  Input and Output Summary (last 24 hours):        Intake/Output Summary (Last 24 hours) at 18 5069  Last data filed at 03/19/18 2000   Gross per 24 hour   Intake             1806 ml   Output                0 ml   Net             1806 ml       Physical Exam:     Physical Exam  Generally cachectic elderly female in no acute distress she is normocephalic atraumatic pupils equal round reactive to light extraocular muscles are intact mucous membranes are moist with no oral lesions  Patient is nonverbal  She can follow with her eyes  Chest is decreased but clear to auscultation is no rhonchi rales or wheezes  Cardiovascular regular rate rhythm positive S1 and S2 no S3-S4 murmur or gallop  Abdomen soft nontender nondistended with positive bowel sounds no hepatosplenomegaly  Extremities show spastic contractures left greater than right  Neurologically patient is awake intermittently alert  Additional Data:   Labs:    Results from last 7 days  Lab Units  03/14/18  0938   WBC Thousand/uL  < > 17 29*   HEMOGLOBIN g/dL  < > 11 2*   HEMATOCRIT %  < > 34 1*   PLATELETS Thousands/uL  < > 240   LYMPHO PCT %  --  12*   MONO PCT MAN %  --  6   EOSINO PCT MANUAL %  --  2   < > = values in this interval not displayed  Results from last 7 days  Lab Units 03/15/18  0549   SODIUM mmol/L 136   POTASSIUM mmol/L 4 7   CHLORIDE mmol/L 94*   CO2 mmol/L 27   BUN mg/dL 85*   CREATININE mg/dL 4 73*   CALCIUM mg/dL 11 2*   TOTAL PROTEIN g/dL 7 0   BILIRUBIN TOTAL mg/dL 0 60   ALK PHOS U/L 698*   ALT U/L 112*   AST U/L 111*   GLUCOSE RANDOM mg/dL 180*         * I Have Reviewed All Lab Data Listed Above  * Additional Pertinent Lab Tests Reviewed: No New Labs Available For Today    Imaging:    Imaging Reports Reviewed Today Include:   NoneReports Reviewed Today Include:    Cultures:   Blood Culture:   Lab Results   Component Value Date    BLOODCX No Growth After 5 Days  03/14/2018    BLOODCX No Growth After 5 Days  03/14/2018    BLOODCX No Growth After 5 Days  03/07/2018    BLOODCX No Growth After 5 Days   03/07/2018     Urine Culture:   Lab Results Component Value Date    URINECX No Growth <1000 cfu/mL 11/09/2017     Sputum Culture: No components found for: SPUTUMCX  Wound Culture: No results found for: WOUNDCULT    Last 24 Hours Medication List:     Current Facility-Administered Medications:  acetaminophen 650 mg Oral Q8H PRN Vernida Staff, PA-C   aspirin 325 mg Oral Daily Peyton Justice, PA-C   atorvastatin 20 mg Oral Daily Peyton Justice, PA-C   b complex-vitamin C-folic acid 1 capsule Oral Daily With Dinner Peyton Justice, PA-C   bisacodyl 10 mg Rectal Daily PRN Peyton Justice, PA-C   calcium carbonate 1,000 mg Oral Daily PRN Peyton Justice, PA-C   epoetin babak 3,000 Units Intravenous Once per day on Tue Thu Sat Michael Gaviria MD   insulin lispro 1-5 Units Subcutaneous Q6H [address] Milagros Snyder MD   metoprolol tartrate 25 mg Oral Q12H [address] Peyton Justice, PA-SINCERE   omeprazole (PRILOSEC) suspension 2 mg/mL 20 mg Oral Daily Peyton Justice, PA-C   ondansetron 4 mg Intravenous Q6H PRN Peyton Justice, PA-C   oxyCODONE 2 5 mg Oral Q4H PRN Vergrace Staff, PA-C   senna-docusate sodium 1 tablet Oral HS Peyton Justice, PA-C   simethicone 80 mg Oral 4x Daily PRN Vernida Staff, PA-C        Today, Patient Was Seen By: Eldridge Olszewski, MD    ** Please Note: Dragon 360 Dictation voice to text software may have been used in the creation of this document   **

## 2018-03-20 NOTE — PLAN OF CARE
Problem: DISCHARGE PLANNING - CARE MANAGEMENT  Goal: Discharge to post-acute care or home with appropriate resources  INTERVENTIONS:  - Conduct assessment to determine patient/family and health care team treatment goals, and need for post-acute services based on payer coverage, community resources, and patient preferences, and barriers to discharge  - Address psychosocial, clinical, and financial barriers to discharge as identified in assessment in conjunction with the patient/family and health care team  - Arrange appropriate level of post-acute services according to patient's   needs and preference and payer coverage in collaboration with the physician and health care team  - Communicate with and update the patient/family, physician, and health care team regarding progress on the discharge plan  - Arrange appropriate transportation to post-acute venues   Outcome: Progressing  Cm spoke to daughter George Serrano via phone to discuss her mother's discharge  George Serrano is going to appeal the discharge  Juan C White phone number given and she will call back with the case number  As soon as Cm receives that number, records will be faxed and will  await decision  ERI discussed Dr Maria C Rodriguez (nephrologist) suggestion of d/cing dialysis and going home with hospice, but George Zach is not interested in doing this

## 2018-03-20 NOTE — SOCIAL WORK
CM spoke to SILVINO López MD regarding possible home dialysis  He explains that home dialysis requires a 2 wk training process before it can be instituted, so pt will need facility dialysis at [address] [facility] on Mondays and Fridays-6:30am chair time  Dr Kentrell López explains that pt is cleared for d/c by him and [facility]  ERI also spoke to dialysis  Susie Palmer at [phone] and she reports that Mary Sully (daughter) has an appointment with her on 3/22  Will await call back from Mary Virk

## 2018-03-20 NOTE — PLAN OF CARE
Problem: DISCHARGE PLANNING - CARE MANAGEMENT  Goal: Discharge to post-acute care or home with appropriate resources  INTERVENTIONS:  - Conduct assessment to determine patient/family and health care team treatment goals, and need for post-acute services based on payer coverage, community resources, and patient preferences, and barriers to discharge  - Address psychosocial, clinical, and financial barriers to discharge as identified in assessment in conjunction with the patient/family and health care team  - Arrange appropriate level of post-acute services according to patient's   needs and preference and payer coverage in collaboration with the physician and health care team  - Communicate with and update the patient/family, physician, and health care team regarding progress on the discharge plan  - Arrange appropriate transportation to post-acute venues   Outcome: Progressing  CM spoke to SILVINO Rock MD regarding possible home dialysis  He explains that home dialysis requires a 2 wk training process before it can be instituted, so pt will need facility dialysis at [address] [facility] on Mondays and Fridays-6:30am chair time  Dr Anup Rock explains that pt is cleared for d/c by him and [facility]  ERI also spoke to dialysis  Yamileth Rey at [phone] and she reports that Camron Silva (daughter) has an appointment with her on 3/22  Will await call back from Camron Silva

## 2018-03-20 NOTE — PLAN OF CARE
Problem: Potential for Falls  Goal: Patient will remain free of falls  INTERVENTIONS:  - Assess patient frequently for physical needs  -  Identify cognitive and physical deficits and behaviors that affect risk of falls    -  [place] fall precautions as indicated by assessment   - Educate patient/family on patient safety including physical limitations  - Instruct patient to call for assistance with activity based on assessment  - Modify environment to reduce risk of injury  - Consider OT/PT consult to assist with strengthening/mobility   Outcome: Progressing      Problem: Prexisting or High Potential for Compromised Skin Integrity  Goal: Skin integrity is maintained or improved  INTERVENTIONS:  - Identify patients at risk for skin breakdown  - Assess and monitor skin integrity  - Assess and monitor nutrition and hydration status  - Monitor labs (i e  albumin)  - Assess for incontinence   - Turn and reposition patient  - Assist with mobility/ambulation  - Relieve pressure over bony prominences  - Avoid friction and shearing  - Provide appropriate hygiene as needed including keeping skin clean and dry  - Evaluate need for skin moisturizer/barrier cream  - Collaborate with interdisciplinary team (i e  Nutrition, Rehabilitation, etc )   - Patient/family teaching   Outcome: Progressing      Problem: PAIN - ADULT  Goal: Verbalizes/displays adequate comfort level or baseline comfort level  Interventions:  - Encourage patient to monitor pain and request assistance  - Assess pain using appropriate pain scale  - Administer analgesics based on type and severity of pain and evaluate response  - Implement non-pharmacological measures as appropriate and evaluate response  - Consider cultural and social influences on pain and pain management  - Notify physician/advanced practitioner if interventions unsuccessful or patient reports new pain   Outcome: Progressing      Problem: INFECTION - ADULT  Goal: Absence or prevention of progression during hospitalization  INTERVENTIONS:  - Assess and monitor for signs and symptoms of infection  - Monitor lab/diagnostic results  - Monitor all insertion sites, i e  indwelling lines, tubes, and drains  - [place] appropriate cooling/warming therapies per order  - Administer medications as ordered  - Instruct and encourage patient and family to use good hand hygiene technique  - Identify and instruct in appropriate isolation precautions for identified infection/condition   Outcome: Progressing      Problem: SAFETY ADULT  Goal: Maintain or return to baseline ADL function  INTERVENTIONS:  -  Assess patient's ability to carry out ADLs; assess patient's baseline for ADL function and identify physical deficits which impact ability to perform ADLs (bathing, care of mouth/teeth, toileting, grooming, dressing, etc )  - Assess/evaluate cause of self-care deficits   - Assess range of motion  - Assess patient's mobility; develop plan if impaired  - Assess patient's need for assistive devices and provide as appropriate  - Encourage maximum independence but intervene and supervise when necessary  ¯ Involve family in performance of ADLs  ¯ Assess for home care needs following discharge   ¯ Request OT consult to assist with ADL evaluation and planning for discharge  ¯ Provide patient education as appropriate   Outcome: Progressing    Goal: Maintain or return mobility status to optimal level  INTERVENTIONS:  - Assess patient's baseline mobility status (ambulation, transfers, stairs, etc )    - Identify cognitive and physical deficits and behaviors that affect mobility  - Identify mobility aids required to assist with transfers and/or ambulation (gait belt, sit-to-stand, lift, walker, cane, etc )  - [place] fall precautions as indicated by assessment  - Record patient progress and toleration of activity level on Mobility SBAR; progress patient to next Phase/Stage  - Instruct patient to call for assistance with activity based on assessment  - Request Rehabilitation consult to assist with strengthening/weightbearing, etc    Outcome: Progressing      Problem: DISCHARGE PLANNING  Goal: Discharge to home or other facility with appropriate resources  INTERVENTIONS:  - Identify barriers to discharge w/patient and caregiver  - Arrange for needed discharge resources and transportation as appropriate  - Identify discharge learning needs (meds, wound care, etc )  - Refer to Case Management Department for coordinating discharge planning if the patient needs post-hospital services based on physician/advanced practitioner order or complex needs related to functional status, cognitive ability, or social support system   Outcome: Progressing      Problem: Knowledge Deficit  Goal: Patient/family/caregiver demonstrates understanding of disease process, treatment plan, medications, and discharge instructions  Complete learning assessment and assess knowledge base  Interventions:  - Provide teaching at level of understanding  - Provide teaching via preferred learning methods   Outcome: Progressing      Problem: NEUROSENSORY - ADULT  Goal: Achieves stable or improved neurological status  INTERVENTIONS  - Monitor and report changes in neurological status  - Initiate measures to prevent increased intracranial pressure  - Maintain blood pressure and fluid volume within ordered parameters to optimize cerebral perfusion  - Monitor temperature, glucose, and sodium or any other associated labs   Initiate appropriate interventions as ordered  - Monitor for seizure activity   - Administer anti-seizure medications as ordered   Outcome: Progressing      Problem: CARDIOVASCULAR - ADULT  Goal: Maintains optimal cardiac output and hemodynamic stability  INTERVENTIONS:  - Monitor I/O, vital signs and rhythm  - Monitor for S/S and trends of decreased cardiac output i e  bleeding, hypotension  - Administer and titrate ordered vasoactive medications to optimize hemodynamic stability  - Assess quality of pulses, skin color and temperature  - Assess for signs of decreased coronary artery perfusion - ex   Angina  - Instruct patient to report change in severity of symptoms   Outcome: Progressing    Goal: Absence of cardiac dysrhythmias or at baseline rhythm  INTERVENTIONS:  - Continuous cardiac monitoring, monitor vital signs, obtain 12 lead EKG if indicated  - Administer antiarrhythmic and heart rate control medications as ordered  - Monitor electrolytes and administer replacement therapy as ordered   Outcome: Progressing      Problem: RESPIRATORY - ADULT  Goal: Achieves optimal ventilation and oxygenation  INTERVENTIONS:  - Assess for changes in respiratory status  - Assess for changes in mentation and behavior  - Position to facilitate oxygenation and minimize respiratory effort  - Oxygen administration by appropriate delivery method based on oxygen saturation (per order) or ABGs  - Initiate smoking cessation education as indicated  - Encourage broncho-pulmonary hygiene including cough, deep breathe, Incentive Spirometry  - Assess the need for suctioning and aspirate as needed  - Assess and instruct to report SOB or any respiratory difficulty  - Respiratory Therapy support as indicated   Outcome: Progressing      Problem: GASTROINTESTINAL - ADULT  Goal: Maintains adequate nutritional intake  INTERVENTIONS:  - Monitor percentage of each meal consumed  - Identify factors contributing to decreased intake, treat as appropriate  - Assist with meals as needed  - Monitor I&O, WT and lab values  - Obtain nutrition services referral as needed   Outcome: Progressing      Problem: GENITOURINARY - ADULT  Goal: Maintains or returns to baseline urinary function  INTERVENTIONS:  - Assess urinary function  - Encourage oral fluids to ensure adequate hydration  - Administer IV fluids as ordered to ensure adequate hydration  - Administer ordered medications as needed  - Offer frequent toileting  - Follow urinary retention protocol if ordered   Outcome: Progressing      Problem: SKIN/TISSUE INTEGRITY - ADULT  Goal: Skin integrity remains intact  INTERVENTIONS  - Identify patients at risk for skin breakdown  - Assess and monitor skin integrity  - Assess and monitor nutrition and hydration status  - Monitor labs (i e  albumin)  - Assess for incontinence   - Turn and reposition patient  - Assist with mobility/ambulation  - Relieve pressure over bony prominences  - Avoid friction and shearing  - Provide appropriate hygiene as needed including keeping skin clean and dry  - Evaluate need for skin moisturizer/barrier cream  - Collaborate with interdisciplinary team (i e  Nutrition, Rehabilitation, etc )   - Patient/family teaching   Outcome: Progressing    Goal: Incision(s), wounds(s) or drain site(s) healing without S/S of infection  INTERVENTIONS  - Assess and document risk factors for skin impairment   - Assess and document dressing, incision, wound bed, drain sites and surrounding tissue  - Initiate Nutrition services consult and/or wound management as needed   Outcome: Progressing    Goal: Oral mucous membranes remain intact  INTERVENTIONS  - Assess oral mucosa and hygiene practices  - Implement preventative oral hygiene regimen  - Implement oral medicated treatments as ordered  - Initiate Nutrition services referral as needed   Outcome: Progressing      Problem: MUSCULOSKELETAL - ADULT  Goal: Maintain or return mobility to safest level of function  INTERVENTIONS:  - Assess patient's ability to carry out ADLs; assess patient's baseline for ADL function and identify physical deficits which impact ability to perform ADLs (bathing, care of mouth/teeth, toileting, grooming, dressing, etc )  - Assess/evaluate cause of self-care deficits   - Assess range of motion  - Assess patient's mobility; develop plan if impaired  - Assess patient's need for assistive devices and provide as appropriate  - Encourage maximum independence but intervene and supervise when necessary  - Involve family in performance of ADLs  - Assess for home care needs following discharge   - Request OT consult to assist with ADL evaluation and planning for discharge  - Provide patient education as appropriate   Outcome: Progressing      Problem: Nutrition/Hydration-ADULT  Goal: Nutrient/Hydration intake appropriate for improving, restoring or maintaining nutritional needs  Monitor and assess patient's nutrition/hydration status for malnutrition (ex- brittle hair, bruises, dry skin, pale skin and conjunctiva, muscle wasting, smooth red tongue, and disorientation)  Collaborate with interdisciplinary team and initiate plan and interventions as ordered  Monitor patient's weight and dietary intake as ordered or per policy     INTERVENTIONS:  - Monitor oral intake, urinary output, labs, and treatment plans  - Assess nutrition and hydration status and recommend course of action  - Evaluate amount of meals eaten  - Assist patient with eating if necessary   - Allow adequate time for meals  - Recommend/ encourage appropriate diets, oral nutritional supplements, and vitamin/mineral supplements  - Order, calculate, and assess calorie counts as needed  - Recommend, monitor, and adjust tube feedings  based on assessed needs  - Assess need for intravenous fluids  - Provide specific nutrition/hydration education as appropriate  - Include patient/family/caregiver in decisions related to nutrition    Outcome: Progressing      Problem: DISCHARGE PLANNING - CARE MANAGEMENT  Goal: Discharge to post-acute care or home with appropriate resources  INTERVENTIONS:  - Conduct assessment to determine patient/family and health care team treatment goals, and need for post-acute services based on payer coverage, community resources, and patient preferences, and barriers to discharge  - Address psychosocial, clinical, and financial barriers to discharge as identified in assessment in conjunction with the patient/family and health care team  - Arrange appropriate level of post-acute services according to patient's   needs and preference and payer coverage in collaboration with the physician and health care team  - Communicate with and update the patient/family, physician, and health care team regarding progress on the discharge plan  - Arrange appropriate transportation to post-acute venues   Outcome: Progressing

## 2018-03-20 NOTE — SOCIAL WORK
Eri spoke to daughter Cristopher Chapman via phone to discuss her mother's discharge  Cristopher Chapman is going to appeal the discharge  Umesh Crow phone number given and she will call back with the case number  As soon as Eri receives that number, records will be faxed and will  await decision  ERI discussed Dr Sherren Reams (nephrologist) suggestion of d/cing dialysis and going home with hospice, but Cristopher Chapman is not interested in doing this

## 2018-03-20 NOTE — ASSESSMENT & PLAN NOTE
· Continue full-strength aspirin, as per the last admission, the family refused heparin due to history of GI bleeding requiring 10 units PRBCs  SCDs  Patient is basically bed-bound and contracted  She will make eye contact and follow with her eyes

## 2018-03-20 NOTE — PLAN OF CARE
Problem: Potential for Falls  Goal: Patient will remain free of falls  INTERVENTIONS:  - Assess patient frequently for physical needs  -  Identify cognitive and physical deficits and behaviors that affect risk of falls    -  [place] fall precautions as indicated by assessment   - Educate patient/family on patient safety including physical limitations  - Instruct patient to call for assistance with activity based on assessment  - Modify environment to reduce risk of injury  - Consider OT/PT consult to assist with strengthening/mobility   Outcome: Progressing      Problem: Prexisting or High Potential for Compromised Skin Integrity  Goal: Skin integrity is maintained or improved  INTERVENTIONS:  - Identify patients at risk for skin breakdown  - Assess and monitor skin integrity  - Assess and monitor nutrition and hydration status  - Monitor labs (i e  albumin)  - Assess for incontinence   - Turn and reposition patient  - Assist with mobility/ambulation  - Relieve pressure over bony prominences  - Avoid friction and shearing  - Provide appropriate hygiene as needed including keeping skin clean and dry  - Evaluate need for skin moisturizer/barrier cream  - Collaborate with interdisciplinary team (i e  Nutrition, Rehabilitation, etc )   - Patient/family teaching   Outcome: Progressing      Problem: PAIN - ADULT  Goal: Verbalizes/displays adequate comfort level or baseline comfort level  Interventions:  - Encourage patient to monitor pain and request assistance  - Assess pain using appropriate pain scale  - Administer analgesics based on type and severity of pain and evaluate response  - Implement non-pharmacological measures as appropriate and evaluate response  - Consider cultural and social influences on pain and pain management  - Notify physician/advanced practitioner if interventions unsuccessful or patient reports new pain   Outcome: Progressing      Problem: INFECTION - ADULT  Goal: Absence or prevention of progression during hospitalization  INTERVENTIONS:  - Assess and monitor for signs and symptoms of infection  - Monitor lab/diagnostic results  - Monitor all insertion sites, i e  indwelling lines, tubes, and drains  - [place] appropriate cooling/warming therapies per order  - Administer medications as ordered  - Instruct and encourage patient and family to use good hand hygiene technique  - Identify and instruct in appropriate isolation precautions for identified infection/condition   Outcome: Progressing      Problem: SAFETY ADULT  Goal: Maintain or return to baseline ADL function  INTERVENTIONS:  -  Assess patient's ability to carry out ADLs; assess patient's baseline for ADL function and identify physical deficits which impact ability to perform ADLs (bathing, care of mouth/teeth, toileting, grooming, dressing, etc )  - Assess/evaluate cause of self-care deficits   - Assess range of motion  - Assess patient's mobility; develop plan if impaired  - Assess patient's need for assistive devices and provide as appropriate  - Encourage maximum independence but intervene and supervise when necessary  ¯ Involve family in performance of ADLs  ¯ Assess for home care needs following discharge   ¯ Request OT consult to assist with ADL evaluation and planning for discharge  ¯ Provide patient education as appropriate   Outcome: Progressing    Goal: Maintain or return mobility status to optimal level  INTERVENTIONS:  - Assess patient's baseline mobility status (ambulation, transfers, stairs, etc )    - Identify cognitive and physical deficits and behaviors that affect mobility  - Identify mobility aids required to assist with transfers and/or ambulation (gait belt, sit-to-stand, lift, walker, cane, etc )  - [place] fall precautions as indicated by assessment  - Record patient progress and toleration of activity level on Mobility SBAR; progress patient to next Phase/Stage  - Instruct patient to call for assistance with activity based on assessment  - Request Rehabilitation consult to assist with strengthening/weightbearing, etc    Outcome: Progressing      Problem: DISCHARGE PLANNING  Goal: Discharge to home or other facility with appropriate resources  INTERVENTIONS:  - Identify barriers to discharge w/patient and caregiver  - Arrange for needed discharge resources and transportation as appropriate  - Identify discharge learning needs (meds, wound care, etc )  - Refer to Case Management Department for coordinating discharge planning if the patient needs post-hospital services based on physician/advanced practitioner order or complex needs related to functional status, cognitive ability, or social support system   Outcome: Progressing      Problem: Knowledge Deficit  Goal: Patient/family/caregiver demonstrates understanding of disease process, treatment plan, medications, and discharge instructions  Complete learning assessment and assess knowledge base  Interventions:  - Provide teaching at level of understanding  - Provide teaching via preferred learning methods   Outcome: Progressing      Problem: NEUROSENSORY - ADULT  Goal: Achieves stable or improved neurological status  INTERVENTIONS  - Monitor and report changes in neurological status  - Initiate measures to prevent increased intracranial pressure  - Maintain blood pressure and fluid volume within ordered parameters to optimize cerebral perfusion  - Monitor temperature, glucose, and sodium or any other associated labs   Initiate appropriate interventions as ordered  - Monitor for seizure activity   - Administer anti-seizure medications as ordered   Outcome: Progressing      Problem: CARDIOVASCULAR - ADULT  Goal: Maintains optimal cardiac output and hemodynamic stability  INTERVENTIONS:  - Monitor I/O, vital signs and rhythm  - Monitor for S/S and trends of decreased cardiac output i e  bleeding, hypotension  - Administer and titrate ordered vasoactive medications to optimize hemodynamic stability  - Assess quality of pulses, skin color and temperature  - Assess for signs of decreased coronary artery perfusion - ex   Angina  - Instruct patient to report change in severity of symptoms   Outcome: Progressing    Goal: Absence of cardiac dysrhythmias or at baseline rhythm  INTERVENTIONS:  - Continuous cardiac monitoring, monitor vital signs, obtain 12 lead EKG if indicated  - Administer antiarrhythmic and heart rate control medications as ordered  - Monitor electrolytes and administer replacement therapy as ordered   Outcome: Progressing      Problem: RESPIRATORY - ADULT  Goal: Achieves optimal ventilation and oxygenation  INTERVENTIONS:  - Assess for changes in respiratory status  - Assess for changes in mentation and behavior  - Position to facilitate oxygenation and minimize respiratory effort  - Oxygen administration by appropriate delivery method based on oxygen saturation (per order) or ABGs  - Initiate smoking cessation education as indicated  - Encourage broncho-pulmonary hygiene including cough, deep breathe, Incentive Spirometry  - Assess the need for suctioning and aspirate as needed  - Assess and instruct to report SOB or any respiratory difficulty  - Respiratory Therapy support as indicated   Outcome: Progressing      Problem: GASTROINTESTINAL - ADULT  Goal: Maintains adequate nutritional intake  INTERVENTIONS:  - Monitor percentage of each meal consumed  - Identify factors contributing to decreased intake, treat as appropriate  - Assist with meals as needed  - Monitor I&O, WT and lab values  - Obtain nutrition services referral as needed   Outcome: Progressing      Problem: GENITOURINARY - ADULT  Goal: Maintains or returns to baseline urinary function  INTERVENTIONS:  - Assess urinary function  - Encourage oral fluids to ensure adequate hydration  - Administer IV fluids as ordered to ensure adequate hydration  - Administer ordered medications as needed  - Offer frequent toileting  - Follow urinary retention protocol if ordered   Outcome: Progressing      Problem: SKIN/TISSUE INTEGRITY - ADULT  Goal: Skin integrity remains intact  INTERVENTIONS  - Identify patients at risk for skin breakdown  - Assess and monitor skin integrity  - Assess and monitor nutrition and hydration status  - Monitor labs (i e  albumin)  - Assess for incontinence   - Turn and reposition patient  - Assist with mobility/ambulation  - Relieve pressure over bony prominences  - Avoid friction and shearing  - Provide appropriate hygiene as needed including keeping skin clean and dry  - Evaluate need for skin moisturizer/barrier cream  - Collaborate with interdisciplinary team (i e  Nutrition, Rehabilitation, etc )   - Patient/family teaching   Outcome: Progressing    Goal: Incision(s), wounds(s) or drain site(s) healing without S/S of infection  INTERVENTIONS  - Assess and document risk factors for skin impairment   - Assess and document dressing, incision, wound bed, drain sites and surrounding tissue  - Initiate Nutrition services consult and/or wound management as needed   Outcome: Progressing    Goal: Oral mucous membranes remain intact  INTERVENTIONS  - Assess oral mucosa and hygiene practices  - Implement preventative oral hygiene regimen  - Implement oral medicated treatments as ordered  - Initiate Nutrition services referral as needed   Outcome: Progressing      Problem: MUSCULOSKELETAL - ADULT  Goal: Maintain or return mobility to safest level of function  INTERVENTIONS:  - Assess patient's ability to carry out ADLs; assess patient's baseline for ADL function and identify physical deficits which impact ability to perform ADLs (bathing, care of mouth/teeth, toileting, grooming, dressing, etc )  - Assess/evaluate cause of self-care deficits   - Assess range of motion  - Assess patient's mobility; develop plan if impaired  - Assess patient's need for assistive devices and provide as appropriate  - Encourage maximum independence but intervene and supervise when necessary  - Involve family in performance of ADLs  - Assess for home care needs following discharge   - Request OT consult to assist with ADL evaluation and planning for discharge  - Provide patient education as appropriate   Outcome: Progressing      Problem: Nutrition/Hydration-ADULT  Goal: Nutrient/Hydration intake appropriate for improving, restoring or maintaining nutritional needs  Monitor and assess patient's nutrition/hydration status for malnutrition (ex- brittle hair, bruises, dry skin, pale skin and conjunctiva, muscle wasting, smooth red tongue, and disorientation)  Collaborate with interdisciplinary team and initiate plan and interventions as ordered  Monitor patient's weight and dietary intake as ordered or per policy     INTERVENTIONS:  - Monitor oral intake, urinary output, labs, and treatment plans  - Assess nutrition and hydration status and recommend course of action  - Evaluate amount of meals eaten  - Assist patient with eating if necessary   - Allow adequate time for meals  - Recommend/ encourage appropriate diets, oral nutritional supplements, and vitamin/mineral supplements  - Order, calculate, and assess calorie counts as needed  - Recommend, monitor, and adjust tube feedings  based on assessed needs  - Assess need for intravenous fluids  - Provide specific nutrition/hydration education as appropriate  - Include patient/family/caregiver in decisions related to nutrition    Outcome: Progressing      Problem: DISCHARGE PLANNING - CARE MANAGEMENT  Goal: Discharge to post-acute care or home with appropriate resources  INTERVENTIONS:  - Conduct assessment to determine patient/family and health care team treatment goals, and need for post-acute services based on payer coverage, community resources, and patient preferences, and barriers to discharge  - Address psychosocial, clinical, and financial barriers to discharge as identified in assessment in conjunction with the patient/family and health care team  - Arrange appropriate level of post-acute services according to patient's   needs and preference and payer coverage in collaboration with the physician and health care team  - Communicate with and update the patient/family, physician, and health care team regarding progress on the discharge plan  - Arrange appropriate transportation to post-acute venues   Outcome: Progressing

## 2018-03-21 NOTE — PLAN OF CARE
Problem: DISCHARGE PLANNING - CARE MANAGEMENT  Goal: Discharge to post-acute care or home with appropriate resources  INTERVENTIONS:  - Conduct assessment to determine patient/family and health care team treatment goals, and need for post-acute services based on payer coverage, community resources, and patient preferences, and barriers to discharge  - Address psychosocial, clinical, and financial barriers to discharge as identified in assessment in conjunction with the patient/family and health care team  - Arrange appropriate level of post-acute services according to patient's   needs and preference and payer coverage in collaboration with the physician and health care team  - Communicate with and update the patient/family, physician, and health care team regarding progress on the discharge plan  - Arrange appropriate transportation to post-acute venues   Outcome: Progressing  S/w pt's daughter at bedside this am to discuss dcp  Per daughter, she called the "home dialysis people" to discuss set up and was made aware, again, that it would take several weeks before she is able to have home HD set up for her mother  She states that she did not appeal her mother discharge for this reason as she now understands she will have to take her home in the interim  She states that she is willing to take her mother home today  I left the room to set up transport and was again approached by the daughter who states she will not be taking her mother home today because she "needs to get things set up " She is also demanding her mother staples be removed prior to d/c  I made the nurse aware of the daughters needs and nurse Albertina Bender is to s/w pt's daughter at bedside shortly  Daughter has still not called to appeal discharge and also refuse to accept her mother at home  She is still not agreeable to SNF or hospice care at this time  CM dept will follow

## 2018-03-21 NOTE — PLAN OF CARE
Problem: Potential for Falls  Goal: Patient will remain free of falls  INTERVENTIONS:  - Assess patient frequently for physical needs  -  Identify cognitive and physical deficits and behaviors that affect risk of falls    -  [place] fall precautions as indicated by assessment   - Educate patient/family on patient safety including physical limitations  - Instruct patient to call for assistance with activity based on assessment  - Modify environment to reduce risk of injury  - Consider OT/PT consult to assist with strengthening/mobility   Outcome: Progressing      Problem: Prexisting or High Potential for Compromised Skin Integrity  Goal: Skin integrity is maintained or improved  INTERVENTIONS:  - Identify patients at risk for skin breakdown  - Assess and monitor skin integrity  - Assess and monitor nutrition and hydration status  - Monitor labs (i e  albumin)  - Assess for incontinence   - Turn and reposition patient  - Assist with mobility/ambulation  - Relieve pressure over bony prominences  - Avoid friction and shearing  - Provide appropriate hygiene as needed including keeping skin clean and dry  - Evaluate need for skin moisturizer/barrier cream  - Collaborate with interdisciplinary team (i e  Nutrition, Rehabilitation, etc )   - Patient/family teaching   Outcome: Progressing      Problem: PAIN - ADULT  Goal: Verbalizes/displays adequate comfort level or baseline comfort level  Interventions:  - Encourage patient to monitor pain and request assistance  - Assess pain using appropriate pain scale  - Administer analgesics based on type and severity of pain and evaluate response  - Implement non-pharmacological measures as appropriate and evaluate response  - Consider cultural and social influences on pain and pain management  - Notify physician/advanced practitioner if interventions unsuccessful or patient reports new pain   Outcome: Progressing      Problem: INFECTION - ADULT  Goal: Absence or prevention of progression during hospitalization  INTERVENTIONS:  - Assess and monitor for signs and symptoms of infection  - Monitor lab/diagnostic results  - Monitor all insertion sites, i e  indwelling lines, tubes, and drains  - Organ appropriate cooling/warming therapies per order  - Administer medications as ordered  - Instruct and encourage patient and family to use good hand hygiene technique  - Identify and instruct in appropriate isolation precautions for identified infection/condition   Outcome: Progressing      Problem: SAFETY ADULT  Goal: Maintain or return to baseline ADL function  INTERVENTIONS:  -  Assess patient's ability to carry out ADLs; assess patient's baseline for ADL function and identify physical deficits which impact ability to perform ADLs (bathing, care of mouth/teeth, toileting, grooming, dressing, etc )  - Assess/evaluate cause of self-care deficits   - Assess range of motion  - Assess patient's mobility; develop plan if impaired  - Assess patient's need for assistive devices and provide as appropriate  - Encourage maximum independence but intervene and supervise when necessary  ¯ Involve family in performance of ADLs  ¯ Assess for home care needs following discharge   ¯ Request OT consult to assist with ADL evaluation and planning for discharge  ¯ Provide patient education as appropriate   Outcome: Progressing    Goal: Maintain or return mobility status to optimal level  INTERVENTIONS:  - Assess patient's baseline mobility status (ambulation, transfers, stairs, etc )    - Identify cognitive and physical deficits and behaviors that affect mobility  - Identify mobility aids required to assist with transfers and/or ambulation (gait belt, sit-to-stand, lift, walker, cane, etc )  - Organ fall precautions as indicated by assessment  - Record patient progress and toleration of activity level on Mobility SBAR; progress patient to next Phase/Stage  - Instruct patient to call for assistance with activity based on assessment  - Request Rehabilitation consult to assist with strengthening/weightbearing, etc    Outcome: Progressing      Problem: DISCHARGE PLANNING  Goal: Discharge to home or other facility with appropriate resources  INTERVENTIONS:  - Identify barriers to discharge w/patient and caregiver  - Arrange for needed discharge resources and transportation as appropriate  - Identify discharge learning needs (meds, wound care, etc )  - Refer to Case Management Department for coordinating discharge planning if the patient needs post-hospital services based on physician/advanced practitioner order or complex needs related to functional status, cognitive ability, or social support system   Outcome: Progressing      Problem: Knowledge Deficit  Goal: Patient/family/caregiver demonstrates understanding of disease process, treatment plan, medications, and discharge instructions  Complete learning assessment and assess knowledge base  Interventions:  - Provide teaching at level of understanding  - Provide teaching via preferred learning methods   Outcome: Progressing      Problem: NEUROSENSORY - ADULT  Goal: Achieves stable or improved neurological status  INTERVENTIONS  - Monitor and report changes in neurological status  - Initiate measures to prevent increased intracranial pressure  - Maintain blood pressure and fluid volume within ordered parameters to optimize cerebral perfusion  - Monitor temperature, glucose, and sodium or any other associated labs   Initiate appropriate interventions as ordered  - Monitor for seizure activity   - Administer anti-seizure medications as ordered   Outcome: Progressing      Problem: CARDIOVASCULAR - ADULT  Goal: Maintains optimal cardiac output and hemodynamic stability  INTERVENTIONS:  - Monitor I/O, vital signs and rhythm  - Monitor for S/S and trends of decreased cardiac output i e  bleeding, hypotension  - Administer and titrate ordered vasoactive medications to optimize hemodynamic stability  - Assess quality of pulses, skin color and temperature  - Assess for signs of decreased coronary artery perfusion - ex   Angina  - Instruct patient to report change in severity of symptoms   Outcome: Progressing    Goal: Absence of cardiac dysrhythmias or at baseline rhythm  INTERVENTIONS:  - Continuous cardiac monitoring, monitor vital signs, obtain 12 lead EKG if indicated  - Administer antiarrhythmic and heart rate control medications as ordered  - Monitor electrolytes and administer replacement therapy as ordered   Outcome: Progressing      Problem: RESPIRATORY - ADULT  Goal: Achieves optimal ventilation and oxygenation  INTERVENTIONS:  - Assess for changes in respiratory status  - Assess for changes in mentation and behavior  - Position to facilitate oxygenation and minimize respiratory effort  - Oxygen administration by appropriate delivery method based on oxygen saturation (per order) or ABGs  - Initiate smoking cessation education as indicated  - Encourage broncho-pulmonary hygiene including cough, deep breathe, Incentive Spirometry  - Assess the need for suctioning and aspirate as needed  - Assess and instruct to report SOB or any respiratory difficulty  - Respiratory Therapy support as indicated   Outcome: Progressing      Problem: GASTROINTESTINAL - ADULT  Goal: Maintains adequate nutritional intake  INTERVENTIONS:  - Monitor percentage of each meal consumed  - Identify factors contributing to decreased intake, treat as appropriate  - Assist with meals as needed  - Monitor I&O, WT and lab values  - Obtain nutrition services referral as needed   Outcome: Progressing      Problem: GENITOURINARY - ADULT  Goal: Maintains or returns to baseline urinary function  INTERVENTIONS:  - Assess urinary function  - Encourage oral fluids to ensure adequate hydration  - Administer IV fluids as ordered to ensure adequate hydration  - Administer ordered medications as needed  - Offer frequent toileting  - Follow urinary retention protocol if ordered   Outcome: Progressing      Problem: SKIN/TISSUE INTEGRITY - ADULT  Goal: Skin integrity remains intact  INTERVENTIONS  - Identify patients at risk for skin breakdown  - Assess and monitor skin integrity  - Assess and monitor nutrition and hydration status  - Monitor labs (i e  albumin)  - Assess for incontinence   - Turn and reposition patient  - Assist with mobility/ambulation  - Relieve pressure over bony prominences  - Avoid friction and shearing  - Provide appropriate hygiene as needed including keeping skin clean and dry  - Evaluate need for skin moisturizer/barrier cream  - Collaborate with interdisciplinary team (i e  Nutrition, Rehabilitation, etc )   - Patient/family teaching   Outcome: Progressing    Goal: Incision(s), wounds(s) or drain site(s) healing without S/S of infection  INTERVENTIONS  - Assess and document risk factors for skin impairment   - Assess and document dressing, incision, wound bed, drain sites and surrounding tissue  - Initiate Nutrition services consult and/or wound management as needed   Outcome: Progressing    Goal: Oral mucous membranes remain intact  INTERVENTIONS  - Assess oral mucosa and hygiene practices  - Implement preventative oral hygiene regimen  - Implement oral medicated treatments as ordered  - Initiate Nutrition services referral as needed   Outcome: Progressing      Problem: MUSCULOSKELETAL - ADULT  Goal: Maintain or return mobility to safest level of function  INTERVENTIONS:  - Assess patient's ability to carry out ADLs; assess patient's baseline for ADL function and identify physical deficits which impact ability to perform ADLs (bathing, care of mouth/teeth, toileting, grooming, dressing, etc )  - Assess/evaluate cause of self-care deficits   - Assess range of motion  - Assess patient's mobility; develop plan if impaired  - Assess patient's need for assistive devices and provide as appropriate  - Encourage maximum independence but intervene and supervise when necessary  - Involve family in performance of ADLs  - Assess for home care needs following discharge   - Request OT consult to assist with ADL evaluation and planning for discharge  - Provide patient education as appropriate   Outcome: Progressing      Problem: Nutrition/Hydration-ADULT  Goal: Nutrient/Hydration intake appropriate for improving, restoring or maintaining nutritional needs  Monitor and assess patient's nutrition/hydration status for malnutrition (ex- brittle hair, bruises, dry skin, pale skin and conjunctiva, muscle wasting, smooth red tongue, and disorientation)  Collaborate with interdisciplinary team and initiate plan and interventions as ordered  Monitor patient's weight and dietary intake as ordered or per policy     INTERVENTIONS:  - Monitor oral intake, urinary output, labs, and treatment plans  - Assess nutrition and hydration status and recommend course of action  - Evaluate amount of meals eaten  - Assist patient with eating if necessary   - Allow adequate time for meals  - Recommend/ encourage appropriate diets, oral nutritional supplements, and vitamin/mineral supplements  - Order, calculate, and assess calorie counts as needed  - Recommend, monitor, and adjust tube feedings  based on assessed needs  - Assess need for intravenous fluids  - Provide specific nutrition/hydration education as appropriate  - Include patient/family/caregiver in decisions related to nutrition    Outcome: Progressing      Problem: DISCHARGE PLANNING - CARE MANAGEMENT  Goal: Discharge to post-acute care or home with appropriate resources  INTERVENTIONS:  - Conduct assessment to determine patient/family and health care team treatment goals, and need for post-acute services based on payer coverage, community resources, and patient preferences, and barriers to discharge  - Address psychosocial, clinical, and financial barriers to discharge as identified in assessment in conjunction with the patient/family and health care team  - Arrange appropriate level of post-acute services according to patient's   needs and preference and payer coverage in collaboration with the physician and health care team  - Communicate with and update the patient/family, physician, and health care team regarding progress on the discharge plan  - Arrange appropriate transportation to post-acute venues   Outcome: Progressing

## 2018-03-21 NOTE — PROGRESS NOTES
[facility] Progress Note  Patient: Jannette Veloz 68 y o  female   MRN: [id number]  PCP: Bridger Doe MD  Unit/Bed#: [facility] Encounter: [id number]  Date Of Visit: 03/21/18    Assessment:    Active Problems:    ESRD (end stage renal disease) on dialysis [facility])    Closed intertrochanteric fracture of left femur ([facility] )    Multiple myeloma not having achieved remission ([facility] )    Diabetes mellitus type 2 in nonobese ([facility] )    Hemiparesis due to old stroke ([facility] )    Anemia    Hypertensive CKD, ESRD on dialysis ([facility] )    Severe protein-calorie malnutrition ([facility] )      Plan:    · End-stage renal disease:  Patient is on hemodialysis  I did read to discharge yesterday however we are waiting the appeal   Patient is still medically stable for discharge so we will see happens with the appeal   The patients daughter may Re appeal   The daughter wants home dialysis set up from here  I did tell her yesterday that is not how it works  She needs 2 weeks of training as an outpatient  She is supposed to have a meeting with somebody from the home dialysis program tomorrow  I did discuss this with Nephrology  They stated that this is all outpatient follow-up  · Severe protein calorie malnutrition  Continue with G-tube feedings  · Hypertensive chronic kidney disease: This is stable  Continue to monitor  · End-stage renal disease on hemodialysis  · History of CVA with residual hemiparesis  Family is refusing any heparin  She is on a full-dose aspirin  · Diabetes mellitus:  Continue with sliding scale coverage  · Closed intertrochanteric fracture of the left femur  She is status post chidi  · Multiple myeloma: Follow up with Oncology as an outpatient  · Shortness of breath:   This is likely secondary to volume overload      VTE Pharmacologic Prophylaxis:   Pharmacologic: Family refused secondary to history of GI bleed in the past  Mechanical VTE Prophylaxis in Place: Yes    Patient Centered Rounds: I have performed bedside rounds with nursing staff today  Education and Discussions with Family / Patient:  Discussed with the daughter yesterday  She wished not to discuss it further with me  Case management is handling it  Time Spent for Care: 20 minutes  More than 50% of total time spent on counseling and coordination of care as described above  Current Length of Stay: 6 day(s)    Current Patient Status: Inpatient       Discharge Plan:  Medically stable for discharge  Family refuses rehab at this time  Awaiting appeal process with Medicare    Code Status: Level 1 - Full Code      Subjective:   Patient seen examined  No acute events  Objective:     Vitals:   Temp (24hrs), Av 7 °F (36 5 °C), Min:97 5 °F (36 4 °C), Max:97 8 °F (36 6 °C)    HR:  [82-99] 93  Resp:  [16-17] 17  BP: (104-128)/(40-63) 116/57  SpO2:  [98 %] 98 %  Body mass index is 22 89 kg/m²  Input and Output Summary (last 24 hours):        Intake/Output Summary (Last 24 hours) at 18 0832  Last data filed at 18 1600   Gross per 24 hour   Intake             1440 ml   Output             2000 ml   Net             -560 ml       Physical Exam:     General Appearance:    Alert, nonverbal contracted and cachectic                               Lungs:     Clear to auscultation bilaterally, respirations unlabored       Heart:    Regular rate and rhythm, S1 and S2 normal, no murmur, rub    or gallop   Abdomen:     Soft, non-tender, bowel sounds active all four quadrants,     no masses, no organomegaly           Extremities:   Extremities normal, atraumatic, no cyanosis or edema                       Additional Data:     Labs:      Results from last 7 days  Lab Units 18  0435  18  0938   WBC Thousand/uL 11 07*  < > 17 29*   HEMOGLOBIN g/dL 10 0*  < > 11 2*   HEMATOCRIT % 30 2*  < > 34 1*   PLATELETS Thousands/uL 164  < > 240   LYMPHO PCT %  --   --  12*   MONO PCT MAN %  --   --  6   EOSINO PCT MANUAL %  --   --  2   < > = values in this interval not displayed  Results from last 7 days  Lab Units 03/20/18  0435  03/15/18  0549   SODIUM mmol/L 138  < > 136   POTASSIUM mmol/L 4 6  < > 4 7   CHLORIDE mmol/L 92*  < > 94*   CO2 mmol/L 31  < > 27   BUN mg/dL 134*  < > 85*   CREATININE mg/dL 7 22*  < > 4 73*   CALCIUM mg/dL 10 5*  < > 11 2*   TOTAL PROTEIN g/dL  --   --  7 0   BILIRUBIN TOTAL mg/dL  --   --  0 60   ALK PHOS U/L  --   --  698*   ALT U/L  --   --  112*   AST U/L  --   --  111*   GLUCOSE RANDOM mg/dL 239*  < > 180*   < > = values in this interval not displayed  Results from last 7 days  Lab Units 03/14/18  0938   INR  0 94       * I Have Reviewed All Lab Data Listed Above  * Additional Pertinent Lab Tests Reviewed: [facility] Admission Reviewed      Recent Cultures (last 7 days):       Results from last 7 days  Lab Units 03/14/18  1145 03/14/18  0938   BLOOD CULTURE  No Growth After 5 Days  No Growth After 5 Days         Last 24 Hours Medication List:     Current Facility-Administered Medications:  acetaminophen 650 mg Oral Q8H PRN Ana Chand PA-C   aspirin 325 mg Oral Daily Peyton Justice PA-C   atorvastatin 20 mg Oral Daily Peyton Justice PA-C   b complex-vitamin C-folic acid 1 capsule Oral Daily With Dinner Peyton Justice PA-C   bisacodyl 10 mg Rectal Daily PRN Ana Chand PA-C   calcium carbonate 1,000 mg Oral Daily PRN Peyton Justice PA-C   epoetin babak 3,000 Units Intravenous Once per day on Tue Thu Sat Erlin Beck MD   insulin lispro 1-5 Units Subcutaneous Q6H [address] Krissy Briceno MD   metoprolol tartrate 25 mg Oral Q12H [address] Peyton Justice PA-C   omeprazole (PRILOSEC) suspension 2 mg/mL 20 mg Oral Daily Peyton Justice PA-C   ondansetron 4 mg Intravenous Q6H PRN Peyton Justice PA-C   oxyCODONE 2 5 mg Oral Q4H PRN Ana Chand PA-C   senna-docusate sodium 1 tablet Oral HS Peyton Justice PA-C   simethicone 80 mg Oral 4x Daily PRN Ana Chand PA-C        Today, Patient Was Seen By: Elina Rich MD    ** Please Note: Dragon 360 Dictation voice to text software may have been used in the creation of this document   **

## 2018-03-21 NOTE — SOCIAL WORK
S/w pt's daughter at bedside this am to discuss dcp  Per daughter, she called the "home dialysis people" to discuss set up and was made aware, again, that it would take several weeks before she is able to have home HD set up for her mother  She states that she did not appeal her mother discharge for this reason as she now understands she will have to take her home in the interim  She states that she is willing to take her mother home today  I left the room to set up transport and was again approached by the daughter who states she will not be taking her mother home today because she "needs to get things set up " She is also demanding her mother staples be removed prior to d/c  I made the nurse aware of the daughters needs and nurse Ángela Russo is to s/w pt's daughter at bedside shortly  Daughter has still not called to appeal discharge and also refuse to accept her mother at home  She is still not agreeable to SNF or hospice care at this time  CM dept will follow

## 2018-03-21 NOTE — PROGRESS NOTES
NEPHROLOGY PROGRESS NOTE    Patient: Suresh Parr               Sex: female          DOA: 3/14/2018  8:54 AM   YOB: 1940        Age:  68 y o         LOS:  LOS: 6 days         3/21/2018    REASON FOR THE CONSULTATION:  Further management of ESRD    HPI     This is a 68 y o  female admitted for SOB (shortness of breath)     SUBJECTIVE     - tolerated dialysis well yesterday  Patient is nonverbal   Updated patient's daughter at bedside today    - Reviewed last 24 hrs events     CURRENT MEDICATIONS       Current Facility-Administered Medications:     acetaminophen (TYLENOL) tablet 650 mg, 650 mg, Oral, Q8H PRN, Bisi Guidry PA-C, 650 mg at 03/14/18 2115    aspirin tablet 325 mg, 325 mg, Oral, Daily, Peyton Justice PA-C, 325 mg at 03/21/18 1039    atorvastatin (LIPITOR) tablet 20 mg, 20 mg, Oral, Daily, Peyton Justice PA-C, 20 mg at 03/21/18 1044    b complex-vitamin C-folic acid (NEPHROCAPS) capsule 1 capsule, 1 capsule, Oral, Daily With Dinner, Bisi Guidry PA-C, 1 capsule at 03/18/18 1620    bisacodyl (DULCOLAX) rectal suppository 10 mg, 10 mg, Rectal, Daily PRN, Peyton Justice PA-C    calcium carbonate (TUMS) chewable tablet 1,000 mg, 1,000 mg, Oral, Daily PRN, Peyton Justice PA-C    epoetin babak (EPOGEN,PROCRIT) injection 3,000 Units, 3,000 Units, Intravenous, Once per day on Tue Thu SatStacey MD, 3,000 Units at 03/20/18 1150    insulin lispro (HumaLOG) 100 units/mL subcutaneous injection 1-5 Units, 1-5 Units, Subcutaneous, Q6H [facility], 1 Units at 03/21/18 0648 **AND** Fingerstick Glucose (POCT), , , TID AC, Briseida Mendez MD    metoprolol tartrate (LOPRESSOR) tablet 25 mg, 25 mg, Oral, Q12H [facility], Peyton Justice PA-C, 25 mg at 03/21/18 1039    omeprazole (PRILOSEC) suspension 2 mg/mL, 20 mg, Oral, Daily, Peyton Justice PA-C, 20 mg at 03/21/18 1051    ondansetron (ZOFRAN) injection 4 mg, 4 mg, Intravenous, Q6H PRN, Bisi Guidry PA-C    oxyCODONE (ROXICODONE) oral solution 2 5 mg, 2 5 mg, Oral, Q4H PRN, Kasey Sutton PA-C, 2 5 mg at 03/21/18 0543    senna-docusate sodium (SENOKOT S) 8 6-50 mg per tablet 1 tablet, 1 tablet, Oral, HS, Peyton Justice PA-C, 1 tablet at 03/20/18 2110    simethicone (MYLICON) chewable tablet 80 mg, 80 mg, Oral, 4x Daily PRN, Kasey Sutton PA-C    OBJECTIVE     Current Weight: Weight - Scale: 51 4 kg (113 lb 5 1 oz)  Vitals:    03/21/18 1039   BP: 143/64   Pulse: 94   Resp:    Temp:    SpO2:      Body mass index is 22 89 kg/m²  Intake/Output Summary (Last 24 hours) at 03/21/18 1139  Last data filed at 03/20/18 1600   Gross per 24 hour   Intake             1440 ml   Output             2000 ml   Net             -560 ml       PHYSICAL EXAMINATION     Physical Exam   Constitutional: No distress  Awake but nonverbal   HENT:   Head: Normocephalic  Eyes: Conjunctivae are normal  No scleral icterus  Neck: Neck supple  No JVD present  Cardiovascular: Normal rate and regular rhythm  Pulmonary/Chest: Effort normal  No respiratory distress  She has no wheezes  Abdominal: Soft  She exhibits no distension  Musculoskeletal: She exhibits no edema  Neurological:   Unable to perform full CNS examination as patient is nonverbal   Skin: No erythema  Psychiatric: She is not agitated           LAB RESULTS       Results from last 7 days  Lab Units 03/20/18  0435 03/18/18  0647 03/15/18  0549   WBC Thousand/uL 11 07* 12 77* 12 19*   HEMOGLOBIN g/dL 10 0* 10 2* 10 1*   HEMATOCRIT % 30 2* 31 6* 30 1*   PLATELETS Thousands/uL 164 204 216   SODIUM mmol/L 138 139 136   POTASSIUM mmol/L 4 6 4 2 4 7   CHLORIDE mmol/L 92* 93* 94*   CO2 mmol/L 31 32 27   BUN mg/dL 134* 84* 85*   CREATININE mg/dL 7 22* 4 97* 4 73*   EGFR ml/min/1 73sq m 6 9 10   CALCIUM mg/dL 10 5* 11 5* 11 2*   TOTAL PROTEIN g/dL  --   --  7 0   GLUCOSE RANDOM mg/dL 239* 260* 180*         RADIOLOGY RESULTS      Results for orders placed during the hospital encounter of 03/14/18   XR chest 1 view portable    Narrative CHEST     INDICATION:   acute shortness of breath at dialysis  COMPARISON:  Chest radiographs March 17, 2018    EXAM PERFORMED/VIEWS:  XR CHEST PORTABLE      FINDINGS:    The heart is enlarged  Atherosclerotic changes in the aorta  Right internal jugular dialysis catheter with tip at cavoatrial junction  Lung markings are crowded secondary to low lung volumes  Within limitations of this examination there is no focal airspace opacity to suggest pneumonia  No pneumothorax or pleural effusion  Osseous structures appear within normal limits for patient age  Impression No active pulmonary disease on examination which is somewhat limited secondary to low lung volumes  Workstation performed: [id number]       Results for orders placed during the hospital encounter of 11/09/17   XR chest 2 views    Narrative CHEST     INDICATION:  Weakness  History taken directly from the electronic ordering system  COMPARISON:  Chest x-ray of 9/19/2017    VIEWS:  Frontal and lateral projections    IMAGES:  2    FINDINGS:  Right-sided dialysis catheter tip overlies the right atrium  The heart is enlarged  Patient is somewhat rotated to the left  There is persistent pulmonary vascular congestion, appears chronic  No pneumothorax or pleural effusion  Degenerative spurring is noted of the spine  There are marked arthritic changes of the right shoulder  Impression Persistent pulmonary vascular congestion, appears chronic  Workstation performed: [id number]         PLAN / RECOMMENDATIONS      1  ESRD  Currently patient is receiving dialysis on Tuesday Thursday Saturday schedule while in the hospital and has tolerated dialysis well yesterday  Discussed in length with patient regarding considering 3 times a week dialysis instead of twice a week     Patient's daughter still was to consider home hemodialysis    Patient's daughter understand the process of home hemodialysis and she has to undergo training prior to consider doing home dialysis by herself at home  No urgent need for dialysis today  Will plan dialysis tomorrow if patient remains in the hospital     2   Anemia  Multifactorial and suspect secondary to anemia of chronic renal failure/in ESRD  Last known hemoglobin was 10 0 which was at goal for ESRD  Will plan to continue Epogen 3000 Units with dialysis  3   Hypertension in ESRD  Overnight patient's blood pressure has remained controlled and at goal and continue monitor hypertension with metoprolol 25 mg PO BID    Disposition:  Stable from renal standpoint for discharge  Nyasia Green MD  Nephrology  3/21/2018        Portions of the record may have been created with voice recognition software  Occasional wrong word or "sound a like" substitutions may have occurred due to the inherent limitations of voice recognition software  Read the chart carefully and recognize, using context, where substitutions have occurred

## 2018-03-21 NOTE — CONSULTS
Orthopedics   Maria M Goldberg 68 y o  female MRN: [id number]  Unit/Bed#: -01      Subjective:  68 y  o female is s/p  left femoral intramedullary nail on 3/3/2018 with Dr Conrado Rivera  Pt is non-verbal and non-ambulatory  Orthopedics was consulted to remove staples left hip       Labs:    0  Lab Value Date/Time   HCT 30 2 (L) 03/20/2018 0435   HCT 31 6 (L) 03/18/2018 0647   HCT 30 1 (L) 03/15/2018 0549   HGB 10 0 (L) 03/20/2018 0435   HGB 10 2 (L) 03/18/2018 0647   HGB 10 1 (L) 03/15/2018 0549   INR 0 94 03/14/2018 0938   WBC 11 07 (H) 03/20/2018 0435   WBC 12 77 (H) 03/18/2018 0647   WBC 12 19 (H) 03/15/2018 0549       Meds:    Current Facility-Administered Medications:     acetaminophen (TYLENOL) tablet 650 mg, 650 mg, Oral, Q8H PRN, Peyton Justice PA-C, 650 mg at 03/14/18 2115    aspirin tablet 325 mg, 325 mg, Oral, Daily, Peyton Justice PA-C, 325 mg at 03/21/18 1039    atorvastatin (LIPITOR) tablet 20 mg, 20 mg, Oral, Daily, Peyton Justice PA-C, 20 mg at 03/21/18 1044    b complex-vitamin C-folic acid (NEPHROCAPS) capsule 1 capsule, 1 capsule, Oral, Daily With Dinner, Artie Norman PA-C, 1 capsule at 03/18/18 1620    bisacodyl (DULCOLAX) rectal suppository 10 mg, 10 mg, Rectal, Daily PRN, Peyton Justice PA-C    calcium carbonate (TUMS) chewable tablet 1,000 mg, 1,000 mg, Oral, Daily PRN, Peyton Justice PA-C    epoetin babak (EPOGEN,PROCRIT) injection 3,000 Units, 3,000 Units, Intravenous, Once per day on Tue Thu Sat, Azalea Lundberg MD, 3,000 Units at 03/20/18 1150    insulin lispro (HumaLOG) 100 units/mL subcutaneous injection 1-5 Units, 1-5 Units, Subcutaneous, Q6H [address], 1 Units at 03/21/18 1223 **AND** Fingerstick Glucose (POCT), , , TID AC, Lauren Salas MD    metoprolol tartrate (LOPRESSOR) tablet 25 mg, 25 mg, Oral, Q12H [address], Peyton Justice PA-C, 25 mg at 03/21/18 1039    omeprazole (PRILOSEC) suspension 2 mg/mL, 20 mg, Oral, Daily, Peyton Justice PA-C, 20 mg at 03/21/18 1051    [facility]ron [facility]) injection 4 mg, 4 mg, Intravenous, Q6H PRN, Peyton Justice PA-C    oxyCODONE (ROXICODONE) oral solution 2 5 mg, 2 5 mg, Oral, Q4H PRN, Peyton Justice PA-C, 2 5 mg at 03/21/18 0543    senna-docusate sodium (SENOKOT S) 8 6-50 mg per tablet 1 tablet, 1 tablet, Oral, HS, Peyton Justice PA-C, 1 tablet at 03/20/18 2110    simethicone (MYLICON) chewable tablet 80 mg, 80 mg, Oral, 4x Daily PRN, Oly Cisneros PA-C    Blood Culture:   Lab Results   Component Value Date    BLOODCX No Growth After 5 Days  03/14/2018       Wound Culture:   No results found for: WOUNDCULT    Ins and Outs:  I/O last 24 hours: In: 3850 [I V :500; Other:500; NG/GT:840]  Out: 2000 [Other:2000]          Physical exam:  Vitals:    03/21/18 1039   BP: 143/64   Pulse: 94   Resp:    Temp:    SpO2:        left lower extremity  · Incision is C/D/I with staples  Thigh is soft and compressible  · Does not yell out in pain when leg is moved today when I removed staples  · Unable to assess motor and sensation, patient is non verbal  · 2+ dorsalis pedis pulse     _*_*_*_*_*_*_*_*_*_*_*_*_*_*_*_*_*_*_*_*_*_*_*_*_*_*_*_*_*_*_*_*_*_*_*_*_*_*_*_*_*    Assessment: 68 y  o female post operative left femur IMN  3/3/2018  Pt doing well  Plan:  · Weightbearing as tolerated left lower extremity    Patient has been bed ridden for one year as her baseline  · PT/OT   · Staples removed today  · Analgesics as needed  · Dispo: [id number] Annelise Garcia for discharge from ortho perspective  · Will follow up with Dr Yandel Nam in 4-6 weeks in office    [place], [place]

## 2018-03-22 NOTE — SOCIAL WORK
Pt to be discharged home w/[facility] B[facility] transport at 19:30  IMM reviewed with daughter  Heidy Agrawal signed IMM  She is refusing to appeal the d/c  Copy to pt and copy to MR for scanning  A ramp was installed today and Marisabel Hill will be started timi

## 2018-03-22 NOTE — DISCHARGE INSTRUCTIONS
1  Apply calazime to sacrum and buttocks TID and PRN   2  Apply hydraguard to b/l heels BID and PRN for prevention and protection  3  Apply skin nourishing cream to the entire skin daily for moisture   4  Soft care cushion to chair when OOB   5  Turn and reposition patient every 2 hours   6   Elevate heels off of bed with pillows to offload pressure

## 2018-03-22 NOTE — PLAN OF CARE
Problem: DISCHARGE PLANNING - CARE MANAGEMENT  Goal: Discharge to post-acute care or home with appropriate resources  INTERVENTIONS:  - Conduct assessment to determine patient/family and health care team treatment goals, and need for post-acute services based on payer coverage, community resources, and patient preferences, and barriers to discharge  - Address psychosocial, clinical, and financial barriers to discharge as identified in assessment in conjunction with the patient/family and health care team  - Arrange appropriate level of post-acute services according to patient's   needs and preference and payer coverage in collaboration with the physician and health care team  - Communicate with and update the patient/family, physician, and health care team regarding progress on the discharge plan  - Arrange appropriate transportation to post-acute venues   Outcome: Completed Date Met: 03/22/18  Pt to be discharged home w/[facility] transport at 19:30  IMM reviewed with daughter  Huang Barlow signed IMM  She is refusing to appeal the d/c  Copy to pt and copy to MR for scanning  A ramp was installed today and Danial Baugh will be started timi

## 2018-03-22 NOTE — DISCHARGE SUMMARY
Discharge Summary - [address] [facility] Medicine    Patient Information: Jose Luis Hem 68 y o  female MRN: [id number]  Unit/Bed#: -01 Encounter: [id number]    Discharging Physician / Practitioner: Emani Hall MD  PCP: Ke Shelton MD  Admission Date: 3/14/2018  Discharge Date: 03/22/18    Disposition:     Home with VNA Services (Reminder: Complete face to face encounter)    Reason for Admission:  Shortness of breath    Discharge Diagnoses:     Principal Problem (Resolved):    SOB (shortness of breath)  Active Problems:    ESRD (end stage renal disease) on dialysis ([facility] [address] )    Closed intertrochanteric fracture of left femur (HCC)    Multiple myeloma not having achieved remission ([facility] [address] )    Diabetes mellitus type 2 in nonobese ([facility] [address] )    Hemiparesis due to old stroke ([facility] [address] )    Anemia    Hypertensive CKD, ESRD on dialysis ([facility] [address] )    Severe protein-calorie malnutrition [facility]      Consultations During Hospital Stay:  · Nephrology  · Orthopedics    Procedures Performed:     · Dialysis  · Removal of staples    Significant Findings / Test Results:     · None    Incidental Findings:   · None     Test Results Pending at Discharge (will require follow up): · None     Outpatient Tests Requested:  · BMP with next dialysis    Complications:  None    Hospital Course:       History of presenting illness:Candie Goldberg is a 68 y  o  female who presents with acute shortness of breath   She was in dialysis this morning when apparently she developed the symptoms  Tarah Syed was recently discharged yesterday to [facility], 3/13 after sustaining a left femoral fracture of unclear etiology      She has a history of stroke, with aphasia and essentially she is bed ridden with functional quadriplegia, left-sided hemiparesis  Tarah Syed is severely malnourished, has a PEG tube for nutrition  Tarah Syed has past medical history of hypertension, GI bleed, multiple myeloma, ESRD on hemodialysis with a Perma-Cath in the chest wall, anemia of chronic disease, arthritis     Hospital course:  Patient was admitted for the above reasons  She was getting dialysis while she was here and everything has been stable from that standpoint  Patient was recently discharged from a skilled nursing facility after having a fracture  She was from that standpoint stable  I have spoken to case management as well as Dr Alva Veloz who was seeing the patient earlier today  After dialysis today the patient is medically stable for discharge she does have a lot of chronic medical problems which needs close follow-up as an outpatient but as for acute care needs in the hospital patient is medically stable for discharge after she gets her dialysis today  This will be as long as her pressures do remain stable  Case management has had difficulty for the most part getting in touch with the daughter since she works at night here in the emergency department  The daughter wants the patient sent home with home dialysis  I did discuss this with Dr Linn Urban with Nephrology and this is not an inpatient thing  The patient needs 2 weeks of an outpatient training regimen with the daughter to do dialysis at home  This is all an outpatient thing and not inpatient  This was discussed with the patient by case management  I did speak to the daughter couple of days ago  She wished not discussed things and did not want take the patient home  She did appeal the discharge  Case management is working with her closely and trying to follow up with the out patient dialysis for the patient to get home dialysis which will be done on an outpatient basis     The patient does have severe protein calorie malnutrition is already on tube feeds as an outpatient    Condition at Discharge: fair     Discharge Day Visit / Exam:     Subjective:  Patient seen examined    No acute events reported  Vitals: Blood Pressure: (!) 107/49 (03/22/18 1050)  Pulse: 96 (03/22/18 1050)  Temperature: 98 2 °F (36 8 °C) (03/22/18 0700)  Temp Source: Axillary (03/22/18 0700)  Respirations: 18 (03/22/18 0700)  Height: 4' 11" (149 9 cm) (03/15/18 0300)  Weight - Scale: 51 4 kg (113 lb 5 1 oz) (03/14/18 0859)  SpO2: 100 % (03/22/18 0700)  Exam:   Physical Exam  (   General Appearance:    Alert, aphasic                               Lungs:     Clear to auscultation bilaterally, respirations unlabored       Heart:    Regular rate and rhythm, S1 and S2 normal, no murmur, rub    or gallop   Abdomen:     Soft, non-tender, bowel sounds active all four quadrants,     no masses, no organomegaly           Extremities:   Contracted     Discussion with Family:  Case Management to discuss with the daughter    Discharge instructions/Information to patient and family:   See after visit summary for information provided to patient and family  Provisions for Follow-Up Care:  See after visit summary for information related to follow-up care and any pertinent home health orders  Planned Readmission:  Not anticipated     Discharge Statement:  I spent 25 minutes discharging the patient  This time was spent on the day of discharge  I had direct contact with the patient on the day of discharge  Greater than 50% of the total time was spent examining patient, answering all patient questions, arranging and discussing plan of care with patient as well as directly providing post-discharge instructions  Additional time then spent on discharge activities  Discharge Medications:  See after visit summary for reconciled discharge medications provided to patient and family        ** Please Note: This note has been constructed using a voice recognition system **

## 2018-03-22 NOTE — PROGRESS NOTES
HEMODIALYSIS ROUNDING NOTE    Patient: Kunal Blair               Sex: female          DOA: 3/14/2018  8:54 AM   YOB: 1940        Age:  68 y o         LOS:  LOS: 7 days   3/22/2018    REASON FOR THE CONSULTATION:  Further management of ESRD    HPI     This is a 68 y o  female admitted for SOB (shortness of breath)     SUBJECTIVE     - Patient was seen during hemodialysis today    Patient was awake but nonverbal  - Reviewed last 24 hrs events     CURRENT MEDICATIONS       Current Facility-Administered Medications:     acetaminophen (TYLENOL) tablet 650 mg, 650 mg, Oral, Q8H PRN, Beau Moore PA-C, 650 mg at 03/14/18 2115    aspirin tablet 325 mg, 325 mg, Oral, Daily, Peyton Justice PA-C, 325 mg at 03/21/18 1039    atorvastatin (LIPITOR) tablet 20 mg, 20 mg, Oral, Daily, Peyton Justice PA-C, 20 mg at 03/21/18 1044    b complex-vitamin C-folic acid (NEPHROCAPS) capsule 1 capsule, 1 capsule, Oral, Daily With Dinner, ADAL Kang-C, 1 capsule at 03/18/18 1620    bisacodyl (DULCOLAX) rectal suppository 10 mg, 10 mg, Rectal, Daily PRN, Peyton Justice PA-C    calcium carbonate (TUMS) chewable tablet 1,000 mg, 1,000 mg, Oral, Daily PRN, Peyton Justice PA-C    epoetin babak (EPOGEN,PROCRIT) injection 3,000 Units, 3,000 Units, Intravenous, Once per day on Tue Thu SatSandy MD, 3,000 Units at 03/22/18 0912    insulin lispro (HumaLOG) 100 units/mL subcutaneous injection 1-5 Units, 1-5 Units, Subcutaneous, Q6H [address], 1 Units at 03/22/18 9029 **AND** Fingerstick Glucose (POCT), , , TID ACMatt MD    metoprolol tartrate (LOPRESSOR) tablet 25 mg, 25 mg, Oral, Q12H [address], Peyton TREVER Justice PA-C, 25 mg at 03/21/18 2122    omeprazole (PRILOSEC) suspension 2 mg/mL, 20 mg, Oral, Daily, Peyton Justice PA-C, 20 mg at 03/21/18 1051    ondansetron (ZOFRAN) injection 4 mg, 4 mg, Intravenous, Q6H PRN, Peyton Justice PA-C    oxyCODONE (ROXICODONE) oral solution 2 5 mg, 2 5 mg, Oral, Q4H PRN, Afshan Combs PA-C, 2 5 mg at 03/21/18 0543    senna-docusate sodium (SENOKOT S) 8 6-50 mg per tablet 1 tablet, 1 tablet, Oral, HS, Peyton Justice PA-C, 1 tablet at 03/21/18 2122    simethicone (MYLICON) chewable tablet 80 mg, 80 mg, Oral, 4x Daily PRN, Afshan Combs PA-C    OBJECTIVE     Current Weight: Weight - Scale: 51 4 kg (113 lb 5 1 oz)  Vitals:    03/22/18 1050   BP: (!) 107/49   Pulse: 96   Resp:    Temp:    SpO2:      Body mass index is 22 89 kg/m²  Intake/Output Summary (Last 24 hours) at 03/22/18 1105  Last data filed at 03/22/18 1045   Gross per 24 hour   Intake             1062 ml   Output                0 ml   Net             1062 ml       PHYSICAL EXAMINATION     Physical Exam   Constitutional: No distress  Awake but nonverbal   Eyes: Conjunctivae are normal  No scleral icterus  Neck: Neck supple  No JVD present  Cardiovascular: Normal rate  Pulmonary/Chest: Effort normal  No respiratory distress  She has no wheezes  Abdominal: Soft  She exhibits no distension  Musculoskeletal: She exhibits no edema  Neurological:   Unable to perform full CNS examination as patient was nonverbal and not following commands   Skin: Skin is warm     Psychiatric:   Unable to perform full psychiatric examination as patient was nonverbal and not following commands       LAB RESULTS       Results from last 7 days  Lab Units 03/20/18  0435 03/18/18  0647   WBC Thousand/uL 11 07* 12 77*   HEMOGLOBIN g/dL 10 0* 10 2*   HEMATOCRIT % 30 2* 31 6*   PLATELETS Thousands/uL 164 204   SODIUM mmol/L 138 139   POTASSIUM mmol/L 4 6 4 2   CHLORIDE mmol/L 92* 93*   CO2 mmol/L 31 32   BUN mg/dL 134* 84*   CREATININE mg/dL 7 22* 4 97*   EGFR ml/min/1 73sq m 6 9   CALCIUM mg/dL 10 5* 11 5*   GLUCOSE RANDOM mg/dL 239* 260*       RADIOLOGY RESULTS     Results for orders placed during the hospital encounter of 03/14/18   XR chest 1 view portable    Narrative CHEST     INDICATION:   acute shortness of breath at dialysis  COMPARISON:  Chest radiographs March 17, 2018    EXAM PERFORMED/VIEWS:  XR CHEST PORTABLE      FINDINGS:    The heart is enlarged  Atherosclerotic changes in the aorta  Right internal jugular dialysis catheter with tip at cavoatrial junction  Lung markings are crowded secondary to low lung volumes  Within limitations of this examination there is no focal airspace opacity to suggest pneumonia  No pneumothorax or pleural effusion  Osseous structures appear within normal limits for patient age  Impression No active pulmonary disease on examination which is somewhat limited secondary to low lung volumes  Workstation performed: [id number]       Results for orders placed during the hospital encounter of 11/09/17   XR chest 2 views    Narrative CHEST     INDICATION:  Weakness  History taken directly from the electronic ordering system  COMPARISON:  Chest x-ray of 9/19/2017    VIEWS:  Frontal and lateral projections    IMAGES:  2    FINDINGS:  Right-sided dialysis catheter tip overlies the right atrium  The heart is enlarged  Patient is somewhat rotated to the left  There is persistent pulmonary vascular congestion, appears chronic  No pneumothorax or pleural effusion  Degenerative spurring is noted of the spine  There are marked arthritic changes of the right shoulder  Impression Persistent pulmonary vascular congestion, appears chronic  Workstation performed: [id number]         PLAN / RECOMMENDATIONS     1  End Stage Renal Disease  Plan HD today to keep on Tuesday Thursday Saturday dialysis schedule while in the hospital (normally patient gets dialysis on Monday Wednesday Friday as outpatient)    At 9:41 AM on 3/22/2018, I saw and examined patient during hemodialysis treatment  The patient was receiving hemodialysis for treatment of end stage renal disease   I have also reviewed vital signs, intake and output, lab results and recent events, and agreed with today's dialysis order  Access: No issue    Disposition:  Stable from renal standpoint for discharge  Patient's daughter is interested doing home hemodialysis and would need to undergo training with our home dialysis Unit prior to performing dialysis by herself at home  Kassie Xie MD  Nephrology  3/22/2018        Portions of the record may have been created with voice recognition software  Occasional wrong word or "sound a like" substitutions may have occurred due to the inherent limitations of voice recognition software  Read the chart carefully and recognize, using context, where substitutions have occurred

## 2018-03-22 NOTE — PLAN OF CARE
CARDIOVASCULAR - ADULT     Maintains optimal cardiac output and hemodynamic stability Progressing     Absence of cardiac dysrhythmias or at baseline rhythm Progressing        GASTROINTESTINAL - ADULT     Maintains adequate nutritional intake Progressing        GENITOURINARY - ADULT     Maintains or returns to baseline urinary function Progressing        INFECTION - ADULT     Absence or prevention of progression during hospitalization Progressing        Knowledge Deficit     Patient/family/caregiver demonstrates understanding of disease process, treatment plan, medications, and discharge instructions Progressing        MUSCULOSKELETAL - ADULT     Maintain or return mobility to safest level of function Progressing        NEUROSENSORY - ADULT     Achieves stable or improved neurological status Progressing        Nutrition/Hydration-ADULT     Nutrient/Hydration intake appropriate for improving, restoring or maintaining nutritional needs Progressing        PAIN - ADULT     Verbalizes/displays adequate comfort level or baseline comfort level Progressing        Potential for Falls     Patient will remain free of falls Progressing        Prexisting or High Potential for Compromised Skin Integrity     Skin integrity is maintained or improved Progressing        RESPIRATORY - ADULT     Achieves optimal ventilation and oxygenation Progressing        SAFETY ADULT     Maintain or return to baseline ADL function Progressing     Maintain or return mobility status to optimal level Progressing        SKIN/TISSUE INTEGRITY - ADULT     Skin integrity remains intact Progressing     Incision(s), wounds(s) or drain site(s) healing without S/S of infection Progressing     Oral mucous membranes remain intact Progressing

## 2018-03-27 NOTE — OP NOTE
OPERATIVE REPORT    Patient Name: Kenroy Burnham    :  1940  MRN: [id number]  Pt Location: [facility] OR ROOM 03    Surgery Date:   3/3/2018    Surgeon(s) and Role:     * Cristopher Sage MD - Primary    Preop Diagnosis:  Closed displaced comminuted subtrochanteric/intertrochanteric fracture of left femur    Post-Op Diagnosis :    Closed displaced comminuted subtrochanteric/intertrochanteric fracture of left femur    Procedure(s) (LRB):  INSERTION NAIL IM FEMUR ANTEGRADE (TROCHANTERIC) (Left)  OPEN REDUCTION W/ INTERNAL FIXATION (ORIF) FEMUR (Left) with cerclage cable    Specimen(s):  * No specimens in log *    Estimated Blood Loss:   200 mL    Drains:  Gastrostomy/Enterostomy Percutaneous endoscopic gastrostomy (PEG) LUQ (Active)   Surrounding Skin Dry; Intact 3/12/2018  7:46 AM   Drain Status Tube feed infusing 3/12/2018  7:46 AM   Drainage Appearance None 3/12/2018  7:46 AM   Site Description Unable to view 3/1/2018  8:00 PM   Dressing Status Clean;Dry; Intact 3/12/2018  7:46 AM   Dressing Intervention Dressing reinforced 3/12/2018  6:24 AM   Dressing Type Dry dressing 3/12/2018  7:46 AM   Intake (mL) 90 mL 3/11/2018 12:01 PM   Number of days:        Implants:     Implant Name Type Inv   Item Serial No   Lot No  LRB No  Used   NAIL SERGIO TI TROCH FIX 11MM X 340MM X 130 DEG LFT [id number]  NAIL SERGIO TI TROCH FIX 11MM X 340MM X 130 DEG [facility]FT [facility] [id number] Left 1   CABLE ORTHOPEDIC STAINLESS STEEL L750 MM OD1 7 MM CRIMP CERCLAGE STRL - [id number]  CABLE ORTHOPEDIC STAINLESS STEEL L750 MM OD1 7 MM CRIMP CERCLAGE STRL  [facility] [id number] Left 1   [employer] HELICAL BLADE TI 67FX X 80MM [id number] [id number] - [id number]  [employer] HELICAL BLADE TI 23IN X 80MM [id number] [id number] Wiper  Left 1   SCREW LCK 5 X 30MM HEX RECES STRL - [id number]  SCREW LCK 5 X 30MM HEX RECES STRL  [employer] [id number] Left 1   SCREW LCK 5 X 42MM HEX RECES STRL - [id number]   SCREW LCK 5 X 42MM HEX RECES STRL   [employer]   Left 1       Anesthesia Type:   General    Operative Indications:   68year old non-ambulator  With severe dementia suffered subtrochanteric/intertrochanteric fracture  Patient bed bound but severe pain with any movement  Risks and benefits of procedure discussed in detail with family and they agreed to proceed  Operative Findings:  Intertrochanteric/subrochanteric fracture  Severe left hip arthritis,    Complications:   None    Procedure and Technique:    Patient's left hip was marked in the preoperative area  Risks and benefits of procedure discussed with patient's family and they wished to proceed  Patient taken to the operating room  General anesthesia administered  Patient placed on the fracture table in the supine position  Heel-toe positioning utilized and left lower extremity held between perineal post and traction boot  Right lower extremity also held with traction boot but minimal traction applied  The guidance but we are not able to correct the proximal fragment flexion and abduction with closed reduction  The left hip buttock and thigh and knee were then prepped and draped in the usual sterile fashion  A proximal incision was made along the line of the femur starting approximately 3 cm proximal to the greater trochanter and continued down to the greater trochanter  A 2nd incision made at the level of the lesser trochanter and slightly distal to the lesser trochanter  Dissection was carried down through iliotibial band and vastus lateralis fascia fracture site identified  This was reduced and held with bone reduction forceps and then cabled  With a [employer] 1 7 mm cable and crimp  This held the subtrochanteric fracture well reduced  We then made a starter hole at the tip of the greater trochanter guide pin advanced using fluoroscopic guidance  It was easily passed down the femur with the subtrochanteric portion of the fracture reduced  It was advanced down to the level of the patella    The femur was then sequentially reamed up to 10 5  Mm to allow passage of a 10 mm long trochanteric fixation nail  The length of the appropriate nail was determined using the measuring guide over the guide guidewire and advancing the measuring guide to the tip of the trochanter  A 340 mm length was chosen  This nail was advanced over the guidewire down to the distal femur  Position was checked  The nail was positioned to afford placement of a spiral blade up the center of the femoral neck and head from the lateral cortex of the femur  Then through the side aiming arm trocars were advanced through the incision that had been made over the lateral aspect of the femur  These trocars were advanced down to the lateral cortex of the femur  Then a guidewire was placed through trocars and up the center of the femoral neck and head using fluoroscopic guidance  Length of the appropriate spiral blade then measured off the guidewire  An 80 mm blade was appropriate  The lateral cortex of the femur was reamed  Then and a 10 mm x 80 mm spiral blade was advanced up the center of femoral neck and head  We kept the tip of the blade approximately 1 cm from the articular surfaces there was significant degeneration of the femoral head  Good reduction and fixation of the fracture was now noted on AP lateral and oblique views  The proximal locking mechanism was then tightened in the proximal aspect of the Nail  We then proceeded with placing 2 locking screws distally  These were placed using fluoroscopic guidance through stab incisions  There was hemostat dissection down to bone followed by drilling using perfect [place] technique  A 30 mm screw and a 42 mm screw were placed  Screw position was checked with fluoro  The incisions were then well irrigated and closed in layers  Patient tolerated the procedure well  No complications  She went to recovery room in stable condition  Estimated blood loss 200 cc          I was present for the entire procedure    Patient Disposition:  PACU     SIGNATURE: Aranza Whaley MD  DATE: March 26, 2018  TIME: 9:45 PM      Portions of the record may have been created with voice recognition software   Occasional wrong word or "sound a like" substitutions may have occurred due to the inherent limitations of voice recognition software   Read the chart carefully and recognize, using context, where substitutions have occurred

## 2018-05-20 NOTE — ED NOTES
Multiple attempts made to insert IV  Unsuccessful at this time Dr Carmelo Orozco aware       Fawn Man, VINI  05/20/18 0902

## 2018-05-20 NOTE — ED PROVIDER NOTES
History  Chief Complaint   Patient presents with    Fever - 76 years or older     pt co of fevers and cough since yesterday, pt hx of stroke      Patient is a 59-year-old female with past medical history of prior stroke which left patient with right-sided weakness, nonverbal and status post PEG tube, hypertension, hyperlipidemia, arthritis, anemia, end-stage renal disease on dialysis M/W/F, multiple myeloma, presents to the emergency department for fever and cough  Son provided majority of the history as history significantly limited secondary to patient's history of stroke and being nonverbal   He states for the past week she has had cough but it worsened last night  Cough is dry and nonproductive per son  She also has had fever and according to son, his wife took her temperature earlier today  He is unsure what the temperature actually was  Elli Evelina does note that she has not been acting her normal self and has been intermittently yelling out which is unlike her  She cannot express any complaints of pain per son  Son does report that recently patient has been having black stool and he also has noticed bright red blood in her stool  He reports history of peptic ulcer disease  She takes aspirin 325 mg daily but no other blood thinners  He denies any known vomiting, diarrhea, abdominal distension, dyspnea or distressed breathing  Rest of review of systems is unobtainable  Son denies any recent fall or known head injury  History provided by:  Patient   used: No        Prior to Admission Medications   Prescriptions Last Dose Informant Patient Reported?  Taking?   acetaminophen (TYLENOL) 325 mg tablet   Yes No   Sig: Take 650 mg by mouth every 4 (four) hours as needed for mild pain   aspirin 325 mg tablet   No No   Sig: Take 1 tablet (325 mg total) by mouth daily for 14 days Then dose to be cut back  to her usual dose of 81 mg daily   atorvastatin (LIPITOR) 20 mg tablet   No No Sig: Take 1 tablet (20 mg total) by mouth daily   b complex-C-folic acid (NEPHRO-TRACY) 0 8 mg tablet   No No   Sig: Take 1 tablet by mouth daily with dinner   bisacodyl (DULCOLAX) 10 mg suppository   No No   Sig: Insert 1 suppository (10 mg total) into the rectum daily as needed for constipation   metoprolol tartrate (LOPRESSOR) 25 mg tablet   No No   Sig: Take 1 tablet (25 mg total) by mouth every 12 (twelve) hours   omeprazole (PriLOSEC) 20 mg delayed release capsule   No No   Sig: Take 1 capsule (20 mg total) by mouth daily   senna-docusate sodium (SENOKOT S) 8 6-50 mg per tablet   No No   Sig: Take 1 tablet by mouth daily at bedtime      Facility-Administered Medications: None       Past Medical History:   Diagnosis Date    Anemia     Arthritis     Chronic kidney disease     Dialysis patient ([facility] [address] )     monday, wednesday, friday    Hypertension     [facility] myeloma ([facility] )     Stroke [facility])        Past Surgical History:   Procedure Laterality Date     SECTION      JOINT REPLACEMENT Right     hip    ORIF FEMUR FRACTURE Left 3/3/2018    Procedure: OPEN REDUCTION W/ INTERNAL FIXATION (ORIF) FEMUR;  Surgeon: Omayra Mcpherson MD;  Location: [facility];  Service: Orthopedics    MT OPEN RX FEMUR FX+INTRAMED LAURENCE Left 3/3/2018    Procedure: INSERTION NAIL IM FEMUR ANTEGRADE (TROCHANTERIC); Surgeon: Omayra Mcpherson MD;  Location: [facility];  Service: Orthopedics       Family History   Problem Relation Age of Onset    Hypertension Mother      I have reviewed and agree with the history as documented  Social History   Substance Use Topics    Smoking status: Never Smoker    Smokeless tobacco: Never Used    Alcohol use No        Review of Systems   Unable to perform ROS: Patient nonverbal   Constitutional: Positive for fever  Respiratory: Positive for cough  Physical Exam  Physical Exam   Constitutional: She appears well-developed and well-nourished  No distress     HENT:   Head: Normocephalic and atraumatic  Right Ear: External ear normal    Left Ear: External ear normal    Dry mucous membranes  Eyes: Conjunctivae and EOM are normal  Pupils are equal, round, and reactive to light  Neck: Normal range of motion  Neck supple  No JVD present  Cardiovascular: Normal rate, regular rhythm and intact distal pulses  Exam reveals no gallop and no friction rub  Murmur heard  Pulmonary/Chest: Effort normal  No respiratory distress  She has no wheezes  She has no rales  She exhibits no tenderness  Coarse breath sounds in bilateral bases  Abdominal: Soft  Bowel sounds are normal  She exhibits no distension  There is no tenderness  There is no rebound and no guarding  PEG tube in place  Genitourinary: Rectal exam shows guaiac positive stool  Genitourinary Comments: Stool grossly bloody   Musculoskeletal: She exhibits no edema, tenderness or deformity  Lymphadenopathy:     She has no cervical adenopathy  Neurological: She is alert  Right-sided paresis (baseline)  Left facial droop (baseline)  Patient nonverbal (baseline)  Patient intermittently moaning  Unable to assess strength or sensation at this time  Skin: Skin is warm and dry  No rash noted  She is not diaphoretic  No erythema  No pallor  Stage II sacral decubitus ulcer  Psychiatric: She has a normal mood and affect  Her behavior is normal    Nursing note and vitals reviewed        Vital Signs  ED Triage Vitals   Temperature Pulse Respirations Blood Pressure SpO2   05/20/18 1858 05/20/18 1856 05/20/18 1856 05/20/18 1856 05/20/18 1856   98 5 °F (36 9 °C) 103 20 136/64 91 %      Temp Source Heart Rate Source Patient Position - Orthostatic VS BP Location FiO2 (%)   05/20/18 1858 05/20/18 1856 05/20/18 1856 05/20/18 1856 --   Oral Monitor Sitting Right arm       Pain Score       --                  Vitals:    05/20/18 2215 05/20/18 2230 05/20/18 2245 05/20/18 2300   BP: 137/57 144/70 126/71 127/65   Pulse: (!) 142 (!) 149 88 82 Patient Position - Orthostatic VS: Lying Lying Lying Lying     Vitals:    05/20/18 2215 05/20/18 2230 05/20/18 2245 05/20/18 2300   BP: 137/57 144/70 126/71 127/65   BP Location: Right arm Right arm Right arm Right arm   Pulse: (!) 142 (!) 149 88 82   Resp: (!) 23 (!) 41 19 (!) 46   Temp:       TempSrc:       SpO2: 100% 100% 100% 100%   Weight:           Visual Acuity      ED Medications  Medications   cefepime (MAXIPIME) IVPB (premix) 2,000 mg (not administered)   vancomycin (VANCOCIN) IVPB (premix) 750 mg (not administered)   propofol (DIPRIVAN) 1000 mg in 100 mL infusion (premix) (5 mcg/kg/min × 52 3 kg Intravenous New Bag 5/20/18 2307)   vecuronium (NORCURON) injection 10 mg (not administered)   sodium chloride 0 9 % bolus 1,000 mL (0 mL Intravenous Stopped 5/20/18 2130)   metoprolol (LOPRESSOR) injection 5 mg (5 mg Intravenous Given 5/20/18 2102)   metoprolol (LOPRESSOR) injection 5 mg (5 mg Intravenous Given 5/20/18 2156)   midazolam (VERSED) 2 mg/2 mL injection **AcuDose Override Pull** (2 mg  Given 5/20/18 2230)   fentanyl citrate (PF) 100 MCG/2ML **AcuDose Override Pull** (100 mcg  Given 5/20/18 2239)   propofol (DIPRIVAN) 200 MG/20ML bolus injection **AcuDose Override Pull** (200 mg  Given 5/20/18 2238)       Diagnostic Studies  Results Reviewed     Procedure Component Value Units Date/Time    Lactic acid, plasma [[id number]]  (Normal) Collected:  05/20/18 2052    Lab Status:  Final result Specimen:  Blood from Arm, Right Updated:  05/20/18 2125     LACTIC ACID 1 5 mmol/L     Narrative:         Result may be elevated if tourniquet was used during collection      Comprehensive metabolic panel [[id number]]  (Abnormal) Collected:  05/20/18 2021    Lab Status:  Final result Specimen:  Blood from Arm, Right Updated:  05/20/18 2116     Sodium 140 mmol/L      Potassium 3 1 (L) mmol/L      Chloride 95 (L) mmol/L      CO2 26 mmol/L      Anion Gap 19 (H) mmol/L      BUN 68 (H) mg/dL      Creatinine 5 44 (H) mg/dL Glucose 275 (H) mg/dL      Calcium 10 3 (H) mg/dL       (H) U/L       (H) U/L      Alkaline Phosphatase 1,045 (H) U/L      Total Protein 7 1 g/dL      Albumin 3 2 (L) g/dL      Total Bilirubin 2 40 (H) mg/dL      eGFR 8 ml/min/1 73sq m     Narrative:         National Kidney Disease Education Program recommendations are as follows:  GFR calculation is accurate only with a steady state creatinine  Chronic Kidney disease less than 60 ml/min/1 73 sq  meters  Kidney failure less than 15 ml/min/1 73 sq  meters  Lipase [[id number]]  (Abnormal) Collected:  05/20/18 2021    Lab Status:  Final result Specimen:  Blood from Arm, Right Updated:  05/20/18 2116     Lipase 6,005 (H) u/L     CBC and differential [[id number]]  (Abnormal) Collected:  05/20/18 2021    Lab Status:  Final result Specimen:  Blood from Arm, Right Updated:  05/20/18 2100     WBC 22 00 (H) Thousand/uL      RBC 1 46 (L) Million/uL      Hemoglobin 5 4 (LL) g/dL      Hematocrit 17 5 (L) %       (H) fL      MCH 37 0 (H) pg      MCHC 30 9 (L) g/dL      RDW 26 1 (H) %      MPV 12 1 fL      Platelets 840 Thousands/uL      nRBC 7 /100 WBCs     Magnesium [[id number]]  (Normal) Collected:  05/20/18 2021    Lab Status:  Final result Specimen:  Blood from Arm, Right Updated:  05/20/18 2054     Magnesium 2 4 mg/dL     TSH, 3rd generation with T4 reflex [[id number]]  (Abnormal) Collected:  05/20/18 2021    Lab Status:  Final result Specimen:  Blood from Arm, Right Updated:  05/20/18 2054     TSH 3RD GENERATON 7 713 (H) uIU/mL     Narrative:         Patients undergoing fluorescein dye angiography may retain small amounts of fluorescein in the body for 48-72 hours post procedure  Samples containing fluorescein can produce falsely depressed TSH values  If the patient had this procedure,a specimen should be resubmitted post fluorescein clearance            The recommended reference ranges for TSH during pregnancy are as follows:  First trimester 0 1 to 2 5 uIU/mL  Second trimester  0 2 to 3 0 uIU/mL  Third trimester 0 3 to 3 0 uIU/m      Phosphorus [[id number]]  (Abnormal) Collected:  05/20/18 2021    Lab Status:  Final result Specimen:  Blood from Arm, Right Updated:  05/20/18 2054     Phosphorus 1 9 (L) mg/dL     T4, free [[id number]] Collected:  05/20/18 2021    Lab Status: In process Specimen:  Blood from Arm, Right Updated:  05/20/18 2054    Troponin I [[id number]]  (Abnormal) Collected:  05/20/18 2021    Lab Status:  Final result Specimen:  Blood from Arm, Right Updated:  05/20/18 2049     Troponin I 0 13 (H) ng/mL     Narrative:         [employer] Chemistry analyzer 99% cutoff is > 0 04 ng/mL in network labs    o cTnI 99% cutoff is useful only when applied to patients in the clinical setting of myocardial ischemia  o cTnI 99% cutoff should be interpreted in the context of clinical history, ECG findings and possibly cardiac imaging to establish correct diagnosis  o cTnI 99% cutoff may be suggestive but clearly not indicative of a coronary event without the clinical setting of myocardial ischemia  Lesvia Cain [[id number]]  (Normal) Collected:  05/20/18 2021    Lab Status:  Final result Specimen:  Blood from Arm, Right Updated:  05/20/18 2041     Protime 13 6 seconds      INR 1 05    APTT [[id number]]  (Normal) Collected:  05/20/18 2021    Lab Status:  Final result Specimen:  Blood from Arm, Right Updated:  05/20/18 2041     PTT 25 seconds     Blood culture #1 [[id number]] Collected:  05/20/18 2021    Lab Status: In process Specimen:  Blood from Arm, Left Updated:  05/20/18 2024    Blood culture #2 [[id number]] Collected:  05/20/18 2021    Lab Status: In process Specimen:  Blood from Arm, Right Updated:  05/20/18 2024    UA w Reflex to Microscopic [[id number]]     Lab Status:  No result Specimen:  Urine                  CT chest abdomen pelvis wo contrast   Final Result by Arun Vazquez MD (05/20 2037)      1  Limited assessment of the lungs    Bibasilar atelectasis with small left effusion   2  Evaluation of the abdomen is also limited secondary to motion  No bowel obstruction or hydronephrosis is present  3  Uterus appears enlarged for the patient's age  This may be further assessed with nonemergent ultrasound  Considerations related to the patient's age and/or comorbidities may be used to alter these recommendations  4  Compression fracture of L2  Age indeterminant  Lucent defect in the right iliac bone  This could represent a bone donor site although a lytic lesion cannot be excluded  5  Diffuse body wall edema               Workstation performed: [id number]         CT head without contrast   Final Result by Job Enriquez MD (05/20 2024)      Severe microangiopathic changes  Subacute/evolving left internal capsule posterior limb lacunar infarction  Significant cerebral volume loss with ventriculomegaly  No intracranial hemorrhage or mass effect                    Workstation performed: [id number]         XR chest 1 view portable    (Results Pending)              Procedures  ECG 12 Lead Documentation  Date/Time: 5/20/2018 8:15 PM  Performed by: Key Marc by: Liza Moser     ECG reviewed by me, the ED Provider: yes    Patient location:  ED  Previous ECG:     Previous ECG:  Compared to current    Comparison ECG info:  ST depressions and T-wave inversions are new compared to prior EKG on 3/14/2018    Similarity:  Changes noted  Rate:     ECG rate:  100    ECG rate assessment: normal    Rhythm:     Rhythm: sinus rhythm    Ectopy:     Ectopy: none    QRS:     QRS axis:  Normal    QRS intervals:  Normal  Conduction:     Conduction: normal    ST segments:     ST segments:  Abnormal    Depression:  V3, V4, II, III and aVF  T waves:     T waves: inverted      Inverted:  I, aVL, V4, V5, V6, II and III    ECG 12 Lead Documentation  Date/Time: 5/20/2018 8:58 PM  Performed by: Liza Moser  Authorized by: Liza Moser     ECG reviewed by me, the ED Provider: yes    Patient location:  ED  Previous ECG:     Previous ECG:  Compared to current    Comparison ECG info:  Rapid AFib has replaced normal sinus rhythm    Similarity:  Changes noted  Rate:     ECG rate:  158    ECG rate assessment: tachycardic    Rhythm:     Rhythm: atrial fibrillation      Rhythm comment:  With RVR  Ectopy:     Ectopy: none    QRS:     QRS axis:  Normal    QRS intervals:  Normal  Conduction:     Conduction: normal    Comments:      Marked ST abnormality in the inferior and lateral leads  Phone Contacts  ED Phone Contact    ED Course  ED Course as of May 20 2314   Sun May 20, 2018   2056 Troponin elevated  It has been elevated in the past and is trending down  Troponin I: (!) 0 13   2102 Nurse notified me that patient's heart rate is now in the 160s and it appears to be rapid atrial fibrillation  She has h/o paroxysmal A-fib one time while in [facility] several months ago  Lab also called notified that hemoglobin is 5 4  Will order 2 units of packed red blood cells, Lopressor 5 mg for rate control  Will also start empiric broad-spectrum antibiotics given history of fever and leukocytosis of 22,000  Will consult critical care  2107 Spoke with patient's daughter over phone who gave verbal consent for blood transfusion and confirmed patient is FULL CODE     2125 Spoke with critical care attending who will send PA down to evaluate patient  2240 Critical care advanced practitioner evaluated patient and agree to accept her to ICU  Based on her mentation it was decided that critical care team was going to intubate patient for airway protection  Intubation to be performed by critical care advanced practitioner while patient still in the ED  I was immediately available to advanced practitioner during intubation  Colby Real with Gastroenterology, Dr Noeím Vasquez, who recommended keeping her NPO and starting a PPI drip  He will see patient in AM  Critical care PA notified  MDM  Number of Diagnoses or Management Options  Diagnosis management comments: 59-year-old female, chronically debilitated and nonverbal at baseline from prior stroke, presents with reported fever and cough  Patient also has evidence of GI bleeding on exam   Will do full cardiac, septic and abdominal workup  Will obtain CT scan of the head as well as CT chest, abdomen and pelvis  Will give IV fluid bolus at this time  Disposition is to admit  Amount and/or Complexity of Data Reviewed  Clinical lab tests: ordered and reviewed  Tests in the radiology section of CPT®: ordered and reviewed  Tests in the medicine section of CPT®: ordered and reviewed  Obtain history from someone other than the patient: yes  Independent visualization of images, tracings, or specimens: yes      CritCare Time    Disposition  Final diagnoses:   Acute pancreatitis   Abnormal LFTs   Altered mental status   Cough   GI bleed   Acute blood loss anemia   Rapid atrial fibrillation ([facility] [address] )     Time reflects when diagnosis was documented in both MDM as applicable and the Disposition within this note     Time User Action Codes Description Comment    5/20/2018 10:32 PM Cb Sevin E Add [K85 90] Acute pancreatitis     5/20/2018 10:32 PM Cb Sevin E Add [R94 5] Abnormal LFTs     5/20/2018 10:32 PM Cb Sevin E Add [R41 82] Altered mental status     5/20/2018 10:32 PM Cb Sevin E Add [R05] Cough     5/20/2018 10:32 PM Cb Sevin E Add [K92 2] GI bleed     5/20/2018 10:32 PM Cb Sevin E Add [D62] Acute blood loss anemia     5/20/2018 10:32 PM Cb Sevin E Add [I48 91] Rapid atrial fibrillation [facility])       ED Disposition     ED Disposition Condition Comment    Admit  Case was discussed with ICU and the patient's admission status was agreed to be Admission Status: inpatient status to the service of Dr Latricia Soriano           Follow-up Information    None         Patient's Medications Discharge Prescriptions    No medications on file     No discharge procedures on file      ED Provider  Electronically Signed by           Madhu Perez DO  05/20/18 1382

## 2018-05-21 PROBLEM — I48.91 RAPID ATRIAL FIBRILLATION (HCC): Status: ACTIVE | Noted: 2018-01-01

## 2018-05-21 PROBLEM — K92.1 GASTROINTESTINAL HEMORRHAGE WITH MELENA: Status: ACTIVE | Noted: 2018-01-01

## 2018-05-21 PROBLEM — K92.2 GI BLEED: Status: ACTIVE | Noted: 2018-01-01

## 2018-05-21 PROBLEM — A41.9 SEPSIS (HCC): Status: ACTIVE | Noted: 2018-01-01

## 2018-05-21 PROBLEM — L89.152 DECUBITUS ULCER OF SACRAL REGION, STAGE 2 (HCC): Status: ACTIVE | Noted: 2018-01-01

## 2018-05-21 PROBLEM — J96.00 ACUTE RESPIRATORY FAILURE (HCC): Status: ACTIVE | Noted: 2018-01-01

## 2018-05-21 PROBLEM — K92.2 GIB (GASTROINTESTINAL BLEEDING): Status: ACTIVE | Noted: 2018-01-01

## 2018-05-21 PROBLEM — D62 ACUTE BLOOD LOSS ANEMIA: Status: ACTIVE | Noted: 2018-01-01

## 2018-05-21 NOTE — ED NOTES
VO given by Giana Bentley, NP Critical Care for Etomidate 10mg IV push            Nivia Rock RN  05/21/18 6140

## 2018-05-21 NOTE — RESPIRATORY THERAPY NOTE
67 yo female with prior hx of stroke, non verbal, presented with fever and cough  Pt was intubated due to afib and remains on full mechanical support

## 2018-05-21 NOTE — OP NOTE
ESOPHAGOGASTRODUODENOSCOPY    PROCEDURE: EGD    INDICATIONS: Melena    POST-OP DIAGNOSIS: See the impression below    SEDATION: Monitored anesthesia care, check anesthesia records    PHYSICAL EXAM:  Vitals:    05/21/18 1200   BP: (!) 91/44   Pulse: 100   Resp: 12   Temp:    SpO2:      Body mass index is 23 15 kg/m²  General: NAD  Heart: S1 & S2 normal, RRR  Lungs: CTA, No rales or rhonchi  Abdomen: Soft, nontender, nondistended, good bowel sounds    CONSENT:  Informed consent was obtained for the procedure, including sedation after explaining the risks and benefits of the procedure  Risks including but not limited to bleeding, perforation, infection, aspiration were discussed in detail  Also explained about less than 100% sensitivity with the exam and other alternatives  PREPARATION:   EKG tracing, pulse oximetry, blood pressure were monitored throughout the procedure  Patient was identified by myself both verbally and by visual inspection of ID band  DESCRIPTION:   Patient was placed in the left lateral decubitus position and was sedated with the above medication  The gastroscope was introduced in to the oropharynx and the esophagus was intubated under direct visualization  Scope was passed down the esophagus up to 2nd part of the duodenum  A careful inspection was made as the gastroscope was withdrawn, including a retroflexed view of the stomach; findings and interventions are described below  FINDINGS:    #1  Esophagus and GEJ- the orogastric tube was removed  The esophageal body was normal  No varices or bleeding lesions were noted    #2  Stomach- there was a mild nonerosive gastritis present within the body  No bleeding stigmata or coffee-grounds were noted in the stomach  The PEG bolster was in appropriate position and the mucosa was looked at underneath it and there was no bolster ulceration  The pylorus was patulous    #3  Duodenum- mild duodenitis in the bulb   No bleeding lesions or ulcerations were noted  The 2nd portion of the duodenum appeared normal         IMPRESSIONS:    Suspect small bowel or colon bleed with chronic anemia    RECOMMENDATIONS:   Ppi q day  Monitor for bleeding  I will discuss the need for colonoscopy with her daughter    COMPLICATIONS:  None; patient tolerated the procedure well    DISPOSITION: PACU  CONDITION: Stable    Osei William MD  5/21/2018,2:10 PM

## 2018-05-21 NOTE — PROGRESS NOTES
HEMODIALYSIS ROUNDING NOTE    Patient: Bertram Herrera               Sex: female          DOA: 5/20/2018  6:54 PM   YOB: 1940        Age:  68 y o         LOS:  LOS: 1 day   5/21/2018    REASON FOR THE CONSULTATION:  Further management of ESRD    HPI     This is a 68 y o  female admitted for Acute respiratory failure ([facility] [address] )     SUBJECTIVE     - Patient was seen during hemodialysis today  Patient was intubated and sedated    CURRENT MEDICATIONS       Current Facility-Administered Medications:     acetaminophen (TYLENOL) rectal suppository 650 mg, 650 mg, Rectal, Q6H PRN, BENNY Humphries, 650 mg at 05/21/18 0741    [START ON 5/22/2018] cefepime (MAXIPIME) IVPB (premix) 1,000 mg, 1,000 mg, Intravenous, Q24H, BENNY Humphries    chlorhexidine (PERIDEX) 0 12 % oral rinse 15 mL, 15 mL, Swish & Spit, Q12H [address], BENNY Humphries, 15 mL at 05/21/18 0861    epoetin babak (EPOGEN,PROCRIT) injection 10,000 Units, 10,000 Units, Intravenous, Once per day on Mon Wed Fri, Nicolle Haro MD, 10,000 Units at 05/21/18 1507    fentanyl citrate (PF) 100 MCG/2ML 50 mcg, 50 mcg, Intravenous, Q2H PRN, BENNY Harrison    [START ON 5/22/2018] pantoprazole (PROTONIX) injection 40 mg, 40 mg, Intravenous, Q24H [address], BENNY Harrison    propofol (DIPRIVAN) 200 MG/20ML bolus injection 30 mg, 30 mg, Intravenous, Once, [employer], DO    propofol (DIPRIVAN) 200 MG/20ML bolus injection 50 mg, 50 mg, Intravenous, Once, [employer], DO    vancomycin (VANCOCIN) IVPB (premix) 500 mg, 10 mg/kg, Intravenous, After Dialysis, BENNY Humphries, Last Rate: 200 mL/hr at 05/21/18 1532, 500 mg at 05/21/18 1532    OBJECTIVE     Current Weight: Weight - Scale: 52 kg (114 lb 10 2 oz)  Vitals:    05/21/18 1500   BP: 91/53   Pulse: 96   Resp: 20   Temp:    SpO2:      Body mass index is 23 15 kg/m²      Intake/Output Summary (Last 24 hours) at 05/21/18 1550  Last data filed at 05/21/18 1518   Gross per 24 hour   Intake           1403 7 ml   Output                0 ml   Net           1403 7 ml       PHYSICAL EXAMINATION     Physical Exam   Constitutional: She is sedated and intubated  HENT:   Head: Normocephalic  Mouth/Throat: Mucous membranes are not cyanotic  ETT in place   Eyes: No scleral icterus  Neck: Neck supple  No JVD present  Cardiovascular: Normal rate, S1 normal and S2 normal     Pulmonary/Chest: She is intubated  She has no wheezes  Abdominal: Soft  She exhibits no distension  Musculoskeletal:        Right hand: She exhibits no laceration  Left hand: She exhibits no laceration  Lymphadenopathy:        Right cervical: No superficial cervical adenopathy present  Left cervical: No superficial cervical adenopathy present  Right: No supraclavicular adenopathy present  Left: No supraclavicular adenopathy present  Neurological:   Unable to perform full neurological examination as patient is intubated and sedated   Skin: Skin is warm  No cyanosis     Psychiatric:   Unable to perform full psychiatric examination as patient is intubated and sedated       LAB RESULTS       Results from last 7 days  Lab Units 05/21/18  0504 05/21/18  0500 05/21/18  0456 05/20/18 2021   WBC Thousand/uL 22 30*  --   --  22 00*   HEMOGLOBIN g/dL 9 7*  --   --  5 4*   I STAT HEMOGLOBIN g/dl  --  9 2*  --   --    HEMATOCRIT % 29 2*  --   --  17 5*   PLATELETS Thousands/uL 282  --   --  305   SODIUM mmol/L  --   --  136  136 140   POTASSIUM mmol/L  --   --  3 2*  3 2* 3 1*   CHLORIDE mmol/L  --   --  96*  96* 95*   CO2 mmol/L  --   --  24  22 26   BUN mg/dL  --   --  68*  70* 68*   CREATININE mg/dL  --   --  5 49*  5 45* 5 44*   EGFR ml/min/1 73sq m  --   --  8  8 8   CALCIUM mg/dL  --   --  9 9  9 8 10 3*   MAGNESIUM mg/dL  --   --  2 3  2 3 2 4   PHOSPHORUS mg/dL  --   --  2 7  2 7 1 9*   TOTAL PROTEIN g/dL  --   --  6 9  6 9 7 1   GLUCOSE RANDOM mg/dL  --   --  256*  254* 275*   GLUCOSE, ISTAT mg/dl  --  240*  --   --        RADIOLOGY RESULTS     Results for orders placed during the hospital encounter of 05/20/18   XR chest 1 view portable    Narrative CHEST     INDICATION:   s/p intubation  COMPARISON:  Chest radiographs March 14, 2018 and CT chest abdomen pelvis May 20, 2018  EXAM PERFORMED/VIEWS:  XR CHEST PORTABLE      FINDINGS:    The heart is enlarged  Atherosclerotic changes in the aorta  Lung volumes diminished  Endotracheal tube is present with tip projecting into the left mainstem bronchus  It is recommended the endotracheal tube be withdrawn at least 2 cm  Ana Maria and pulmonary vessels are prominent  Small left pleural effusion  Left lower lobe infiltrate representing atelectasis or pneumonia  Right-sided dialysis catheter with tip at cavoatrial junction  Orogastric tube coursing beneath the left hemidiaphragm  Tip not seen  Osseous structures appear within normal limits for patient age  Impression 1  Low-lying endotracheal tube with tip projecting into left mainstem bronchus  Repositioning recommended  2   Mild vascular congestion  3   Left lower lobe infiltrate representing atelectasis or pneumonia  The study was marked in [facility] for immediate notification  Workstation performed: [id number]       Results for orders placed during the hospital encounter of 11/09/17   XR chest 2 views    Narrative CHEST     INDICATION:  Weakness  History taken directly from the electronic ordering system  COMPARISON:  Chest x-ray of 9/19/2017    VIEWS:  Frontal and lateral projections    IMAGES:  2    FINDINGS:  Right-sided dialysis catheter tip overlies the right atrium  The heart is enlarged  Patient is somewhat rotated to the left  There is persistent pulmonary vascular congestion, appears chronic  No pneumothorax or pleural effusion  Degenerative spurring is noted of the spine    There are marked arthritic changes of the right shoulder  Impression Persistent pulmonary vascular congestion, appears chronic  Workstation performed: [id number]         PLAN / RECOMMENDATIONS     1  End Stage Renal Disease  Plan HD today to keep on is Monday Wednesday Friday dialysis schedule    At 3:50 PM on 5/21/2018, I saw and examined patient during hemodialysis treatment  The patient was receiving hemodialysis for treatment of end stage renal disease  I have also reviewed vital signs, intake and output, lab results and recent events, and agreed with today's dialysis order  Access: No issue      Shweta Lainez MD  Nephrology  5/21/2018        Portions of the record may have been created with voice recognition software  Occasional wrong word or "sound a like" substitutions may have occurred due to the inherent limitations of voice recognition software  Read the chart carefully and recognize, using context, where substitutions have occurred

## 2018-05-21 NOTE — DISCHARGE INSTR - OTHER ORDERS
Wound recommendations: Cleanse b/l buttocks and sacrum with soap and water, pat dry, apply calazime cream TID and PRN

## 2018-05-21 NOTE — PLAN OF CARE
Problem: DISCHARGE PLANNING - CARE MANAGEMENT  Goal: Discharge to post-acute care or home with appropriate resources  INTERVENTIONS:  - Conduct assessment to determine patient/family and health care team treatment goals, and need for post-acute services based on payer coverage, community resources, and patient preferences, and barriers to discharge  - Address psychosocial, clinical, and financial barriers to discharge as identified in assessment in conjunction with the patient/family and health care team  - Arrange appropriate level of post-acute services according to patients   needs and preference and payer coverage in collaboration with the physician and health care team  - Communicate with and update the patient/family, physician, and health care team regarding progress on the discharge plan  - Arrange appropriate transportation to post-acute venues   Outcome: Progressing  LTAC pended to see if pt meets criteria  Tacho Mccarthy will review and let CM know decision

## 2018-05-21 NOTE — PLAN OF CARE
GENITOURINARY - ADULT     Maintains or returns to baseline urinary function Progressing     Absence of urinary retention Progressing        Nutrition/Hydration-ADULT     Nutrient/Hydration intake appropriate for improving, restoring or maintaining nutritional needs Progressing        Potential for Falls     Patient will remain free of falls Progressing        Prexisting or High Potential for Compromised Skin Integrity     Skin integrity is maintained or improved Progressing        RESPIRATORY - ADULT     Achieves optimal ventilation and oxygenation Progressing

## 2018-05-21 NOTE — CONSULTS
Recommend TF of Nepro 45 mL/hour for a total volume of 1080  This will provide 1944 kcals, 87 grams protein and 783 mL free water  Recommend flushes of 100 mL q 6 hours for total fluid intake of 1183 mL

## 2018-05-21 NOTE — PROCEDURES
Intubation  Date/Time: 5/20/2018 10:40 PM  Performed by: Conchita August by: Leona Thayer     Patient location:  ED  Other Assisting Provider: No    Consent:     Consent obtained:  Emergent situation  Universal protocol:     Patient identity confirmed:  Arm band  Pre-procedure details:     Patient status:  Unresponsive    Mallampati score:  3    Pretreatment medications:  Midazolam and etomidate    Paralytics:  Succinylcholine  Indications:     Indications for intubation: airway protection    Procedure details:     Preoxygenation:  Bag valve mask    CPR in progress: no      Intubation method:  Oral    Oral intubation technique:  Glidescope    Laryngoscope blade: Mac 3    Tube size (mm):  7 5    Tube type:  Cuffed    Number of attempts:  2    Ventilation between attempts: yes      Cricoid pressure: no      Tube visualized through cords: yes    Placement assessment:     ETT to lip:  24    Tube secured with:  ETT jacob    Breath sounds:  Equal and absent over the epigastrium    Placement verification: chest rise, CXR verification, equal breath sounds and ETCO2 detector      CXR findings:  ETT in proper place  Post-procedure details:     Patient tolerance of procedure: Tolerated well, no immediate complications  Comments:      On 1st attempt, large mucus burden noted in airway  Difficult to clear due to hardened sputum

## 2018-05-21 NOTE — CONSULTS
Consultation - [facility]  Rocael Omer 68 y o  female MRN: [id number]  Unit/Bed#:  Encounter: [id number]        Inpatient consult to gastroenterology  Consult performed by: Sage Coffman ordered by: Paul Bhatti          Reason for Consult / Principal Problem: Melena, Critical Anemia    HPI: Ms Daphne Arguello is a 69 yo F with a PMH of CVA with residual R sided weakness, aphasia, s/p PEG tube, HTN, HLD, arthritis, ESRD on HD, multiple myeloma, who presented last night with reported cough and fever as well as black tarry stools  The patient is currently intubated and sedated and there is no family at bedside  The history is obtained from the ICU H&P  Calls were attempted to the 2 numbers listed on the facesheet but no one was reached  She apparently has been calling/yelling out recently which is unlike her and she was noted to have black, tarry stools which recently became streaked with overt BRB  Family reported a history of gastric ulcers in the past  There was no associated vomiting, diarrhea, abdominal pain, or distention apparently  She was found to have a Hb of 5 3 on admission s/p 1 U of O neg blood in the ER  She was in rapid Afib which resolved upon intubation and clearance of large mucous plug  She was reportedly hypotensive on admission, though this is not documented in the flowsheet, which also improved s/p intubation though it is unclear how long she has been in Afib        REVIEW OF SYSTEMS: Unable to obtain due to mental status    Historical Information   Past Medical History:   Diagnosis Date    Anemia     Arthritis     Chronic kidney disease     [facility]     monday, wednesday, friday    Hypertension     [facility] myel[facility] [address] [facility]     Status post insertion of percutaneous endoscopic gastrostomy (PEG) tube ([facility] [address] )     [facility]roke [facility])      Past Surgical History:   Procedure Laterality Date     SECTION      JOINT REPLACEMENT Right     hip    ORIF FEMUR FRACTURE Left 3/3/2018    Procedure: OPEN REDUCTION W/ INTERNAL FIXATION (ORIF) FEMUR;  Surgeon: Endy De Jesus MD;  Location: [facility];  Service: Orthopedics    MT OPEN RX FEMUR FX+INTRAMED LAURENCE Left 3/3/2018    Procedure: INSERTION NAIL IM FEMUR ANTEGRADE (TROCHANTERIC);   Surgeon: Endy De Jesus MD;  Location: [facility];  Service: Orthopedics     Social History   History   Alcohol Use No     History   Drug Use No     History   Smoking Status    Never Smoker   Smokeless Tobacco    Never Used     Family History   Problem Relation Age of Onset    Hypertension Mother        Meds/Allergies     Prescriptions Prior to Admission   Medication    acetaminophen (TYLENOL) 325 mg tablet    aspirin 325 mg tablet    atorvastatin (LIPITOR) 20 mg tablet    b complex-C-folic acid (NEPHRO-TRACY) 0 8 mg tablet    bisacodyl (DULCOLAX) 10 mg suppository    metoprolol tartrate (LOPRESSOR) 25 mg tablet    omeprazole (PriLOSEC) 20 mg delayed release capsule    senna-docusate sodium (SENOKOT S) 8 6-50 mg per tablet     Current Facility-Administered Medications   Medication Dose Route Frequency    acetaminophen (TYLENOL) rectal suppository 650 mg  650 mg Rectal Q6H PRN    [START ON 5/22/2018] cefepime (MAXIPIME) IVPB (premix) 1,000 mg  1,000 mg Intravenous Q24H    chlorhexidine (PERIDEX) 0 12 % oral rinse 15 mL  15 mL Swish & Spit Q12H [facility]    epoetin babak (EPOGEN,PROCRIT) injection 10,000 Units  10,000 Units Intravenous Once per day on Mon Wed Fri    fentanyl citrate (PF) 100 MCG/2ML 50 mcg  50 mcg Intravenous Q2H PRN    insulin regular (HumuLIN R,NovoLIN R) 1 Units/mL in sodium chloride 0 9 % 100 mL infusion  0 3-21 Units/hr Intravenous Titrated    pantoprazole (PROTONIX) 80 mg in sodium chloride 0 9 % 100 mL infusion  8 mg/hr Intravenous Continuous    vancomycin (VANCOCIN) IVPB (premix) 500 mg  10 mg/kg Intravenous After Dialysis       No Known Allergies        Objective     Blood pressure (!) 101/49, pulse 101, temperature (!) 102 °F (38 9 °C), temperature source Axillary, resp  rate (!) 25, weight 52 kg (114 lb 10 2 oz), SpO2 96 %  Intake/Output Summary (Last 24 hours) at 05/21/18 1051  Last data filed at 05/21/18 0800   Gross per 24 hour   Intake          1044 24 ml   Output                0 ml   Net          1044 24 ml         PHYSICAL EXAM:      General Appearance:   Intubated, sedated   HEENT:   Normocephalic, atraumatic   Neck:  Supple, symmetrical, trachea midline   Lungs:   Clear to auscultation anteriorly   Heart[de-identified]   RRR, no murmur   Abdomen:   Non-distended, soft, BS hypoactive, (+) PEG tube in place appears     Rectal:   Deferred    Extremities:  No cyanosis or LE edema    Skin:  No jaundice or pallor     Lab Results:     Results from last 7 days  Lab Units 05/21/18  0504   WBC Thousand/uL 22 30*   HEMOGLOBIN g/dL 9 7*   HEMATOCRIT % 29 2*   PLATELETS Thousands/uL 282   LYMPHO PCT % 14   MONO PCT MAN % 1*   EOSINO PCT MANUAL % 0       Results from last 7 days  Lab Units 05/21/18  0500 05/21/18  0456   SODIUM mmol/L  --  136  136   POTASSIUM mmol/L  --  3 2*  3 2*   CHLORIDE mmol/L  --  96*  96*   CO2 mmol/L  --  24  22   BUN mg/dL  --  68*  70*   CREATININE mg/dL  --  5 49*  5 45*   CALCIUM mg/dL  --  9 9  9 8   TOTAL PROTEIN g/dL  --  6 9  6 9   BILIRUBIN TOTAL mg/dL  --  3 40*  3 40*   ALK PHOS U/L  --  1,032*  1,047*   ALT U/L  --  321*  316*   AST U/L  --  260*  280*   GLUCOSE RANDOM mg/dL  --  256*  254*   GLUCOSE, ISTAT mg/dl 240*  --        Results from last 7 days  Lab Units 05/21/18  0456   INR  1 17       Results from last 7 days  Lab Units 05/21/18  0456   LIPASE u/L 3,180*       Imaging Studies: I have personally reviewed pertinent imaging studies  Ct Chest Abdomen Pelvis Wo Contrast  Result Date: 5/20/2018  Impression: 1  Limited assessment of the lungs  Bibasilar atelectasis with small left effusion 2  Evaluation of the abdomen is also limited secondary to motion  No bowel obstruction or hydronephrosis is present  3  Uterus appears enlarged for the patient's age  This may be further assessed with nonemergent ultrasound  Considerations related to the patient's age and/or comorbidities may be used to alter these recommendations  4  Compression fracture of L2  Age indeterminant  Lucent defect in the right iliac bone  This could represent a bone donor site although a lytic lesion cannot be excluded  5  Diffuse body wall edema  Xr Chest 1 View Portable  Result Date: 5/21/2018  Impression: 1  Low-lying endotracheal tube with tip projecting into left mainstem bronchus  Repositioning recommended  2   Mild vascular congestion  3   Left lower lobe infiltrate representing atelectasis or pneumonia  The study was marked in [facility] for immediate notification  Ct Head Without Contrast  Result Date: 5/20/2018  Impression: Severe microangiopathic changes  Subacute/evolving left internal capsule posterior limb lacunar infarction  Significant cerebral volume loss with ventriculomegaly  No intracranial hemorrhage or mass effect         ASSESSMENT and PLAN:      Melena  Critical Anemia  - Hb 5 3 on admission with reported melena and recently BRBPR  - OG tube without any overt blood output  - S/P 1 U PRBC yesterday with Hb up to 9 7 indicating possible error in Hb on admission  - INR WNL  - Pt on  mg prior to admission, this can be continued if felt to be necessary per neurology and ICU in the setting of subacute CVA, Afib  - Plan for EGD today for further evaluation of anemia and melena with prior history of gastric ulcers  - Protonix drip  - Hold PEG tube feedings    Elevated LFTs  - Suspect her transaminitis and hyperbilirubinemia could be 2/2 obstructive biliary etiology v ischemic hepatitis as she was reported to be hypotensive at times with Afib v sepsis v component of heart failure  - Recommend checking CKMB  - Acute hepatitis panel neg in September 2017  - She has had mild elevation of transaminases in the past   - Suspect the elevated Alk phos is chronic 2/2 history of multiple myeloma   - CT on admission without significant acute findings  - Follow up blood cultures  - Trend CMP and INR    Elevated Lipase  - Lipase elevated at 6,005   - CT A/P with no abnormal findings of the pancreas or biliary tree  - Unable to determine if patient had abdominal pain prior to admission to qualify as acute pancreatitis  - IVF management per ICU and nephrology in the setting of ESRD with HD  - Serial abdominal exams  - RUQ US pending  - Pending above, consider MRI/MRCP in the setting of transaminitis and elevated lipase with fever especially if elevated LFTs are persistent, this will need to be coordinated with dialysis      Patient will be seen and examined by Dr Pardeep Arreola  All kruse medical decisions were made by Dr Pardeep Arreola  Thank you for allowing us to participate in the care of this patient  We will follow with you closely

## 2018-05-21 NOTE — CONSULTS
NEPHROLOGY CONSULTATION NOTE    Patient: Agueda Gresham               Sex: female          DOA: 5/20/2018  6:54 PM   YOB: 1940        Age:  68 y o         LOS:  LOS: 1 day     REFERRING PHYSICIAN: BENNY Rainey     REASON FOR THE REFERRAL / CONSULTATION:  Further management of ESRD    DATE OF CONSULTATION / SERVICE: 5/21/2018    ADMISSION DIAGNOSIS: Acute respiratory failure (HCC)     CHIEF COMPLAINT     Fever    HPI     This is a 80-year-old female with a past medical history of ESRD on chronic hemodialysis, nonverbal, stroke with right-sided weakness, s/p bag, hypertension, hyperlipidemia, initially presented to ER last night with chief complaint of fever associated with cough  During the hospital stay in the ER patient got more obtunded and also found to have sustained AFib associated with hypotension  Patient got intubated overnight and currently in ICU  Nephrology were consulted for further management of ESRD  I have personally seen and examined patient earlier this morning  As mentioned earlier currently patient is intubated  I have reviewed patient's old medical record and patient is on chronic hemodialysis at [place] [address] dialysis Unit and patient receives dialysis on Monday and Friday only  Patient's target weight is 50 kg  Patient also a history of anemia which was thought to be secondary to anemia of chronic kidney disease/in ESRD and patient was also receiving Epogen 8000 Units with dialysis  As mentioned earlier on admission after intubation patient was found to have sustained AFib with hypotension which has improved now  Currently patient is not requiring any pressors  Patient was also noticed to have elevated lipase level on admission but CT scan did not show any gallstones or acute inflammation around pancreas  Patient was also found to have possible GI bleed and has also received 1 year of blood overnight         PAST MEDICAL HISTORY     Past Medical History:   Diagnosis Date    Anemia     Arthritis     Chronic kidney disease     [facility] [address] )     monday, wednesday, friday    Hypertension     Multiple myeloma ([facility] )     Status post insertion of percutaneous endoscopic gastrostomy (PEG) tube ([facility] )     Stroke ([facility] )        PAST SURGICAL HISTORY     Past Surgical History:   Procedure Laterality Date     SECTION      JOINT REPLACEMENT Right     hip    ORIF FEMUR FRACTURE Left 3/3/2018    Procedure: OPEN REDUCTION W/ INTERNAL FIXATION (ORIF) FEMUR;  Surgeon: Jamil Rice MD;  [facility]: [facility];  Service: Orthopedics    MA OPEN RX FEMUR FX+INTRAMED LAURENCE Left 3/3/2018    Procedure: INSERTION NAIL IM FEMUR ANTEGRADE (TROCHANTERIC);   Surgeon: Jamil Rice MD;  [facility]: [facility];  Service: Orthopedics       ALLERGIES     No Known Allergies    SOCIAL HISTORY     History   Alcohol Use No     History   Drug Use No     History   Smoking Status    Never Smoker   Smokeless Tobacco    Never Used       FAMILY HISTORY     Family History   Problem Relation Age of Onset    Hypertension Mother        CURRENT MEDICATIONS       Current Facility-Administered Medications:     acetaminophen (TYLENOL) rectal suppository 650 mg, 650 mg, Rectal, Q6H PRN, BENNY Haskins, 650 mg at 18 0741    [START ON 2018] cefepime (MAXIPIME) IVPB (premix) 1,000 mg, 1,000 mg, Intravenous, Q24H, BENNY Haskins    chlorhexidine (PERIDEX) 0 12 % oral rinse 15 mL, 15 mL, Swish & Spit, Q12H [facility], Time BENNY Henriquez, 15 mL at 18 5740    epoetin babak (EPOGEN,PROCRIT) injection 10,000 Units, 10,000 Units, Intravenous, Once per day on , Calvin Laboy MD    fentanyl citrate (PF) 100 MCG/2ML 50 mcg, 50 mcg, Intravenous, Q2H PRN, BENNY Oswald    insulin regular (HumuLIN R,NovoLIN R) 1 Units/mL in sodium chloride 0 9 % 100 mL infusion, 0 3-21 Units/hr, Intravenous, Titrated, BENNY Zambrano    pantoprazole (PROTONIX) 80 mg in sodium chloride 0 9 % 100 mL infusion, 8 mg/hr, Intravenous, Continuous, BENNY Zambrano, Last Rate: 10 mL/hr at 05/21/18 0913, 8 mg/hr at 05/21/18 0913    vancomycin (VANCOCIN) IVPB (premix) 500 mg, 10 mg/kg, Intravenous, [facility]    REVIEW OF SYSTEMS     Complete 10 points of review of systems were unable to obtain from patient as patient was intubated and sedated and not following commands         OBJECTIVE     Current Weight: Weight - Scale: 52 kg (114 lb 10 2 oz)  Vitals:    05/21/18 0723   BP: 133/59   Pulse: (!) 109   Resp:    Temp: 100 4 °F (38 °C)   SpO2: 99%     Body mass index is 23 15 kg/m²  Intake/Output Summary (Last 24 hours) at 05/21/18 0958  Last data filed at 05/20/18 2130   Gross per 24 hour   Intake             1000 ml   Output                0 ml   Net             1000 ml       PHYSICAL EXAMINATION     Physical Exam   Constitutional: She is sedated and intubated  HENT:   Head: Normocephalic  Mouth/Throat: Mucous membranes are not cyanotic  ETT in place   Eyes: No scleral icterus  Neck: Neck supple  No JVD present  Cardiovascular: Normal rate, S1 normal and S2 normal     Pulmonary/Chest: She is intubated  She has no wheezes  Abdominal: Soft  She exhibits no distension  Musculoskeletal:        Right hand: She exhibits no laceration  Left hand: She exhibits no laceration  Lymphadenopathy:        Right cervical: No superficial cervical adenopathy present  Left cervical: No superficial cervical adenopathy present  Right: No supraclavicular adenopathy present  Left: No supraclavicular adenopathy present  Neurological:   Unable to perform full neurological examination as patient is intubated and sedated   Skin: Skin is warm  No cyanosis     Psychiatric:   Unable to perform full psychiatric examination as patient is intubated and sedated         LAB RESULTS          Results from last 7 days  Lab Units 05/21/18  0504 05/21/18  0500 05/21/18  0456 05/20/18  2021   WBC Thousand/uL 22 30*  --   --  22 00*   HEMOGLOBIN g/dL 9 7*  --   --  5 4*   I STAT HEMOGLOBIN g/dl  --  9 2*  --   --    HEMATOCRIT % 29 2*  --   --  17 5*   PLATELETS Thousands/uL 282  --   --  305   SODIUM mmol/L  --   --  136  136 140   POTASSIUM mmol/L  --   --  3 2*  3 2* 3 1*   CHLORIDE mmol/L  --   --  96*  96* 95*   CO2 mmol/L  --   --  24  22 26   BUN mg/dL  --   --  68*  70* 68*   CREATININE mg/dL  --   --  5 49*  5 45* 5 44*   EGFR ml/min/1 73sq m  --   --  8  8 8   CALCIUM mg/dL  --   --  9 9  9 8 10 3*   MAGNESIUM mg/dL  --   --  2 3  2 3 2 4   PHOSPHORUS mg/dL  --   --  2 7  2 7 1 9*   TOTAL PROTEIN g/dL  --   --  6 9  6 9 7 1   GLUCOSE RANDOM mg/dL  --   --  256*  254* 275*   GLUCOSE, ISTAT mg/dl  --  240*  --   --        RADIOLOGY RESULTS     Results for orders placed during the hospital encounter of 05/20/18   XR chest 1 view portable    Narrative CHEST     INDICATION:   s/p intubation  COMPARISON:  Chest radiographs March 14, 2018 and CT chest abdomen pelvis May 20, 2018  EXAM PERFORMED/VIEWS:  XR CHEST PORTABLE      FINDINGS:    The heart is enlarged  Atherosclerotic changes in the aorta  Lung volumes diminished  Endotracheal tube is present with tip projecting into the left mainstem bronchus  It is recommended the endotracheal tube be withdrawn at least 2 cm  Ana Maria and pulmonary vessels are prominent  Small left pleural effusion  Left lower lobe infiltrate representing atelectasis or pneumonia  Right-sided dialysis catheter with tip at cavoatrial junction  Orogastric tube coursing beneath the left hemidiaphragm  Tip not seen  Osseous structures appear within normal limits for patient age  Impression 1  Low-lying endotracheal tube with tip projecting into left mainstem bronchus  Repositioning recommended    2   Mild vascular congestion  3   Left lower lobe infiltrate representing atelectasis or pneumonia  The study was marked in [facility] for immediate notification  Workstation performed: [id number]       Results for orders placed during the hospital encounter of 11/09/17   XR chest 2 views    Narrative CHEST     INDICATION:  Weakness  History taken directly from the electronic ordering system  COMPARISON:  Chest x-ray of 9/19/2017    VIEWS:  Frontal and lateral projections    IMAGES:  2    FINDINGS:  Right-sided dialysis catheter tip overlies the right atrium  The heart is enlarged  Patient is somewhat rotated to the left  There is persistent pulmonary vascular congestion, appears chronic  No pneumothorax or pleural effusion  Degenerative spurring is noted of the spine  There are marked arthritic changes of the right shoulder  Impression Persistent pulmonary vascular congestion, appears chronic  Workstation performed: [id number]                 PLAN / RECOMMENDATIONS      1  ESRD  Upon review of old medical record patient is on chronic hemodialysis at [place] [address] Dialysis Unit on Monday and Friday dialysis schedule only  I have personally seen and examined patient earlier in the ICU  Patient was admitted with fever and was also developed altered mental status and also found to have AFib RVR with hypotension which has resolved now currently patient is intubated and sedated  Will plan hemodialysis today to keep patient on Monday Wednesday Friday dialysis schedule  I have also coordinated care with dialysis nurses  2   Anemia  Multifactorial   Patient also was admitted with possible GI bleed and pending GI evaluation  Patient has received 1 year of blood transfusion on admission  At current hemoglobin is 9 7 which is below the goal for ESRD and plan to start patient on Epogen 16077 Units with dialysis  Thank you for the consultation to participate in patient's care   I have personally discussed my plan with the referring physician /ICU team      Tita Villalobos MD  Nephrology  5/21/2018        Portions of the record may have been created with voice recognition software  Occasional wrong word or "sound a like" substitutions may have occurred due to the inherent limitations of voice recognition software  Read the chart carefully and recognize, using context, where substitutions have occurred

## 2018-05-21 NOTE — PLAN OF CARE
Problem: Nutrition/Hydration-ADULT  Goal: Nutrient/Hydration intake appropriate for improving, restoring or maintaining nutritional needs  Monitor and assess patient's nutrition/hydration status for malnutrition (ex- brittle hair, bruises, dry skin, pale skin and conjunctiva, muscle wasting, smooth red tongue, and disorientation)  Collaborate with interdisciplinary team and initiate plan and interventions as ordered  Monitor patient's weight and dietary intake as ordered or per policy  Utilize nutrition screening tool and intervene per policy  Determine patient's food preferences and provide high-protein, high-caloric foods as appropriate       INTERVENTIONS:  - Monitor oral intake, urinary output, labs, and treatment plans  - Assess nutrition and hydration status and recommend course of action  - Recommend/ encourage appropriate nutritional supplements, and vitamin/mineral supplements  - Order, calculate, and assess calorie counts as needed  - Recommend, monitor, and adjust tube feedings based on assessed needs  - Assess need for intravenous fluids  - Provide specific nutrition/hydration education as appropriate  - Include patient/family/caregiver in decisions related to nutrition   Outcome: Progressing

## 2018-05-21 NOTE — H&P
History and Physical - Critical Care   Gabi Alexandre 68 y o  female MRN: [id number]  Unit/Bed#:  Encounter: [id number]    Reason for Admission / Chief Complaint:  Encephalopathy, rapid atrial fibrillation, sepsis of unknown etiology    History of Present Illness:  Gabi Alexandre is a 68 y o  female with significant past medical history including prior stroke that left her with with right-sided weakness, nonverbal and status post PEG;hypertension, hyperlipidemia, arthritis, anemia, anuric end-stage renal disease on dialysis, multiple myeloma, who presents to the emergency room for one-week history of cough and fever  Family stated that the patient was yelling out, which is unlike her  There is additional history of recent black tarry stools that have recently become more blood streaked  Family reports history of gastric ulcers  She currently takes a full-dose aspirin daily secondary to her stroke, however no other blood thinners  Family is not aware of any known vomiting, diarrhea, abdominal distention, shortness of breath  Emergency room evaluation reveals GI bleed with a hemoglobin of 5 3  Patient was transfused 1 unit of O negative blood  Patient also had sustained rapid atrial fibrillation, with some hypotension  Patient also noted to be febrile  Critical Care Medicine consulted for admission to step-down  Upon evaluation, patient was found to be completely obtunded and unable to protect her airway  Patient intubated with minimal medication, and large mucus plug noted above the cords  Immediately post intubation patient converted into sinus rhythm with some improvement of her blood pressure  CT head performed in the emergency room revealed subacute left internal capsule posterior limb lacunar infarct  Lipase noted to be 6000, although no gallstones were noted nor acute inflammatory changes within the pancreas      Patient admitted to the ICU with acute respiratory failure, pancreatitis, GI bleed, acute blood loss anemia, and sepsis of unknown etiology  History obtained from chart review and son-in-law at bedside  Past Medical History:  Past Medical History:   Diagnosis Date    Anemia     Arthritis     Chronic kidney disease     Dialysis patient ([facility] [address] )     monday, wednesday, friday    Hypertension     Multiple myeloma ([facility] [address] )     Stroke [facility])        Past Surgical History:  Past Surgical History:   Procedure Laterality Date     SECTION      JOINT REPLACEMENT Right     hip    ORIF FEMUR FRACTURE Left 3/3/2018    Procedure: OPEN REDUCTION W/ INTERNAL FIXATION (ORIF) FEMUR;  Surgeon: Valeri Pelaez MD;  Location: [facility];  Service: Orthopedics    CO OPEN RX FEMUR FX+INTRAMED LAURENCE Left 3/3/2018    Procedure: INSERTION NAIL IM FEMUR ANTEGRADE (TROCHANTERIC); Surgeon: Valeri Pelaez MD;  [facility]: [facility];  Service: Orthopedics       Past Family History:  Family History   Problem Relation Age of Onset    Hypertension Mother        Social History:  History   Smoking Status    Never Smoker   Smokeless Tobacco    Never Used     History   Alcohol Use No     History   Drug Use No     Marital Status: Single      Medications:  Current Facility-Administered Medications   Medication Dose Route Frequency    cefepime (MAXIPIME) IVPB (premix) 1,000 mg  1,000 mg Intravenous Q24H    cefepime (MAXIPIME) IVPB (premix) 2,000 mg  2,000 mg Intravenous Once    pantoprazole (PROTONIX) 80 mg in sodium chloride 0 9 % 100 mL infusion  8 mg/hr Intravenous Continuous    propofol (DIPRIVAN) 1000 mg in 100 mL infusion (premix)  5-50 mcg/kg/min Intravenous Titrated    vancomycin (VANCOCIN) IVPB (premix) 1,000 mg  20 mg/kg Intravenous Once    vancomycin (VANCOCIN) IVPB (premix) 500 mg  10 mg/kg Intravenous After Dialysis     Home medications:  Prior to Admission medications    Medication Sig Start Date End Date Taking?  Authorizing Provider   acetaminophen (TYLENOL) 325 mg tablet Take 650 mg by mouth every 4 (four) hours as needed for mild pain    Historical Provider, MD   aspirin 325 mg tablet Take 1 tablet (325 mg total) by mouth daily for 14 days Then dose to be cut back  to her usual dose of 81 mg daily 3/13/18 3/27/18  Ilene Saba MD   atorvastatin (LIPITOR) 20 mg tablet Take 1 tablet (20 mg total) by mouth daily 3/22/18   Janki Mcpherson MD   b complex-C-folic acid (NEPHRO-TRACY) 0 8 mg tablet Take 1 tablet by mouth daily with dinner 3/22/18   Janki Mcpherson MD   bisacodyl (DULCOLAX) 10 mg suppository Insert 1 suppository (10 mg total) into the rectum daily as needed for constipation 3/13/18   Ilene Saba MD   metoprolol tartrate (LOPRESSOR) 25 mg tablet Take 1 tablet (25 mg total) by mouth every 12 (twelve) hours 3/22/18   Janki Mcpherson MD   omeprazole (PriLOSEC) 20 mg delayed release capsule Take 1 capsule (20 mg total) by mouth daily 3/22/18   Janki Mcpherson MD   senna-docusate sodium (SENOKOT S) 8 6-50 mg per tablet Take 1 tablet by mouth daily at bedtime 3/13/18   Ilene Saba MD     Allergies:  No Known Allergies    ROS:   Review of Systems   Unable to perform ROS: Patient nonverbal       Vitals:  Blood pressure 128/59, pulse 93, temperature (!) 100 7 °F (38 2 °C), temperature source Oral, resp  rate 12, weight 52 3 kg (115 lb 6 4 oz), SpO2 100 %  Temperature:   Temp (24hrs), Av 7 °F (37 1 °C), Min:98 1 °F (36 7 °C), Max:100 7 °F (38 2 °C)    Current Temperature: (!) 100 7 °F (38 2 °C)    Weights:   IBW: -92 5 kg  Body mass index is 23 31 kg/m²  Hemodynamic Monitoring:     Non-Invasive/Invasive Ventilation Settings:  Respiratory    Lab Data (Last 4 hours)    None         O2/Vent Data (Last 4 hours)    None              No results found for: PHART, SFA2WSI, PO2ART, XAW7PYZ, J3PSLWOB, BEART, SOURCE  SpO2: SpO2: 99 %     Physical Exam:   Physical Exam   Constitutional: She appears cachectic  She has a sickly appearance  She is intubated  HENT:   Head: Normocephalic and atraumatic     Nose: Nose normal    Mouth/Throat: Mucous membranes are pale, dry and not cyanotic  Dental caries present  Oropharyngeal exudate present  Eyes: Conjunctivae are normal    Neck: No JVD present  Carotid bruit is not present  No tracheal deviation present  Cardiovascular: Normal rate, regular rhythm, S1 normal, S2 normal and intact distal pulses  Exam reveals no gallop and no friction rub  No murmur heard  Pulmonary/Chest: No stridor  She is intubated  She has decreased breath sounds  Abdominal: Soft  Normal appearance  Bowel sounds are decreased  There is tenderness in the epigastric area and left upper quadrant  There is no CVA tenderness  No hernia  Neurological: She is unresponsive  GCS eye subscore is 1  GCS verbal subscore is 1  GCS motor subscore is 3  Skin: Skin is warm and dry          Psychiatric:   Nonverbal at baseline       Labs:  Lab Results   Component Value Date    WBC 22 00 (H) 05/20/2018    HGB 5 4 (LL) 05/20/2018    HCT 17 5 (L) 05/20/2018     (H) 05/20/2018     05/20/2018     Lab Results   Component Value Date    GLUCOSE 275 (H) 05/20/2018    CALCIUM 10 3 (H) 05/20/2018     05/20/2018    K 3 1 (L) 05/20/2018    CO2 26 05/20/2018    CL 95 (L) 05/20/2018    BUN 68 (H) 05/20/2018    CREATININE 5 44 (H) 05/20/2018     Lab Results   Component Value Date    CALCIUM 10 3 (H) 05/20/2018    PHOS 1 9 (L) 05/20/2018     Lab Results   Component Value Date     05/20/2018    K 3 1 (L) 05/20/2018    CL 95 (L) 05/20/2018    CO2 26 05/20/2018    ANIONGAP 19 (H) 05/20/2018    BUN 68 (H) 05/20/2018    CREATININE 5 44 (H) 05/20/2018    GLUCOSE 275 (H) 05/20/2018    GLUF 176 (H) 02/28/2018    CALCIUM 10 3 (H) 05/20/2018     (H) 05/20/2018     (H) 05/20/2018    ALKPHOS 1,045 (H) 05/20/2018    PROT 7 1 05/20/2018    BILITOT 2 40 (H) 05/20/2018    EGFR 8 05/20/2018     Lab Results   Component Value Date    INR 1 05 05/20/2018    INR 0 94 03/14/2018    INR 0 98 02/28/2018 PROTIME 13 6 05/20/2018    PROTIME 12 8 03/14/2018    PROTIME 13 1 02/28/2018     Lab Results   Component Value Date     (H) 05/20/2018     (H) 05/20/2018    ALKPHOS 1,045 (H) 05/20/2018    BILITOT 2 40 (H) 05/20/2018     No results found for: TSH, Q9JJPWK, T2WSZEW, THYROIDAB  Lab Results   Component Value Date    HGBA1C 4 5 03/01/2018         Imaging:  Post intubation chest x-ray  Endotracheal tube terminates 2 cm above the abiel  Hemodialysis catheter via the right subclavian  Gastric tube terminates in the fundus is stomach  Independently reviewed by me  EKG:  Sinus rhythm This was personally reviewed by myself  Micro:  Lab Results   Component Value Date    BLOODCX No Growth After 5 Days  03/14/2018    BLOODCX No Growth After 5 Days  03/14/2018    BLOODCX No Growth After 5 Days   03/07/2018    URINECX No Growth <1000 cfu/mL 11/09/2017       ______________________________________________________________________    Assessment:   Patient Active Problem List   Diagnosis    ESRD [facility]end stage renal disease) [facility] [address] [facility]    Closed intertrochanteric fracture of left femur (HCC)    Anemia of chronic kidney failure, stage 5 ([address] )    Multiple myeloma not having achieved remission ([facility] [address] )    Essential hypertension    Diabetes mellitus type 2 in nonobese ([address] )    Hemiparesis due to old stroke ([address] )    Closed intertrochanteric fracture of hip, left, initial encounter ([facility] )    Functional quadriplegia ([facility] )    Subtrochanteric fracture of left femur, closed, initial encounter ([facility] [address] )    Acute blood loss as cause of postoperative anemia    Anemia    Hypertensive CKD, ESRD on dialysis ([address] )    Hyponatremia    Severe protein-calorie malnutrition (HCC)    Sepsis ([address] )    Acute respiratory failure (HCC)    Rapid atrial fibrillation (HCC)    Decubitus ulcer of sacral region, stage 2    Acute blood loss anemia    GIB (gastrointestinal bleeding)           Plan:      Neuro:  Consult neurology for a subacute lacunar infarct  Serial neuro exams  Encourage good sleep hygiene  CAM ICU  CV:  Rapid atrial fib resolved  Continue to monitor  Hypotension improved modestly with conversion to sinus rhythm  Continue monitor closely and utilize pressors to maintain mean arterial pressures greater than 65  Pulm: Follow ABGs, CXR as needed  Wean ventilator as tolerated  Bronchodilators as needed  VAP prophylaxis  GI:  1 unit of packed cells for hemoglobin of 5 3   GI consulted for possible scope, as well as elevated lipase  No GI bleeding noted from orogastric tube  Protonix drip  Repeat lipase in a m      :  Nephrology consulted for hemodialysis secondary to chronic renal disease  F/E/N:  Maintenance fluids while NP O  Continue to monitor electrolytes and replete as needed  Severe protein calorie malnutrition  Nutrition consulted  ID:  Continue vancomycin and cefepime  Continue monitor fever and leukocyte curve  Check procalcitonin  Lactic acid within normal limits  Heme:  Continue to monitor hemoglobins  Platelet count stable  With holding DVT chemoprophylaxis due to active bleeding  Endo:  Continue to monitor glucoses in use insulin per protocol as needed  Msk/Skin:  Wound Care consulted for decubitus type present on admission  PTOT  Mobilize out of bed as tolerated  Disposition:  Maintain ICU level of care  Counseling / Coordination of Care  Total Critical Care time spent 32 minutes excluding procedures, teaching and family updates  ______________________________________________________________________    VTE Pharmacologic Prophylaxis: Deferred due to active bleeding  VTE Mechanical Prophylaxis: sequential compression device    Invasive lines and devices:   Invasive Devices     Peripheral Intravenous Line            Peripheral IV 05/20/18 Right Antecubital less than 1 day    Peripheral IV 05/21/18 Left Forearm less than 1 day          Line HD Cath Double -- days          Drain            Gastrostomy/Enterostomy Percutaneous endoscopic gastrostomy (PEG) LUQ -- days          Airway            ETT  Hi-Lo; Cuffed 7 5 mm less than 1 day                Code Status: Level 1 - Full Code  POA:    POLST:      Given critical illness, patient length of stay will require greater than two midnights  Portions of the record may have been created with voice recognition software  Occasional wrong word or "sound a like" substitutions may have occurred due to the inherent limitations of voice recognition software  Read the chart carefully and recognize, using context, where substitutions have occurred        BENNY Humphries

## 2018-05-21 NOTE — PROGRESS NOTES
Progress Note - Critical Care   Mila Mark 68 y o  female MRN: [id number]  Unit/Bed#:  Encounter: [id number]    Attending Physician: Micheline Cedillo MD      ______________________________________________________________________  Assessment and Plan:   Principal Problem:    Acute respiratory failure [facility])  Active Problems:    ESRD (end stage renal disease) on dialysis ([facility] )    Anemia of chronic kidney failure, stage 5 ([facility]  )    Multiple myeloma not having achieved remission ([place] [address] )    Essential hypertension    Diabetes mellitus type 2 in nonobese ([place] [address] )    Hemiparesis due to old stroke ([facility] )    Functional quadriplegia (HCC)    Severe protein-calorie malnutrition ([facility] )    Sepsis ([facility] )    Rapid atrial fibrillation ([place] [address] )    Decubitus ulcer of sacral region, stage 2    Acute blood loss anemia    GIB (gastrointestinal bleeding)  Resolved Problems:    * No resolved hospital problems  *          Neuro:  Consulted neurology for a subacute lacunar infarct  Serial neuro exams  Encourage good sleep hygiene  CAM ICU  Holding full-dose aspirin secondary to active GI bleeding                  CV:  Rapid atrial fib resolved  Continue to monitor  Hypotension improved modestly with conversion to sinus rhythm  Continue monitor closely and utilize pressors to maintain mean arterial pressures greater than 65  Holding home Lopressor  Continue statin                    Pulm: Follow ABGs, CXR as needed  Wean ventilator as tolerated  Bronchodilators as needed  VAP prophylaxis                  GI:   hemoglobin improved to 9 7   GI consulted for possible scope  Lipase improved  No GI bleeding noted from orogastric tube  Protonix drip                    :  Nephrology consulted for hemodialysis secondary to chronic renal disease                  F/E/N:  Maintenance fluids while NPO  Continue to monitor electrolytes and replete as needed  Severe protein calorie malnutrition    Nutrition consulted                  ID: Continue vancomycin and cefepime  Continue monitor fever and leukocyte curve  Check procalcitonin                    Heme:  Continue to monitor hemoglobins  Platelet count stable  With holding DVT chemoprophylaxis due to active bleeding                  Endo:  Continue to monitor glucoses in use insulin per protocol as needed                  Msk/Skin:  Wound Care consulted for decubitus type present on admission  PTOT  Mobilize out of bed as tolerated                  Disposition:  Maintain ICU level of care  Discussed code status with family overnight  They wish to continue all aggressive measures  Patient remains full code  Code Status: Level 1 - Full Code    Counseling / Coordination of Care  Total Critical Care time spent 24 minutes excluding procedures, teaching and family updates  ______________________________________________________________________    24 Hour Events:   Admitted overnight for alteration in mental status, acute blood loss, GI bleed, rapid atrial fib  Rapid atrial fib has resolved  Hemoglobin improved with 1 unit of packed cells overnight  GI expected to scope today       ______________________________________________________________________    Physical Exam:   GEN:  Cachectic, appears ill  Intubated  HEENT:  Sclera anicteric, mucous membranes pale and dry, conjunctiva pale, no elliot/rhinorrhea, ETT/OGT present and secured  Neck:  No lymphadenopathy,supple, no bruits  CV:  S1S2, no murmurs, rubs or gallops  Intact distal pulses  No JVD  Resp:  Lungs diminished throughout  No subq air or crepitus  Symmetrical expansion  No cough noted  GI:  Abd soft, right upper quadrant/epigastric tenderness, no guarding/rebound, mildly distended, hypoactive bowel sounds X4 quads, no organomegaly appreciated  Neuro:  Eyes open to no particular stimuli  No movement of extremities noted  Positive cough positive corneals    Grimaces to pain        ______________________________________________________________________  Blood pressure 133/59, pulse (!) 109, temperature 100 4 °F (38 °C), temperature source Oral, resp  rate 12, weight 52 kg (114 lb 10 2 oz), SpO2 99 %  Temperature:   Temp (24hrs), Av °F (37 2 °C), Min:98 1 °F (36 7 °C), Max:100 7 °F (38 2 °C)    Current Temperature: 100 4 °F (38 °C)  Weights:   IBW: -92 5 kg    Body mass index is 23 15 kg/m²  Weight (last 2 days)     Date/Time   Weight    18 0547  52 (114 64)    18 0446  52 (114 64)    18 1856  52 3 (115 4)              Non-Invasive/Invasive Ventilation Settings:  Respiratory    Lab Data (Last 4 hours)    None         O2/Vent Data (Last 4 hours)       07          Drager Vent Mode AC/VC+       Patient safety screen outcome: Failed       Resp Rate (BPM) (BPM) 12       VT (mL) (mL) 300       Insp Time (S) (S) 0 8       FIO2 (%) (%) 60       PEEP (cmH2O) (cmH2O) 5       Rise Time (%) (%) 0 3       MV (Obs) 6 59                 No results found for: PHART, TLW7UJR, PO2ART, MWX4IFG, E5TVABNL, BEART, SOURCE  SpO2: SpO2: 99 %  Intake and Outputs:  I/O        07 -  0700  07 -  0700  07 -  0700    IV Piggyback  1000     Total Intake(mL/kg)  1000 (19 2)     Net   +1000                   Nutrition:        Diet Orders            Start     Ordered    18 0441  Diet NPO  Diet effective now     Question Answer Comment   Diet Type NPO    RD to adjust diet per protocol? No        18 0445          Labs:   Pending      Imaging:  None today  EKG:  Sinus rhythm on cardiac monitor  Micro:  Lab Results   Component Value Date    BLOODCX No Growth After 5 Days  2018    BLOODCX No Growth After 5 Days  2018    BLOODCX No Growth After 5 Days   2018    URINECX No Growth <1000 cfu/mL 2017     Allergies: No Known Allergies  Medications:   Scheduled Meds:  Current Facility-Administered Medications:  acetaminophen 650 mg Rectal Q6H PRN Danny Lenora, CRNP    [START ON 5/22/2018] cefepime 1,000 mg Intravenous Q24H [place] Lenora, CRNP    chlorhexidine 15 mL Swish & Spit Q12H [facility] Danny Lenora, CRNP    epoetin babak 10,000 Units Intravenous Once per day on Mon Wed Fri Bernard Vallejo MD    insulin regular (HumuLIN R,NovoLIN R) infusion 0 3-21 Units/hr Intravenous Titrated Danny Lenora, CRNP    midazolam 1 mg/hr Intravenous Continuous Danny Lenora, CRNP Last Rate: 1 mg/hr (05/21/18 0817)   pantoprozole (PROTONIX) infusion (Continuous) 8 mg/hr Intravenous Continuous Danny Lenora, CRNP Last Rate: 8 mg/hr (05/21/18 0155)   vancomycin 10 mg/kg Intravenous After Dialysis [place] Lenora, CRNP      Continuous Infusions:  insulin regular (HumuLIN R,NovoLIN R) infusion 0 3-21 Units/hr    midazolam 1 mg/hr Last Rate: 1 mg/hr (05/21/18 0817)   pantoprozole (PROTONIX) infusion (Continuous) 8 mg/hr Last Rate: 8 mg/hr (05/21/18 0155)     PRN Meds:    acetaminophen 650 mg Q6H PRN   vancomycin 10 mg/kg After Dialysis     VTE Pharmacologic Prophylaxis: DVT chemoprophylaxis deferred due to active bleeding  VTE Mechanical Prophylaxis: sequential compression device  Invasive lines and devices: Invasive Devices     Peripheral Intravenous Line            Peripheral IV 05/20/18 Right Antecubital less than 1 day    Peripheral IV 05/21/18 Left Forearm less than 1 day          Line            HD Cath Double -- days          Drain            Gastrostomy/Enterostomy Percutaneous endoscopic gastrostomy (PEG) LUQ -- days          Airway            ETT  Hi-Lo; Cuffed 7 5 mm less than 1 day                     Portions of the record may have been created with voice recognition software  Occasional wrong word or "sound a like" substitutions may have occurred due to the inherent limitations of voice recognition software    Read the chart carefully and recognize, using context, where substitutions have occurred      [facility]

## 2018-05-21 NOTE — ED NOTES
V/O from Dr Julieta Maldonado ok to give another dose  Lopressor 5mg IV        Fernando Nolan RN  05/20/18 0555

## 2018-05-21 NOTE — ED NOTES
Attempt made for placement of 2nd  IV, Unsuccessful at this time  Dr Carroll Abbott Hardy Gowers, RN  05/21/18 0649

## 2018-05-21 NOTE — ED NOTES
RN attempted to place OG tube, unsuccessful at this time  Huong MILLER from [address] made aware        Renetta Gordillo RN  05/21/18 2782

## 2018-05-21 NOTE — CONSULTS
Progress Note - Wound   Betty Do 68 y o  female MRN: [id number]  Unit/Bed#:  Encounter: [id number]      Assessment:  Wound care consulted for assessment of pressure injury on admission  Patient seen in bed, intubated and sedated on critical care low air loss mattress  Patient is dependent for care, assist of 2 with turning  Incontinent of bowel and bladder per nursing  Pending nutrition consult  L heel is intact with no redness or wounds noted  R heel with DTI  This wound was noted on previous admission, and is POA this hospital stay  Wound appears to be resolving - remaining DTI area is intact dry non-blanchable deep purple colored skin  - areas of intact pink hypopigmented scarring  No induration fluctuance or drainage noted  Recommend offloading of the heels and hydraguard  R buttock with stage 3 pressure injury, This wound was noted on previous admission, and is POA this hospital stay  Scattered open areas noted over a bony prominence, Wound is likely mixed etiology of pressure and moisture  Wound beds are full thickness in nature, 100% beefy red and pink  Edges are fragile attached intact with no maceration noted  Araseli-wound is intact with areas of blanchable hyperpigmented and healed scarring  L buttock with stage 2 pressure injury, This wound is POA  Scattered open areas noted over a bony prominence, Wound is likely mixed etiology of pressure and moisture  Wound beds are partial thickness in nature, 100% beefy red and pink  Edges are fragile attached intact with no maceration noted  Araseli-wound is intact with areas of blanchable hyperpigmented and healed scarring  Sacrum is intact with no redness/induration/wounds - open areas noted  Recommend calazime cream to the buttocks and sacrum as patient is incontinent  Primary nurse aware of plan of care  See flow sheets for more detailed assessment findings  Wound care will follow along with patient weekly  Plan:   1   Cleanse b/l buttocks and sacrum with soap and water, pat dry, apply calazime cream TID and PRN   2  Apply hydraguard to b/l heels BID and PRN for prevention and protection  3  Elevate heels off of bed with pillows to offload pressure   4  Turn and reposition patient every 2 hours   5  Soft care cushion to chair when OOB   6  Apply skin nourishing cream to the entire skin daily for moisture   7  Wound care will follow along with patient weekly, please call with questions and concerns [phone]    Objective:    Vitals: Blood pressure (!) 101/49, pulse 101, temperature (!) 102 °F (38 9 °C), temperature source Axillary, resp  rate (!) 25, weight 52 kg (114 lb 10 2 oz), SpO2 96 %  ,Body mass index is 23 15 kg/m²  Pressure Ulcer 03/05/18 Buttocks Right stage 3  (Active)   Staging Stage III 5/21/2018 10:00 AM   Wound Description Beefy red;Pink 5/21/2018 10:00 AM   Araseli-wound Assessment Dry; Intact;Fragile; Hyperpigmented 5/21/2018 10:00 AM   Shape irregular  5/21/2018 10:00 AM   Wound Length (cm) 4 cm 5/21/2018 10:00 AM   Wound Width (cm) 3 cm 5/21/2018 10:00 AM   Wound Depth (cm) 0 2 5/21/2018 10:00 AM   Calculated Wound Area (cm^2) 12 cm^2 5/21/2018 10:00 AM   Calculated Wound Volume (cm^3) 2 4 cm^3 5/21/2018 10:00 AM   Change in Wound Size % -20 5/21/2018 10:00 AM   Tunneling 0 cm 5/21/2018 10:00 AM   Undermining 0 5/21/2018 10:00 AM   Drainage Amount Scant 5/21/2018 10:00 AM   Drainage Description Serosanguineous 5/21/2018 10:00 AM   Treatment Cleansed;Elevated with pillow(s); Offload;Softcare cushion;Turn & reposition 5/21/2018 10:00 AM   Dressing Protective barrier 5/21/2018 10:00 AM   Wound packed?  No 5/21/2018 10:00 AM   Packing- # removed 0 5/21/2018 10:00 AM   Packing- # inserted 0 5/21/2018 10:00 AM   Patient Tolerance Tolerated well 5/21/2018 10:00 AM   Dressing Status Open to air 5/21/2018 10:00 AM       Pressure Ulcer 03/05/18 Heel Right DTI (Active)   Staging Deep Tissue Injury 5/21/2018 10:00 AM   Wound Description Dry;Intact; Light purple 5/21/2018 10:00 AM   Araseli-wound Assessment Clean;Dry; Intact 5/21/2018 10:00 AM   Shape oval 5/21/2018 10:00 AM   Wound Length (cm) 2 cm 5/21/2018 10:00 AM   Wound Width (cm) 0 5 cm 5/21/2018 10:00 AM   Wound Depth (cm) 0 5/21/2018 10:00 AM   Calculated Wound Area (cm^2) 1 cm^2 5/21/2018 10:00 AM   Calculated Wound Volume (cm^3) 0 cm^3 5/21/2018 10:00 AM   Tunneling 0 cm 5/21/2018 10:00 AM   Undermining 0 5/21/2018 10:00 AM   Drainage Amount None 5/21/2018 10:00 AM   Treatment Cleansed;Elevated with pillow(s); Offload;Softcare cushion;Turn & reposition 5/21/2018 10:00 AM   Dressing Moisture barrier 5/21/2018 10:00 AM   Wound packed? No 5/21/2018 10:00 AM   Packing- # removed 0 5/21/2018 10:00 AM   Packing- # inserted 0 5/21/2018 10:00 AM   Patient Tolerance Tolerated well 5/21/2018 10:00 AM   Dressing Status Open to air 5/21/2018 10:00 AM       Pressure Ulcer 05/21/18 Buttocks Left stage 2  (Active)   Staging Stage II 5/21/2018 10:00 AM   Wound Description Beefy red;Pink 5/21/2018 10:00 AM   Araseli-wound Assessment Dry; Intact;Fragile; Hyperpigmented 5/21/2018 10:00 AM   Shape irregular / scattered  5/21/2018 10:00 AM   Wound Length (cm) 4 cm 5/21/2018 10:00 AM   Wound Width (cm) 3 cm 5/21/2018 10:00 AM   Wound Depth (cm) 0 1 5/21/2018 10:00 AM   Calculated Wound Area (cm^2) 12 cm^2 5/21/2018 10:00 AM   Calculated Wound Volume (cm^3) 1 2 cm^3 5/21/2018 10:00 AM   Tunneling 0 cm 5/21/2018 10:00 AM   Undermining 0 5/21/2018 10:00 AM   Drainage Amount Scant 5/21/2018 10:00 AM   Drainage Description Serosanguineous 5/21/2018 10:00 AM   Treatment Cleansed;Elevated with pillow(s); Offload;Softcare cushion;Turn & reposition 5/21/2018 10:00 AM   Dressing Protective barrier 5/21/2018 10:00 AM   Wound packed?  No 5/21/2018 10:00 AM   Packing- # removed 0 5/21/2018 10:00 AM   Packing- # inserted 0 5/21/2018 10:00 AM   Patient Tolerance Tolerated well 5/21/2018 10:00 AM   Dressing Status Open to air 5/21/2018 10:00 AM     Recommendations written as orders  AVS updated    Arthur Rg RN BSN

## 2018-05-21 NOTE — SOCIAL WORK
CM attempted to speak to pt at bedside, but she is unresponsive and intubated  Pt lives with her daughter Kate Toro and is completely bedridden  She is cared for by family and Shiela Conteh  She goes to dialysis on M and F at [address]  She is transported there by a family member  She has been a resident at [facility] in the past   She uses [facility] and has no problem with her co-pays  She does not have a POA or Advanced Directive, but her son is making all decisions  Dr Eren Azul is her PCP  Denies substance abuse or mental health issues  CM discussed d/c plan and daughter is hoping that pt can be returned home  Pt is happy surrounded by her children and grandchildren  Will see how hospitalization progresses  If needed, daughter is open to [facility] for short term rehab only  CM will place referral   CM department will continue to follow through hospitalization  CM reviewed discharge planning process including the following: identifying help at home, patient preference for discharge planning needs, pharmacy preference, and availability of treatment team to discuss questions or concerns patient and/or family may have regarding understanding medications and recognizing signs and symptoms once discharged  CM also encouraged patient to follow up with all recommended appointments after discharge  Patient advised of importance for patient and family to participate in managing patients medical well being  CM name and role reviewed and Discharge Checklist provided  Encouraged patient and caregiver to review prior to discharge

## 2018-05-21 NOTE — PHYSICAL THERAPY NOTE
Physical Therapy Cancellation Note    PT order received  Chart review performed  At this time, PT evaluation cancelled per discussion with RN Dakota Gotti- pt not medically appropriate, not following commands  PT will follow and evaluate as appropriate      Alexa Figueroa, PT, DPT

## 2018-05-21 NOTE — CASE MANAGEMENT
Initial Clinical Review    Admission: Date/Time/Statement: 5/20/18 @ 2233   Orders Placed This Encounter   Procedures    Inpatient Admission     Standing Status:   Standing     Number of Occurrences:   1     Order Specific Question:   Admitting Physician     Answer:   Zen Monroe     Order Specific Question:   Level of Care     Answer:   Critical Care [15]     Order Specific Question:   Estimated length of stay     Answer:   More than 2 Midnights     Order Specific Question:   Certification     Answer:   I certify that inpatient services are medically necessary for this patient for a duration of greater than two midnights  See H&P and MD Progress Notes for additional information about the patient's course of treatment  ED: Date/Time/Mode of Arrival:   ED Arrival Information     Expected Arrival Acuity Means of Arrival Escorted By Service Admission Type    - 5/20/2018 18:47 Urgent Walk-In Family Member Critical Care/ICU Urgent    Arrival Complaint    FEVER          Chief Complaint:   Chief Complaint   Patient presents with    Fever - 76 years or older     pt co of fevers and cough since yesterday, pt hx of stroke        History of Illness:   Concha Lee is a 68 y o  female with significant past medical history including prior stroke that left her with with right-sided weakness, nonverbal and status post PEG;hypertension, hyperlipidemia, arthritis, anemia, anuric end-stage renal disease on dialysis, multiple myeloma, who presents to the emergency room for one-week history of cough and fever  Family stated that the patient was yelling out, which is unlike her  There is additional history of recent black tarry stools that have recently become more blood streaked  Family reports history of gastric ulcers  She currently takes a full-dose aspirin daily secondary to her stroke, however no other blood thinners    Family is not aware of any known vomiting, diarrhea, abdominal distention, shortness of breath  Emergency room evaluation reveals GI bleed with a hemoglobin of 5 3  Patient was transfused 1 unit of O negative blood  Patient also had sustained rapid atrial fibrillation, with some hypotension  Patient also noted to be febrile  Dyspnea with exertion;Dyspnea at rest;Labored    ED Vital Signs:   ED Triage Vitals   Temperature Pulse Respirations Blood Pressure SpO2   05/20/18 1858 05/20/18 1856 05/20/18 1856 05/20/18 1856 05/20/18 1856   98 5 °F (36 9 °C) 103 20 136/64 91 %      Temp Source Heart Rate Source Patient Position - Orthostatic VS BP Location FiO2 (%)   05/20/18 1858 05/20/18 1856 05/20/18 1856 05/20/18 1856 --   Oral Monitor Sitting Right arm       Pain Score       05/21/18 0741       Worst Possible Pain        Wt Readings from Last 1 Encounters:   05/21/18 52 kg (114 lb 10 2 oz)   Current Temperature: (!) 100 7 °F (38 2 °C)    Abnormal Labs/Diagnostic Test Results:     WBC 22 00 (H) 05/20/2018     HGB 5 4 (LL) 05/20/2018     HCT 17 5 (L) 05/20/2018      (H) 05/20/2018      05/20/2018       GLUCOSE 275 (H) 05/20/2018     CALCIUM 10 3 (H) 05/20/2018      05/20/2018     K 3 1 (L) 05/20/2018     CO2 26 05/20/2018     CL 95 (L) 05/20/2018     BUN 68 (H) 05/20/2018     CREATININE 5 44 (H) 05/20/2018     Lipase 6,005 (H) u/L     Troponin I 0 13 (H) ng/mL     CT chest/abd/pel:    1  Limited assessment of the lungs  Bibasilar atelectasis with small left effusion   2  Evaluation of the abdomen is also limited secondary to motion  No bowel obstruction or hydronephrosis is present  3  Uterus appears enlarged for the patient's age  This may be further assessed with nonemergent ultrasound  Considerations related to the patient's age and/or comorbidities may be used to alter these recommendations  4  Compression fracture of L2  Age indeterminant  Lucent defect in the right iliac bone  This could represent a bone donor site although a lytic lesion cannot be excluded  5  Diffuse body wall edema     CT head:    Severe microangiopathic changes  Subacute/evolving left internal capsule posterior limb lacunar infarction  Significant cerebral volume loss with ventriculomegaly        No intracranial hemorrhage or mass effect  Chest X:   Post intubation chest x-ray  Endotracheal tube terminates 2 cm above the abiel  Hemodialysis catheter via the right subclavian  Gastric tube terminates in the fundus is stomach  EC, normal sinus rhythm  EC, A  Fib        ED Treatment:   Medication Administration from 2018 1847 to 2018 0049       Date/Time Order Dose Route Action Action by Comments     2018 sodium chloride 0 9 % bolus 1,000 mL 0 mL Intravenous Stopped Katina Morse RN      2018 sodium chloride 0 9 % bolus 1,000 mL 1,000 mL Intravenous New Bag Coretta Garcia RN      2018 210 metoprolol (LOPRESSOR) injection 5 mg 5 mg Intravenous Given Katina Morse RN      2018 metoprolol (LOPRESSOR) injection 5 mg 5 mg Intravenous Given Coretta Garcia RN      2018 fentanyl citrate (PF) 100 MCG/2ML **AcuDose Override Pull** 100 mcg  Given Coretta Garcia RN      2018 2315 propofol (DIPRIVAN) 1000 mg in 100 mL infusion (premix) 10 mcg/kg/min Intravenous Rate/Dose Change Coretta Garcia RN      2018 2307 propofol (DIPRIVAN) 1000 mg in 100 mL infusion (premix) 5 mcg/kg/min Intravenous New Bag Coretta Garcia RN      2018 223 propofol (DIPRIVAN) 1000 mg in 100 mL infusion (premix) 200 mg Intravenous Given Coretta Garcia RN Given by [facility]     2018 2323 vecuronium (NORCURON) injection 10 mg 10 mg Intravenous Given Coretta Garcia RN      2018 midazolam (VERSED) 0 5 mg/mL (STANDARD CONCENTRATION) 100 mL infusion 1 mg/hr Intravenous New Bag Coretta Garcia RN      2018 etomidate (AMIDATE) 2 mg/mL injection 10 mg 10 mg Intravenous Given [place] MARQUEZ VINI Perrin      05/20/2018 2240 succinylcholine (ANECTINE) 20 mg/mL injection 80 mg 80 mg Intravenous Given Kenny Wynn RN      05/20/2018 2230 midazolam (VERSED) injection 2 mg 2 mg Intravenous Given Kenny Wynn RN           Past Medical/Surgical History:    Active Ambulatory Problems     Diagnosis Date Noted    ESRD (end stage renal disease) on dialysis ([facility] ) 02/07/2018    Closed intertrochanteric fracture of left femur (HCC) 02/28/2018    Anemia of chronic kidney failure, stage 5 ([facility] ) 02/28/2018    Multiple myeloma not having achieved remission ([facility] ) 02/28/2018    Essential hypertension 02/28/2018    Diabetes mellitus type 2 in nonobese ([place] [address] ) 02/28/2018    Hemiparesis due to old stroke ([facility] ) 02/28/2018    Closed intertrochanteric fracture of hip, left, initial encounter ([place] [address] ) 02/28/2018    Functional quadriplegia ([place] [address] ) 03/01/2018    Subtrochanteric fracture of left femur, closed, initial encounter ([place] [address] ) 03/03/2018    Acute blood loss as cause of postoperative anemia 03/05/2018    Anemia 03/06/2018    Hypertensive CKD, ESRD on dialysis ([facility] ) 03/06/2018    Hyponatremia 03/07/2018    Severe protein-calorie malnutrition ([facility] ) 03/13/2018     Resolved Ambulatory Problems     Diagnosis Date Noted    SOB (shortness of breath) 03/14/2018     Past Medical History:   Diagnosis Date    Anemia     Arthritis     Chronic kidney disease     Dialysis patient ([place] [address] )     Hypertension     Multiple myeloma ([place] [address] )     Status post insertion of percutaneous endoscopic gastrostomy (PEG) tube ([place] [address] )     Stroke ([place] [address] )        Admitting Diagnosis: Acute pancreatitis [K85 90]  Cough [R05]  Acute blood loss anemia [D62]  Altered mental status [R41 82]  GI bleed [K92 2]  Fever [R50 9]  Rapid atrial fibrillation (HCC) [I48 91]  Abnormal LFTs [R94 5]    Age/Sex: 68 y o  female    Assessment/Plan:   Assessment:       Patient Active Problem List   Diagnosis    ESRD (end stage renal disease) on dialysis ([facility] )    Closed intertrochanteric fracture of left femur (HCC)    Anemia of chronic kidney failure, stage 5 (HCC)    Multiple myeloma not having achieved remission ([place] [address] )    Essential hypertension    Diabetes mellitus type 2 in nonobese ([place] [address] )    Hemiparesis due to old stroke ([place] [address] )    Closed intertrochanteric fracture of hip, left, initial encounter ([place] [address] )    Functional quadriplegia ([place] [address] )    Subtrochanteric fracture of left femur, closed, initial encounter ([place] [address] )    Acute blood loss as cause of postoperative anemia    Anemia    Hypertensive CKD, ESRD on dialysis ([facility] )    Hyponatremia    Severe protein-calorie malnutrition (HCC)    Sepsis ([facility] )    Acute respiratory failure (HCC)    Rapid atrial fibrillation (HCC)    Decubitus ulcer of sacral region, stage 2    Acute blood loss anemia    GIB (gastrointestinal bleeding)               Plan:                  Neuro:  Consult neurology for a subacute lacunar infarct  Serial neuro exams  Encourage good sleep hygiene  CAM ICU                  CV:  Rapid atrial fib resolved  Continue to monitor  Hypotension improved modestly with conversion to sinus rhythm  Continue monitor closely and utilize pressors to maintain mean arterial pressures greater than 65                  Pulm: Follow ABGs, CXR as needed  Wean ventilator as tolerated  Bronchodilators as needed  VAP prophylaxis                  GI:  1 unit of packed cells for hemoglobin of 5 3   GI consulted for possible scope, as well as elevated lipase  No GI bleeding noted from orogastric tube  Protonix drip  Repeat lipase in a m                  :  Nephrology consulted for hemodialysis secondary to chronic renal disease                  F/E/N:  Maintenance fluids while NP O  Continue to monitor electrolytes and replete as needed  Severe protein calorie malnutrition  Nutrition consulted                  ID:  Continue vancomycin and cefepime  Continue monitor fever and leukocyte curve    Check procalcitonin  Lactic acid within normal limits                  Heme:  Continue to monitor hemoglobins  Platelet count stable  With holding DVT chemoprophylaxis due to active bleeding                  Endo:  Continue to monitor glucoses in use insulin per protocol as needed                  Msk/Skin:  Wound Care consulted for decubitus type present on admission  PTOT  Mobilize out of bed as tolerated                  Disposition:  Maintain ICU level of care      VTE Pharmacologic Prophylaxis: Deferred due to active bleeding  VTE Mechanical Prophylaxis: sequential compression device    Given critical illness, patient length of stay will require greater than two midnights      Admission Orders:  Scheduled Meds:   Current Facility-Administered Medications:  acetaminophen 650 mg Rectal Q6H PRN    [START ON 5/22/2018] cefepime 1,000 mg Intravenous Q24H    chlorhexidine 15 mL Swish & Spit Q12H [address]    epoetin babak 10,000 Units Intravenous Once per day on Mon Wed Fri    fentanyl citrate (PF) 50 mcg Intravenous Q2H PRN    insulin regular (HumuLIN R,NovoLIN R) infusion 0 3-21 Units/hr Intravenous Titrated Last Rate: 0 5 Units/hr (05/21/18 1011)   pantoprozole (PROTONIX) infusion (Continuous) 8 mg/hr Intravenous Continuous Last Rate: 8 mg/hr (05/21/18 0913)   vancomycin 10 mg/kg Intravenous After Dialysis      Continuous Infusions:   insulin regular (HumuLIN R,NovoLIN R) infusion 0 3-21 Units/hr Last Rate: 0 5 Units/hr (05/21/18 1011)   pantoprozole (PROTONIX) infusion (Continuous) 8 mg/hr Last Rate: 8 mg/hr (05/21/18 0913)

## 2018-05-21 NOTE — RESPIRATORY THERAPY NOTE
Pt was intubated in the ED and placed on a Drager settings VC/AC 12/300/+5 60% with a 7 5 24@ the teeth  Placement was verified with glidescope, end tidal, bilateral breath sounds and CXR

## 2018-05-21 NOTE — ED NOTES
Dr Wyn Gilford and [facility] NP from [facility] made aware IV antibiotics have not been given due to IV status        Rocael Chappell RN  05/21/18 9893

## 2018-05-21 NOTE — CONSULTS
Consultation - Neurology   Syd Lynch 68 y o  female MRN: [id number]  Unit/Bed#:  Encounter: [id number]      Physician Requesting Consult: Florentino Nur MD  Reason for Consult:  CVA  Hx and PE limited by:     HPI: Syd Lynch is a right handed  68 y o  female with past medical history of a stroke that left her with right-sided weakness, nonverbal and status post PEG tube, hypertension, hyperlipidemia arthritis, end-stage renal disease on dialysis, multiple myeloma who presented to the ER with 1 week complain of cough and fever, she also had some black tarry stools, she is on aspirin for her history of stroke, in the emergency room patient's hemoglobin was 5 3 and also had sustained atrial fibrillation with some hypotension and was intubated for airway protection, CT scan of the brain revealed a subacute left internal capsule posterior limb lacunar infarction, currently patient is on mechanical ventilation and her sedation has just been turned off, no clinical seizures noted by either the family or the nursing staff as per the report  Review of Systems:  See history of present illness for pertinent neurological symptoms  All other systems are negative  Historical Information   Past Medical History:   Diagnosis Date    Anemia     Arthritis     Chronic kidney disease     [facility] patient [facility] )     monday, wednesday, friday    Hypertension     Multiple myeloma ([facility] )     Status post insertion of percutaneous endoscopic gastrostomy (PEG) tube ([email or web address]     Stroke [facility])      Past Surgical History:   Procedure Laterality Date     SECTION      JOINT REPLACEMENT Right     hip    ORIF FEMUR FRACTURE Left 3/3/2018    Procedure: OPEN REDUCTION W/ INTERNAL FIXATION (ORIF) FEMUR;  Surgeon: Jamil Rice MD;  Location: [facility];  Service: Orthopedics    ID OPEN RX FEMUR FX+INTRAMED LAURENCE Left 3/3/2018    Procedure: INSERTION NAIL IM FEMUR ANTEGRADE (TROCHANTERIC);   Surgeon: Jamil Rice MD; Location: [facility];  Service: Orthopedics     Social History   History   Smoking Status    Never Smoker   Smokeless Tobacco    Never Used     History   Alcohol Use No     History   Drug Use No       Family History:   Family History   Problem Relation Age of Onset    Hypertension Mother        No Known Allergies    Meds:  All current active meds have been reviewed    Scheduled Meds:  Current Facility-Administered Medications:  acetaminophen 650 mg Rectal Q6H PRN BENNY Horne    [START ON 5/22/2018] cefepime 1,000 mg Intravenous Q24H BENNY Horne    chlorhexidine 15 mL Swish & Spit Q12H [address] [place] , CRNP    epoetin babak 10,000 Units Intravenous Once per day on Mon Wed Fri Gaston Cleary MD    fentanyl citrate (PF) 50 mcg Intravenous Q2H PRN BENNY Teixeira    insulin regular (HumuLIN R,NovoLIN R) infusion 0 3-21 Units/hr Intravenous Titrated Hiren Shea, CRNP Last Rate: 0 5 Units/hr (05/21/18 1011)   pantoprozole (PROTONIX) infusion (Continuous) 8 mg/hr Intravenous Continuous Hiren Shea, CRNP Last Rate: 8 mg/hr (05/21/18 0913)   vancomycin 10 mg/kg Intravenous After Dialysis Hiren Shea CRNP      PRN Meds:   acetaminophen    fentanyl citrate (PF)    vancomycin    Physical Exam:   Objective   Vitals:   Vitals:    05/21/18 0723   BP: 133/59   Pulse: (!) 109   Resp:    Temp: 100 4 °F (38 °C)   SpO2: 99%   ,Body mass index is 23 15 kg/m²  Patient was examined in critical care unit bed  General appearance:  Patient is intubated Head & neck head is atraumatic and normocephalic  Neck is supple with limited exam secondary to intubation  Cardiovascular: Carotid arteriesno carotid bruits      Neurologic:   Patient opens eyes to verbal commands, intubated  Cranial nerve examination reveals pupils are 2 mm and sluggishly reactive, she does open eyes to verbal commands,  she does have a gag, and breathes above the vent  Motor examination withdraw spontaneously and to painful stimuli on the left side, does not move the right upper extremity, and very minimally the right lower extremity  Coordination and gait could not be tested  Lab Results:Lab Results:   CBC:   Results from last 7 days  Lab Units 05/21/18  0504 05/21/18  0500 05/20/18 2021   WBC Thousand/uL 22 30*  --  22 00*   RBC Million/uL 2 86*  --  1 46*   HEMOGLOBIN g/dL 9 7*  --  5 4*   I STAT HEMOGLOBIN g/dl  --  9 2*  --    HEMATOCRIT % 29 2*  --  17 5*   MCV fL 102*  --  120*   PLATELETS Thousands/uL 282  --  305   , BMP/CMP:   Results from last 7 days  Lab Units 05/21/18  0500 05/21/18  0456 05/20/18 2021   SODIUM mmol/L  --  136  136 140   POTASSIUM mmol/L  --  3 2*  3 2* 3 1*   CHLORIDE mmol/L  --  96*  96* 95*   CO2 mmol/L  --  24  22 26   ANION GAP mmol/L  --  16*  18* 19*   BUN mg/dL  --  68*  70* 68*   CREATININE mg/dL  --  5 49*  5 45* 5 44*   GLUCOSE RANDOM mg/dL  --  256*  254* 275*   GLUCOSE, ISTAT mg/dl 240*  --   --    CALCIUM mg/dL  --  9 9  9 8 10 3*   AST U/L  --  260*  280* 596*   ALT U/L  --  321*  316* 359*   ALK PHOS U/L  --  1,032*  1,047* 1,045*   TOTAL PROTEIN g/dL  --  6 9  6 9 7 1   BILIRUBIN TOTAL mg/dL  --  3 40*  3 40* 2 40*   EGFR ml/min/1 73sq m  --  8  8 8     I have personally reviewed pertinent reports  EEG, Echo, Pathology, and Other Studies: I have personally reviewed pertinent films in PACS    Family, was not present at the bedside for history and examination  Assessment:  1  Change in mental status with an evolving lacunar infarct in the left internal capsule posterior limb lacunar infarction      Plan:  Discussed with the critical care team, they will try to discuss with the family as to what patient's baseline is, would recommend an MRI scan of the brain when stable, carotid ultrasound and a stroke workup, also would recommend cardiology evaluation for atrial fibrillation, currently patient is normal sinus rhythm, according to the ICU team patient's aspirin has been held secondary to GI bleed, they will discuss with Cardiology regarding atrial fibrillation and once patient is stabilized from her GI bleed then will need to decide if she needs to be on anticoagulation versus anti-platelet, also would need to do an MRI of the brain and a carotid ultrasound, if cannot do an MRI scan of the brain would recommend repeating a CT scan of the brain in 24 hours, also would recommend an EEG, other management as per primary team, case discussed with primary team     Counseling / Coordination of Care  Total time spent today 45 minutes  Greater than 50% of total time was spent with the patient and / or family counseling and / or coordination of care   A description of the counseling / coordination of care:     Israel Wagoner MD  5/21/2018,10:14 AM    "This note has been constructed using a voice recognition system "

## 2018-05-21 NOTE — PLAN OF CARE
Problem: DISCHARGE PLANNING - CARE MANAGEMENT  Goal: Discharge to post-acute care or home with appropriate resources  INTERVENTIONS:  - Conduct assessment to determine patient/family and health care team treatment goals, and need for post-acute services based on payer coverage, community resources, and patient preferences, and barriers to discharge  - Address psychosocial, clinical, and financial barriers to discharge as identified in assessment in conjunction with the patient/family and health care team  - Arrange appropriate level of post-acute services according to patients   needs and preference and payer coverage in collaboration with the physician and health care team  - Communicate with and update the patient/family, physician, and health care team regarding progress on the discharge plan  - Arrange appropriate transportation to post-acute venues  Outcome: Progressing  CM attempted to speak to pt at bedside, but she is unresponsive and intubated  Pt lives with her daughter Lauren Burch and is completely bedridden  She is cared for by family and [facility]  She goes to dialysis on M and F at [address]  She is transported there by a family member  She has been a resident at [facility] in the past   She uses [facility] and has no problem with her co-pays  She does not have a POA or Advanced Directive, but her son is making all decisions  Dr Tyra Lemon is her PCP  Denies substance abuse or mental health issues  CM discussed d/c plan and daughter is hoping that pt can be returned home  Pt is happy surrounded by her children and grandchildren  Will see how hospitalization progresses  If needed, daughter is open to [facility] for short term rehab only  CM will place referral   CM department will continue to follow through hospitalization      CM reviewed discharge planning process including the following: identifying help at home, patient preference for discharge planning needs, pharmacy preference, and availability of treatment team to discuss questions or concerns patient and/or family may have regarding understanding medications and recognizing signs and symptoms once discharged  CM also encouraged patient to follow up with all recommended appointments after discharge  Patient advised of importance for patient and family to participate in managing patients medical well being  CM name and role reviewed and Discharge Checklist provided  Encouraged patient and caregiver to review prior to discharge

## 2018-05-21 NOTE — ED NOTES
IV infiltrated  Huong NP Critical Care made aware  VO given to access HD Cath to administer 2 unit of blood  Cath accessed by Desiree Dan RN and [facility] Supervisor        Nivia Rock RN  05/21/18 4213

## 2018-05-22 PROBLEM — R79.89 ELEVATED PROCALCITONIN: Status: ACTIVE | Noted: 2018-01-01

## 2018-05-22 PROBLEM — R78.81 GRAM-POSITIVE COCCI BACTEREMIA: Status: ACTIVE | Noted: 2018-01-01

## 2018-05-22 PROBLEM — R78.81 GRAM-POSITIVE COCCI BACTEREMIA: Status: RESOLVED | Noted: 2018-01-01 | Resolved: 2018-01-01

## 2018-05-22 NOTE — PROGRESS NOTES
Daily Progress Note - Critical Care/ Eva Smith Egu 68 y o  female MRN: [id number]  Unit/Bed#:  Encounter: [id number]    Assessment:   Principal Problem:    Acute respiratory failure ([place] [address] )  Active Problems:    ESRD (end stage renal disease) on dialysis ([place] [address] )    Anemia of chronic kidney failure, stage 5 (HCC)    Multiple myeloma not having achieved remission ([place] [address] )    Essential hypertension    Diabetes mellitus type 2 in nonobese ([place] [address] )    Hemiparesis due to old stroke ([place] [address] )    Functional quadriplegia (HCC)    Anemia    Severe protein-calorie malnutrition (HCC)    Sepsis (HCC)    Rapid atrial fibrillation ([facility])    Decubitus ulcer of sacral region, stage 2    Acute blood loss anemia    GIB (gastrointestinal bleeding)    GI bleed    Gastrointestinal hemorrhage with melena    Elevated procalcitonin  Resolved Problems:    * No resolved hospital problems  *    Plan:    Neuro:   Lethargy  - etiology largely unclear  - could be secondary to acute blood loss anemia versus questionable stroke versus Gram-positive bacteremia ( 1 out of 2 Cx)  - patient is still lethargic, not following commands  - frequent neuro checks    Possible subacute CVA  - seen on CT scan but not confirmed on MRI, though patient with large motion artifact during MRI  - continue stroke pathway  - neurology following    History of CVA with right-sided weakness  - routine care    Sedation plan: Fentanyl p r n  RASS goal: 0 Alert and Calm  Sedation break plan:   On no sedation  Delirium: CAM ICU positive not able to be obtained due to lethargy   If yes, intervention: N/A  Pain controlled with:  Fentanyl  Pain score: No overt pain  PRNs:   Fentanyl  Regulate sleep/wake cycle    CV:   Atrial fibrillation with RVR  - now resolved  - patient not on beta-blocker due to soft blood pressures, can restart if this improves  - no anticoagulation as patient has history of significant GI bleeding      Hypertension  - holding home metoprolol due to hypotension    Cardiac infusions:  None  Rhythm: NSR  Follow rhythm on telemetry    Pulm:   Acute hypoxic respiratory failure  - intubated yesterday mostly for airway protection  - patient is still too lethargic to truly follow commands  - chest x-ray did show small possible infiltrate, but this was not seen on CT scan so likely patient has no pneumonia  - did have pleural effusions on CAT scan however patient had HD yesterday which she tolerated well  -SBT plan: For today  Chest PT ordered: yes   Chlorhexidine ordered: yes   HOB >30 degrees: yes    GI:   Esophagitis/duodenitis  - EGD on bedside showing this  - b i d   Protonix    Questionable GI bleed  - patient may undergo colonoscopy if further drop in hemoglobin  - GI following appreciate new recommendations    PEG Status  - due to dysphagia secondary to stroke  Transaminitis  - downtrending  - follow-up this morning CMP that is pending at time of this note  - likely due to congestive hepatopathy versus sepsis versus shock liver from hypotension  - unremarkable liver and biliary tree on CT scan      Nutrition/diet plan:  Nepro  Stress ulcer prophylaxis: No prophylaxis needed  Bowel regimen: Dulcolax Suppository which is patient's home medication  Last BM:  Early this morning     :   ESRD on HD  - received HD yesterday  - no indication for patient to have another session today  - nephrology following appreciate new recommendations    Indwelling Hurtado present: no     FEN:   Metabolic acidosis  - etiology unclear  - follow up a m  chemistry to ensure clearance    Fluid/Diuretic plan: No intervention  Goal 24 hour fluid balance:  Even  Electrolytes repleted: not applicable no is labs pending at time of this note  Goal: K >4 0, Mag >2 0, and Phos >3 0    ID:   Elevated procalcitonin  - etiology unclear  - could be secondary to below, though believe this to be contaminant  - continue empiric antibiotics  - trend serially    1/2 gram-positive cocci BC  - likely contaminant  - will discuss with Nephrology course of plan, especially if 2nd blood culture returned positive  - if positive 2nd culture, will need TTE  - follow-up speciation    Abx ordered: Cefepime and Vanco  Day # 2 of to be determined day course  Vancomycin trough due:  Prior to the 3rd HD session, and ordered  Trend temps and WBC count    Heme:   ABLA  - no etiology found on EGD  - could be secondary to chronic slow bleed that eventually cause decompensation  - hemoglobin stable since transfusion  - follow up a m  CBC  - may need colonoscopy if hemoglobin continues to trend down words, or signs concerning for GI bleeding    CBC pending at time of this note   Trend hgb and plts  Transfuse as needed for goal hgb >7    Endo:   No history of diabetes, follow blood glucose on chemistries    Glycemic control plan: N/A    Msk/Skin:  Stage 3 right buttock ulcer present on admission  - wound care consult  - defer management to them    Mobility goal:  Bed-bound  PT consult: yes  OT consult: yes  Frequent turning and off-loading    Family:  Family updated within 24 hours: Will update patient's family with MRI findings and any new data points this morning   Family meeting planned today: no     Lines:  Central venous access: dialysis catheter Chronic  Keep central line today for HD  VTE Prophylaxis:  Pharmacologic Prophylaxis: RX contraindicated due to: GI bleeding  Mechanical Prophylaxis: sequential compression device    Disposition: Continue ICU care  Continue goals of care discussion    Code Status: Level 1 - Full Code    Counseling / Coordination of Care  Total Critical Care time spent 31 minutes excluding procedures, teaching and family updates  ______________________________________________________________________    HPI/24hr events:   Patient mid yesterday with lethargy  Was intubated emergency department    CT head was found to have subacute evolving left internal capsule posterior limb lacunar infarct, CT chest abdomen pelvis with some fluid overload  Patient also anemic with history of significant GI bleed  Was given blood and hemodynamics improved  Underwent EGD that showed mild nonerosive gastritis, and duodenitis  Hemoglobins remained stable  No overt bleeding from upper lower overnight  MRI overnight was severely limited by motion artifact but did not show evidence of acute stroke  Patient with mild hypotension this a m , responsive to albumin  Did have 1 black stool this morning     ______________________________________________________________________  VITALS:    Vitals:    18 0100 18 0200 18 0311 18 0317   BP: (!) 93/43 (!) 90/43 (!) 109/49    BP Location:   Right arm    Pulse: 104 104 105    Resp: 12 (!) 11 (!) 32    Temp:  99 2 °F (37 3 °C) (!) 101 °F (38 3 °C)    TempSrc:  Oral Oral    SpO2: 100% 100% 100% 99%   Weight:         Temp  Min: 98 1 °F (36 7 °C)  Max: 102 °F (38 9 °C)  IBW: -92 5 kg       Temp (24hrs), Av 6 °F (38 1 °C), Min:98 8 °F (37 1 °C), Max:102 °F (38 9 °C)    HR:  [] 105  Resp:  [11-45] 32  BP: ()/(42-81) 109/49  SpO2:  [93 %-100 %] 99 %    Weights:   IBW: -92 5 kg    Body mass index is 23 15 kg/m²    Weight (last 2 days)     Date/Time   Weight    18 0547  52 (114 64)    18 0446  52 (114 64)    18 1856  52 3 (115 4)              Hemodynamic Monitoring:  N/A       Non-Invasive/Invasive Ventilation Settings:  Respiratory    Lab Data (Last 4 hours)    None         O2/Vent Data (Last 4 hours)       [id number]        Drager Vent Mode AC/VC+ AC/VC+      Resp Rate (BPM) (BPM) 12 12      VT (mL) (mL) 300 300      Insp Time (S) (S) 0 8 0 8      FIO2 (%) (%) 50 50      PEEP (cmH2O) (cmH2O) 5 5      Rise Time (%) (%) 0 2 0 2      MV (Obs) 5 76 5 33                No results found for: PHART, RQJ2CUG, PO2ART, KHB1RMD, Q4DHDOUI, BEART, SOURCE  SpO2: SpO2: 99 %, SpO2 Activity: SpO2 Activity: At Rest, SpO2 Device: O2 Device: Other (comment)    ______________________________________________________________________    Physical Exam:   Physical Exam   Constitutional: She appears cachectic  She is intubated and restrained  HENT:   Head: Normocephalic and atraumatic  Mouth/Throat: Mucous membranes are normal    Eyes: Pupils are equal, round, and reactive to light  Tracks with eyes   Neck: Trachea normal  Neck supple  No JVD present  Cardiovascular: Normal rate, regular rhythm, S1 normal, S2 normal and normal pulses  No extrasystoles are present  Pulmonary/Chest: She is intubated  She has no decreased breath sounds  She has no rhonchi  Abdominal: Soft  Bowel sounds are normal  She exhibits no distension  There is no tenderness  Peg tube site clean, dry, intact without discharge   Genitourinary:   Genitourinary Comments: Deferred   Musculoskeletal: She exhibits no edema  Neurological: GCS eye subscore is 3  GCS verbal subscore is 1  GCS motor subscore is 4  Patient opens eyes consistently to voice and is able to track around the room  Spontaneously moves left upper extremity and left lower extremity, but not to command  Withdraws to pain in bilateral lower and left upper extremity  No movement of right upper extremity  Skin: Skin is warm and dry  Buttock stage III ulcer   Nursing note and vitals reviewed        ______________________________________________________________________    Intake and Outputs:  I/O       05/20 0701 - 05/21 0700 05/21 0701 - 05/22 0700    I V  (mL/kg) 82 (1 6) 621 7 (12)    IV Piggyback 1000     Total Intake(mL/kg) 1082 (20 8) 621 7 (12)    Emesis/NG output  250    Other  1500    Total Output   1750    Net +1082 -1128 3                  Intake/Output Summary (Last 24 hours) at 05/22/18 0331  Last data filed at 05/21/18 1701   Gross per 24 hour   Intake           674 13 ml   Output             1750 ml   Net         -1075 87 ml       Nutrition:        Diet Orders            Start Ordered    05/21/18 1411  Diet Enteral/Parenteral; Tube Feeding No Oral Diet; Nepro; 100; Every 4 hours  Diet effective now     Comments:  Please begin tube feeding at 10 ml/hr and advance by 10 mL/hr every 4 hours to a goal of 45 mL/hr   Question Answer Comment   Diet Type Enteral/Parenteral    Enteral/Parenteral Tube Feeding No Oral Diet    Tube Feeding Formula: Nepro    Tube Feeding water flush (mL): 100    Water flush frequency: Every 4 hours    RD to adjust diet per protocol? Yes        05/21/18 1411        TF currently running at 45 cc/hour with a goal of 45 cc/hr    With 100 free water flushes every 4 hours Formula:  Nepro    ______________________________________________________________________    Labs:     Results from last 7 days  Lab Units 05/21/18  0504 05/21/18  0500 05/20/18 2021   WBC Thousand/uL 22 30*  --  22 00*   HEMOGLOBIN g/dL 9 7*  --  5 4*   I STAT HEMOGLOBIN g/dl  --  9 2*  --    HEMATOCRIT % 29 2*  --  17 5*   PLATELETS Thousands/uL 282  --  305   MONO PCT MAN % 1*  --  6       Results from last 7 days  Lab Units 05/21/18  0500 05/21/18  0456 05/20/18 2021   SODIUM mmol/L  --  136  136 140   POTASSIUM mmol/L  --  3 2*  3 2* 3 1*   CHLORIDE mmol/L  --  96*  96* 95*   CO2 mmol/L  --  24  22 26   BUN mg/dL  --  68*  70* 68*   CREATININE mg/dL  --  5 49*  5 45* 5 44*   CALCIUM mg/dL  --  9 9  9 8 10 3*   TOTAL PROTEIN g/dL  --  6 9  6 9 7 1   BILIRUBIN TOTAL mg/dL  --  3 40*  3 40* 2 40*   ALK PHOS U/L  --  1,032*  1,047* 1,045*   ALT U/L  --  321*  316* 359*   AST U/L  --  260*  280* 596*   GLUCOSE RANDOM mg/dL  --  256*  254* 275*   GLUCOSE, ISTAT mg/dl 240*  --   --        Results from last 7 days  Lab Units 05/21/18  0456 05/20/18 2021   MAGNESIUM mg/dL 2 3  2 3 2 4     Lab Results   Component Value Date    PHOS 2 7 05/21/2018    PHOS 2 7 05/21/2018    PHOS 1 9 (L) 05/20/2018        Results from last 7 days  Lab Units 05/21/18  0456 05/20/18 2021   INR  1 17 1 05   PTT seconds  --  25       0  Lab Value Date/Time   TROPONINI 0 13 (H) 05/20/2018 2021   TROPONINI 0 22 (H) 03/15/2018 0939   TROPONINI 0 27 (H) 03/15/2018 0549   TROPONINI 0 29 (H) 03/14/2018 1523   TROPONINI 0 06 (H) 03/14/2018 0938   TROPONINI 0 04 09/19/2017 1614       Results from last 7 days  Lab Units 05/20/18 2052   LACTIC ACID mmol/L 1 5     ABG:No results found for: PHART, WGD2AKN, PO2ART, ZRW6GOP, V2JAEAON, BEART, SOURCE    Imaging: CXR:  Left lower lobe infiltrate with mild vascular congestion I have personally reviewed pertinent films in PACS  ECHO:  None from this admission, however 03/02/2018 showed EF of 55-65, concentric hypertrophy, grade 1 diastolic dysfunction, mild MR, mild AI, mild TR   CT:  Chest abdomen pelvis:  Bibasilar atelectasis with small left effusion, no bowel obstruction hydrocephalus, compression fracture of L2 age indeterminate, diffuse body wall abnormality  MRI:  Suboptimal person nondiagnostic exam, no evidence of acute infarct within the sensitivity of exam   I have personally reviewed pertinent films in PACS   EKG:  Reviewed    Micro:  Lab Results   Component Value Date    BLOODCX No Growth After 5 Days  03/14/2018    BLOODCX No Growth After 5 Days  03/14/2018    BLOODCX No Growth After 5 Days   03/07/2018    URINECX No Growth <1000 cfu/mL 11/09/2017       ______________________________________________________________________      Medications:   Scheduled Meds:    Current Facility-Administered Medications:  acetaminophen 650 mg Rectal Q6H PRN BENNY Aguillon    bisacodyl 10 mg Rectal Daily Darron Crawford PA-C    cefepime 1,000 mg Intravenous Q24H BENNY Aguillon    chlorhexidine 15 mL Swish & Spit Q12H [facility] BENNY Aguillon    epoetin babak 10,000 Units Intravenous Once per day on Mon Wed Bonnie Crane MD    fentanyl citrate (PF) 50 mcg Intravenous Q2H PRN BENNY Guzman    pantoprazole 40 mg Intravenous Q24H [facility] Rory Pitts BENNY Spear    vancomycin 10 mg/kg Intravenous After Dialysis [facility], CRNP Last Rate: 500 mg (05/21/18 1532)     Continuous Infusions:   PRN Meds:    acetaminophen 650 mg Q6H PRN   fentanyl citrate (PF) 50 mcg Q2H PRN   vancomycin 10 mg/kg After Dialysis          Allergies: No Known Allergies    ______________________________________________________________________    Invasive lines and devices: Invasive Devices     Peripheral Intravenous Line            Peripheral IV 05/20/18 Right Antecubital 1 day    Peripheral IV 05/21/18 Left Forearm 1 day          Line            HD Cath Double -- days          Drain            Gastrostomy/Enterostomy Percutaneous endoscopic gastrostomy (PEG) LUQ -- days          Airway            ETT  Hi-Lo; Cuffed 7 5 mm 1 day                   SIGNATURE: Fernando Davis PA-C  DATE: May 22, 2018    Portions of the record may have been created with voice recognition software  Occasional wrong word or "sound a like" substitutions may have occurred due to the inherent limitations of voice recognition software  Read the chart carefully and recognize, using context, where substitutions have occurred

## 2018-05-22 NOTE — RESPIRATORY THERAPY NOTE
Patient remains on full mechanical ventilation VC/AC at this time  Pt placed on CPAP/PS 5/5 this morning but failed due to increased respiratory rate and low tidal volumes  Pt was transported down and back to MRI today without incident on MRI transport ventilator  Pt tolerating VC/AC well; will continue to monitor pt  Plan: continue current support until further orders

## 2018-05-22 NOTE — PROGRESS NOTES
NEPHROLOGY PROGRESS NOTE    Patient: Christiano Yen               Sex: female          DOA: 5/20/2018  6:54 PM   YOB: 1940        Age:  68 y o         LOS:  LOS: 2 days         5/22/2018    REASON FOR THE CONSULTATION:  Further management of ESRD    HPI     This is a 68 y o  female admitted for Acute respiratory failure ([facility] [address] )     SUBJECTIVE     - patient has remained intubated  Reviewed Neurology note with the recommendations  Underwent EGD yesterday    -Reviewed last 24 hrs events     CURRENT MEDICATIONS       Current Facility-Administered Medications:     albumin human (FLEXBUMIN) 25 % injection **AcuDose Override Pull**, , , ,     bisacodyl (DULCOLAX) rectal suppository 10 mg, 10 mg, Rectal, Daily, Michael Michaels PA-C, 10 mg at 05/22/18 0843    cefepime (MAXIPIME) IVPB (premix) 1,000 mg, 1,000 mg, Intravenous, Q24H, BENNY Boston, Stopped at 05/22/18 0800    chlorhexidine (PERIDEX) 0 12 % oral rinse 15 mL, 15 mL, Swish & Spit, Q12H [facility], BENNY Boston, 15 mL at 05/22/18 7454    epoetin babak (EPOGEN,PROCRIT) injection 10,000 Units, 10,000 Units, Intravenous, Once per day on Mon Wed Fri, Pooja Sapp MD, 10,000 Units at 05/21/18 1507    fentanyl citrate (PF) 100 MCG/2ML 50 mcg, 50 mcg, Intravenous, Q2H PRN, BENNY Ahumada, 50 mcg at 05/21/18 2030    norepinephrine (LEVOPHED) 4 mg (STANDARD CONCENTRATION) IV in sodium chloride 0 9% 250 mL, 1-30 mcg/min, Intravenous, Titrated, BENNY Ahumada    pantoprazole (PROTONIX) injection 40 mg, 40 mg, Intravenous, Q24H [facility], BENNY Ahumada, 40 mg at 05/22/18 8914    propofol (DIPRIVAN) 1000 mg in 100 mL infusion (premix), 5-50 mcg/kg/min, Intravenous, Titrated, BENNY Ahumada    vancomycin (VANCOCIN) IVPB (premix) 500 mg, 10 mg/kg, Intravenous, After Dialysis, BENNY Boston, Last Rate: 200 mL/hr at 05/21/18 1532, 500 mg at 05/21/18 1532    OBJECTIVE     Current Weight: Weight - Scale: 52 kg (114 lb 10 2 oz)  Vitals:    05/22/18 1108   BP:    Pulse: (!) 108   Resp: 20   Temp:    SpO2: 98%     Body mass index is 23 15 kg/m²  Intake/Output Summary (Last 24 hours) at 05/22/18 1238  Last data filed at 05/22/18 0800   Gross per 24 hour   Intake           584 62 ml   Output             1750 ml   Net         -1165 38 ml       PHYSICAL EXAMINATION     Physical Exam   Constitutional: She is sedated and intubated  HENT:   Head: Normocephalic  Mouth/Throat: Mucous membranes are not cyanotic  ETT in place   Eyes: No scleral icterus  Neck: Neck supple  No JVD present  Cardiovascular: Normal rate, S1 normal and S2 normal     Pulmonary/Chest: She is intubated  She has no wheezes  Abdominal: Soft  She exhibits no distension  Musculoskeletal:        Right hand: She exhibits no laceration  Left hand: She exhibits no laceration  Lymphadenopathy:        Right cervical: No superficial cervical adenopathy present  Left cervical: No superficial cervical adenopathy present  Right: No supraclavicular adenopathy present  Left: No supraclavicular adenopathy present  Neurological:   Unable to perform full neurological examination as patient is intubated and sedated   Skin: Skin is warm  No cyanosis     Psychiatric:   Unable to perform full psychiatric examination as patient is intubated and sedated         LAB RESULTS       Results from last 7 days  Lab Units 05/22/18  0435 05/21/18  0504 05/21/18  0500 05/21/18  0456 05/20/18 2021   WBC Thousand/uL 16 89* 22 30*  --   --  22 00*   HEMOGLOBIN g/dL 8 1* 9 7*  --   --  5 4*   I STAT HEMOGLOBIN g/dl  --   --  9 2*  --   --    HEMATOCRIT % 25 1* 29 2*  --   --  17 5*   PLATELETS Thousands/uL 206 282  --   --  305   SODIUM mmol/L 139  --   --  136  136 140   POTASSIUM mmol/L 3 8  --   --  3 2*  3 2* 3 1*   CHLORIDE mmol/L 99*  --   --  96*  96* 95*   CO2 mmol/L 27  --   --  24  22 26   BUN mg/dL 27*  -- --  68*  70* 68*   CREATININE mg/dL 3 13*  --   --  5 49*  5 45* 5 44*   EGFR ml/min/1 73sq m 16  --   --  8  8 8   CALCIUM mg/dL 9 5  --   --  9 9  9 8 10 3*   MAGNESIUM mg/dL  --   --   --  2 3  2 3 2 4   PHOSPHORUS mg/dL  --   --   --  2 7  2 7 1 9*   TOTAL PROTEIN g/dL 6 2*  --   --  6 9  6 9 7 1   GLUCOSE RANDOM mg/dL 93  --   --  256*  254* 275*   GLUCOSE, ISTAT mg/dl  --   --  240*  --   --          RADIOLOGY RESULTS      Results for orders placed during the hospital encounter of 05/20/18   XR chest 1 view portable    Narrative CHEST     INDICATION:   s/p intubation  COMPARISON:  Chest radiographs March 14, 2018 and CT chest abdomen pelvis May 20, 2018  EXAM PERFORMED/VIEWS:  XR CHEST PORTABLE      FINDINGS:    The heart is enlarged  Atherosclerotic changes in the aorta  Lung volumes diminished  Endotracheal tube is present with tip projecting into the left mainstem bronchus  It is recommended the endotracheal tube be withdrawn at least 2 cm  Ana Maria and pulmonary vessels are prominent  Small left pleural effusion  Left lower lobe infiltrate representing atelectasis or pneumonia  Right-sided dialysis catheter with tip at cavoatrial junction  Orogastric tube coursing beneath the left hemidiaphragm  Tip not seen  Osseous structures appear within normal limits for patient age  Impression 1  Low-lying endotracheal tube with tip projecting into left mainstem bronchus  Repositioning recommended  2   Mild vascular congestion  3   Left lower lobe infiltrate representing atelectasis or pneumonia  The study was marked in [facility] for immediate notification  Workstation performed: [id number]       Results for orders placed during the hospital encounter of 11/09/17   XR chest 2 views    Narrative CHEST     INDICATION:  Weakness  History taken directly from the electronic ordering system          COMPARISON:  Chest x-ray of 9/19/2017    VIEWS:  Frontal and lateral projections    IMAGES:  2    FINDINGS:  Right-sided dialysis catheter tip overlies the right atrium  The heart is enlarged  Patient is somewhat rotated to the left  There is persistent pulmonary vascular congestion, appears chronic  No pneumothorax or pleural effusion  Degenerative spurring is noted of the spine  There are marked arthritic changes of the right shoulder  Impression Persistent pulmonary vascular congestion, appears chronic  Workstation performed: [id number]       Right upper quadrant ultrasound done on 5/20/2018 showed small amount of layering sludge and non said showing gallbladder calculi  Hepatomegaly  Right kidney was 8 6 x 3 6 cm in size  PLAN / RECOMMENDATIONS      1  ESRD  Patient is on chronic hemodialysis on Monday Wednesday Friday schedule  Patient has tolerated dialysis well yesterday  No urgent need for dialysis today  Plan dialysis tomorrow to keep patient on regular outpatient dialysis schedule  2   Anemia  Multifactorial   Suspect to have rectal bleeding  Current hemoglobin is 8 1  GI is on the case  Consider blood transfusion if hemoglobin level drops below 7  Continue Epogen 37227 Units with dialysis  Disposition:  Overall prognosis seems guarded    Plan was also d/w the ICU team    Luz Shelton MD  Nephrology  5/22/2018        Portions of the record may have been created with voice recognition software  Occasional wrong word or "sound a like" substitutions may have occurred due to the inherent limitations of voice recognition software  Read the chart carefully and recognize, using context, where substitutions have occurred

## 2018-05-22 NOTE — PHYSICAL THERAPY NOTE
Physical Therapy Cancellation Note      Pt remains intubated + sedated  Unstable for PT at this time  Will cont to follow       Lizz Kiran PT

## 2018-05-22 NOTE — PROGRESS NOTES
Progress Note  Jose Cruz Talavera 68 y o  female MRN: [id number]  Unit/Bed#:  Encounter: [id number]    Subjective: The patient remains intubated and sedated and has multiple poor prognostic factors  She is having an acute stroke in the notes of been reviewed I spoke with her daughter yesterday and she does not want her to have a colonoscopy    Objective:     Vitals:   Vitals:    05/22/18 1800   BP: 102/51   Pulse: 99   Resp: 20   Temp:    SpO2: 100%   ,Body mass index is 23 15 kg/m²  Intake/Output Summary (Last 24 hours) at 05/22/18 1853  Last data filed at 05/22/18 1355   Gross per 24 hour   Intake             85 1 ml   Output                0 ml   Net             85 1 ml       Physical Exam:     General Appearance: Intubated and sedated in the ICU  Lungs: bilateral rhonchi, Heart: Regular rate and rhythm, S1, S2 normal, no murmur, click, rub or gallop  Abdomen: Soft, non-tender, non-distended; bowel sounds normal; no masses or no organomegaly  Extremities: No cyanosis, edema    Invasive Devices     Peripheral Intravenous Line            Peripheral IV 05/20/18 Right Antecubital 1 day    Peripheral IV 05/21/18 Left Forearm 1 day    Peripheral IV 05/22/18 Right Antecubital less than 1 day    Peripheral IV 05/22/18 Right Forearm less than 1 day          Line            HD Cath Double -- days          Drain            Gastrostomy/Enterostomy Percutaneous endoscopic gastrostomy (PEG) LUQ -- days    Rectal Tube With balloon less than 1 day          Airway            ETT  Hi-Lo; Cuffed 7 5 mm 1 day                Lab Results:  Admission on 05/20/2018   No results displayed because visit has over 200 results  Imaging Studies:   I have personally reviewed pertinent imaging studies  Ct Chest Abdomen Pelvis Wo Contrast    Result Date: 5/20/2018  Narrative: CT CHEST, ABDOMEN AND PELVIS WITHOUT IV CONTRAST INDICATION:   cough, fever, GI bleeding   COMPARISON: 3/14/2018 TECHNIQUE: CT examination of the chest, abdomen and pelvis was performed without intravenous contrast   Axial, sagittal, and coronal 2D reformatted images were created from the source data and submitted for interpretation  Radiation dose length product (DLP) for this visit:  367 mGy-cm   This examination, like all CT scans performed in the [facility], was performed utilizing techniques to minimize radiation dose exposure, including the use of iterative reconstruction and automated exposure control  Enteric contrast was administered  FINDINGS: CHEST LUNGS:  Respiratory motion artifact reduces sensitivity for nodule detection  Bibasilar dependent atelectatic changes are seen  PLEURA:  There is a small left-sided pleural effusion  HEART/GREAT VESSELS:  Unremarkable for patient's age  MEDIASTINUM AND TYLER:  Unremarkable  CHEST WALL AND LOWER NECK:   There is a right subscapular lipoma noted  No axillary adenopathy ABDOMEN Examination is limited by patient motion LIVER/BILIARY TREE:  Unremarkable  GALLBLADDER:  No calcified gallstones  No pericholecystic inflammatory change  SPLEEN:  Unremarkable  PANCREAS:  Unremarkable  ADRENAL GLANDS:  Unremarkable  KIDNEYS/URETERS:  Unremarkable  No hydronephrosis  STOMACH AND BOWEL:  G-tube present within the stomach  No bowel obstruction  Again there is limitation secondary to the motion artifact  APPENDIX:  No findings to suggest appendicitis  ABDOMINOPELVIC CAVITY:  No ascites or free intraperitoneal air  No lymphadenopathy  VESSELS:  Unremarkable for patient's age  PELVIS REPRODUCTIVE ORGANS:  Limited assessment of the uterus secondary to artifact from hip hardware and motion  The uterus does appear enlarged for the patient's age and is heterogeneous  URINARY BLADDER:  Unremarkable  ABDOMINAL WALL/INGUINAL REGIONS:  There is body wall edema  OSSEOUS STRUCTURES:  There is left hip hardware and a right hip prosthesis  There is severe arthritis of the left hip    There is a lucent defect in the right iliac bone on image 2/80 measuring 9 mm  There are degenerative changes of the shoulders  Compression fracture of L2 noted  Impression: 1  Limited assessment of the lungs  Bibasilar atelectasis with small left effusion 2  Evaluation of the abdomen is also limited secondary to motion  No bowel obstruction or hydronephrosis is present  3  Uterus appears enlarged for the patient's age  This may be further assessed with nonemergent ultrasound  Considerations related to the patient's age and/or comorbidities may be used to alter these recommendations  4  Compression fracture of L2  Age indeterminant  Lucent defect in the right iliac bone  This could represent a bone donor site although a lytic lesion cannot be excluded  5  Diffuse body wall edema Workstation performed: [id number]     Xr Chest 1 View Portable    Result Date: 5/21/2018  Narrative: CHEST INDICATION:   s/p intubation  COMPARISON:  Chest radiographs March 14, 2018 and CT chest abdomen pelvis May 20, 2018  EXAM PERFORMED/VIEWS:  XR CHEST PORTABLE FINDINGS: The heart is enlarged  Atherosclerotic changes in the aorta  Lung volumes diminished  Endotracheal tube is present with tip projecting into the left mainstem bronchus  It is recommended the endotracheal tube be withdrawn at least 2 cm  An[place] and pulmonary vessels are prominent  Small left pleural effusion  Left lower lobe infiltrate representing atelectasis or pneumonia  Right-sided dialysis catheter with tip at cavoatrial junction  Orogastric tube coursing beneath the left hemidiaphragm  Tip not seen  Osseous structures appear within normal limits for patient age  Impression: 1  Low-lying endotracheal tube with tip projecting into left mainstem bronchus  Repositioning recommended  2   Mild vascular congestion  3   Left lower lobe infiltrate representing atelectasis or pneumonia  The study was marked in [facility] for immediate notification   Workstation performed: [id number]      Head Without Contrast    Result Date: 5/20/2018  Narrative: CT BRAIN - WITHOUT CONTRAST INDICATION:   AMS, fever, cough, h/o stroke  COMPARISON:  11/9/2017 TECHNIQUE:  CT examination of the brain was performed  In addition to axial images, coronal 2D reformatted images were created and submitted for interpretation  Radiation dose length product (DLP) for this visit:  1051 mGy-cm   This examination, like all CT scans performed in the [facility], was performed utilizing techniques to minimize radiation dose exposure, including the use of iterative reconstruction and automated exposure control  IMAGE QUALITY:  Diagnostic  FINDINGS: PARENCHYMA: Decreased attenuation is noted in periventricular and subcortical white matter demonstrating an appearance that is statistically most likely to represent advanced microangiopathic change  No CT signs of acute infarction  No intracranial mass, mass effect or midline shift  No acute parenchymal hemorrhage  There is a left internal capsule posterior limb lacunar infarction, although not present on prior study this does not appear acute VENTRICLES AND EXTRA-AXIAL SPACES:  Significant cerebral volume loss with ventriculomegaly unchanged from prior [address]:  There is an air-fluid level present within the left maxillary sinus  CALVARIUM AND EXTRACRANIAL SOFT TISSUES:  Normal      Impression: Severe microangiopathic changes  Subacute/evolving left internal capsule posterior limb lacunar infarction  Significant cerebral volume loss with ventriculomegaly  No intracranial hemorrhage or mass effect   Workstation performed: [id number]     Mri Brain Wo Contrast    Result Date: 5/22/2018  Narrative: MRI BRAIN WITHOUT CONTRAST INDICATION: 40-year-old female, altered mental status, right-sided weakness COMPARISON:   5/21/2018 MRI; 5/20/2018 CT TECHNIQUE:  Sagittal T1, axial T2, axial FLAIR, axial T1, axial [place] and axial diffusion imaging  IMAGE QUALITY:  Diagnostic  FINDINGS: BRAIN PARENCHYMA: Severe chronic microvascular ischemic changes are present within the subcortical and deep white matter of the frontal and parietal lobes bilaterally  Chronic cortical infarctions left parietal convexity and left lateral frontal perisylvian region  Consistent with CT  A small region of diffusion hyperintensity involves the inferior aspect of the posterior left internal capsule and the adjacent cerebral peduncle consistent with subacute lacunar infarction  This was not visible on prior MRI which was severely degraded by patient motion  Age-appropriate cerebral atrophy is present  There is no discrete mass, mass effect or midline shift  No acute hemorrhage  Brainstem and cerebellum demonstrate normal signal   Diffusion imaging is unremarkable  VENTRICLES: Mildly disproportionate lateral ventricular dilatation suggesting possible superimposed communicating hydrocephalus  SELLA AND PITUITARY GLAND:  Normal  ORBITS:  Normal  PARANASAL SINUSES: Left maxillary mucosal thickening and fluid level VASCULATURE:  Evaluation of the major intracranial vasculature demonstrates appropriate flow voids  CALVARIUM AND SKULL BASE: Bilateral mastoid effusions  EXTRACRANIAL SOFT TISSUES: Normal      Impression: Severe chronic microvascular ischemic disease Small acute to subacute inferior left internal capsule and adjacent cerebral peduncle lacunar infarction Age-related atrophy Possible communicating hydrocephalus Left maxillary sinus disease with air-fluid level Bilateral mastoid effusions Workstation performed: [id number]     Mri Brain Wo Contrast    Result Date: 5/21/2018  Narrative: MRI BRAIN WITHOUT CONTRAST INDICATION:  stroke COMPARISON:   5/20/2018  TECHNIQUE:  Sagittal T1, axial T2, axial FLAIR, axial T1, axial [place] and axial diffusion imaging   IMAGE QUALITY:  Severe patient motion artifact results in a highly suboptimal and partially nondiagnostic exam  FINDINGS: BRAIN PARENCHYMA:  Partially confluent T2/FLAIR hyperintense foci in the subcortical, deep and periventricular white matter  No restricted effusion however sensitivity of the images is significantly degraded by motion artifact  No gross evidence of hemorrhage or mass  VENTRICLES:  Dilatation of the ventricles and sulci consistent with diffuse volume loss  No hydrocephalus or extra-axial collection  SELLA AND PITUITARY GLAND:  Normal  ORBITS:  Normal  PARANASAL SINUSES:  Mucosal thickening in the sphenoid sinuses, small fluid level in the left maxillary sinus and minimal fluid in the nasopharynx may be secondary to intubation  VASCULATURE:  Evaluation of the major intracranial vasculature demonstrates appropriate flow voids  CALVARIUM AND SKULL BASE:  Normal  EXTRACRANIAL SOFT TISSUES:  Normal      Impression: Suboptimal/partially nondiagnostic exam due to severe patient motion artifact  No evidence of acute infarct, within the sensitivity limitations of the exam   Extensive microangiopathic disease  Workstation performed: [id number]      Right Upper Quadrant    Result Date: 5/22/2018  Narrative: RIGHT UPPER QUADRANT ULTRASOUND INDICATION:     Elevated lipase  COMPARISON:  Noncontrast CT chest abdomen pelvis May 20, 2018 TECHNIQUE:   Real-time ultrasound of the right upper quadrant was performed with a curvilinear transducer with both volumetric sweeps and still imaging techniques  FINDINGS: Study limited secondary to patient being unresponsive and on ventilator  PANCREAS:  Portions of the pancreas are obscured by bowel gas  Visualized portions of the pancreas are unremarkable  AORTA AND IVC:  Obscured by bowel gas  LIVER: Size:  Moderately enlarged  The liver measures 22 cm in the midclavicular line  Contour:  Surface contour is smooth  Parenchyma:  Echogenicity and echotexture are within normal limits  No evidence of suspicious mass   Limited imaging of the main portal vein shows it to be patent and hepatopetal   BILIARY: The gallbladder is normal in caliber  No wall thickening or pericholecystic fluid  Small amount of layering sludge and nonshadowing calculi  Sonographic Lorn Ethan sign could not be assessed given the patient's unresponsive state  No intrahepatic biliary dilatation  CBD measures 6 mm  No choledocholithiasis  KIDNEY: Right kidney measures 8 6 x 3 6 cm  Within normal limits  ASCITES:   None  Impression: 1  Limited examination due to patient condition  2   Small amount of layering sludge and nonshadowing gallbladder calculi  No sonographic evidence for acute cholecystitis  3   Hepatomegaly  Workstation performed: [id number]         Assessment & Plan  Principal Problem:    Acute respiratory failure (HCC)  Active Problems:    ESRD (end stage renal disease) on dialysis (HCC)    Anemia of chronic kidney failure, stage 5 (HCC)    Multiple myeloma not having achieved remission ([facility] [address] )    Essential hypertension    Diabetes mellitus type 2 in nonobese (HCC)    Hemiparesis due to old stroke ([facility] [address] )    Functional quadriplegia (HCC)    Anemia    Severe protein-calorie malnutrition (HCC)    Sepsis (HCC)    Rapid atrial fibrillation (HCC)    Decubitus ulcer of sacral region, stage 2    Acute blood loss anemia    GIB (gastrointestinal bleeding)    GI bleed    Gastrointestinal hemorrhage with melena    Elevated procalcitonin    She is a 68-year-old female with acute stroke, sepsis, respiratory failure, baseline dementia, and gastrointestinal blood loss currently intubated and sedated in the ICU    Her endoscopy did not reveal a bleeding source and I discussed her case with her daughter and she does not want her to have a colonoscopy  She has multiple poor prognostic factors  She can have antiplatelet therapy as needed; she obviously has an enhance risk of bleeding  Consider palliative care consultation  At the present time we will sign off please re-consult as needed    Lola Ochoa MD  7/56/7557,3:58 PM

## 2018-05-22 NOTE — PLAN OF CARE
Problem: DISCHARGE PLANNING - CARE MANAGEMENT  Goal: Discharge to post-acute care or home with appropriate resources  INTERVENTIONS:  - Conduct assessment to determine patient/family and health care team treatment goals, and need for post-acute services based on payer coverage, community resources, and patient preferences, and barriers to discharge  - Address psychosocial, clinical, and financial barriers to discharge as identified in assessment in conjunction with the patient/family and health care team  - Arrange appropriate level of post-acute services according to patients   needs and preference and payer coverage in collaboration with the physician and health care team  - Communicate with and update the patient/family, physician, and health care team regarding progress on the discharge plan  - Arrange appropriate transportation to post-acute venues   Outcome: Progressing  Pt remains intubated  [place] from [facility] Kellie Beltran  reached out to family to see if they had any questions, but they were not open to an LTAC for consideration at this time  CM will continue to follow

## 2018-05-22 NOTE — PROGRESS NOTES
Discussed with critical care team, patient does have an acute stroke, would recommend anti-platelet treatment once cleared by GI and also would recommend cardiology evaluation to evaluate for atrial fibrillation and patient to be on stroke pathway

## 2018-05-22 NOTE — SOCIAL WORK
Pt remains intubated  [place] from [facility] Kellie Beltran  reached out to family to see if they had any questions, but they were not open to an LTAC for consideration at this time  CM will continue to follow

## 2018-05-23 PROBLEM — E63.9 INADEQUATE CALORIC INTAKE: Status: ACTIVE | Noted: 2018-01-01

## 2018-05-23 NOTE — RESPIRATORY THERAPY NOTE
RT Ventilator Management Note  Tom Kaur 68 y o  female MRN: [id number]  Unit/Bed#:  Encounter: [id number]      Daily Screen       5/23/2018 0933 5/23/2018 0958          Patient safety screen outcome[de-identified] Passed -      Spont breathing trial % for 30 min: - Yes      Spont breathing trial outcome[de-identified] - Failed      Spont breathing trial reason failed: - RSBI > 100;RR > 35 bpm;Tidal volume < 4ml/Kg of ideal body weight or VE <5 or  >15 LPM      Previous settings resumed: - Yes      Name of Medical Team Notified[de-identified] - Micheline Pitts      RSBI: - 174              Physical Exam:   Assessment Type: Assess only  General Appearance: Sleeping, Eyes open/responds to stimulus  Respiratory Pattern: Assisted, Symmetrical  Chest Assessment: Chest expansion symmetrical  Bilateral Breath Sounds: Coarse      Resp Comments: Pt remains on full mechanical ventilation at this time  No changes made to ventilator; will continue to monitor pt  Plan: continue current support until further orders  Pt is currently getting dialysis  ET tube jacob changed today

## 2018-05-23 NOTE — PLAN OF CARE
Problem: DISCHARGE PLANNING - CARE MANAGEMENT  Goal: Discharge to post-acute care or home with appropriate resources  INTERVENTIONS:  - Conduct assessment to determine patient/family and health care team treatment goals, and need for post-acute services based on payer coverage, community resources, and patient preferences, and barriers to discharge  - Address psychosocial, clinical, and financial barriers to discharge as identified in assessment in conjunction with the patient/family and health care team  - Arrange appropriate level of post-acute services according to patients   needs and preference and payer coverage in collaboration with the physician and health care team  - Communicate with and update the patient/family, physician, and health care team regarding progress on the discharge plan  - Arrange appropriate transportation to post-acute venues   Outcome: Progressing  CM spoke to daughter Ward Farley via phone to discuss possible LTAC on discharge  Ward Farley used to work at [employer] in [employer], so is very familiar and knowledgeable about the care that an LTAC can provide  She remains committed to the idea of bringing her mother home, but is willing to consider an LTAC  CM will continue to follow

## 2018-05-23 NOTE — PROGRESS NOTES
Carroll GALEAS aware that pt still febrile >103 despite ice  Orders received for tylenol   Zoila Sevilla RN

## 2018-05-23 NOTE — RESPIRATORY THERAPY NOTE
Patient remains on full mechanical ventilation VC/AC at this time    Pt tolerating VC/AC at this time, will continue to monitor pt  Plan: continue current support until further orders

## 2018-05-23 NOTE — PROGRESS NOTES
Daily Progress Note - Critical Care/ [place] Flash Egu 68 y o  female MRN: [id number]  Unit/Bed#:  Encounter: [id number]    Assessment:   Principal Problem:    Acute respiratory failure ([place] [id number] )  Active Problems:    ESRD (end stage renal disease) on dialysis ([place] [address] )    Anemia of chronic kidney failure, stage 5 (HCC)    Multiple myeloma not having achieved remission ([place] [address] )    Essential hypertension    Diabetes mellitus type 2 in nonobese ([place] [address] )    Hemiparesis due to old stroke ([place] [address] )    Functional quadriplegia (HCC)    Anemia    Severe protein-calorie malnutrition (HCC)    Sepsis (HCC)    Rapid atrial fibrillation ([facility])    Decubitus ulcer of sacral region, stage 2    Acute blood loss anemia    GIB (gastrointestinal bleeding)    GI bleed    Gastrointestinal hemorrhage with melena    Elevated procalcitonin  Resolved Problems:    * No resolved hospital problems  *    Plan:    Neuro:   Lethargy  - now appears back at baseline  - opens eyes to voice but does not follow commands  - continue frequent neuro checks    Acute to subacute CVA  - MRI showing small acute to subacute inferior left internal capsule and adjacent cerebral peduncle lacunar infarct  - patient now placed on stroke pathway  - follow up a m   A1c and lipid panel  - carotid duplex ordered  - GI signed off, can likely start anti-platelet agent if hemoglobin stable      sinusitis bilateral mastoid effusions  - ENT notified  - patient on adequate antibiotics    Fever  - unclear etiology  - does have 1/2 Gram-positive cocci in blood culture  - repeated culture  - continue broad-spectrum antibiotics  - continue cooling without Tylenol if possible    Sedation plan: None  RASS goal: 0 Alert and Calm  Sedation break plan:  Patient on sedation    Regulate sleep/wake cycle    CV:   Atrial fibrillation with RVR  - now resolved  - blood pressure is still soft and cannot add back beta-blocker  - no anticoagulation as patient has history of significant GI bleeding    Hypertension  - on hold due to soft blood pressures    Cardiac infusions:  None  Rhythm: Sinus Tachycardia  Follow rhythm on telemetry    Pulm:   Acute hypoxic respiratory failure  - initially intubated mostly for airway protection  - patient failed spontaneous breathing trial yesterday  - will trial again  - will discuss possibility of tracheostomy with family if patient fails spontaneous breathing trial again today    SBT plan: This a m  Chest PT ordered: yes   Chlorhexidine ordered: yes   HOB >30 degrees: yes    GI:   Esophagitis/duodenitis  - EGD showing this  -b i d  Protonix    Questionable GI bleed  - patient's daughter declined colonoscopy  -GI signed off    Peg status  - in place and functioning  - clean dry and intact    Transaminitis  - downtrending  - follow up this a m   CMP which is pending  - etiology unclear  - right upper quadrant ultrasound showing small sludge in non shadowing gallbladder calculi, hepatomegaly, but no acute cholecystitis    Diarrhea  - patient had large, hard stool past yesterday followed by significant output of liquid stool  - fecal management device placed  - discontinue once output decreases  - consider sending C diff    Nutrition/diet plan:  Tube feedings  Stress ulcer prophylaxis: Protonix IV  Bowel regimen: Discontinued due to diarrhea  Last BM:  Fecal management device in place     :   End-stage renal disease on HD  - continue Monday Wednesday Friday schedule, therefore dialysis today  - nephrology following  - appreciate new recommendations    No Hurtado needed    FEN:   Metabolic acidosis  - resolved on yesterday's BMP  - follow up today's which is pending  - if elevated, would send lactic acid    Fluid/Diuretic plan: No intervention  Goal 24 hour fluid balance:  Per Nephrology recommendations  Electrolytes repleted:  No as labs are pending  Goal: K >4 0, Mag >2 0, and Phos >3 0    ID:   Fevers with elevated procalcitonin  - patient continues on cefepime and Vanco  - re-cultured overnight due to fever  - procalcitonin 5 55  - continue antibiotics, low threshold for further increase  Blood cultures 1 of 2 g positive cocci  - likely contaminant   - follow-up speciation  - due to fever and elevated procalcitonin, will continue broad-spectrum antibiotics    Abx ordered: Cefepime and Vanco  Day # 3 of TBD day course  Vancomycin trough due:  Friday  Trend temps and WBC count    Heme:   Acute blood loss anemia  - no etiology found on EGD  - be secondary to chronic slow bleed  - hemoglobin stable since transfusion  - follow up a m  hemoglobin   - patient's daughter declined colonoscopy    CBC pending at time of this note   Trend hgb and plts  Transfuse as needed for goal hgb >7    Endo:   No history of diabetes  Follow blood glucose on chemistries  Glycemic control plan: N/A    Msk/Skin:  Stage III right buttock ulcer present on admission  - wound care consult  - for management to them    Mobility goal:  Bed-bound  PT consult: not applicable  OT consult: not applicable  Frequent turning and off-loading    Family:  Family updated within 24 hours: Will update patient's daughter with today's events   Family meeting planned today: yes     Lines:  Peripheral IVs and dialysis line    VTE Prophylaxis:  Pharmacologic Prophylaxis: Reason for no pharmacologic prophylaxis Acute blood loss anemia, can restart today  Mechanical Prophylaxis: sequential compression device    Disposition: Continue ICU care    Code Status: Level 1 - Full Code    Counseling / Coordination of Care  Total time spent today 45 minutes  Greater than 50% of total time was spent with the patient and / or family counseling and / or coordination of care   A description of the counseling / coordination of care: Speaking with ENT, will communicate with all specialists involved as well as patient's daughter  ______________________________________________________________________    HPI/24hr events:   Patient with increasing diarrhea overnight  Fecal containment device placed  Patient with temperature overnight with T-max of 103°  Unfortunately no cooling blanket was available, so patient given 1 dose of Tylenol with good control of fever  Blood cultures redrawn    ______________________________________________________________________  VITALS:    Vitals:    18 0200 18 0300 18 0400 18 0420   BP: 121/57 131/80     Pulse: (!) 122 104 (!) 119 100   Resp: (!) 27 (!) 27 17 (!) 25   Temp: (!) 102 4 °F (39 1 °C) (!) 100 9 °F (38 3 °C) 99 9 °F (37 7 °C)    TempSrc:       SpO2: 100% 100% 100% 100%   Weight:         Temp  Min: 98 1 °F (36 7 °C)  Max: 103 3 °F (39 6 °C)  IBW: -92 5 kg       Temp (24hrs), Av 7 °F (38 7 °C), Min:99 3 °F (37 4 °C), Max:103 3 °F (39 6 °C)    HR:  [] 100  Resp:  [9-32] 25  BP: ()/(39-90) 131/80  SpO2:  [98 %-100 %] 100 %    Weights:   IBW: -92 5 kg    Body mass index is 23 15 kg/m²  Weight (last 2 days)     Date/Time   Weight    18 0547  52 (114 64)    18 0446  52 (114 64)              Hemodynamic Monitoring:  N/A       Non-Invasive/Invasive Ventilation Settings:  Respiratory    Lab Data (Last 4 hours)    None         O2/Vent Data (Last 4 hours)       042          Drager Vent Mode AC/VC+       Resp Rate (BPM) (BPM) 12       VT (mL) (mL) 300       Insp Time (S) (S) 0 8       FIO2 (%) (%) 50       PEEP (cmH2O) (cmH2O) 5       Rise Time (%) (%) 0 2       MV (Obs) 7 36                 No results found for: PHART, OQL4WCA, PO2ART, TKC2KCT, Z7OODFIO, BEART, SOURCE  SpO2: SpO2: 100 %, SpO2 Activity: SpO2 Activity: At Rest, SpO2 Device: O2 Device: Other (comment) (vent)    ______________________________________________________________________    Physical Exam:   Physical Exam   Constitutional: She appears cachectic  No distress  She is intubated  HENT:   Head: Normocephalic and atraumatic  Eyes: Conjunctivae are normal  No scleral icterus     Opens eyes to voice, does not track   Neck: Trachea normal  No JVD present  Cardiovascular: Regular rhythm, S1 normal and S2 normal    No extrasystoles are present  Tachycardia present  Pulmonary/Chest: Breath sounds normal  She is intubated  Abdominal: Soft  Bowel sounds are normal  There is tenderness  Peg tube site clean, dry, intact   Genitourinary:   Genitourinary Comments: Deferred   Musculoskeletal: She exhibits edema  Neurological:   Patient opens eyes to voice does not follow commands  Spontaneously moves left upper and left lower extremity however no purposeful movements are following of commands  Withdraws bilateral lower extremities  Flaccid right upper extremity   Skin: Skin is warm, dry and intact  Nursing note and vitals reviewed  ______________________________________________________________________    Intake and Outputs:  I/O       05/21 0701 - 05/22 0700 05/22 0701 - 05/23 0700    I V  (mL/kg) 621 7 (12) 35 1 (0 7)    NG/GT  100    IV Piggyback  50    Feedings  252    Total Intake(mL/kg) 621 7 (12) 437 1 (8 4)    Emesis/NG output 250     Other 1500     Total Output 1750      Net -1128 3 +437 1          Unmeasured Stool Occurrence  1 x              Intake/Output Summary (Last 24 hours) at 05/23/18 1460  Last data filed at 05/22/18 2300   Gross per 24 hour   Intake            437 1 ml   Output                0 ml   Net            437 1 ml       Nutrition:        Diet Orders            Start     Ordered    05/21/18 1411  Diet Enteral/Parenteral; Tube Feeding No Oral Diet; Nepro; 100; Every 4 hours  Diet effective now     Comments:  Please begin tube feeding at 10 ml/hr and advance by 10 mL/hr every 4 hours to a goal of 45 mL/hr   Question Answer Comment   Diet Type Enteral/Parenteral    Enteral/Parenteral Tube Feeding No Oral Diet    Tube Feeding Formula: Nepro    Tube Feeding water flush (mL): 100    Water flush frequency: Every 4 hours    RD to adjust diet per protocol?  Yes        05/21/18 1411 _____________________________________________________________    Labs:     Results from last 7 days  Lab Units 05/22/18  0435 05/21/18  0504 05/21/18  0500 05/20/18 2021   WBC Thousand/uL 16 89* 22 30*  --  22 00*   HEMOGLOBIN g/dL 8 1* 9 7*  --  5 4*   I STAT HEMOGLOBIN g/dl  --   --  9 2*  --    HEMATOCRIT % 25 1* 29 2*  --  17 5*   PLATELETS Thousands/uL 206 282  --  305   MONO PCT MAN %  --  1*  --  6       Results from last 7 days  Lab Units 05/22/18  0435 05/21/18  0500 05/21/18  0456 05/20/18 2021   SODIUM mmol/L 139  --  136  136 140   POTASSIUM mmol/L 3 8  --  3 2*  3 2* 3 1*   CHLORIDE mmol/L 99*  --  96*  96* 95*   CO2 mmol/L 27  --  24  22 26   BUN mg/dL 27*  --  68*  70* 68*   CREATININE mg/dL 3 13*  --  5 49*  5 45* 5 44*   CALCIUM mg/dL 9 5  --  9 9  9 8 10 3*   TOTAL PROTEIN g/dL 6 2*  --  6 9  6 9 7 1   BILIRUBIN TOTAL mg/dL 4 90*  --  3 40*  3 40* 2 40*   ALK PHOS U/L 833*  --  1,032*  1,047* 1,045*   ALT U/L 198*  --  321*  316* 359*   AST U/L 150*  --  260*  280* 596*   GLUCOSE RANDOM mg/dL 93  --  256*  254* 275*   GLUCOSE, ISTAT mg/dl  --  240*  --   --        Results from last 7 days  Lab Units 05/21/18  0456 05/20/18 2021   MAGNESIUM mg/dL 2 3  2 3 2 4     Lab Results   Component Value Date    PHOS 2 7 05/21/2018    PHOS 2 7 05/21/2018    PHOS 1 9 (L) 05/20/2018        Results from last 7 days  Lab Units 05/21/18  0456 05/20/18 2021   INR  1 17 1 05   PTT seconds  --  25       0  Lab Value Date/Time   TROPONINI 0 13 (H) 05/20/2018 2021   TROPONINI 0 22 (H) 03/15/2018 0939   TROPONINI 0 27 (H) 03/15/2018 0549   TROPONINI 0 29 (H) 03/14/2018 1523   TROPONINI 0 06 (H) 03/14/2018 0938   TROPONINI 0 04 09/19/2017 1614       Results from last 7 days  Lab Units 05/20/18 2052   LACTIC ACID mmol/L 1 5     ABG:No results found for: PHART, TCB2NZU, PO2ART, QNH0HSH, E8VJVVSX, BEART, SOURCE    Imaging:   MRI with sedation:  Severe chronic microvascular ischemic disease, small acute to subacute inferior left internal capsule and adjacent cerebral peduncle lacunar infarct, possible communicating hydrocephalus, left maxillary sinus air-fluid level, bilateral mastoid effusions  EKG:  Reviewed    Micro:  Lab Results   Component Value Date    BLOODCX No Growth at 24 hrs  05/20/2018    BLOODCX No Growth After 5 Days  03/14/2018    BLOODCX No Growth After 5 Days  03/14/2018    URINECX No Growth <1000 cfu/mL 11/09/2017       ______________________________________________________________________      Medications:   Scheduled Meds:  Current Facility-Administered Medications:  cefepime 1,000 mg Intravenous Q24H BENNY Diallo Last Rate: Stopped (05/22/18 0800)   chlorhexidine 15 mL Swish & Spit Q12H [address] BENNY Diallo    epoetin babak 10,000 Units Intravenous Once per day on Mon Wed Fri Rocael Bonilla MD    fentanyl citrate (PF) 50 mcg Intravenous Q2H PRN BENNY Hansen    pantoprazole 40 mg Intravenous Q24H [address] BENNY Hansen    vancomycin 10 mg/kg Intravenous After Dialysis BENNY Diallo Last Rate: 500 mg (05/21/18 1532)     Continuous Infusions:   PRN Meds:    fentanyl citrate (PF) 50 mcg Q2H PRN   vancomycin 10 mg/kg After Dialysis          Allergies: No Known Allergies    ______________________________________________________________________    Invasive lines and devices: Invasive Devices     Peripheral Intravenous Line            Peripheral IV 05/20/18 Right Antecubital 2 days    Peripheral IV 05/21/18 Left Forearm 2 days    Peripheral IV 05/22/18 Right Antecubital less than 1 day    Peripheral IV 05/22/18 Right Forearm less than 1 day          Line            HD Cath Double -- days          Drain            Gastrostomy/Enterostomy Percutaneous endoscopic gastrostomy (PEG) LUQ -- days    Rectal Tube With balloon less than 1 day          Airway            ETT  Hi-Lo; Cuffed 7 5 mm 2 days                   SIGNATURE: Danielle Asif PA-C  DATE: May 23, 2018    Portions of the record may have been created with voice recognition software  Occasional wrong word or "sound a like" substitutions may have occurred due to the inherent limitations of voice recognition software  Read the chart carefully and recognize, using context, where substitutions have occurred

## 2018-05-23 NOTE — PLAN OF CARE
DISCHARGE PLANNING - CARE MANAGEMENT     Discharge to post-acute care or home with appropriate resources Progressing        GENITOURINARY - ADULT     Maintains or returns to baseline urinary function Progressing     Absence of urinary retention Progressing        Nutrition/Hydration-ADULT     Nutrient/Hydration intake appropriate for improving, restoring or maintaining nutritional needs Progressing        Potential for Falls     Patient will remain free of falls Progressing        Prexisting or High Potential for Compromised Skin Integrity     Skin integrity is maintained or improved Progressing        RESPIRATORY - ADULT     Achieves optimal ventilation and oxygenation Progressing

## 2018-05-23 NOTE — RESPIRATORY THERAPY NOTE
Pt continues on full vent support  Attempted SBT, pt failed due to RSBi 174, RR>35  Pt was placed back on full support post SBT  Found ETT at the 18 cm pato, repositioned ETT and CXR pending  BS=, spo2 99%, VSS

## 2018-05-23 NOTE — PLAN OF CARE
Problem: DISCHARGE PLANNING - CARE MANAGEMENT  Goal: Discharge to post-acute care or home with appropriate resources  INTERVENTIONS:  - Conduct assessment to determine patient/family and health care team treatment goals, and need for post-acute services based on payer coverage, community resources, and patient preferences, and barriers to discharge  - Address psychosocial, clinical, and financial barriers to discharge as identified in assessment in conjunction with the patient/family and health care team  - Arrange appropriate level of post-acute services according to patients   needs and preference and payer coverage in collaboration with the physician and health care team  - Communicate with and update the patient/family, physician, and health care team regarding progress on the discharge plan  - Arrange appropriate transportation to post-acute venues   Outcome: Progressing  Left message for daughter Jamaal Edmond to call back to discuss possible LTAC's on d/c

## 2018-05-23 NOTE — OCCUPATIONAL THERAPY NOTE
Occupational Therapy Cancellation Note    OT orders received  OT attempted to see pt for OT eval  However, pt is intubated and sedated  Pt is not appropriate for OT eval at this time  OT will continue to re attempt at a later date    [place] MS OTR/L

## 2018-05-23 NOTE — SOCIAL WORK
CM spoke to daughter Arline Craig via phone to discuss possible LTAC on discharge  Arline Craig used to work at [employer] in [place], so is very familiar and knowledgeable about the care that an LTAC can provide  She remains committed to the idea of bringing her mother home, but is willing to consider an LTAC  CM will continue to follow

## 2018-05-23 NOTE — PROGRESS NOTES
HEMODIALYSIS ROUNDING NOTE    Patient: Lanie Alexander               Sex: female          DOA: 5/20/2018  6:54 PM   YOB: 1940        Age:  68 y o         LOS:  LOS: 3 days   5/23/2018    REASON FOR THE CONSULTATION:  Further management of ESRD    HPI     This is a 68 y o  female admitted for Acute respiratory failure ([facility] [address] )     SUBJECTIVE     - Patient was seen during hemodialysis today  Patient is intubated    - Reviewed last 24 hrs events     CURRENT MEDICATIONS       Current Facility-Administered Medications:     albumin human (FLEXBUMIN) 25 % injection **AcuDose Override Pull**, , , ,     albumin human (FLEXBUMIN) 5 % injection **AcuDose Override Pull**, , , ,     atorvastatin (LIPITOR) tablet 20 mg, 20 mg, Oral, Daily With Dinner, Donna Humphreys DO    cefepime (MAXIPIME) IVPB (premix) 1,000 mg, 1,000 mg, Intravenous, Q24H, ElroyBLAISE GuevaraNP, Last Rate: 100 mL/hr at 05/23/18 0535, 1,000 mg at 05/23/18 0535    chlorhexidine (PERIDEX) 0 12 % oral rinse 15 mL, 15 mL, Swish & Spit, Q12H [address], Elroy Kamilla, CRNP, 15 mL at 05/23/18 0927    epoetin babak (EPOGEN,PROCRIT) injection 10,000 Units, 10,000 Units, Intravenous, Once per day on Mon Wed Fri, Paresh Noguera MD, 10,000 Units at 05/21/18 1507    fentanyl citrate (PF) 100 MCG/2ML 50 mcg, 50 mcg, Intravenous, Q2H PRN, Anthony [place], CRNP, 50 mcg at 05/23/18 0516    metoprolol tartrate (LOPRESSOR) partial tablet 12 5 mg, 12 5 mg, Oral, Q12H [facility], Donna Humphreys DO    midodrine (PROAMATINE) tablet 10 mg, 10 mg, Oral, TID AC, Erickson Tesoriero, DO, 10 mg at 05/23/18 0908    pantoprazole (PROTONIX) injection 40 mg, 40 mg, Intravenous, Q24H [address], Anthony [place], CRNP, 40 mg at 05/23/18 0858    vancomycin (VANCOCIN) IVPB (premix) 500 mg, 10 mg/kg, Intravenous, After Dialysis, BENNY Zhang, Last Rate: 200 mL/hr at 05/21/18 1532, 500 mg at 05/21/18 1532    OBJECTIVE     Current Weight: Weight - Scale: 51 2 kg (112 lb 14 oz)  Vitals:    05/23/18 1242   BP:    Pulse: (!) 117   Resp: (!) 30   Temp:    SpO2: 100%     Body mass index is 22 8 kg/m²  Intake/Output Summary (Last 24 hours) at 05/23/18 1323  Last data filed at 05/23/18 1305   Gross per 24 hour   Intake           1410 1 ml   Output              600 ml   Net            810 1 ml       PHYSICAL EXAMINATION     Physical Exam   Constitutional: She is sedated and intubated  Ill looking   HENT:   Head: Normocephalic  Mouth/Throat: Mucous membranes are not cyanotic  ETT in place   Eyes: No scleral icterus  Neck: Neck supple  No JVD present  Cardiovascular: Normal rate, S1 normal and S2 normal     Pulmonary/Chest: She is intubated  She has no wheezes  Abdominal: Soft  She exhibits no distension  Musculoskeletal:        Right hand: She exhibits no laceration  Left hand: She exhibits no laceration  Lymphadenopathy:        Right cervical: No superficial cervical adenopathy present  Left cervical: No superficial cervical adenopathy present  Right: No supraclavicular adenopathy present  Left: No supraclavicular adenopathy present  Neurological:   Unable to perform full neurological examination as patient is intubated and sedated   Skin: Skin is warm  No cyanosis     Psychiatric:   Unable to perform full psychiatric examination as patient is intubated and sedated       LAB RESULTS       Results from last 7 days  Lab Units 05/23/18  0459 05/22/18  0435 05/21/18  0504 05/21/18  0500 05/21/18  0456 05/20/18  2021   WBC Thousand/uL 15 60* 16 89* 22 30*  --   --  22 00*   HEMOGLOBIN g/dL 9 6* 8 1* 9 7*  --   --  5 4*   I STAT HEMOGLOBIN g/dl  --   --   --  9 2*  --   --    HEMATOCRIT % 31 2* 25 1* 29 2*  --   --  17 5*   PLATELETS Thousands/uL 201 206 282  --   --  305   SODIUM mmol/L 133* 139  --   --  136  136 140   POTASSIUM mmol/L 4 0 3 8  --   --  3 2*  3 2* 3 1*   CHLORIDE mmol/L 97* 99*  --   --  96*  96* 95* CO2 mmol/L 18* 27  --   --  24  22 26   BUN mg/dL 41* 27*  --   --  68*  70* 68*   CREATININE mg/dL 4 34* 3 13*  --   --  5 49*  5 45* 5 44*   EGFR ml/min/1 73sq m 11 16  --   --  8  8 8   CALCIUM mg/dL 10 2* 9 5  --   --  9 9  9 8 10 3*   MAGNESIUM mg/dL  --   --   --   --  2 3  2 3 2 4   PHOSPHORUS mg/dL  --   --   --   --  2 7  2 7 1 9*   TOTAL PROTEIN g/dL 7 4 6 2*  --   --  6 9  6 9 7 1   GLUCOSE RANDOM mg/dL 148* 93  --   --  256*  254* 275*   GLUCOSE, ISTAT mg/dl  --   --   --  240*  --   --        RADIOLOGY RESULTS     Results for orders placed during the hospital encounter of 05/20/18   XR chest 1 view portable    Narrative CHEST     INDICATION:   s/p intubation  COMPARISON:  Chest radiographs March 14, 2018 and CT chest abdomen pelvis May 20, 2018  EXAM PERFORMED/VIEWS:  XR CHEST PORTABLE      FINDINGS:    The heart is enlarged  Atherosclerotic changes in the aorta  Lung volumes diminished  Endotracheal tube is present with tip projecting into the left mainstem bronchus  It is recommended the endotracheal tube be withdrawn at least 2 cm  Ana Maria and pulmonary vessels are prominent  Small left pleural effusion  Left lower lobe infiltrate representing atelectasis or pneumonia  Right-sided dialysis catheter with tip at cavoatrial junction  Orogastric tube coursing beneath the left hemidiaphragm  Tip not seen  Osseous structures appear within normal limits for patient age  Impression 1  Low-lying endotracheal tube with tip projecting into left mainstem bronchus  Repositioning recommended  2   Mild vascular congestion  3   Left lower lobe infiltrate representing atelectasis or pneumonia  The study was marked in [facility] for immediate notification  Workstation performed: [id number]       Results for orders placed during the hospital encounter of 11/09/17   XR chest 2 views    Narrative CHEST     INDICATION:  Weakness   History taken directly from the electronic ordering system  COMPARISON:  Chest x-ray of 9/19/2017    VIEWS:  Frontal and lateral projections    IMAGES:  2    FINDINGS:  Right-sided dialysis catheter tip overlies the right atrium  The heart is enlarged  Patient is somewhat rotated to the left  There is persistent pulmonary vascular congestion, appears chronic  No pneumothorax or pleural effusion  Degenerative spurring is noted of the spine  There are marked arthritic changes of the right shoulder  Impression Persistent pulmonary vascular congestion, appears chronic  Workstation performed: [id number]         PLAN / RECOMMENDATIONS     1  End Stage Renal Disease  Plan HD today to keep on Monday Wednesday Friday dialysis schedule  Patient's blood pressure is on the lower side and plan to run dialysis even today  At 1:23 PM on 5/23/2018, I saw and examined patient during hemodialysis treatment  The patient was receiving hemodialysis for treatment of end stage renal disease  I have also reviewed vital signs, intake and output, lab results and recent events, and agreed with today's dialysis order  Access:  Noticed to have frequent access alarms and running on low blood flow rate today  Ernesto Edmnods MD  Nephrology  5/23/2018        Portions of the record may have been created with voice recognition software  Occasional wrong word or "sound a like" substitutions may have occurred due to the inherent limitations of voice recognition software  Read the chart carefully and recognize, using context, where substitutions have occurred

## 2018-05-23 NOTE — PHYSICAL THERAPY NOTE
Physical Therapy Cancellation Note      Pt remains intubated/sedated  Not medically stable for PT  Will cont to follow       Josef Fang PT

## 2018-05-24 NOTE — PROGRESS NOTES
NEPHROLOGY PROGRESS NOTE    Patient: Mila Mark               Sex: female          DOA: 5/20/2018  6:54 PM   YOB: 1940        Age:  68 y o         LOS:  LOS: 4 days         5/24/2018    REASON FOR THE CONSULTATION:  Further management of ESRD    HPI     This is a 68 y o  female admitted for Acute respiratory failure ([facility] [address] )     SUBJECTIVE     - patient has remained intubated and minimally responsive  Underwent dialysis yesterday but due to low blood pressure no ultrafiltration was performed  Noticed to have low blood pressure overnight  Currently not requiring pressures    Continue spiked temperature overnight   - Reviewed last 24 hrs events     CURRENT MEDICATIONS       Current Facility-Administered Medications:     atorvastatin (LIPITOR) tablet 20 mg, 20 mg, Oral, Daily With Dinner, Marta Larsen DO, 20 mg at 05/23/18 1728    cefepime (MAXIPIME) IVPB (premix) 1,000 mg, 1,000 mg, Intravenous, Q24H, Rosamaria Libman, CRNP, Stopped at 05/24/18 0536    chlorhexidine (PERIDEX) 0 12 % oral rinse 15 mL, 15 mL, Swish & Spit, Q12H [address], Rosamaria Libman, CRNP, 15 mL at 05/24/18 0021    epoetin babak (EPOGEN,PROCRIT) injection 10,000 Units, 10,000 Units, Intravenous, Once per day on Mon Wed Fri, Deyanira Kruse MD, 10,000 Units at 05/23/18 1357    fentanyl citrate (PF) 100 MCG/2ML 50 mcg, 50 mcg, Intravenous, Q2H PRN, BENNY Su, 50 mcg at 05/24/18 0441    heparin (porcine) subcutaneous injection 5,000 Units, 5,000 Units, Subcutaneous, Q8H [address], BENNY Tariq, 5,000 Units at 05/24/18 0536    ibuprofen (MOTRIN) oral suspension 400 mg, 400 mg, Oral, Q6H PRN, BENNY Mario, 400 mg at 05/24/18 0442    metoprolol (LOPRESSOR) injection 5 mg, 5 mg, Intravenous, Q6H PRN, BENNY Lanier    metoprolol tartrate (LOPRESSOR) partial tablet 12 5 mg, 12 5 mg, Oral, Q12H LUZ ELENA, Erickson Larsen DO, 12 5 mg at 05/23/18 1594    midodrine (PROAMATINE) tablet 10 mg, 10 mg, Oral, TID AC, Erickson Tesoriero, DO, 10 mg at 05/24/18 0800    pantoprazole (PROTONIX) injection 40 mg, 40 mg, Intravenous, Q24H [facility], Jeanette BENNY Almendarez, 40 mg at 05/24/18 0813    vancomycin (VANCOCIN) IVPB (premix) 500 mg, 10 mg/kg, Intravenous, After Dialysis, BENNY Aguero, Last Rate: 200 mL/hr at 05/23/18 1613, 500 mg at 05/23/18 1613    OBJECTIVE     Current Weight: Weight - Scale: 51 2 kg (112 lb 14 oz)  Vitals:    05/24/18 0815   BP: (!) 85/51   Pulse: 100   Resp: (!) 7   Temp:    SpO2:      Body mass index is 22 8 kg/m²  Intake/Output Summary (Last 24 hours) at 05/24/18 0854  Last data filed at 05/24/18 0800   Gross per 24 hour   Intake             2700 ml   Output             2110 ml   Net              590 ml       PHYSICAL EXAMINATION     Physical Exam   Constitutional: She is sedated and intubated  HENT:   Head: Normocephalic  Mouth/Throat: Mucous membranes are not cyanotic  ETT in place   Eyes: No scleral icterus  Neck: Neck supple  No JVD present  Cardiovascular: Normal rate, S1 normal and S2 normal     Pulmonary/Chest: She is intubated  She has no wheezes  Abdominal: Soft  She exhibits no distension  Musculoskeletal:        Right hand: She exhibits no laceration  Left hand: She exhibits no laceration  Lymphadenopathy:        Right cervical: No superficial cervical adenopathy present  Left cervical: No superficial cervical adenopathy present  Right: No supraclavicular adenopathy present  Left: No supraclavicular adenopathy present  Neurological:   Unable to perform full neurological examination as patient is intubated and sedated   Skin: Skin is warm  No cyanosis     Psychiatric:   Unable to perform full psychiatric examination as patient is intubated and sedated         LAB RESULTS       Results from last 7 days  Lab Units 05/24/18  0552 05/23/18  0459 05/22/18  0435 05/21/18  0504 05/21/18  0500 05/21/18  0456 05/20/18 2021   WBC Thousand/uL 10 77* 15 60* 16 89* 22 30*  --   --  22 00*   HEMOGLOBIN g/dL 8 1* 9 6* 8 1* 9 7*  --   --  5 4*   I STAT HEMOGLOBIN g/dl  --   --   --   --  9 2*  --   --    HEMATOCRIT % 25 2* 31 2* 25 1* 29 2*  --   --  17 5*   PLATELETS Thousands/uL 156 201 206 282  --   --  305   SODIUM mmol/L 132* 133* 139  --   --  136  136 140   POTASSIUM mmol/L 3 2* 4 0 3 8  --   --  3 2*  3 2* 3 1*   CHLORIDE mmol/L 95* 97* 99*  --   --  96*  96* 95*   CO2 mmol/L 26 18* 27  --   --  24  22 26   BUN mg/dL 24 41* 27*  --   --  68*  70* 68*   CREATININE mg/dL 2 60* 4 34* 3 13*  --   --  5 49*  5 45* 5 44*   EGFR ml/min/1 73sq m 20 11 16  --   --  8  8 8   CALCIUM mg/dL 9 2 10 2* 9 5  --   --  9 9  9 8 10 3*   MAGNESIUM mg/dL 2 4  --   --   --   --  2 3  2 3 2 4   PHOSPHORUS mg/dL  --   --   --   --   --  2 7  2 7 1 9*   TOTAL PROTEIN g/dL  --  7 4 6 2*  --   --  6 9  6 9 7 1   GLUCOSE RANDOM mg/dL 185* 148* 93  --   --  256*  254* 275*   GLUCOSE, ISTAT mg/dl  --   --   --   --  240*  --   --          RADIOLOGY RESULTS      Results for orders placed during the hospital encounter of 05/20/18   XR chest 1 view portable    Narrative CHEST     INDICATION:   s/p intubation  COMPARISON:  Chest radiographs March 14, 2018 and CT chest abdomen pelvis May 20, 2018  EXAM PERFORMED/VIEWS:  XR CHEST PORTABLE      FINDINGS:    The heart is enlarged  Atherosclerotic changes in the aorta  Lung volumes diminished  Endotracheal tube is present with tip projecting into the left mainstem bronchus  It is recommended the endotracheal tube be withdrawn at least 2 cm  Ana Maria and pulmonary vessels are prominent  Small left pleural effusion  Left lower lobe infiltrate representing atelectasis or pneumonia  Right-sided dialysis catheter with tip at cavoatrial junction  Orogastric tube coursing beneath the left hemidiaphragm  Tip not seen  Osseous structures appear within normal limits for patient age  Impression 1  Low-lying endotracheal tube with tip projecting into left mainstem bronchus  Repositioning recommended  2   Mild vascular congestion  3   Left lower lobe infiltrate representing atelectasis or pneumonia  The study was marked in [place] for immediate notification  Workstation performed: [id number]       Results for orders placed during the hospital encounter of 11/09/17   XR chest 2 views    Narrative CHEST     INDICATION:  Weakness  History taken directly from the electronic ordering system  COMPARISON:  Chest x-ray of 9/19/2017    VIEWS:  Frontal and lateral projections    IMAGES:  2    FINDINGS:  Right-sided dialysis catheter tip overlies the right atrium  The heart is enlarged  Patient is somewhat rotated to the left  There is persistent pulmonary vascular congestion, appears chronic  No pneumothorax or pleural effusion  Degenerative spurring is noted of the spine  There are marked arthritic changes of the right shoulder  Impression Persistent pulmonary vascular congestion, appears chronic  Workstation performed: [id number]         PLAN / RECOMMENDATIONS      1  ESRD  Patient is on chronic hemodialysis on Monday Wednesday Friday dialysis schedule  Patient had underwent dialysis yesterday but no ultrafiltration of perform in the light of low blood pressure  Overnight pressure has remained on the lower side but currently not requiring any pressors  Plan to hold off dialysis till tomorrow  If patient end up requiring pressors, may consider CRRT  Patient is continue spiked temperature overnight  Exact source of infection is unknown  Patient was found to have positive blood culture in 1 blood culture bottle which was thought to be secondary to contamination  Repeat sets of blood cultures has shown no growth so far  Defer antibiotic to primary team     2   Anemia  Multifactorial   Suspected GI bleed  Current hemoglobin is 8 1  GI is on the case    Continue Epogen 66548 Units with dialysis  Disposition:  Overall prognosis seems guarded but plan to have tracheostomy in near future if unable to extubate patient  Plan was also d/w ICU team    Elijah Miles MD  Nephrology  5/24/2018        Portions of the record may have been created with voice recognition software  Occasional wrong word or "sound a like" substitutions may have occurred due to the inherent limitations of voice recognition software  Read the chart carefully and recognize, using context, where substitutions have occurred

## 2018-05-24 NOTE — PROGRESS NOTES
Progress Note - Critical Care   Cleo Thompson 68 y o  female MRN: [id number]  Unit/Bed#:  Encounter: [id number]    Attending Physician: Lamonte Disla DO      ______________________________________________________________________  Assessment and Plan:   Principal Problem:    Acute respiratory failure ([facility] [address] )  Active Problems:    ESRD (end stage renal disease) on dialysis [facility]    Anemia of chronic kidney failure, stage 5 (HCC)    Multiple myeloma not having achieved remission ([facility] )    Essential hypertension    Diabetes mellitus type 2 in nonobese ([facility] )    Hemiparesis due to old stroke ([facility] )    Functional quadriplegia (HCC)    Anemia    Inadequate caloric intake    Sepsis (HCC)    Rapid atrial fibrillation (HCC)    Decubitus ulcer of sacral region, stage 2    Acute blood loss anemia    GIB (gastrointestinal bleeding)    GI bleed    Gastrointestinal hemorrhage with melena    Elevated procalcitonin    Altered mental status  Resolved Problems:    * No resolved hospital problems   *        Neuro:   Acute CVA superimposed on top of old chronic CVAs-dual antiplatelet therapy if ok with GI, neurology following, continue stroke pathway  Acute encephalopathy 2/2 acute and chronic CVA-has not received sedation on vent, serial neuro exams, neuro status likley back to baseline  Sinusitis mastoid effusions-on abx    CV:   Afib with RVR-resolved  HTN-meds on hold 2/2 soft BP, started on midodrine yesterday    Pulm:   Acute hypoxic resp failure-intubated for airway protection-failed SBT yesterday, will trial again today, patient's family not ready to discuss tracheostomy at this time    GI:   Esophagitis/duodenitis-protonix daily  Questionable GI bleed-colonoscopy declined by the family  Transaminitis-downtrending, RUADELINA US-no acute cholecystitis   Diarrhea-follow output    :   ESRD-on dialysis, nephrology following    F/E/N:   Metabolic acidosis-resolved    ID:   Fever-unclear etiology-does have 1/2 Gram positive cocci blood culture, cultures repeated, continue cooling, motrin PRN-continue abx    Heme:   ACute blood loss anemia-no etiology found on EGD, pt's daughter declined colonoscopy, hbg has been stable    Endo: Follow glucose on daily BMP     Msk/Skin:   Right buttock stage III ulcer-POA-wound care consult, freq turning and repositioning    Disposition:   Continue ICU level care    Code Status: Level 1 - Full Code    Counseling / Coordination of Care  Total Critical Care time spent 34 minutes excluding procedures, teaching and family updates  ______________________________________________________________________    24 Hour Events:   No new events  Failed SBT yesterday  Review of Systems   Unable to perform ROS: Intubated     ______________________________________________________________________  Physical Exam   Constitutional: Intubated, cachetic  Head: Normocephalic and atraumatic  Eyes: Conjunctivae are normal  Pupils are equal, round, and reactive to light  Neck: Neck supple  No JVD present  Cardiovascular: Normal rate, regular rhythm and normal heart sounds  Exam reveals no gallop and no friction rub  No murmur heard  Pulmonary/Chest: Effort normal and breath sounds normal    Abdominal: Soft  Bowel sounds are normal  No distension  No tenderness  +peg  Genitourinary: Deferred   Musculoskeletal: Normal range of motion  + edema  Lymphadenopathy:   No cervical adenopathy  Neurological: opens eyes to voice, MASTERS  Skin: Skin is warm and dry   Right buttock stage 3 pressure ulcer    ______________________________________________________________________  Vitals:    18 0200 18 0300 18 0319 18 0400   BP: (!) 96/48 (!) 84/45  95/54   Pulse: 99 97  96   Resp:  22  20   Temp: (!) 101 5 °F (38 6 °C) (!) 101 6 °F (38 7 °C)  (!) 102 1 °F (38 9 °C)   TempSrc:       SpO2:   98%    Weight:       Height:           Temperature:   Temp (24hrs), Av 5 °F (38 1 °C), Min:99 °F (37 2 °C), Max:102 1 °F (38 9 °C)    Current Temperature: (!) 102 1 °F (38 9 °C)  Weights:   IBW: 43 2 kg    Body mass index is 22 8 kg/m²  Weight (last 2 days)     Date/Time   Weight    05/23/18 0500  51 2 (112 88)            Hemodynamic Monitoring:  N/A     Non-Invasive/Invasive Ventilation Settings:  Respiratory    Lab Data (Last 4 hours)    None         O2/Vent Data (Last 4 hours)      05/24 0319          Drager Vent Mode AC/VC+       Resp Rate (BPM) (BPM) 12       VT (mL) (mL) 300       Insp Time (S) (S) 0 8       FIO2 (%) (%) 35       PEEP (cmH2O) (cmH2O) 5       Rise Time (%) (%) 0 2       MV (Obs) 5 72                 No results found for: PHART, QEE2JGQ, PO2ART, LVR3UNO, E7EQBZFS, BEART, SOURCE  SpO2: SpO2: 98 %  Intake and Outputs:  I/O       05/22 0701 - 05/23 0700 05/23 0701 - 05/24 0700    I V  (mL/kg) 35 1 (0 7) 500 (9 8)    NG/ 300    IV Piggyback 50 550    Feedings 252 473    Total Intake(mL/kg) 437 1 (8 5) 1823 (35 6)    Other  1510    Stool  750    Total Output   2260    Net +437 1 -437          Unmeasured Stool Occurrence 1 x           Nutrition:        Diet Orders            Start     Ordered    05/21/18 1411  Diet Enteral/Parenteral; Tube Feeding No Oral Diet; Nepro; 100; Every 4 hours  Diet effective now     Comments:  Please begin tube feeding at 10 ml/hr and advance by 10 mL/hr every 4 hours to a goal of 45 mL/hr   Question Answer Comment   Diet Type Enteral/Parenteral    Enteral/Parenteral Tube Feeding No Oral Diet    Tube Feeding Formula: Nepro    Tube Feeding water flush (mL): 100    Water flush frequency: Every 4 hours    RD to adjust diet per protocol?  Yes        05/21/18 1411          Labs:     Results from last 7 days  Lab Units 05/23/18  0459 05/22/18  0435 05/21/18  0504  05/20/18 2021   WBC Thousand/uL 15 60* 16 89* 22 30*  --  22 00*   HEMOGLOBIN g/dL 9 6* 8 1* 9 7*  --  5 4*   I STAT HEMOGLOBIN   --   --   --   < >  --    HEMATOCRIT % 31 2* 25 1* 29 2*  --  17 5*   PLATELETS Thousands/uL 201 206 282  --  305   MONO PCT MAN %  --   --  1*  --  6   < > = values in this interval not displayed  Results from last 7 days  Lab Units 05/23/18  0459 05/22/18  0435 05/21/18  0500 05/21/18  0456   SODIUM mmol/L 133* 139  --  136  136   POTASSIUM mmol/L 4 0 3 8  --  3 2*  3 2*   CHLORIDE mmol/L 97* 99*  --  96*  96*   CO2 mmol/L 18* 27  --  24  22   BUN mg/dL 41* 27*  --  68*  70*   CREATININE mg/dL 4 34* 3 13*  --  5 49*  5 45*   CALCIUM mg/dL 10 2* 9 5  --  9 9  9 8   TOTAL PROTEIN g/dL 7 4 6 2*  --  6 9  6 9   BILIRUBIN TOTAL mg/dL 6 40* 4 90*  --  3 40*  3 40*   ALK PHOS U/L 943* 833*  --  1,032*  1,047*   ALT U/L 147* 198*  --  321*  316*   AST U/L 115* 150*  --  260*  280*   GLUCOSE RANDOM mg/dL 148* 93  --  256*  254*   GLUCOSE, ISTAT mg/dl  --   --  240*  --        Results from last 7 days  Lab Units 05/21/18  0456 05/20/18 2021   MAGNESIUM mg/dL 2 3  2 3 2 4     Lab Results   Component Value Date    PHOS 2 7 05/21/2018    PHOS 2 7 05/21/2018    PHOS 1 9 (L) 05/20/2018        Results from last 7 days  Lab Units 05/21/18  0456 05/20/18 2021   INR  1 17 1 05   PTT seconds  --  25       0  Lab Value Date/Time   TROPONINI 0 13 (H) 05/20/2018 2021   TROPONINI 0 22 (H) 03/15/2018 0939   TROPONINI 0 27 (H) 03/15/2018 0549   TROPONINI 0 29 (H) 03/14/2018 1523   TROPONINI 0 06 (H) 03/14/2018 0938   TROPONINI 0 04 09/19/2017 1614       Results from last 7 days  Lab Units 05/20/18 2052   LACTIC ACID mmol/L 1 5     ABG:No results found for: PHART, HLJ6WPS, PO2ART, DCL1FLN, J9HKDKMK, BEART, SOURCE    EKG: NSR  Micro:  Lab Results   Component Value Date    BLOODCX Staphylococcus coagulase negative (A) 05/20/2018    BLOODCX No Growth at 48 hrs  05/20/2018    BLOODCX No Growth After 5 Days   03/14/2018    URINECX No Growth <1000 cfu/mL 11/09/2017     Allergies: No Known Allergies  Medications:   Scheduled Meds:  Current Facility-Administered Medications:  atorvastatin 20 mg Oral Daily With [employer] DO Suzie    cefepime 1,000 mg Intravenous Q24H [facility], CRNP Last Rate: 1,000 mg (05/23/18 0535)   chlorhexidine 15 mL Swish & Spit Q12H [facility], CRNP    epoetin babak 10,000 Units Intravenous Once per day on Mon Wed Fri Onur Fernandez MD    fentanyl citrate (PF) 50 mcg Intravenous Q2H PRN Shivaniulisses Cain, CRNP    ibuprofen 400 mg Oral Q6H PRN Saurabh Ponce, CRNP    metoprolol 5 mg Intravenous Q6H PRN Valiant Dilan, CRANGELA    metoprolol tartrate 12 5 mg Oral Q12H [facility] Erickson Larsen, DO    midodrine 10 mg Oral TID AC Erickson Larsen DO    pantoprazole 40 mg Intravenous Q24H [facility] BENNY Robin    vancomycin 10 mg/kg Intravenous After Dialysis [facility], CRNP Last Rate: 500 mg (05/23/18 1613)     Continuous Infusions:   PRN Meds:    fentanyl citrate (PF) 50 mcg Q2H PRN   ibuprofen 400 mg Q6H PRN   metoprolol 5 mg Q6H PRN   vancomycin 10 mg/kg After Dialysis     VTE Pharmacologic Prophylaxis: Heparin  VTE Mechanical Prophylaxis: sequential compression device  Invasive lines and devices: Invasive Devices     Peripheral Intravenous Line            Peripheral IV 05/20/18 Right Antecubital 3 days    Peripheral IV 05/21/18 Left Forearm 3 days    Peripheral IV 05/22/18 Right Antecubital 1 day    Peripheral IV 05/22/18 Right Forearm 1 day          Line            HD Cath Double -- days          Drain            Gastrostomy/Enterostomy Percutaneous endoscopic gastrostomy (PEG) LUQ -- days    Rectal Tube With balloon 1 day          Airway            ETT  Hi-Lo; Cuffed 7 5 mm 3 days                     Portions of the record may have been created with voice recognition software  Occasional wrong word or "sound a like" substitutions may have occurred due to the inherent limitations of voice recognition software  Read the chart carefully and recognize, using context, where substitutions have occurred      Nirmal Guerra

## 2018-05-24 NOTE — CASE MANAGEMENT
Continued Stay Review    Date: 05/24/2018    Vital Signs: BP 98/50   Pulse 79   Temp 98 6 °F (37 °C) (Probe)   Resp (!) 7   Ht 4' 11" (1 499 m)   Wt 51 2 kg (112 lb 14 oz)   SpO2 99%   BMI 22 80 kg/m²     Medications:   Scheduled Meds:   Current Facility-Administered Medications:  aspirin 81 mg Oral Daily   atorvastatin 20 mg Oral Daily With Dinner   cefepime 1,000 mg Intravenous Q24H   chlorhexidine 15 mL Swish & Spit Q12H [address]   clopidogrel 75 mg Oral Daily   epoetin babak 10,000 Units Intravenous Once per day on Mon Wed Fri   heparin (porcine) 5,000 Units Subcutaneous Q8H [address]   ipratropium 0 5 mg Nebulization Q6H   levalbuterol 1 25 mg Nebulization Q6H   metoprolol tartrate 12 5 mg Oral Q12H LUZ ELENA   midodrine 15 mg Oral TID AC   pantoprazole 40 mg Intravenous Q24H LUZ ELENA   vancomycin 10 mg/kg Intravenous After Dialysis     Continuous Infusions:    PRN Meds: fentanyl citrate (PF)    ibuprofen    metoprolol    vancomycin    Abnormal Labs/Diagnostic Results: WBC 15 60, Na 133, Cl 97, Bun 41, Creat 4  34Alk Phos 943, , , Glucose 148    Age/Sex: 68 y o  female     Assessment/Plan:     Principal Problem:    Acute respiratory failure (HCC)  Active Problems:    ESRD (end stage renal disease) on dialysis (HCC)    Anemia of chronic kidney failure, stage 5 (HCC)    [facility]    Essential hypertension    Diabetes mellitus type 2 in nonobese (HCC)    Hemiparesis due to old stroke ([facility] [address] )    Functional quadriplegia (HCC)    Anemia    Inadequate caloric intake    Sepsis (HCC)    Rapid atrial fibrillation (HCC)    Decubitus ulcer of sacral region, stage 2    Acute blood loss anemia    GIB (gastrointestinal bleeding)    GI bleed    Gastrointestinal hemorrhage with melena    Elevated procalcitonin    Altered mental status     Neuro:   Acute CVA superimposed on top of old chronic CVAs-dual antiplatelet therapy if ok with GI, neurology following, continue stroke pathway  Acute encephalopathy 2/2 acute and chronic CVA-has not received sedation on vent, serial neuro exams, neuro status likley back to baseline  Sinusitis mastoid effusions-on abx     CV:   Afib with RVR-resolved  HTN-meds on hold 2/2 soft BP, started on midodrine yesterday     Pulm:   Acute hypoxic resp failure-intubated for airway protection-failed SBT yesterday, will trial again today, patient's family not ready to discuss tracheostomy at this time     GI:   Esophagitis/duodenitis-protonix daily  Questionable GI bleed-colonoscopy declined by the family  Transaminitis-downtrending, RUQ US-no acute cholecystitis   Diarrhea-follow output     :   ESRD-on dialysis, nephrology following     F/E/N:   Metabolic acidosis-resolved     ID:   Fever-unclear etiology-does have 1/2 Gram positive cocci blood culture, cultures repeated, continue cooling, motrin PRN-continue abx     Heme:   ACute blood loss anemia-no etiology found on EGD, pt's daughter declined colonoscopy, hbg has been stable     Endo:    Follow glucose on daily BMP                Msk/Skin:   Right buttock stage III ulcer-POA-wound care consult, freq turning and repositioning     Disposition:   Continue ICU level care

## 2018-05-24 NOTE — PHYSICAL THERAPY NOTE
Physical Therapy Cancellation Note    Chart review performed  At this time, PT evaluation cancelled s/p discussion with RN Nick Kyle and Nasreen Chappell- noting pt not medically appropriate for PT IE at this time  Also discussed with [address] [place] [address]  PT will follow and evaluate as appropriate      Vesna Villagomez, PT, DPT

## 2018-05-24 NOTE — PLAN OF CARE
Problem: SAFETY,RESTRAINT: NV/NON-SELF DESTRUCTIVE BEHAVIOR  Goal: Remains free of harm/injury (restraint for non violent/non self-detsructive behavior)  INTERVENTIONS:  - Instruct patient/family regarding restraint use   - Assess and monitor physiologic and psychological status   - Provide interventions and comfort measures to meet assessed patient needs   - Identify and implement measures to help patient regain control  - Assess readiness for release of restraint    Outcome: Progressing    Goal: Returns to optimal restraint-free functioning  INTERVENTIONS:  - Assess the patient's behavior and symptoms that indicate continued need for restraint  - Identify and implement measures to help patient regain control  - Assess readiness for release of restraint    Outcome: Progressing

## 2018-05-24 NOTE — OCCUPATIONAL THERAPY NOTE
Occupational Therapy Cancellation Note        Patient Name: Agueda Gresham  XHKPI'E Date: 5/24/2018    OT consult received  Chart review indicates patient medically unstable to engage in therapy evaluation at this time  OT will continue to follow and evaluate as appropriate

## 2018-05-24 NOTE — RESPIRATORY THERAPY NOTE
RT Ventilator Management Note  Gabi Alexandre 68 y o  female MRN: [id number]  Unit/Bed#:  Encounter: [id number]      Daily Screen       5/23/2018 2019 5/24/2018 0753          Patient safety screen outcome[de-identified] Passed Failed      Not Ready for Weaning due to[de-identified] - Underline problem not resolved              Physical Exam:   Assessment Type: Assess only  General Appearance: Awake  Respiratory Pattern: Assisted  Chest Assessment: Chest expansion symmetrical  Bilateral Breath Sounds: Diminished, Coarse  Suction: ET Tube      Resp Comments: pt remains on full mechanical support  No changes made to vent settings    Will continue to monitor

## 2018-05-25 NOTE — PROGRESS NOTES
NEPHROLOGY HEMODIALYSIS NOTE  Jacinda Lacey 68 y o  female MRN: [id number]  Unit/Bed#:  Encounter: [id number]        Patient seen and examined on Hemodialysis, tolerating procedure well  All documentation, labs, medications were reviewed by myself, and the treatment plan was reviewed with nurse and patient  ASSESSMENT and PLAN:  59-year-old female initially presented with 1 week of cough and fever  Patient was confused, was in rapid AFib and hypotensive and was intubated  Patient is diagnosed as having acute CVA superimposed on chronic CVA  She had possible GI bleed but colonoscopy was declined by family  She has been running hypotensive during dialysis  Blood culture is now positive for gram-negative rods  1  End Stage Renal Disease on Hemodialysis  Patient seen and examined on HD  Single visit med during hemodialysis treatment  HD prescription:   Time: 3 1/2 hours  Sodium: 138   Blood flow (ml/min): 400   Dialyzer: F160 Potassium: 3 0 Dialysate flow(ml/min): 600   Access: rt TDC  Bicarbonate: 35 Ultrafiltration goal (kg): 2 3   Medications on HD: epogen   Next hemodialysis treatment on Monday if electrolytes remained stable  2  Access tunneled dialysis catheter  3  BP= hypotensive - on midodrine  Could be septic as blood culture positive  On antibiotics per ICU  4  Anemia of CKD and possibly GI bleed-continue Epogen during hemodialysis  Monitor hemoglobin  5  Acute respiratory failure on vent  Currently on 35% FiO2    Discussed with ICU team, discussed with dialysis nurse  SUBJECTIVE:  Patient is still in ICU, intubated  Blood pressure running in 90s to 80s    Patient was seen during dialysis treatment at 11:30 am      OBJECTIVE:    Current Weight: Weight - Scale: 54 5 kg (120 lb 2 4 oz)    Physical Exam:    Vitals:    05/25/18 1200   BP: (!) 79/46   Pulse: 93   Resp: (!) 11   Temp:    SpO2: 99%     General:  Ill looking, intubated   Head: normocephalic, atraumatic  Eyes: Conjunctivae pink,  Sclera anicteric  ENT: Lips and mucous membranes moist  Neck: supple   Chest: Clear to Auscultation both lungs,  no crackles or wheezing  CVS: S1 & S2 present, normal rate, regular rhythm, no murmur  Abdomen: soft, non-tender, non-distended, Bowel sounds normoactive  Extremities: no edema of  legs  Neuro: Sedated, intubated  Skin: no rash, warm and dry  Psych: sedated  Unable to assess        Laboratory Results:  Lab Results   Component Value Date    WBC 9 73 05/25/2018    HGB 8 3 (L) 05/25/2018    HCT 28 1 (L) 05/25/2018     (H) 05/25/2018     (L) 05/25/2018     Lab Results   Component Value Date    GLUCOSE 170 (H) 05/25/2018    CALCIUM 9 7 05/25/2018     (L) 05/25/2018    K 4 2 05/25/2018    CO2 23 05/25/2018    CL 93 (L) 05/25/2018    BUN 42 (H) 05/25/2018    CREATININE 3 53 (H) 05/25/2018

## 2018-05-25 NOTE — PHYSICAL THERAPY NOTE
Physical Therapy Cancellation Note    Pt remains intubated  Will perform initial eval once extubated + medically stable       Maria L Brown PT

## 2018-05-25 NOTE — RESPIRATORY THERAPY NOTE
Patient placed in cpap/ps mode per order DR Wolfgang Jacob in attempt to wean as tolerated form high levels of mechanical ventilation as tolerated nursing made aware

## 2018-05-25 NOTE — RESPIRATORY THERAPY NOTE
Patient remains intubated off sedation on full mechanical ventilation  patient is vent day # 5 s/p being admitted for acute respiratory failure requiring intubation and mechanical ventilation to maintain adequate ventilation and oxygenation  Weaning attempt made today by placing in cpap/ps mode 5/12  patient tolerated 4 hours well without great apparent respiratory distress noted during that time and placed back on vc/ac mode due to increased work of breathing and worsening pulmonary status after 4 hours  patient resting at this time without great apparent respiratory distress plan: continue current support overnight until further orders given tomorrow for possible wean towards extubation

## 2018-05-25 NOTE — PLAN OF CARE
DISCHARGE PLANNING - CARE MANAGEMENT     Discharge to post-acute care or home with appropriate resources Progressing        GENITOURINARY - ADULT     Maintains or returns to baseline urinary function Progressing     Absence of urinary retention Progressing        Nutrition/Hydration-ADULT     Nutrient/Hydration intake appropriate for improving, restoring or maintaining nutritional needs Progressing        Potential for Falls     Patient will remain free of falls Progressing        Prexisting or High Potential for Compromised Skin Integrity     Skin integrity is maintained or improved Progressing        RESPIRATORY - ADULT     Achieves optimal ventilation and oxygenation Progressing        SAFETY,RESTRAINT: NV/NON-SELF DESTRUCTIVE BEHAVIOR     Remains free of harm/injury (restraint for non violent/non self-detsructive behavior) Progressing     Returns to optimal restraint-free functioning Progressing

## 2018-05-25 NOTE — RESPIRATORY THERAPY NOTE
Patient placed back in Vc/Ac mode due to increased work of breathing and elevated RSBI >100 nrusing and CRNP Sarah Candelario made aware

## 2018-05-25 NOTE — PROGRESS NOTES
Progress Note - Critical Care   Jacinda Lacey 68 y o  female MRN: [id number]  Unit/Bed#:  Encounter: [id number]    Attending Physician: Natali Howell, DO      ______________________________________________________________________  Assessment and Plan:   Principal Problem:    Acute respiratory failure ([facility] [address] )  Active Problems:    ESRD (end stage renal disease) on dialysis ([facility] )    Anemia of chronic kidney failure, stage 5 (HCC)    Multiple myeloma not having achieved remission ([place] [address] )    Essential hypertension    Diabetes mellitus type 2 in nonobese ([place] [address] )    Hemiparesis due to old stroke ([place] [address] )    Functional quadriplegia ([facility])    Anemia    Inadequate caloric intake    Sepsis (HCC)    Rapid atrial fibrillation ([facility])    Decubitus ulcer of sacral region, stage 2    Acute blood loss anemia    GIB (gastrointestinal bleeding)    GI bleed    Gastrointestinal hemorrhage with melena    Elevated procalcitonin    Altered mental status  Resolved Problems:    * No resolved hospital problems   *        Neuro:   Acute CVA superimposed on old chronic CVAs-dual antiplatelet therapy initiated yesterday, neurology following, continue stroke pathway  Acute encephalopathy-2/2 acute and chronic CVA-has nto received sedation on vent-cont serial neuro exams, neuro status likely back to baseline  Sinusitis mastoid effusions-on abx    CV:   Afib with RVR-resolved  HTN-meds on hold, started on midodrine    Pulm:   Acute hypoxic resp failure-intubated for airway protection-did do better on SBT yesterday but became tachypneic after a few hours and was placed back on AC/VC settings    GI:   Esophagitis/duodenitis-protonix daily  Questionable GI bleed-colonoscopy declined by family  Transaminitis -downtrending  Diarrhea-Cdiff pending    :   ESRD-on dialysis, nephrology following    F/E/N:   Monitor lytes    ID:   Fever-unclear etiology-continue abx, follow WBC    Heme:   ACute blood loss anemia-hgb has been stable, cont to trend    Endo: Follow glucose on daily BMP     Msk/Skin:   Right buttock stage III pressure ulcer-POA-freq turning and repositioning, wound care    Disposition:   Continue ICU level of care    Code Status: Level 1 - Full Code    Counseling / Coordination of Care  Total Critical Care time spent 32 minutes excluding procedures, teaching and family updates  ______________________________________________________________________    24 Hour Events:   No new events  Failed SBT yesterday  Review of Systems   Unable to perform ROS: Intubated     ______________________________________________________________________  Physical Exam   Constitutional: Intubated, cachectic   Head: Normocephalic and atraumatic  Eyes: Conjunctivae are normal  Pupils are equal, round, and reactive to light  Neck: Neck supple  No JVD present  Cardiovascular: Normal rate, regular rhythm and normal heart sounds  Exam reveals no gallop and no friction rub  No murmur heard  Pulmonary/Chest: Effort normal and breath sounds normal    Abdominal: Soft  Bowel sounds are normal  + distension  Genitourinary: Deferred   Musculoskeletal: Normal range of motion  +edema  Lymphadenopathy:   No cervical adenopathy  Neurological: Opens eyes to voice, MASTERS  Skin: Skin is warm and dry  R buttock stage 3 pressure ulcer    ______________________________________________________________________  Vitals:    18 0010 18 0100 18 0117 18 0200   BP: 118/59 128/58  105/59   BP Location:       Pulse: 90 85  94   Resp: (!) 26 19  22   Temp:    99 9 °F (37 7 °C)   TempSrc:    Probe   SpO2: 97% 99% 99% 99%   Weight:       Height:           Temperature:   Temp (24hrs), Av 4 °F (37 4 °C), Min:98 6 °F (37 °C), Max:102 1 °F (38 9 °C)    Current Temperature: 99 9 °F (37 7 °C)  Weights:   IBW: 43 2 kg    Body mass index is 22 8 kg/m²    Weight (last 2 days)     Date/Time   Weight    18 0500  51 2 (112 88)            Hemodynamic Monitoring:  N/A Non-Invasive/Invasive Ventilation Settings:  Respiratory    Lab Data (Last 4 hours)    None         O2/Vent Data (Last 4 hours)      05/25 0000          Drager Vent Mode AC/VC+       Resp Rate (BPM) (BPM) 12       VT (mL) (mL) 300       Insp Time (S) (S) 0 8       FIO2 (%) (%) 35       PEEP (cmH2O) (cmH2O) 5       Rise Time (%) (%) 0 2       MV (Obs) 7 1                 Lab Results   Component Value Date    PHART 7 465 (H) 05/24/2018    WOG8DPP 35 8 (L) 05/24/2018    PO2ART 149 5 (H) 05/24/2018    PBW4AKM 25 2 05/24/2018    BEART 1 5 05/24/2018    SOURCE Radial, Right 05/24/2018     SpO2: SpO2: 99 %  Intake and Outputs:  I/O       05/23 0701 - 05/24 0700 05/24 0701 - 05/25 0700    I V  (mL/kg) 550 (10 7) 20 (0 4)    NG/GT 1090 520    IV Piggyback 550     Feedings 473 1155    Total Intake(mL/kg) 2663 (52) 1695 (33 1)    Other 1510     Stool 750 40    Total Output 2260 40    Net +403 +1655          Unmeasured Stool Occurrence  3 x          Nutrition:        Diet Orders            Start     Ordered    05/21/18 1411  Diet Enteral/Parenteral; Tube Feeding No Oral Diet; Nepro; 100; Every 4 hours  Diet effective now     Comments:  Please begin tube feeding at 10 ml/hr and advance by 10 mL/hr every 4 hours to a goal of 45 mL/hr   Question Answer Comment   Diet Type Enteral/Parenteral    Enteral/Parenteral Tube Feeding No Oral Diet    Tube Feeding Formula: Nepro    Tube Feeding water flush (mL): 100    Water flush frequency: Every 4 hours    RD to adjust diet per protocol?  Yes        05/21/18 1411          Labs:     Results from last 7 days  Lab Units 05/24/18  0552 05/23/18  0459 05/22/18  0435 05/21/18  0504  05/20/18 2021   WBC Thousand/uL 10 77* 15 60* 16 89* 22 30*  --  22 00*   HEMOGLOBIN g/dL 8 1* 9 6* 8 1* 9 7*  --  5 4*   I STAT HEMOGLOBIN   --   --   --   --   < >  --    HEMATOCRIT % 25 2* 31 2* 25 1* 29 2*  --  17 5*   PLATELETS Thousands/uL 156 201 206 282  --  305   MONO PCT MAN % 2*  --   --  1*  --  6   < > = values in this interval not displayed  Results from last 7 days  Lab Units 05/24/18  0552 05/23/18  0459 05/22/18  0435  05/21/18  0456   SODIUM mmol/L 132* 133* 139  --  136  136   POTASSIUM mmol/L 3 2* 4 0 3 8  --  3 2*  3 2*   CHLORIDE mmol/L 95* 97* 99*  --  96*  96*   CO2 mmol/L 26 18* 27  --  24  22   BUN mg/dL 24 41* 27*  --  68*  70*   CREATININE mg/dL 2 60* 4 34* 3 13*  --  5 49*  5 45*   CALCIUM mg/dL 9 2 10 2* 9 5  --  9 9  9 8   TOTAL PROTEIN g/dL  --  7 4 6 2*  --  6 9  6 9   BILIRUBIN TOTAL mg/dL  --  6 40* 4 90*  --  3 40*  3 40*   ALK PHOS U/L  --  943* 833*  --  1,032*  1,047*   ALT U/L  --  147* 198*  --  321*  316*   AST U/L  --  115* 150*  --  260*  280*   GLUCOSE RANDOM mg/dL 185* 148* 93  --  256*  254*   GLUCOSE, ISTAT   --   --   --   < >  --    < > = values in this interval not displayed      Results from last 7 days  Lab Units 05/24/18  0552 05/21/18  0456 05/20/18 2021   MAGNESIUM mg/dL 2 4 2 3  2 3 2 4     Lab Results   Component Value Date    PHOS 2 7 05/21/2018    PHOS 2 7 05/21/2018    PHOS 1 9 (L) 05/20/2018        Results from last 7 days  Lab Units 05/21/18  0456 05/20/18 2021   INR  1 17 1 05   PTT seconds  --  25       0  Lab Value Date/Time   TROPONINI 0 13 (H) 05/20/2018 2021   TROPONINI 0 22 (H) 03/15/2018 0939   TROPONINI 0 27 (H) 03/15/2018 0549   TROPONINI 0 29 (H) 03/14/2018 1523   TROPONINI 0 06 (H) 03/14/2018 0938   TROPONINI 0 04 09/19/2017 1614       Results from last 7 days  Lab Units 05/20/18  2052   LACTIC ACID mmol/L 1 5     ABG:  Lab Results   Component Value Date    PHART 7 465 (H) 05/24/2018    VIR3ZRE 35 8 (L) 05/24/2018    PO2ART 149 5 (H) 05/24/2018    EOC6LHS 25 2 05/24/2018    BEART 1 5 05/24/2018    SOURCE Radial, Right 05/24/2018       EKG: NSR  Micro:  Lab Results   Component Value Date    BLOODCX No Growth at 24 hrs  05/23/2018    BLOODCX No Growth at 24 hrs  05/23/2018    BLOODCX Staphylococcus coagulase negative (A) 05/20/2018 BLOODCX No Growth at 72 hrs  05/20/2018    URINECX No Growth <1000 cfu/mL 11/09/2017    SPUTUMCULTUR Culture results to follow  05/23/2018     Allergies: No Known Allergies  Medications:   Scheduled Meds:  Current Facility-Administered Medications:  aspirin 81 mg Oral Daily BENNY Regalado    atorvastatin 20 mg Oral Daily With [employer], DO    cefepime 1,000 mg Intravenous Q24H BENNY Mckeon Last Rate: Stopped (05/24/18 0536)   chlorhexidine 15 mL Swish & Spit Q12H [address] BENNY Mckeon    clopidogrel 75 mg Oral Daily BENNY Regalado    epoetin babak 10,000 Units Intravenous Once per day on Mon Wed Fri Storm Mckeon MD    fentanyl citrate (PF) 50 mcg Intravenous Q2H PRN BENNY Miller    heparin (porcine) 5,000 Units Subcutaneous Q8H [address] BENNY Tariq    ibuprofen 400 mg Oral Q6H PRN BENNY Tariq    ipratropium 0 5 mg Nebulization Q6H Erickson Tesoriero, DO    levalbuterol 1 25 mg Nebulization Q6H Erickson Tesoriero, DO    metoprolol 5 mg Intravenous Q6H PRN BENNY Regalado    metoprolol tartrate 12 5 mg Oral Q12H [address] Erickson Tesoriero, DO    midodrine 15 mg Oral TID AC BENNY Regalado    pantoprazole 40 mg Intravenous Q24H [address] BENNY Robin    vancomycin 10 mg/kg Intravenous After Dialysis BENNY Mckeon Last Rate: 500 mg (05/23/18 1613)     Continuous Infusions:   PRN Meds:    fentanyl citrate (PF) 50 mcg Q2H PRN   ibuprofen 400 mg Q6H PRN   metoprolol 5 mg Q6H PRN   vancomycin 10 mg/kg After Dialysis     VTE Pharmacologic Prophylaxis: Heparin  VTE Mechanical Prophylaxis: sequential compression device  Invasive lines and devices:   Invasive Devices     Peripheral Intravenous Line            Peripheral IV 05/21/18 Left Forearm 4 days    Peripheral IV 05/22/18 Right Antecubital 2 days    Peripheral IV 05/22/18 Right Forearm 2 days          Line            HD Cath Double -- days          Drain Gastrostomy/Enterostomy Percutaneous endoscopic gastrostomy (PEG) LUQ -- days          Airway            ETT  Hi-Lo; Cuffed 7 5 mm 4 days                     Portions of the record may have been created with voice recognition software  Occasional wrong word or "sound a like" substitutions may have occurred due to the inherent limitations of voice recognition software  Read the chart carefully and recognize, using context, where substitutions have occurred      Felipe Semen

## 2018-05-26 NOTE — RESPIRATORY THERAPY NOTE
Pt remains intubated and is currently on cpap/ps of 12/5  Pt is currently resting with no apparent distress    Will continue with current plan

## 2018-05-26 NOTE — PHYSICAL THERAPY NOTE
Physical Therapy Cancellation Note    Pt remains intubated + unstable from medical standpoint for PT  Will d/c pt from PT caseload at this time as multiple attempts have been made to evaluate pt + pt remains unstable  Please re-consult as appropriate       Adrienne Alcaraz PT

## 2018-05-26 NOTE — PROGRESS NOTES
ASSESSMENT & PLAN:  26-year-old female initially presented with 1 week of cough and fever  Patient was confused, was in rapid AFib and hypotensive and was intubated  Patient is diagnosed as having acute CVA superimposed on chronic CVA  She had possible GI bleed but colonoscopy was declined by family  She has been running hypotensive during dialysis  Blood culture is now positive for gram-negative rods  End-stage renal disease on hemodialysis  -last hemodialysis was yesterday, ultrafiltration 2 7 kg  Will do next hemodialysis on Monday if electrolytes are stable  Access tunneled dialysis catheter, right internal jugular    Hyponatremia  -worsening hyponatremia  Likely due to free water from the PEG tube  Decrease free water flushes  Hypotension  -continue midodrine at current dose  Could be due to sepsis as blood culture is positive for gram-negative rods  Has leukocytosis  Continue antibiotics per ICU  Follow up final blood culture  Follow-up CT abdomen and pelvis    Anemia due to chronic kidney disease and possibly GI bleed  -continue Epogen during hemodialysis  Hemoglobin stable    Hypophosphatemia  - patient receivin IV sodium phosphate  If persistent hypophosphatemia can change to feeding from Nepro to Glucerna  Hypomagnesemia, likely from IV magnesium replacement  Monitor magnesium level  Acute respiratory failure on vent, currently on FiO2 of 35%, management per Pulmonary Critical Care  DM-2- mx per primary team    Discussed with icu nurse  SUBJECTIVE:  Patient still in ICU, intubated and on vent at 35% FiO2         OBJECTIVE:  Current Weight: Weight - Scale: 51 2 kg (112 lb 14 oz)  Vitals:    05/26/18 1000   BP: 95/51   Pulse: 101   Resp: (!) 25   Temp: 99 3 °F (37 4 °C)   SpO2: 100%       Intake/Output Summary (Last 24 hours) at 05/26/18 1206  Last data filed at 05/26/18 1000   Gross per 24 hour   Intake             1618 ml   Output                0 ml   Net 1618 ml       Physical Exam   General:  Ill looking, intubated   Head: normocephalic, atraumatic  Eyes: Conjunctivae pink,  Sclera anicteric  ENT: Lips and mucous membranes moist  Neck: supple   Chest: Clear to Auscultation both lungs,  no crackles or wheezing  CVS: S1 & S2 present, normal rate, regular rhythm, no murmur  Abdomen: soft, non-tender, non-distended, Bowel sounds normoactive, PEG tube present  Extremities: no edema of  legs  Right upper extremity edema at this  Has a right tunnel dialysis catheter  Neuro:  intubated  Moves her head  Skin: no rash, warm and dry  Psych: Intubated  Unable to assess          Medications:    Current Facility-Administered Medications:     acetaminophen (TYLENOL) tablet 650 mg, 650 mg, Oral, Q6H PRN, Sarah Candelario PA-C, 650 mg at 05/25/18 1606    aspirin chewable tablet 81 mg, 81 mg, Oral, Daily, BENNY Bro, 81 mg at 05/26/18 0935    atorvastatin (LIPITOR) tablet 20 mg, 20 mg, Oral, Daily With Adeola Larsen DO, 20 mg at 05/25/18 1601    cefepime (MAXIPIME) IVPB (premix) 1,000 mg, 1,000 mg, Intravenous, Q24H, BENNY Horne, Stopped at 05/26/18 0459    chlorhexidine (PERIDEX) 0 12 % oral rinse 15 mL, 15 mL, Swish & Spit, Q12H [address], Mar[place]aga Field, CRNP, 15 mL at 05/26/18 6469    clopidogrel (PLAVIX) tablet 75 mg, 75 mg, Oral, Daily, BENNY Bro, 75 mg at 05/26/18 7109    epoetin babak (EPOGEN,PROCRIT) injection 10,000 Units, 10,000 Units, Intravenous, Once per day on Mon Wed Fri, Gaston Cleary MD, 10,000 Units at 05/25/18 0859    fentanyl citrate (PF) 100 MCG/2ML 50 mcg, 50 mcg, Intravenous, Q2H PRN, BLAISE TeixeiraNP, 50 mcg at 05/24/18 0441    heparin (porcine) subcutaneous injection 5,000 Units, 5,000 Units, Subcutaneous, Q8H [address], BENNY Tariq, 5,000 Units at 05/26/18 0558    ibuprofen (MOTRIN) oral suspension 400 mg, 400 mg, Oral, Q6H PRN, BENNY Triplett, 400 mg at 05/25/18 0123   ipratropium (ATROVENT) 0 02 % inhalation solution 0 5 mg, 0 5 mg, Nebulization, Q6H, Erickson Tesoriero, DO, 0 5 mg at 05/26/18 0718    levalbuterol (XOPENEX) inhalation solution 1 25 mg, 1 25 mg, Nebulization, Q6H, Erickson Tesoriero, DO, 1 25 mg at 05/26/18 0718    metoprolol (LOPRESSOR) injection 5 mg, 5 mg, Intravenous, Q6H PRN, BENNY Menon, 5 mg at 05/26/18 0507    midodrine (PROAMATINE) tablet 15 mg, 15 mg, Oral, TID AC, BENNY Menon, 15 mg at 05/26/18 1119    pantoprazole (PROTONIX) injection 40 mg, 40 mg, Intravenous, Q24H [address], BENNY Lara, 40 mg at 05/26/18 0824    sodium phosphate 30 mmol in dextrose 5 % 250 mL Infusion, 30 mmol, Intravenous, Once, BENNY Lara, Last Rate: 41 7 mL/hr at 05/26/18 0823, 30 mmol at 05/26/18 0823    vancomycin (VANCOCIN) IVPB (premix) 750 mg, 750 mg, Intravenous, After Dialysis, Scott Ford PA-C, Stopped at 05/25/18 1200    Invasive Devices:        Lab Results:     Results from last 7 days  Lab Units 05/26/18  0437 05/25/18  2309 05/25/18  1629 05/25/18  0457 05/24/18  0552 05/23/18  0459 05/22/18  0435  05/21/18  0456 05/20/18  2021   WBC Thousand/uL 12 24* 10 60*  --  9 73 10 77* 15 60* 16 89*  < >  --  22 00*   HEMOGLOBIN g/dL 9 5* 8 5*  --  8 3* 8 1* 9 6* 8 1*  < >  --  5 4*   I STAT HEMOGLOBIN   --   --   --   --   --   --   --   < >  --   --    HEMATOCRIT % 30 7* 26 8*  --  28 1* 25 2* 31 2* 25 1*  < >  --  17 5*   PLATELETS Thousands/uL 169 168  --  146* 156 201 206  < >  --  305   SODIUM mmol/L 132*  --  137 130* 132* 133* 139  --  136  136 140   POTASSIUM mmol/L 4 1  --  3 7 4 2 3 2* 4 0 3 8  --  3 2*  3 2* 3 1*   CHLORIDE mmol/L 94*  --  97* 93* 95* 97* 99*  --  96*  96* 95*   CO2 mmol/L 26  --  29 23 26 18* 27  --  24  22 26   BUN mg/dL 29*  --  18 42* 24 41* 27*  --  68*  70* 68*   CREATININE mg/dL 2 55*  --  1 94* 3 53* 2 60* 4 34* 3 13*  --  5 49*  5 45* 5 44*   CALCIUM mg/dL 10 4*  --  9 8 9 7 9 2 10 2* 9 5  --  9 9 9  8 10 3*   MAGNESIUM mg/dL 3 1*  --   --   --  2 4  --   --   --  2 3  2 3 2 4   PHOSPHORUS mg/dL 2 1*  --   --   --   --   --   --   --  2 7  2 7 1 9*   TOTAL PROTEIN g/dL  --   --   --   --   --  7 4 6 2*  --  6 9  6 9 7 1   BILIRUBIN TOTAL mg/dL  --   --   --   --   --  6 40* 4 90*  --  3 40*  3 40* 2 40*   ALK PHOS U/L  --   --   --   --   --  943* 833*  --  1,032*  1,047* 1,045*   ALT U/L  --   --   --   --   --  147* 198*  --  321*  316* 359*   AST U/L  --   --   --   --   --  115* 150*  --  260*  280* 596*   GLUCOSE RANDOM mg/dL 162*  --  150* 170* 185* 148* 93  --  256*  254* 275*   GLUCOSE, ISTAT   --   --   --   --   --   --   --   < >  --   --    < > = values in this interval not displayed  Previous work up:     Chest x-ray personally reviewed by me, has slight pulmonary congestion    Portions of the record may have been created with voice recognition software  Occasional wrong word or "sound a like" substitutions may have occurred due to the inherent limitations of voice recognition software  Read the chart carefully and recognize, using context, where substitutions have occurred  If you have any questions, please contact the dictating provider

## 2018-05-26 NOTE — CONSULTS
Progress Note - Wound   Krystle Puller 68 y o  female MRN: [id number]  Unit/Bed#:  Encounter: [id number]      Assessment:  1  Pt  On vent, with PEG tube feeding  Eyes opening  2   Prevalon boots on    3  DTI right heel resolving  4  Left buttock and right buttock wounds due to a combination of friction, shear, moisture and pressure  Tissue pink at this point, and almost surface level  Scant amt  Bleeding  5  Lip mucosa with small (approx 0 1x0 1x0 1) open ulcers (approximately 3) mostly located at edges of lips  Due to location, does not appear to be pressure injury related to ET tube  Pink tissue on mucosa  6  Skin tear right upper arm, not assessed by wound nurse  Dermagran dressing intact  Plan:   1  Apply small sacral foam to sacrum, buttocks  Able to apply because pt  Makes no urine, only occasional small amt  Stool  Change q 3 days and prn   2  Lip moisturizer to open areas on lips  3  Continue prevalon boots  3  Continue frequent repositioning  4  Continue hydraguard to heels  5  Moisturizing cream to skin q day  Discussed wound care with nurse Kimberly Neff  Vitals: Blood pressure (!) 86/50, pulse 92, temperature 99 3 °F (37 4 °C), temperature source Probe, resp  rate (!) 29, height 4' 11" (1 499 m), weight 51 2 kg (112 lb 14 oz), SpO2 100 %  ,Body mass index is 22 8 kg/m²  Pressure Ulcer 03/05/18 Buttocks Right stage 3  (Active)   Staging Stage III 5/26/2018 12:30 PM   Wound Description Pink 5/26/2018 12:30 PM   Araseli-wound Assessment Fragile 5/26/2018 12:30 PM   Shape irreg   5/22/2018  8:00 PM   Wound Length (cm) 4 cm 5/22/2018  8:00 PM   Wound Width (cm) 3 cm 5/22/2018  8:00 PM   Wound Depth (cm) 0 2 5/22/2018  8:00 PM   Calculated Wound Area (cm^2) 12 cm^2 5/22/2018  8:00 PM   Calculated Wound Volume (cm^3) 2 4 cm^3 5/22/2018  8:00 PM   Change in Wound Size % -20 5/22/2018  8:00 PM   Tunneling 0 cm 5/21/2018 10:00 AM   Undermining 0 5/21/2018 10:00 AM   Drainage Amount Scant 5/26/2018 12:30 PM   Drainage Description Other (Comment) 5/26/2018 12:30 PM   Treatment Cleansed; Offload; Turn & reposition 5/26/2018 12:30 PM   Dressing Foam 5/26/2018 12:30 PM   Wound packed? No 5/21/2018 10:00 AM   Packing- # removed 0 5/21/2018 10:00 AM   Packing- # inserted 0 5/21/2018 10:00 AM   Patient Tolerance Tolerated well 5/26/2018  8:00 AM   Dressing Status Intact;Dry;Clean 5/26/2018 12:00 PM       Pressure Ulcer 03/05/18 Heel Right DTI (Active)   Staging Deep Tissue Injury 5/26/2018 12:30 PM   Wound Description Dry; Intact 5/26/2018 12:00 PM   Araseli-wound Assessment Clean;Dry; Intact 5/26/2018 12:00 PM   Shape oval 5/22/2018  8:00 PM   Wound Length (cm) 2 cm 5/22/2018  8:00 PM   Wound Width (cm) 0 5 cm 5/22/2018  8:00 PM   Wound Depth (cm) 0 5/22/2018  8:00 PM   Calculated Wound Area (cm^2) 1 cm^2 5/22/2018  8:00 PM   Calculated Wound Volume (cm^3) 0 cm^3 5/22/2018  8:00 PM   Tunneling 0 cm 5/21/2018 10:00 AM   Undermining 0 5/21/2018 10:00 AM   Drainage Amount None 5/24/2018  4:00 PM   Treatment Heel boot(s); Turn & reposition 5/26/2018 12:30 PM   Dressing Open to air 5/26/2018 12:00 PM   Wound packed? No 5/21/2018 10:00 AM   Packing- # removed 0 5/21/2018 10:00 AM   Packing- # inserted 0 5/21/2018 10:00 AM   Patient Tolerance Tolerated well 5/26/2018 12:30 PM   Dressing Status Open to air 5/26/2018 12:00 PM       Pressure Ulcer 05/21/18 Buttocks Left stage 2  (Active)   Staging Stage II 5/26/2018 12:30 PM   Wound Description Pink; Other (Comment) 5/26/2018 12:30 PM   Araseli-wound Assessment Dry; Intact;Fragile 5/26/2018 12:00 PM   Shape irreg   5/22/2018  8:00 PM   Wound Length (cm) 6 cm 5/26/2018 12:30 PM   Wound Width (cm) 5 cm 5/26/2018 12:30 PM   Wound Depth (cm) 0 1 5/26/2018 12:30 PM   Calculated Wound Area (cm^2) 30 cm^2 5/26/2018 12:30 PM   Calculated Wound Volume (cm^3) 3 cm^3 5/26/2018 12:30 PM   Change in Wound Size % -150 5/26/2018 12:30 PM   Tunneling 0 cm 5/21/2018 10:00 AM   Undermining 0 5/21/2018 10:00 AM   Drainage Amount Scant 5/26/2018 12:30 PM   Drainage Description Bloody 5/26/2018 12:30 PM   Treatment Cleansed; Offload; Turn & reposition 5/26/2018 12:30 PM   Dressing Foam 5/26/2018 12:30 PM   Wound packed? No 5/21/2018 10:00 AM   Packing- # removed 0 5/21/2018 10:00 AM   Packing- # inserted 0 5/21/2018 10:00 AM   Patient Tolerance Tolerated well 5/26/2018 12:30 PM   Dressing Status Clean;Dry; Intact 5/26/2018 12:00 PM       Wound 05/25/18 Skin tear Arm Right skin tear (Active)   Wound Description Clean; Intact;Fragile 5/26/2018 12:00 PM   Araseli-wound Assessment Clean;Dry; Intact;Fragile 5/26/2018 12:00 PM   Shape circular 5/25/2018  7:05 PM   Wound Length (cm) 0 5 cm 5/25/2018  7:05 PM   Wound Width (cm) 0 5 cm 5/25/2018  7:05 PM   Drainage Amount None 5/26/2018 12:00 PM   Treatments Site care 5/26/2018 12:00 PM   Dressing Protective barrier 5/26/2018 12:00 PM   Patient Tolerance Tolerated well 5/26/2018 12:00 PM   Dressing Status Dry; Intact 5/26/2018 12:00 PM       Wound 05/25/18 Other (Comment) Mouth ulcerations on lips-not PI related (Active)   Wound Description Other (Comment) 5/26/2018 12:30 PM   Araseli-wound Assessment Intact 5/26/2018 12:30 PM   Wound Length (cm) 0 1 cm 5/26/2018 12:30 PM   Wound Width (cm) 0 1 cm 5/26/2018 12:30 PM   Wound Depth (cm) 0 1 5/26/2018 12:30 PM   Calculated Wound Volume (cm^3) 0 cm^3 5/26/2018 12:30 PM   Drainage Amount None 5/26/2018 12:30 PM   Treatments Cleansed 5/26/2018 12:30 PM   Dressing Other (Comment) 5/26/2018 12:30 PM   Patient Tolerance Tolerated well 5/26/2018 12:30 PM       Incision 03/03/18 Hip Left (Active)

## 2018-05-26 NOTE — PROGRESS NOTES
Progress Note - Critical Care   Mohr Tucker 68 y o  female MRN: [id number]  Unit/Bed#:  Encounter: [id number]    Attending Physician: Johanne Allan DO  ______________________________________________________________________  Assessment and Plan:   1  Acute hypoxic respiratory failure  2  Sepsis, presumed pneumonia  3  Acute on chronic lacunar infarct  4  Stage 2 sacral ulcer  5  ESRD on dialysis  6  Anemia, likely of chronic disease  7  Atrial fibrillation  8  Type 2 diabetes    Neuro: Close neurologic monitoring, avoid sedating medications    CV: Close hemodynamic monitoring, continue aspirin/Plavix/statin, midodrine increased yesterday    Pulm: Spontaneous breathing trial today, continue scheduled nebulizers    GI: Continue tube feeding    : Dialysis per the nephrology service    F/E/N: Continue present tube feeding, replete electrolytes as needed    ID: Day 7 of antibiotics today, cultures negative, most recent procalcitonin was 7 13, continue today, repeat procalcitonin tomorrow    Heme: Continue heparin for DVT prophylaxis    Endo: Follow blood sugars as needed     Msk/Skin: Frequent turning and repositioning, local wound care    Disposition: Remain ICU    Code Status: Level 1 - Full Code    Counseling / Coordination of Care  Total time spent today 25 minutes  Greater than 50% of total time was spent with the patient and / or family counseling and / or coordination of care   A description of the counseling / coordination of care: SBT plan today  ______________________________________________________________________    Chief Complaint: None offered, patient intubated    24 Hour Events: Midodrine increased yesterday, ECHO mostly normal, dialyzed yesterday  ______________________________________________________________________    Physical Exam:   Gen: Minimally interactive, chronically ill-appearing, in no acute distress  Neuro: GCS 9T E4 V1T M4, withdraws left upper/lower, right lower  HEENT: Temporal wasting, PERRL, conjugate gaze, trachea midline, no JVD  CV: Regular rate and rhythm  Pulm: Lung sounds coarse with moderate amount of yellow secretions  GI: Abdomen soft, non-tender, mildly distended with bowel sounds present, G-tube site with no erythema or discharge  MSK: Contracture of right hand, no clubbing/cyanosis/edema  Skin: Warm, dry, stage 2 sacral ulcer  ______________________________________________________________________  Vitals:    18 0507 18 0508 18 0509 18 0600   BP:  110/56  118/58   Pulse: (!) 155 (!) 156 (!) 162 89   Resp: (!) 25 (!) 23 22 17   Temp: 98 1 °F (36 7 °C) 98 1 °F (36 7 °C) 98 1 °F (36 7 °C) 97 9 °F (36 6 °C)   TempSrc:       SpO2: 100% 100% 100% 100%   Weight:    51 2 kg (112 lb 14 oz)   Height:           Temperature:   Temp (24hrs), Av 9 °F (37 7 °C), Min:97 9 °F (36 6 °C), Max:101 7 °F (38 7 °C)    Current Temperature: 97 9 °F (36 6 °C)  Weights:   IBW: 43 2 kg    Body mass index is 22 8 kg/m²    Weight (last 2 days)     Date/Time   Weight    18 0600  51 2 (112 88)    18 0600  54 5 (120 15)            Hemodynamic Monitoring:  N/A     Non-Invasive/Invasive Ventilation Settings:  Respiratory    Lab Data (Last 4 hours)    None         O2/Vent Data (Last 4 hours)       0423          Drager Vent Mode AC/VC+       Resp Rate (BPM) (BPM) 12       VT (mL) (mL) 300       Insp Time (S) (S) 0 8       FIO2 (%) (%) 35       PEEP (cmH2O) (cmH2O) 5       Rise Time (%) (%) 0 2       MV (Obs) 5 84                 No results found for: PHART, HOM4BKX, PO2ART, MLR1EGA, S3OCXARK, BEART, SOURCE  SpO2: SpO2: 100 %, SpO2 Activity: SpO2 Activity: At Rest, SpO2 Device: O2 Device: Other (comment) (vent)  Intake and Outputs:  I/O        07 -  0700 701 -  0700    I V  (mL/kg) 50 (0 9) 530 (10 4)    NG/ 500    IV Piggyback  250    Feedings 1309 828    Total Intake(mL/kg) 1949 (35 8) 2108 (41 2)    Other  2700    Stool 40 0    Total Output 40 2700    Net +[phone]          Unmeasured Stool Occurrence 3 x 3 x          UOP: 0/hour     Nutrition:        Diet Orders            Start     Ordered    05/21/18 1411  Diet Enteral/Parenteral; Tube Feeding No Oral Diet; Nepro; 100; Every 4 hours  Diet effective now     Comments:  Please begin tube feeding at 10 ml/hr and advance by 10 mL/hr every 4 hours to a goal of 45 mL/hr   Question Answer Comment   Diet Type Enteral/Parenteral    Enteral/Parenteral Tube Feeding No Oral Diet    Tube Feeding Formula: Nepro    Tube Feeding water flush (mL): 100    Water flush frequency: Every 4 hours    RD to adjust diet per protocol? Yes        05/21/18 1411          TF currently running at 45/hour with a goal of 45  Formula: Nepro    Labs:     Results from last 7 days  Lab Units 05/25/18  2309 05/25/18  0457 05/24/18  0552  05/21/18  0504  05/20/18 2021   WBC Thousand/uL 10 60* 9 73 10 77*  < > 22 30*  --  22 00*   HEMOGLOBIN g/dL 8 5* 8 3* 8 1*  < > 9 7*  --  5 4*   I STAT HEMOGLOBIN   --   --   --   --   --   < >  --    HEMATOCRIT % 26 8* 28 1* 25 2*  < > 29 2*  --  17 5*   PLATELETS Thousands/uL 168 146* 156  < > 282  --  305   MONO PCT MAN %  --   --  2*  --  1*  --  6   < > = values in this interval not displayed      Results from last 7 days  Lab Units 05/26/18  0437 05/25/18  1629 05/25/18  0457  05/23/18  0459 05/22/18  0435  05/21/18  0456   SODIUM mmol/L 132* 137 130*  < > 133* 139  --  136  136   POTASSIUM mmol/L 4 1 3 7 4 2  < > 4 0 3 8  --  3 2*  3 2*   CHLORIDE mmol/L 94* 97* 93*  < > 97* 99*  --  96*  96*   CO2 mmol/L 26 29 23  < > 18* 27  --  24  22   BUN mg/dL 29* 18 42*  < > 41* 27*  --  68*  70*   CREATININE mg/dL 2 55* 1 94* 3 53*  < > 4 34* 3 13*  --  5 49*  5 45*   CALCIUM mg/dL 10 4* 9 8 9 7  < > 10 2* 9 5  --  9 9  9 8   TOTAL PROTEIN g/dL  --   --   --   --  7 4 6 2*  --  6 9  6 9   BILIRUBIN TOTAL mg/dL  --   --   --   --  6 40* 4 90*  --  3 40*  3 40*   ALK PHOS U/L  --   --   -- --  943* 833*  --  1,032*  1,047*   ALT U/L  --   --   --   --  147* 198*  --  321*  316*   AST U/L  --   --   --   --  115* 150*  --  260*  280*   GLUCOSE RANDOM mg/dL 162* 150* 170*  < > 148* 93  --  256*  254*   GLUCOSE, ISTAT   --   --   --   --   --   --   < >  --    < > = values in this interval not displayed  Results from last 7 days  Lab Units 18  0437 18  0552 18  0456   MAGNESIUM mg/dL 3 1* 2 4 2 3  2 3     Lab Results   Component Value Date    PHOS 2 1 (L) 2018    PHOS 2 7 2018    PHOS 2 7 2018        Results from last 7 days  Lab Units 18  2309 18  0456 18  2021   INR  1 19* 1 17 1 05   PTT seconds  --   --  25       0  Lab Value Date/Time   TROPONINI 0 13 (H) 2018 2021   TROPONINI 0 22 (H) 03/15/2018 0939   TROPONINI 0 27 (H) 03/15/2018 0549   TROPONINI 0 29 (H) 2018 1523   TROPONINI 0 06 (H) 2018 0938   TROPONINI 0 04 2017 1614       Results from last 7 days  Lab Units 18  2052   LACTIC ACID mmol/L 1 5     ABG:  Lab Results   Component Value Date    PHART 7 465 (H) 2018    NIE6GVO 35 8 (L) 2018    PO2ART 149 5 (H) 2018    NOX7FIN 25 2 2018    BEART 1 5 2018    SOURCE Radial, Right 2018       Imagin/26 CXR- No acute cardiopulmonary disease, my interpretation   KUB- Nonobstructed bowel gas pattern   ECHO- EF 60%, trace mitral/aortic regurgitation, mild tricuspid/pulmonic regurgitation I have personally reviewed pertinent reports     and I have personally reviewed pertinent films in PACS    EKG: Review of telemetry demonstrates sinus rhythm    Micro:  Lab Results   Component Value Date    BLOODCX No Growth at 48 hrs  2018    BLOODCX No Growth at 48 hrs  2018    BLOODCX Staphylococcus coagulase negative (A) 2018    URINECX No Growth <1000 cfu/mL 2017    SPUTUMCULTUR 1 colony Staphylococcus coagulase negative (A) 2018       Allergies: No Known Allergies    Medications:   Scheduled Meds:  Current Facility-Administered Medications:  acetaminophen 650 mg Oral Q6H PRN Tamir Shultz PA-C    aspirin 81 mg Oral Daily BENNY Gaona    atorvastatin 20 mg Oral Daily With [employer] Tesoriero, DO    cefepime 1,000 mg Intravenous Q24H BENNY Esteban Last Rate: Stopped (05/26/18 0459)   chlorhexidine 15 mL Swish & Spit Q12H [address] BENNY Esteban    clopidogrel 75 mg Oral Daily BENNY Gaona    epoetin babak 10,000 Units Intravenous Once per day on Mon Wed Fri Ernesto Edmonds MD    fentanyl citrate (PF) 50 mcg Intravenous Q2H PRN BENNY Ruiz    heparin (porcine) 5,000 Units Subcutaneous Q8H [address] BENNY Tariq    ibuprofen 400 mg Oral Q6H PRN BENNY Tariq    ipratropium 0 5 mg Nebulization Q6H Erickson Tesoriero, DO    levalbuterol 1 25 mg Nebulization Q6H Erickson Tesoriero, DO    metoprolol 5 mg Intravenous Q6H PRN BENNY Gaona    midodrine 15 mg Oral TID AC BENNY Gaona    pantoprazole 40 mg Intravenous Q24H [address] BENNY Robin    sodium phosphate 30 mmol Intravenous Once BENNY Ruiz    vancomycin 750 mg Intravenous After Dialysis Tamir Shultz PA-C Last Rate: Stopped (05/25/18 1200)     Continuous Infusions:   PRN Meds:    acetaminophen 650 mg Q6H PRN   fentanyl citrate (PF) 50 mcg Q2H PRN   ibuprofen 400 mg Q6H PRN   metoprolol 5 mg Q6H PRN   vancomycin 750 mg After Dialysis       VTE Pharmacologic Prophylaxis: Heparin  VTE Mechanical Prophylaxis: sequential compression device    Invasive lines and devices: Invasive Devices     Peripheral Intravenous Line            Peripheral IV 05/25/18 Left Arm less than 1 day    Peripheral IV 05/25/18 Right Forearm less than 1 day          Line            HD Cath Double -- days          Drain            Gastrostomy/Enterostomy Percutaneous endoscopic gastrostomy (PEG) LUQ -- days          Airway            ETT  Hi-Lo; Cuffed 7 5 mm 5 days                     Portions of the record may have been created with voice recognition software  Occasional wrong word or "sound a like" substitutions may have occurred due to the inherent limitations of voice recognition software  Read the chart carefully and recognize, using context, where substitutions have occurred      BENNY Duong

## 2018-05-27 NOTE — RESPIRATORY THERAPY NOTE
RT Ventilator Management Note  Chad Innocent 68 y o  female MRN: [id number]  Unit/Bed#:  Encounter: [id number]      Daily Screen       5/26/2018 0718 5/27/2018 1122          Patient safety screen outcome[de-identified] Passed Passed              Physical Exam:   Assessment Type: Assess only  General Appearance: Drowsy  Respiratory Pattern: Assisted  Chest Assessment: Chest expansion symmetrical  Bilateral Breath Sounds: Diminished, Coarse  Suction: ET Tube      Resp Comments: pt is back on full mechanical support

## 2018-05-27 NOTE — RESPIRATORY THERAPY NOTE
RT Ventilator Management Note  Tom Kaur 68 y o  female MRN: [id number]  Unit/Bed#:  Encounter: [id number]      Daily Screen       5/26/2018 0718 5/27/2018 1122          Patient safety screen outcome[de-identified] Passed Passed              Physical Exam:   Assessment Type: Assess only  General Appearance: Drowsy  Respiratory Pattern: Assisted  Chest Assessment: Chest expansion symmetrical  Bilateral Breath Sounds: Diminished, Coarse  Suction: ET Tube      Resp Comments: pt remains on cpap ps 12/5

## 2018-05-27 NOTE — RESPIRATORY THERAPY NOTE
RT Ventilator Management Note  Bertram Herrera 68 y o  female MRN: [id number]  Unit/Bed#:  Encounter: [id number]      Daily Screen       5/25/2018 0903 5/26/2018 0718          Patient safety screen outcome[de-identified] Passed Passed      Not Ready for Weaning due to[de-identified] - -      Spont breathing trial % for 30 min: No -              Physical Exam:   Assessment Type: Assess only  General Appearance: Drowsy  Respiratory Pattern: Assisted  Chest Assessment: Chest expansion symmetrical  Bilateral Breath Sounds: Diminished, Coarse  Suction: ET Tube      Resp Comments: pt remains on full mechanical support

## 2018-05-27 NOTE — PROGRESS NOTES
Progress Note - Critical Care   Anayeli Harry 68 y o  female MRN: [id number]  Unit/Bed#:  Encounter: [id number]    Attending Physician: Anson Garcia DO  ______________________________________________________________________  Assessment and Plan:   1  Acute hypoxic respiratory failure  2  Sepsis of unclear etiology  3  Acute on chronic lacunar infarct  4  Stage 2 sacral ulcer  5  ESRD on dialysis  6  Anemia, likely of chronic disease  7  Atrial fibrillation  8  Type 2 diabetes  9  Hyponatremia    Neuro: Close neurologic monitoring, avoid sedating medications    CV: Close hemodynamic monitoring, continue aspirin/Plavix/statin, continue midodrine to support volume removal    Pulm: Spontaneous breathing trial, contnued scheduled nebulizers    GI: Resume tube feeding following ultrasound    : Dialysis per nephrology service, pending OBGYN and US of fluid filled uterus    F/E/N: Resume tube feeding, reduce free water flushes, replete electrolytes as needed    ID: Day 8 of antibiotics, will continue pending OBGYN recommendations    Heme: Continue heparin for DVT prophylaxis    Endo: Follow blood sugars as needed     Msk/Skin: Frequent turning and repositioning, wound care    Disposition: Remain ICU    Code Status: Level 1 - Full Code    Counseling / Coordination of Care  Total time spent today 22 minutes  Greater than 50% of total time was spent with the patient and / or family counseling and / or coordination of care   A description of the counseling / coordination of care: plan for SBT  ______________________________________________________________________    Chief Complaint: Intubated    24 Hour Events: Patient CT scan demonstrated large fluid-filled uterus, US and OBGYN consult pending, remains afebril  ______________________________________________________________________    Physical Exam:   Gen: Intubated, not-oriented  Neuro: GCS 7T E2 V1T M4  HEENT: Atraumatic, conjugate gaze, trachea midline, no JVD  CV: Tachycardic, regular rhythm  Pulm: Lungs clear to auscultation bilaterally, moderate amount of thin white secretions inline suction  GI: Abdomen soft, distended, bowel sounds present, G-tube in place with no signs of erythema or discharge  MSK: Atraumatic, no clubbing/cyanosis/edema, contracture of right hand  Skin: Warm, dry, stage 2 sacral ulcer  ______________________________________________________________________  Vitals:    18 0500 18 0600 18 0700 18 0800   BP: 130/56  116/57 114/53   BP Location:   Left arm    Pulse: 82 81 101 104   Resp: 21 (!) 24 21 22   Temp: 98 4 °F (36 9 °C) 98 2 °F (36 8 °C) 98 5 °F (36 9 °C) 98 4 °F (36 9 °C)   TempSrc:   Axillary    SpO2: 100% 99% 99% 99%   Weight:       Height:           Temperature:   Temp (24hrs), Av 9 °F (37 2 °C), Min:98 2 °F (36 8 °C), Max:99 9 °F (37 7 °C)    Current Temperature: 98 4 °F (36 9 °C)  Weights:   IBW: 43 2 kg    Body mass index is 22 8 kg/m²    Weight (last 2 days)     Date/Time   Weight    18 0600  51 2 (112 88)    18 0600  54 5 (120 15)            Hemodynamic Monitoring:  N/A     Non-Invasive/Invasive Ventilation Settings:  Respiratory    Lab Data (Last 4 hours)    None         O2/Vent Data (Last 4 hours)       08          Drager Vent Mode AC/VC+       Resp Rate (BPM) (BPM) 12       VT (mL) (mL) 300       Insp Time (S) (S) 0 8       FIO2 (%) (%) 35       PEEP (cmH2O) (cmH2O) 5       Rise Time (%) (%) 0 2       MV (Obs) 3 83                 No results found for: PHART, XFD3ABT, PO2ART, DXD3SOU, H1JJDUTF, BEART, SOURCE  SpO2: SpO2: 99 %, SpO2 Activity: SpO2 Activity: At Rest, SpO2 Device: O2 Device:  (vent)  Intake and Outputs:  I/O        07    I V  (mL/kg) 530 (10 4)      NG/ 500     IV Piggyback 250 250     Feedings 828 1054     Total Intake(mL/kg) 2108 (41 2) 1804 (35 2)     Other 2700      Stool 0      Total Output 2700 Net -278 +1804             Unmeasured Stool Occurrence 3 x 3 x           UOP: 0/hour     Nutrition:        Diet Orders            Start     Ordered    05/27/18 0500  Diet NPO  Diet effective 0500     Question Answer Comment   Diet Type NPO    RD to adjust diet per protocol? Yes        05/26/18 1652          TF currently running at 0/hour with a goal of 45  Formula: Nepro    Labs:     Results from last 7 days  Lab Units 05/27/18  0655 05/26/18  0437 05/25/18  2309  05/24/18  0552  05/21/18  0504   WBC Thousand/uL 11 16* 12 24* 10 60*  < > 10 77*  < > 22 30*   HEMOGLOBIN g/dL 8 0* 9 5* 8 5*  < > 8 1*  < > 9 7*   HEMATOCRIT % 25 1* 30 7* 26 8*  < > 25 2*  < > 29 2*   PLATELETS Thousands/uL 171 169 168  < > 156  < > 282   MONO PCT MAN %  --  4  --   --  2*  --  1*   < > = values in this interval not displayed  Results from last 7 days  Lab Units 05/27/18  0609 05/26/18  0437 05/25/18  1629  05/23/18  0459 05/22/18  0435  05/21/18  0456   SODIUM mmol/L 126* 132* 137  < > 133* 139  --  136  136   POTASSIUM mmol/L 4 5 4 1 3 7  < > 4 0 3 8  --  3 2*  3 2*   CHLORIDE mmol/L 90* 94* 97*  < > 97* 99*  --  96*  96*   CO2 mmol/L 19* 26 29  < > 18* 27  --  24  22   BUN mg/dL 42* 29* 18  < > 41* 27*  --  68*  70*   CREATININE mg/dL 3 45* 2 55* 1 94*  < > 4 34* 3 13*  --  5 49*  5 45*   CALCIUM mg/dL 9 5 10 4* 9 8  < > 10 2* 9 5  --  9 9  9 8   TOTAL PROTEIN g/dL  --   --   --   --  7 4 6 2*  --  6 9  6 9   BILIRUBIN TOTAL mg/dL  --   --   --   --  6 40* 4 90*  --  3 40*  3 40*   ALK PHOS U/L  --   --   --   --  943* 833*  --  1,032*  1,047*   ALT U/L  --   --   --   --  147* 198*  --  321*  316*   AST U/L  --   --   --   --  115* 150*  --  260*  280*   GLUCOSE RANDOM mg/dL 109 162* 150*  < > 148* 93  --  256*  254*   GLUCOSE, ISTAT   --   --   --   --   --   --   < >  --    < > = values in this interval not displayed      Results from last 7 days  Lab Units 05/27/18  0609 05/26/18  0437 05/24/18  0552   [facility] mg/dL 2 8* 3 1* 2 4     Lab Results   Component Value Date    PHOS 6 4 (H) 2018    PHOS 2 1 (L) 2018    PHOS 2 7 2018    PHOS 2 7 2018        Results from last 7 days  Lab Units 18  2309 18  0456 18   INR  1 19* 1 17 1 05   PTT seconds  --   --  25       0  Lab Value Date/Time   TROPONINI 0 13 (H) 2018 202   TROPONINI 0 22 (H) 03/15/2018 0939   TROPONINI 0 27 (H) 03/15/2018 0549   TROPONINI 0 29 (H) 2018 1523   TROPONINI 0 06 (H) 2018 0938   TROPONINI 0 04 2017 1614       Results from last 7 days  Lab Units 18  2052   LACTIC ACID mmol/L 1 5     ABG:  Lab Results   Component Value Date    PHART 7 465 (H) 2018    OPD5LUP 35 8 (L) 2018    PO2ART 149 5 (H) 2018    DPN9RYU 25 2 2018    BEART 1 5 2018    SOURCE Radial, Right 2018       Imagin/26 CT C/A/P- large amount of fluid in the endometrial cavity, trace pericardial and left pleural effusion as well as anasarca I have personally reviewed pertinent reports  and I have personally reviewed pertinent films in PACS    EKG: Review of telemetry demonstrates sinus tachycardia    Micro:  Lab Results   Component Value Date    BLOODCX No Growth After 4 Days  2018    BLOODCX No Growth After 4 Days   2018    BLOODCX Staphylococcus coagulase negative (A) 2018    URINECX No Growth <1000 cfu/mL 2017    SPUTUMCULTUR 1 colony Staphylococcus coagulase negative (A) 2018       Allergies: No Known Allergies    Medications:   Scheduled Meds:  Current Facility-Administered Medications:  acetaminophen 650 mg Oral Q6H PRN Fransico Pandya PA-C    aspirin 81 mg Oral Daily BENNY Fisher    atorvastatin 20 mg Oral Daily With [employer] DO Suzie    cefepime 1,000 mg Intravenous Q24H Asif Lay, CRNP Last Rate: 1,000 mg (18 0519)   chlorhexidine 15 mL Swish & Spit Q12H [address], [address] [place] [address] clopidogrel 75 mg Oral Daily BENNY Melendrez    epoetin babak 10,000 Units Intravenous Once per day on Mon Wed Fri Jie Aquino MD    fentanyl citrate (PF) 50 mcg Intravenous Q2H PRN BENNY Martel    heparin (porcine) 5,000 Units Subcutaneous Q8H [address] BENNY Tariq    ibuprofen 400 mg Oral Q6H PRN BENNY Tariq    ipratropium 0 5 mg Nebulization TID Nkechi Moravian, DO    levalbuterol 1 25 mg Nebulization TID Nkechi Moravian, DO    metoprolol 5 mg Intravenous Q6H PRN BENNY Melendrez    midodrine 15 mg Oral TID AC BENNY Melendrez    pantoprazole 40 mg Intravenous Q24H [address] BENNY Robin    vancomycin 750 mg Intravenous After Dialysis Jeanine Hardin PA-C Last Rate: Stopped (05/25/18 1200)     Continuous Infusions:   PRN Meds:    acetaminophen 650 mg Q6H PRN   fentanyl citrate (PF) 50 mcg Q2H PRN   ibuprofen 400 mg Q6H PRN   metoprolol 5 mg Q6H PRN   vancomycin 750 mg After Dialysis       VTE Pharmacologic Prophylaxis: Heparin  VTE Mechanical Prophylaxis: sequential compression device    Invasive lines and devices: Invasive Devices     Peripheral Intravenous Line            Peripheral IV 05/25/18 Left Arm 1 day    Peripheral IV 05/25/18 Right Forearm 1 day          Line            HD Cath Double -- days          Drain            Gastrostomy/Enterostomy Percutaneous endoscopic gastrostomy (PEG) LUQ -- days          Airway            ETT  Hi-Lo; Cuffed 7 5 mm 6 days                     Portions of the record may have been created with voice recognition software  Occasional wrong word or "sound a like" substitutions may have occurred due to the inherent limitations of voice recognition software  Read the chart carefully and recognize, using context, where substitutions have occurred      BENNY Martel

## 2018-05-27 NOTE — PROGRESS NOTES
ASSESSMENT & PLAN:  26-year-old female initially presented with 1 week of cough and fever  Patient was confused, was in rapid AFib and hypotensive and was intubated  Patient is diagnosed as having acute CVA superimposed on chronic CVA  She had possible GI bleed but colonoscopy was declined by family  She has been running hypotensive during dialysis  Blood culture is now positive for gram-negative rods  CT abdomen showing fluid in endometrial cavity  End-stage renal disease on hemodialysis  -she is on hemodialysis Monday Wednesday Friday  Will do next hemodialysis tomorrow  Access tunneled dialysis catheter, right internal jugular    Hyponatremia  -worsening hyponatremia  Likely due to free water from the PEG tube  Discussed with ICU nurse-recommended not using free water at all if possible  Hypotension  -continue midodrine at current dose  Blood pressure currently stable  Hypotension Could be due to sepsis as blood culture is positive for gram-negative rods  Has leukocytosis  Continue antibiotics per ICU  Follow up final blood culture  CT abdomen pelvis showing fluid collection in the endometrial cavity and plan for ultrasound as recommended by Gyn  Anemia due to chronic kidney disease and possibly GI bleed  -continue Epogen during hemodialysis  Slight drop in hemoglobin today, continue monitor  Metabolic acidosis  -bicarbonate level is 19 today  Will use higher bicarbonate bath during hemodialysis treatment tomorrow  Check lactic acid level  Hyperphosphatemia  -due to IV sodium phosphate replacement yesterday  Would monitor, no need for binders  Hypermagnesemia, likely from IV magnesium replacement  Monitor magnesium level  Magnesium level trending down  Acute respiratory failure on vent, currently on FiO2 of 35%, management per Pulmonary Critical Care  DM-2- mx per primary team    Discussed with icu nurse       SUBJECTIVE:  Patient still in ICU, intubated and on vent at 35% FiO2  No new complaints  OBJECTIVE:  Current Weight: Weight - Scale: 51 2 kg (112 lb 14 oz)  Vitals:    05/27/18 0800   BP: 114/53   Pulse: 104   Resp: 22   Temp: 98 4 °F (36 9 °C)   SpO2: 99%       Intake/Output Summary (Last 24 hours) at 05/27/18 1001  Last data filed at 05/27/18 0600   Gross per 24 hour   Intake             1274 ml   Output                0 ml   Net             1274 ml       Physical Exam   General:  Ill looking, intubated   Head: normocephalic, atraumatic  Eyes: Conjunctivae pink,  Sclera anicteric  ENT: Lips and mucous membranes moist  Neck: supple   Chest: Clear to Auscultation both lungs,  no crackles or wheezing  CVS: S1 & S2 present, tachycardic, regular rhythm, no murmur  Abdomen: soft, non-tender, non-distended, Bowel sounds normoactive  Extremities: no edema of  legs  Neuro:  Response to pain, intubated  Skin: no rash, warm and dry  Psych: Intubated  Unable to assess            Medications:    Current Facility-Administered Medications:     acetaminophen (TYLENOL) tablet 650 mg, 650 mg, Oral, Q6H PRN, Amish Arroyo PA-C, 650 mg at 05/25/18 1606    aspirin chewable tablet 81 mg, 81 mg, Oral, Daily, BENNY Lanier, 81 mg at 05/27/18 0944    atorvastatin (LIPITOR) tablet 20 mg, 20 mg, Oral, Daily With Kizzy Larsen DO, 20 mg at 05/26/18 1535    cefepime (MAXIPIME) IVPB (premix) 1,000 mg, 1,000 mg, Intravenous, Q24H, Rosamaria Libman, CRNP, Last Rate: 100 mL/hr at 05/27/18 0519, 1,000 mg at 05/27/18 0519    chlorhexidine (PERIDEX) 0 12 % oral rinse 15 mL, 15 mL, Swish & Spit, Q12H [address], Rosamaria Libman, CRNP, 15 mL at 05/27/18 0944    clopidogrel (PLAVIX) tablet 75 mg, 75 mg, Oral, Daily, BENNY Lanier, 75 mg at 05/27/18 0944    epoetin babak (EPOGEN,PROCRIT) injection 10,000 Units, 10,000 Units, Intravenous, Once per day on Mon Wed Fri, Deyanira Kruse MD, 10,000 Units at 05/25/18 0859    fentanyl citrate (PF) 100 MCG/2ML 50 mcg, 50 mcg, Intravenous, Q2H PRN, BENNY Kiran, 50 mcg at 05/27/18 0124    heparin (porcine) subcutaneous injection 5,000 Units, 5,000 Units, Subcutaneous, Q8H [facility], BENNY Tariq, 5,000 Units at 05/27/18 0517    ibuprofen (MOTRIN) oral suspension 400 mg, 400 mg, Oral, Q6H PRN, BLAISE TariqNP, 400 mg at 05/26/18 1313    ipratropium (ATROVENT) 0 02 % inhalation solution 0 5 mg, 0 5 mg, Nebulization, TID, Marko Armani, DO, 0 5 mg at 05/27/18 0719    levalbuterol (XOPENEX) inhalation solution 1 25 mg, 1 25 mg, Nebulization, TID, Marko Armani, DO, 1 25 mg at 05/27/18 0719    metoprolol (LOPRESSOR) injection 5 mg, 5 mg, Intravenous, Q6H PRN, BENNY [place], 5 mg at 05/26/18 0507    midodrine (PROAMATINE) tablet 15 mg, 15 mg, Oral, TID AC, BENNY [place], 15 mg at 05/27/18 0800    pantoprazole (PROTONIX) injection 40 mg, 40 mg, Intravenous, Q24H [facility], BENNY Kiran, 40 mg at 05/27/18 0944    vancomycin (VANCOCIN) IVPB (premix) 750 mg, 750 mg, Intravenous, After Dialysis, Jodi Vega PA-C, Stopped at 05/25/18 1200    Invasive Devices:        Lab Results:     Results from last 7 days  Lab Units 05/27/18  0655 05/27/18  0609 05/26/18  0437 05/25/18  2309 05/25/18  1629  05/24/18  0552 05/23/18  0459 05/22/18  0435  05/21/18  0456   WBC Thousand/uL 11 16*  --  12 24* 10 60*  --   < > 10 77* 15 60* 16 89*  < >  --    HEMOGLOBIN g/dL 8 0*  --  9 5* 8 5*  --   < > 8 1* 9 6* 8 1*  < >  --    I STAT HEMOGLOBIN   --   --   --   --   --   --   --   --   --   < >  --    HEMATOCRIT % 25 1*  --  30 7* 26 8*  --   < > 25 2* 31 2* 25 1*  < >  --    PLATELETS Thousands/uL 171  --  169 168  --   < > 156 201 206  < >  --    SODIUM mmol/L  --  126* 132*  --  137  < > 132* 133* 139  --  136  136   POTASSIUM mmol/L  --  4 5 4 1  --  3 7  < > 3 2* 4 0 3 8  --  3 2*  3 2*   CHLORIDE mmol/L  --  90* 94*  --  97*  < > 95* 97* 99*  --  96*  96*   CO2 mmol/L  --  19* 26  --  29  < > 26 18* 27 --  24  22   BUN mg/dL  --  42* 29*  --  18  < > 24 41* 27*  --  68*  70*   CREATININE mg/dL  --  3 45* 2 55*  --  1 94*  < > 2 60* 4 34* 3 13*  --  5 49*  5 45*   CALCIUM mg/dL  --  9 5 10 4*  --  9 8  < > 9 2 10 2* 9 5  --  9 9  9 8   MAGNESIUM mg/dL  --  2 8* 3 1*  --   --   --  2 4  --   --   --  2 3  2 3   PHOSPHORUS mg/dL  --  6 4* 2 1*  --   --   --   --   --   --   --  2 7  2 7   TOTAL PROTEIN g/dL  --   --   --   --   --   --   --  7 4 6 2*  --  6 9  6 9   BILIRUBIN TOTAL mg/dL  --   --   --   --   --   --   --  6 40* 4 90*  --  3 40*  3 40*   ALK PHOS U/L  --   --   --   --   --   --   --  943* 833*  --  1,032*  1,047*   ALT U/L  --   --   --   --   --   --   --  147* 198*  --  321*  316*   AST U/L  --   --   --   --   --   --   --  115* 150*  --  260*  280*   GLUCOSE RANDOM mg/dL  --  109 162*  --  150*  < > 185* 148* 93  --  256*  254*   GLUCOSE, ISTAT   --   --   --   --   --   --   --   --   --   < >  --    < > = values in this interval not displayed  Previous work up:     Ct Chest Abdomen Pelvis Wo Contrast    Result Date: 5/20/2018  Narrative CT CHEST, ABDOMEN AND PELVIS WITHOUT IV CONTRAST INDICATION:   cough, fever, GI bleeding  COMPARISON: 3/14/2018 TECHNIQUE: CT examination of the chest, abdomen and pelvis was performed without intravenous contrast   Axial, sagittal, and coronal 2D reformatted images were created from the source data and submitted for interpretation  Radiation dose length product (DLP) for this visit:  367 mGy-cm   This examination, like all CT scans performed in the [facility], was performed utilizing techniques to minimize radiation dose exposure, including the use of iterative reconstruction and automated exposure control  Enteric contrast was administered  FINDINGS: CHEST LUNGS:  Respiratory motion artifact reduces sensitivity for nodule detection  Bibasilar dependent atelectatic changes are seen   PLEURA:  There is a small left-sided pleural effusion  HEART/GREAT VESSELS:  Unremarkable for patient's age  MEDIASTINUM AND TYLER:  Unremarkable  CHEST WALL AND LOWER NECK:   There is a right subscapular lipoma noted  No axillary adenopathy ABDOMEN Examination is limited by patient motion LIVER/BILIARY TREE:  Unremarkable  GALLBLADDER:  No calcified gallstones  No pericholecystic inflammatory change  SPLEEN:  Unremarkable  PANCREAS:  Unremarkable  ADRENAL GLANDS:  Unremarkable  KIDNEYS/URETERS:  Unremarkable  No hydronephrosis  STOMACH AND BOWEL:  G-tube present within the stomach  No bowel obstruction  Again there is limitation secondary to the motion artifact  APPENDIX:  No findings to suggest appendicitis  ABDOMINOPELVIC CAVITY:  No ascites or free intraperitoneal air  No lymphadenopathy  VESSELS:  Unremarkable for patient's age  PELVIS REPRODUCTIVE ORGANS:  Limited assessment of the uterus secondary to artifact from hip hardware and motion  The uterus does appear enlarged for the patient's age and is heterogeneous  URINARY BLADDER:  Unremarkable  ABDOMINAL WALL/INGUINAL REGIONS:  There is body wall edema  OSSEOUS STRUCTURES:  There is left hip hardware and a right hip prosthesis  There is severe arthritis of the left hip  There is a lucent defect in the right iliac bone on image 2/80 measuring 9 mm  There are degenerative changes of the shoulders  Compression fracture of L2 noted  Impression 1  Limited assessment of the lungs  Bibasilar atelectasis with small left effusion 2  Evaluation of the abdomen is also limited secondary to motion  No bowel obstruction or hydronephrosis is present  3  Uterus appears enlarged for the patient's age  This may be further assessed with nonemergent ultrasound  Considerations related to the patient's age and/or comorbidities may be used to alter these recommendations  4  Compression fracture of L2  Age indeterminant  Lucent defect in the right iliac bone    This could represent a bone donor site although a lytic lesion cannot be excluded  5  Diffuse body wall edema Workstation performed: [id number]     Ct Chest Abdomen Pelvis W Contrast    Result Date: 5/26/2018  Narrative CT CHEST, ABDOMEN AND PELVIS WITH IV CONTRAST INDICATION:   Sepsis  COMPARISON: CT of the chest, abdomen, pelvis dated 314 TECHNIQUE: CT examination of the chest, abdomen and pelvis was performed  Axial, sagittal, and coronal 2D reformatted images were created from the source data and submitted for interpretation  Radiation dose length product (DLP) for this visit:  372 mGy-cm   This examination, like all CT scans performed in the [facility], was performed utilizing techniques to minimize radiation dose exposure, including the use of iterative reconstruction and automated exposure control  IV Contrast:  100 mL of iohexol (OMNIPAQUE) Enteric Contrast: Enteric contrast was administered  FINDINGS: CHEST LUNGS:  Endotracheal tube tip terminates 1 3 cm before the abiel  Dependent atelectasis in both lower lobes and in the posterior left upper lobe  No other acute consolidation  No interstitial thickening  Patient motion is present  Partially obscured sub-6 mm nodules in the right lower lobe appear unchanged since March  PLEURA:  Trace left pleural effusion  HEART/GREAT VESSELS:  Borderline cardiomegaly  Central venous catheter terminates at the high right atrium  Trace pericardial effusion  Thoracic aorta is unremarkable for age  MEDIASTINUM AND TYLER:  Unremarkable  CHEST WALL AND LOWER NECK:   Mild anasarca  ABDOMEN LIVER/BILIARY TREE:  Liver is enlarged  No focal hepatic lesions are noted  Hepatic contours are within normal limits  No biliary dilatation  GALLBLADDER:  Sludge in gallbladder  Wall thickening  No pericholecystic inflammatory change  SPLEEN:  Unremarkable  PANCREAS:  Unremarkable  ADRENAL GLANDS:  Unremarkable  KIDNEYS/URETERS:  Unremarkable  No hydronephrosis   STOMACH AND BOWEL:  Percutaneous gastrostomy tube within the gastric body  No gastric wall thickening  No dilated or inflamed loops of bowel  Seton material within a right perirectal fistula  APPENDIX:  Not well demonstrated  No evidence for acute appendicitis  ABDOMINOPELVIC CAVITY:  No ascites or free intraperitoneal air  No lymphadenopathy  VESSELS:  Unremarkable for patient's age  PELVIS REPRODUCTIVE ORGANS:  Large amount of fluid within the endometrial cavity  URINARY BLADDER:  Entirely obscured by streak artifact from patient's hip hardware  ABDOMINAL WALL/INGUINAL REGIONS:  Anasarca  OSSEOUS STRUCTURES:  Prior right total hip plasty  Nail, screw, and cerclage wire fixation of the left proximal femur  Severe posttraumatic/degenerative deformity the left hip with erosive component  Severe glenohumeral erosive arthropathy  Findings suggest background rheumatoid arthritis  No evidence for loosening or infection  Anterior wedging of L2 unchanged from prior  No acute fracture or focally destructive osseous lesion  Punched out pole in the right iliac crest from prior graft harvest or marrow biopsy  Impression 1  Large amount of fluid in the endometrial cavity, abnormal for age and new from prior  Correlate for clinical evidence of endometritis  Cervical stenosis or endometrial neoplasm can give this appearance but are less likely given that the finding is new from prior  2   Trace pericardial and left pleural effusions as well as anasarca suggesting 3rd spacing  No encapsulated fluid collections to suggest abscess  3   Endotracheal tube tip terminates near the abiel  Recommend retracting by 2 cm to decrease risk for mainstem intubation with head flexion  Workstation performed: [id number]       Portions of the record may have been created with voice recognition software  Occasional wrong word or "sound a like" substitutions may have occurred due to the inherent limitations of voice recognition software   Read the chart carefully and recognize, using context, where substitutions have occurred  If you have any questions, please contact the dictating provider

## 2018-05-27 NOTE — RESPIRATORY THERAPY NOTE
RT Ventilator Management Note  Radha Alonzo 68 y o  female MRN: [id number]  Unit/Bed#:  Encounter: [id number]      Daily Screen       5/25/2018 0903 5/26/2018 0718          Patient safety screen outcome[de-identified] Passed Passed      Not Ready for Weaning due to[de-identified] - -      Spont breathing trial % for 30 min: No -              Physical Exam:   Assessment Type: Assess only  General Appearance: Drowsy  Respiratory Pattern: Assisted  Chest Assessment: Chest expansion symmetrical  Bilateral Breath Sounds: Diminished, Coarse  Suction: ET Tube      Resp Comments: placed pt on cpap/ps 12/5

## 2018-05-28 NOTE — PLAN OF CARE
DISCHARGE PLANNING - CARE MANAGEMENT     Discharge to post-acute care or home with appropriate resources Not Progressing        GENITOURINARY - ADULT     Maintains or returns to baseline urinary function Not Progressing     Absence of urinary retention Not Progressing        Nutrition/Hydration-ADULT     Nutrient/Hydration intake appropriate for improving, restoring or maintaining nutritional needs Not Progressing        Potential for Falls     Patient will remain free of falls Not Progressing        Prexisting or High Potential for Compromised Skin Integrity     Skin integrity is maintained or improved Not Progressing        RESPIRATORY - ADULT     Achieves optimal ventilation and oxygenation Not Progressing        SAFETY,RESTRAINT: NV/NON-SELF DESTRUCTIVE BEHAVIOR     Remains free of harm/injury (restraint for non violent/non self-detsructive behavior) Not Progressing     Returns to optimal restraint-free functioning Not Progressing

## 2018-05-28 NOTE — OCCUPATIONAL THERAPY NOTE
Occupational Therapy Cancellation Note        Patient Name: Shavonne Jensen Date: 5/28/2018        Chart review indicates, patient remains on mechanical ventilation, not a candidate for OT assessment at this time  OT will continue to follow and evaluate when appropriate

## 2018-05-28 NOTE — RESPIRATORY THERAPY NOTE
RT Ventilator Management Note  Lawanda Acevedo 68 y o  female MRN: [id number]  Unit/Bed#:  Encounter: [id number]      Daily Screen       5/27/2018 1122 5/28/2018 0823          Patient safety screen outcome[de-identified] Passed Failed      Not Ready for Weaning due to[de-identified] - Underline problem not resolved              Physical Exam:   Assessment Type: Assess only  General Appearance: Eyes open/responds to voice, Eyes open/responds to stimulus  Respiratory Pattern: Tachypneic, Assisted  Chest Assessment: Chest expansion symmetrical  Bilateral Breath Sounds: Diminished, Rhonchi      Resp Comments: ET tube moved to 20cm at the lip per Alfonso Gifford  ET tube jacob changed at this time  Pt remains on full mechanical ventilation VC/AC at this time  Surgery will be consulted tomorrow about tracheostomy  Will continue to monitor pt  Plan: continue current support until further orders

## 2018-05-28 NOTE — PROGRESS NOTES
Progress Note - Critical Care   Laurie Simpson 68 y o  female MRN: [id number]  Unit/Bed#:  Encounter: [id number]    Assessment:   Principal Problem:    Acute respiratory failure ([facility] [address] )  Active Problems:    ESRD (end stage renal disease) on dialysis ([facility] )    Anemia of chronic kidney failure, stage 5 (HCC)    Multiple myeloma not having achieved remission ([place] [address] )    Essential hypertension    Diabetes mellitus type 2 in nonobese ([place] [address] )    Hemiparesis due to old stroke ([place] [address] )    Functional quadriplegia (HCC)    Anemia    Inadequate caloric intake    Sepsis (HCC)    Rapid atrial fibrillation (HCC)    Decubitus ulcer of sacral region, stage 2    Acute blood loss anemia    GIB (gastrointestinal bleeding)    GI bleed    Gastrointestinal hemorrhage with melena    Elevated procalcitonin    Altered mental status  Resolved Problems:    * No resolved hospital problems   *    Plan:      Neuro:   -continue to monitor neuro status  -holding sedation for accurate neurologic exams  -[facility] ICU   -sleep hygiene    CV:   -continue ASA/plavix and statin   -midodrine 15 mg TID to support BP during HD treatments    Lung:   -continue mechanical ventilation  -she has failed daily SBTs secondary to hypoventilation and tachypnea  -will likely need tracheostomy    GI:   -continue TF at goal   -protonix for GI prophylaxis    FEN:   -monitor electrolytes and replete as necessary   -nephrology following for HD treatment    :   -patient is anuric   -monitor daily weights  -OBGYN consultation for US of fluid filled uterus pending   ID:   -monitor off abx  -monitor temperature curve and WBCs   Heme:   -continue heparin for DVT prophylaxis    Endo:   -continue to monitor glucose    Msk/Skin:   -frequent turning and reposition   -wound care    Disposition:   -continue ICU care    ______________________________________________________________________    HPI/24hr events: no acute events overnight    ______________________________________________________________________    Physical Exam:   PHYSICAL EXAM  General :   Intubated   Neuro:   CN 2-12 intact  GCS 10 T  HEENT:  Normocephalic, atraumatic, Pupils 3 brisk bilat  PERRLA, EOMI, hearing grossly intact  Tongue midline without fasiculations  Neck:  No JVD, FROM, No masses/adenopathy  Back:   Symmetrical, atruamatic, spinous process pain free on palpation  Cardiovascular:   No heaves,lifts,thrills  S1/S2 [employer] No noted MRGC  Pulmonary:   Symmetrical expansion of chest  Resp even & unlabored  GI :   Abd SNT + BSX4 quads  No palp/pulsitile masses  :  N/A  Musculoskeletal:  Symmetrical  Contracture of right hand  FROM in UE & LE  No lymphadenopathy  Extrem warm to touch  DTR grossly intact  Brachial/radial/femoral/popliteal/pedal/posterior tibial pulses +2  No lesions noted  CN 2-12 grossly intact  No rhomberg  Sensation and motor function intact   ______________________________________________________________________  Vitals:    18 2309 18 0000 18 0100 18 0200   BP:  123/58 115/70 128/63   BP Location:       Pulse:  88 90 93   Resp:  21 (!) 26 (!) 27   Temp:  100 2 °F (37 9 °C) 99 9 °F (37 7 °C) 99 7 °F (37 6 °C)   TempSrc:       SpO2: 98% 96% 95% 95%   Weight:       Height:         Temperature:   Temp (24hrs), Av °F (37 2 °C), Min:98 2 °F (36 8 °C), Max:100 2 °F (37 9 °C)    Current Temperature: 99 7 °F (37 6 °C)  Weights:   IBW: 43 2 kg    Body mass index is 22 8 kg/m²    Weight (last 2 days)     Date/Time   Weight    18 0600  51 2 (112 88)            Hemodynamic Monitoring:  N/A     Non-Invasive/Invasive Ventilation Settings:  Respiratory    Lab Data (Last 4 hours)    None         O2/Vent Data (Last 4 hours)       2309          Drager Vent Mode AC/VC+       Resp Rate (BPM) (BPM) 12       VT (mL) (mL) 300       Insp Time (S) (S) 0 8       FIO2 (%) (%) 35       PEEP (cmH2O) (cmH2O) 5       Rise Time (%) (%) 0 2       MV (Obs) 7 65                 Lab Results   Component Value Date    PHART 7 408 05/27/2018    LOS4TMQ 36 9 05/27/2018    PO2ART 82 4 05/27/2018    RFZ2KVP 22 8 05/27/2018    BEART -1 7 05/27/2018    SOURCE Radial, Right 05/27/2018     SpO2: SpO2: 95 %  Intake and Outputs:  I/O       05/26 0701 - 05/27 0700 05/27 0701 - 05/28 0700    NG/ 0    IV Piggyback 250     Feedings 1054 799    Total Intake(mL/kg) 1804 (35 2) 799 (15 6)    Net +1804 +799          Unmeasured Stool Occurrence 3 x 1 x        Nutrition:        Diet Orders            Start     Ordered    05/27/18 1242  Diet Enteral/Parenteral; Tube Feeding No Oral Diet; Nepro; 45  Diet effective 0500     Question Answer Comment   Diet Type Enteral/Parenteral    Enteral/Parenteral Tube Feeding No Oral Diet    Tube Feeding Formula: Nepro    Tube Feeding Continuous rate (mL/hr): 45    RD to adjust diet per protocol? Yes        05/27/18 1241        Labs:     Results from last 7 days  Lab Units 05/27/18  0655 05/26/18  0437 05/25/18  2309  05/24/18  0552  05/21/18  0504   WBC Thousand/uL 11 16* 12 24* 10 60*  < > 10 77*  < > 22 30*   HEMOGLOBIN g/dL 8 0* 9 5* 8 5*  < > 8 1*  < > 9 7*   HEMATOCRIT % 25 1* 30 7* 26 8*  < > 25 2*  < > 29 2*   PLATELETS Thousands/uL 171 169 168  < > 156  < > 282   MONO PCT MAN %  --  4  --   --  2*  --  1*   < > = values in this interval not displayed     Results from last 7 days  Lab Units 05/27/18  0609 05/26/18  0437 05/25/18  1629  05/23/18  0459 05/22/18  0435  05/21/18  0456   SODIUM mmol/L 126* 132* 137  < > 133* 139  --  136  136   POTASSIUM mmol/L 4 5 4 1 3 7  < > 4 0 3 8  --  3 2*  3 2*   CHLORIDE mmol/L 90* 94* 97*  < > 97* 99*  --  96*  96*   CO2 mmol/L 19* 26 29  < > 18* 27  --  24  22   BUN mg/dL 42* 29* 18  < > 41* 27*  --  68*  70*   CREATININE mg/dL 3 45* 2 55* 1 94*  < > 4 34* 3 13*  --  5 49*  5 45*   CALCIUM mg/dL 9 5 10 4* 9 8  < > 10 2* 9 5  --  9 9  9 8   TOTAL PROTEIN g/dL  --   -- --   --  7 4 6 2*  --  6 9  6 9   BILIRUBIN TOTAL mg/dL  --   --   --   --  6 40* 4 90*  --  3 40*  3 40*   ALK PHOS U/L  --   --   --   --  943* 833*  --  1,032*  1,047*   ALT U/L  --   --   --   --  147* 198*  --  321*  316*   AST U/L  --   --   --   --  115* 150*  --  260*  280*   GLUCOSE RANDOM mg/dL 109 162* 150*  < > 148* 93  --  256*  254*   GLUCOSE, ISTAT   --   --   --   --   --   --   < >  --    < > = values in this interval not displayed  Results from last 7 days  Lab Units 05/27/18  0609 05/26/18  0437 05/24/18  0552   MAGNESIUM mg/dL 2 8* 3 1* 2 4       Results from last 7 days  Lab Units 05/27/18  0609 05/26/18  0437 05/21/18  0456   PHOSPHORUS mg/dL 6 4* 2 1* 2 7  2 7        Results from last 7 days  Lab Units 05/25/18  2309 05/21/18  0456   INR  1 19* 1 17           0  Lab Value Date/Time   TROPONINI 0 13 (H) 05/20/2018 2021   TROPONINI 0 22 (H) 03/15/2018 0939   TROPONINI 0 27 (H) 03/15/2018 0549   TROPONINI 0 29 (H) 03/14/2018 1523   TROPONINI 0 06 (H) 03/14/2018 0938   TROPONINI 0 04 09/19/2017 1614     Imaging: no new imaging today   EKG: NSR   Micro:  Lab Results   Component Value Date    BLOODCX No Growth After 4 Days  05/23/2018    BLOODCX No Growth After 4 Days   05/23/2018    BLOODCX Staphylococcus coagulase negative (A) 05/20/2018    URINECX No Growth <1000 cfu/mL 11/09/2017    SPUTUMCULTUR 1 colony Staphylococcus coagulase negative (A) 05/23/2018     Allergies: No Known Allergies  Medications:   Scheduled Meds:  Current Facility-Administered Medications:  acetaminophen 650 mg Oral Q6H PRN Can Ramirez PA-C   aspirin 81 mg Oral Daily BENNY Gonzalez   atorvastatin 20 mg Oral Daily [employer], DO   chlorhexidine 15 mL Swish & Spit Q12H [facility] BENNY Fiore   clopidogrel 75 mg Oral Daily BENNY Gonzalez   epoetin babak 10,000 Units Intravenous Once per day on Mon Wed Fri Cary Montgomery MD   fentanyl citrate (PF) 50 mcg Intravenous Q2H PRN Darlean Asa, CRNP   heparin (porcine) 5,000 Units Subcutaneous [facility] Kimmy CowanBENNY Dupont   ibuprofen 400 mg Oral Q6H PRN BENNY Tariq   ipratropium 0 5 mg Nebulization TID Bozena Sawyer, DO   levalbuterol 1 25 mg Nebulization TID [place], DO   metoprolol 5 mg Intravenous Q6H PRN Dawna Nageotte, CRNP   midodrine 15 mg Oral TID AC Dawna Nageotte, CRNP   pantoprazole 40 mg Intravenous Q24H [facility] BENNY Robin     Continuous Infusions:   PRN Meds:    acetaminophen 650 mg Q6H PRN   fentanyl citrate (PF) 50 mcg Q2H PRN   ibuprofen 400 mg Q6H PRN   metoprolol 5 mg Q6H PRN     VTE Pharmacologic Prophylaxis: Sequential compression device (Venodyne)  and Heparin  VTE Mechanical Prophylaxis: sequential compression device  Invasive lines and devices: Invasive Devices     Peripheral Intravenous Line            Peripheral IV 05/25/18 Left Arm 2 days    Peripheral IV 05/25/18 Right Forearm 2 days          Line            HD Cath Double -- days          Drain            Gastrostomy/Enterostomy Percutaneous endoscopic gastrostomy (PEG) LUQ -- days          Airway            ETT  Hi-Lo; Cuffed 7 5 mm 7 days                   Counseling / Coordination of Care  Total Critical Care time spent 34 minutes excluding procedures, teaching and family updates  Code Status: Level 1 - Full Code    Portions of the record may have been created with voice recognition software  Occasional wrong word or "sound a like" substitutions may have occurred due to the inherent limitations of voice recognition software  Read the chart carefully and recognize, using context, where substitutions have occurred      Dawna Nageotte, CRNP

## 2018-05-28 NOTE — RESPIRATORY THERAPY NOTE
RT Ventilator Management Note  Rain Beasley 68 y o  female MRN: [id number]  Unit/Bed#:  Encounter: [id number]      Daily Screen       5/27/2018 1122 5/28/2018 0823          Patient safety screen outcome[de-identified] Passed Failed      Not Ready for Weaning due to[de-identified] - Underline problem not resolved              Physical Exam:   Assessment Type: During-treatment  General Appearance: Eyes open/responds to voice, Eyes open/responds to stimulus  Respiratory Pattern: Tachypneic, Assisted  Chest Assessment: Chest expansion symmetrical  Bilateral Breath Sounds: Diminished, Rhonchi      Resp Comments: Pt remains on full mechanical ventilation VC/AC at this time  Will continue to monitor pt  Plan: continue current support until further orders

## 2018-05-28 NOTE — PROGRESS NOTES
NEPHROLOGY HEMODIALYSIS NOTE  Laurie Simpson 68 y o  female MRN: [id number]  Unit/Bed#:  Encounter: [id number]        Patient seen and examined on Hemodialysis, tolerating procedure well  All documentation, labs, medications were reviewed by myself, and the treatment plan was reviewed with nurse and patient  ASSESSMENT and PLAN:  26-year-old female initially presented with 1 week of cough and fever   Patient was confused, was in rapid AFib and hypotensive and was intubated   Patient is diagnosed as having acute CVA superimposed on chronic CVA   She had possible GI bleed but colonoscopy was declined by family  Clara Alves has been running hypotensive during dialysis   Blood culture is now positive for gram-negative rods  CT abdomen showing fluid in endometrial cavity      End Stage Renal Disease on Hemodialysis  Patient seen and examined on HD  Single visit med on hemodialysis treatment at 10:10 a m  patient tolerating hemodialysis  Blood pressure improved  Patient receives hemodialysis Monday Wednesday Friday  HD prescription:   Time: 3 1/2 hours  Sodium: 135   Blood flow (ml/min): 400   Dialyzer: F160 Potassium: 3 0 Dialysate flow(ml/min): 800   Access: rt TDC  Bicarbonate: 35 Ultrafiltration goal (kg): 3 0   Medications on HD: epogen       Access  right internal jugular tunneled dialysis catheter  Next hemodialysis on Wednesday  Hyponatremia  -Sodium stable since yesterday  Patient is off  free water during tube feeding  During hemodialysis with 135 sodium bath  If serum sodium improves can increase the sodium bath on hemodialysis treatment      Hypotension  -continue midodrine at current dose  Blood pressure currently stable  CT abdomen pelvis showing fluid collection in the endometrial cavity and plan for ultrasound as recommended by Gyn  Gyn consult pending  Patient is currently off all antibiotic per ICU    Last blood cultures showed gram-negative rods      Anemia due to chronic kidney disease and possibly GI bleed  -continue Epogen during hemodialysis  hemoglobin stable since yesterday      Metabolic acidosis  -should improve with dialysis today  Lactic acid was within normal limits  Serum bicarb level was 21 today      Hyperphosphatemia  -due to IV sodium phosphate replacement  Would monitor, no need for binders      Hypermagnesemia, likely from IV magnesium replacement  Magnesium level trending down  Check magnesium level again tomorrow      Acute respiratory failure on vent, currently on FiO2 of 35%, management per Pulmonary Critical Care      DM-2- mx per primary team     Discussed with icu team and the hemodialysis nurse  SUBJECTIVE:  Patient seen during hemodialysis treatment  She appears more awake and alert today  Blood pressure has improved  Discussed with ICU who mentioned that she is off all antibiotics currently and plan is to get ID involved  OBJECTIVE:    Current Weight: Weight - Scale: 51 2 kg (112 lb 14 oz)    Physical Exam:    Vitals:    05/28/18 1030   BP: 94/56   Pulse: (!) 109   Resp:    Temp:    SpO2:      General:  Ill looking, intubated but more awake today  She has spontaneous eye opening  Head: normocephalic, atraumatic, intubated  Eyes: Conjunctivae pink,  Sclera anicteric  ENT: Lips and mucous membranes moist  Neck: supple   Chest: Clear to Auscultation both lungs,  no crackles or wheezing  CVS: S1 & S2 present, normal rate, regular rhythm, no murmur  Abdomen: soft, non-tender, non-distended, Bowel sounds normoactive, has PEG tube  Extremities: no edema of  legs  Neuro:  Awake, intubated  Responsive to pain  Skin: no rash, warm and dry  Psych: Intubated  Unable to assess            Laboratory Results:  Lab Results   Component Value Date    WBC 12 67 (H) 05/28/2018    HGB 8 0 (L) 05/28/2018    HCT 24 0 (L) 05/28/2018    MCV 99 (H) 05/28/2018     05/28/2018     Lab Results   Component Value Date    GLUCOSE 96 05/28/2018    CALCIUM 9 4 05/28/2018     (L) 05/28/2018    K 4 3 05/28/2018    CO2 21 05/28/2018    CL 88 (L) 05/28/2018    BUN 58 (H) 05/28/2018    CREATININE 4 41 (H) 05/28/2018

## 2018-05-29 NOTE — PROGRESS NOTES
Patient with mild hypotension early this morning and then with bradycardia requiring initiation of dopamine  Patient's daughter, Severa New, notified and at bedside-- discussed overall events  Patient appears to be in worsening septic shock with elevated lactate, elevated procalcitonin and increasing hemodynamic instability  All of this discussed with patient's daughter, who is a nurse and understands the situation  Increasing dopamine gtt while awaiting arrival of   However, once  arrived at bedside, Severa New called ICU team into room to "stop all of this" stating "she's been through enough, just let her rest"  Asking us to stop dopamine gtt and terminally extubate patient with focus on comfort care

## 2018-05-29 NOTE — RESPIRATORY THERAPY NOTE
Called to patient room for withdraw of care  Patient is vent day # 9 s/p being admitted for acute respiratory failure requiring intubation and mechanical ventilation to maintain adequate ventilation and oxygenation  Patient has very poor prognosis due to multiple co-borbidities  patient made comfort care and asked for withdraw on endotracheal tube by family  Patient electively extubation place on 6 lpm nasal  cannula  And slowly passed away without further distress

## 2018-05-29 NOTE — DISCHARGE SUMMARY
Discharge Summary - Sheryll Canavan 68 y o  female MRN: [id number]    Unit/Bed#:  Encounter: [id number] PCP: Chan Corado MD    Admission Date:   Admission Orders     Ordered        05/20/18 2233  Inpatient Admission  Once               Admitting Diagnosis: Acute pancreatitis [K85 90]  Cough [R05]  Acute blood loss anemia [D62]  Altered mental status [R41 82]  GI bleed [K92 2]  Fever [R50 9]  Rapid atrial fibrillation (HCC) [I48 91]  Abnormal LFTs [R94 5]    HPI: Patient is a 68year old female initially presenting on 5/20 with a complaint of one-week history of cough, fever, and melena/blood-streaked stools  She has a past medical history of prior stroke with residual deficits including right-sided weakness, dysphagia requiring a gastrostomy tube, and global aphasia  In addition, she had end-stage renal disease on dialysis, hypertension, hyperlipidemia, anemia of chronic disease, and multiple myeloma  In the emergency room she was found to have significant anemia, was in rapid atrial fibrillation, and in mild respiratory distress  She was intubated in the emergency room  Her imaging demonstrated a new subacute left internal capsule posterior limb lacunar infarct and an elevated lipase without radiographic findings of pancreatitis  She was referred to the critical care unit for admission  Procedures Performed:   Orders Placed This Encounter   Procedures    ED ECG Documentation Only    ED ECG Documentation Only    Intubation       Summary of Hospital Course: She was started on broad spectrum antibiotics for sepsis presumed to be from pneumonia and gently hydrated  She underwent an esophagogastroduodenoscopy due to the concern for an upper gastrointestinal bleed which demonstrated mild nonerosive gastritis  A colonoscopy was declined by the family  She was restarted on aspirin given her stroke history and neurology was asked to see the patient   She was placed on the stroke pathway and a MRI re-demonstrated the CT scan findings  Plavix was added to her regimen given the reoccurrence of stroke  She spontaneously converted to sinus rhythm following gentle fluid hydration  The patient continued to intermittently become febrile and the broad spectrum antibiotics were continued  Culture results from admission demonstrated coagulase negative staph in her sputum and one blood culture and gram negative rods pending speciation at the time of her death  Repeat blood cultures did not grow any organisms  Her procalcitonin remained elevated and antibiotics continued for 8 days  She continued on dialysis per the nephrology service  Due to her persistent hyperthermia, she underwent a repeat CT scan which demonstrated a large, fluid-filled endometrium  Obstetrics/gynecology was contacted who recommended obtaining an ultrasound of the pelvis which re-demonstrated the CT scan findings  Infectious disease was contacted regarding the complicated work-up who advised re-culturing the patient and observing off antibiotics  Formal consults from these services were pending at the time of the patient's death  Overnight on  the patient became mildly hypotensive and received volume and additional midodrine  In the morning on , she became hypotensive again with an elevated lactic acid of 11 6  Fluid resuscitation was resumed  The patient became progressively bradycardic and dopamine was initiated  The family at bedside indicated that they would not want aggressive resuscitative measures  A  contacted by the family came to bedside  Her endotracheal tube and dopamine were discontinued  At [phone], she was noted to be asystolic on monitor  Physical examination demonstrated no heart or lung sounds  She was pronounced  at 200  Family and clergy were at bedside      Significant Findings, Care, Treatment and Services Provided:    Chest xray- Mild left basilar atelectasis and small left pleural effusion   Pelvic ultrasound- Diffuse abnormal appearance of the endometrium, findings similar to recent CT scan   CT Chest/Abdomen/Pelvis- Large amount of fluid in the endometrial cavity, trace pericardial and left pleural effusions as well as anasarca   Abdominal xray- Nonobstructed bowel gas pattern   MRI Brain- Severe chronic microvascular ischemic disease, small acute to subacute inferior  Left internal capsule and adjacent cerebral peduncle lacunar infarction, age-related atrophy, possible communication hydrocephalus, left maxiallary sinus disease with air-fluid level, bilateral mastoid effusions   Right upper quadrant ultasound- small amount of layering sludge and nonshadowing gallbladder calculi, no sonographic evidence for acute cholecystitis, hepatomegaly   Endotracheal tube   CT C/A/P- bibasilar atelectasis with small left effusion, evaluation of the abdomen limited secondary to motion, no bowel obstruction or hydronephrosis present, uterus appears enlarged for the patient's age, compression fraction of L2 age-indeterminate, lucent defect in the right iliac bone, diffuse body wall edema   CT Head- Severe microangiopathic changes, subacute/evolving left internal capsule posterior limb lacunar infarction, significant cerebral volume loss with ventriculomegaly, no intracranial hemorrhage or mass effect      Complications: None    Disposition:      Final Diagnosis:   1  Septic shock  2  Acute hypoxic respiratory failure  3  Acute on chronic lacunar infarct  4  End stage renal disease on hemodialysis  5  Anemia of chronic disease  6  Paroxysmal atrial fibrillation  7   Type 2 diabetes    Resolved Problems  Date Reviewed: 2018    None          Condition at Time of Death: Daughter present with clergy

## 2018-05-29 NOTE — PROGRESS NOTES
Progress Note - Critical Care   Meghan Dumont 68 y o  female MRN: [id number]  Unit/Bed#:  Encounter: [id number]    Assessment:   Principal Problem:    Acute respiratory failure ([place] [address] )  Active Problems:    ESRD (end stage renal disease) on dialysis ([place] [address] )    Anemia of chronic kidney failure, stage 5 (HCC)    Multiple myeloma not having achieved remission ([place] [address] )    Essential hypertension    Diabetes mellitus type 2 in nonobese ([place] [address] )    Hemiparesis due to old stroke ([place] [address] )    Functional quadriplegia (HCC)    Anemia    Inadequate caloric intake    Sepsis (HCC)    Rapid atrial fibrillation ([facility])    Decubitus ulcer of sacral region, stage 2    Acute blood loss anemia    GIB (gastrointestinal bleeding)    GI bleed    Gastrointestinal hemorrhage with melena    Elevated procalcitonin    Altered mental status  Resolved Problems:    * No resolved hospital problems   *    Plan:       Neuro:   -continue to monitor neuro status  -holding sedation for accurate neurologic exams  -[facility] ICU   -sleep hygiene               CV:   -continue ASA/plavix and statin   -midodrine 15 mg TID to support BP during HD treatments               Lung:   -continue mechanical ventilation  -she has failed daily SBTs secondary to hypoventilation and tachypnea  -consultation for general surgery for tracheostomy               GI:   -continue TF at goal   -protonix for GI prophylaxis               FEN:   -monitor electrolytes and replete as necessary   -nephrology following for HD treatment               :   -patient is anuric   -monitor daily weights  -OBGYN consultation for US of fluid filled uterus showing diffuse abnormal appearance of the endometrium               ID:   -monitor off abx  -monitor temperature curve and WBCs  -ID consultation pending, appreciate recommendations               Heme:   -continue heparin for DVT prophylaxis               Endo:   -continue to monitor glucose               Msk/Skin:   -frequent turning and reposition   -wound care               Disposition:   -continue ICU care_____________________________________________________________________    HPI/24hr events: mild hypotension outside of patient's baseline, responsive to 25 % albumin     ______________________________________________________________________    Physical Exam:   PHYSICAL EXAM  General :   Chronically ill appearing, intubated  Neuro:   GCS= 10T   CN 2-12 intact  Non Focal  HEENT:  Normocephalic, atraumatic, Pupils 3 brisk bilat  PERRLA, EOMI, hearing grossly intact  Tongue midline without fasiculations  Neck:  No JVD, FROM, No masses/adenopathy  Back:   Symmetrical, atruamatic, spinous process pain free on palpation  Cardiovascular:   No heaves,lifts,thrills  S1/S2 Maria Fernanda@[email or web address] No noted MRGC  Pulmonary:   Symmetrical expansion of chest  Resp even & unlabored  GI :   Abd SNT + BSX4 quads  No palp/pulsitile masses  :  N/A  Musculoskeletal:  Symmetrical  Contracture of right hand  FROM in UE & LE  Muscle strength 2/5  No lymphadenopathy  Extrem warm to touch  DTR grossly intact  Brachial/radial/femoral/popliteal/pedal/posterior tibial pulses +2  No lesions noted  CN 2-12 grossly intact  No rhomberg  Sensation and motor function intact   ______________________________________________________________________  Vitals:    18 2300 18 2313 18 0000 18 0100   BP: (!) 81/44  (!) 88/48 (!) 92/48   BP Location:       Pulse: 91  89 90   Resp: (!) 26  22 21   Temp: 97 5 °F (36 4 °C)  97 9 °F (36 6 °C) 98 2 °F (36 8 °C)   TempSrc:       SpO2: 100% 100% 100% 99%   Weight:       Height:         Temperature:   Temp (24hrs), Av 2 °F (37 3 °C), Min:97 5 °F (36 4 °C), Max:100 9 °F (38 3 °C)    Current Temperature: 98 2 °F (36 8 °C)  Weights:   IBW: 43 2 kg    Body mass index is 22 8 kg/m²    Weight (last 2 days)     None        Hemodynamic Monitoring:  N/A     Non-Invasive/Invasive Ventilation Settings:  Respiratory    Lab Data (Last 4 hours)    None O2/Vent Data (Last 4 hours)      05/28 2313          Drager Vent Mode AC/VC+       Resp Rate (BPM) (BPM) 12       VT (mL) (mL) 300       Insp Time (S) (S) 0 8       FIO2 (%) (%) 35       PEEP (cmH2O) (cmH2O) 5       Rise Time (%) (%) 0 2       MV (Obs) 4 54                 No results found for: PHART, SXD6ZTV, PO2ART, IAP2FHI, R9GFVCKR, BEART, SOURCE  SpO2: SpO2: 99 %  Intake and Outputs:  I/O       05/27 0701 - 05/28 0700 05/28 0701 - 05/29 0700    I V  (mL/kg)  500 (9 8)    NG/GT 0 160    Feedings 884 720    Total Intake(mL/kg) 884 (17 3) 1380 (27)    Other  2785    Total Output   2785    Net +884 -1405          Unmeasured Stool Occurrence 1 x         Nutrition:        Diet Orders            Start     Ordered    05/27/18 1242  Diet Enteral/Parenteral; Tube Feeding No Oral Diet; Nepro; 45  Diet effective 0500     Question Answer Comment   Diet Type Enteral/Parenteral    Enteral/Parenteral Tube Feeding No Oral Diet    Tube Feeding Formula: Nepro    Tube Feeding Continuous rate (mL/hr): 45    RD to adjust diet per protocol? Yes        05/27/18 1241        Labs:     Results from last 7 days  Lab Units 05/28/18  0429 05/27/18  0655 05/26/18  0437  05/24/18  0552   WBC Thousand/uL 12 67* 11 16* 12 24*  < > 10 77*   HEMOGLOBIN g/dL 8 0* 8 0* 9 5*  < > 8 1*   HEMATOCRIT % 24 0* 25 1* 30 7*  < > 25 2*   PLATELETS Thousands/uL 258 171 169  < > 156   MONO PCT MAN %  --   --  4  --  2*   < > = values in this interval not displayed     Results from last 7 days  Lab Units 05/28/18  0619 05/27/18  0609 05/26/18  0437  05/23/18  0459 05/22/18  0435   SODIUM mmol/L 126* 126* 132*  < > 133* 139   POTASSIUM mmol/L 4 3 4 5 4 1  < > 4 0 3 8   CHLORIDE mmol/L 88* 90* 94*  < > 97* 99*   CO2 mmol/L 21 19* 26  < > 18* 27   BUN mg/dL 58* 42* 29*  < > 41* 27*   CREATININE mg/dL 4 41* 3 45* 2 55*  < > 4 34* 3 13*   CALCIUM mg/dL 9 4 9 5 10 4*  < > 10 2* 9 5   TOTAL PROTEIN g/dL  --   --   --   --  7 4 6 2*   BILIRUBIN TOTAL mg/dL  --   -- --   --  6 40* 4 90*   ALK PHOS U/L  --   --   --   --  943* 833*   ALT U/L  --   --   --   --  147* 198*   AST U/L  --   --   --   --  115* 150*   GLUCOSE RANDOM mg/dL 96 109 162*  < > 148* 93   < > = values in this interval not displayed  Results from last 7 days  Lab Units 05/27/18  0609 05/26/18  0437 05/24/18  0552   MAGNESIUM mg/dL 2 8* 3 1* 2 4       Results from last 7 days  Lab Units 05/28/18  0619 05/27/18  0609 05/26/18  0437   PHOSPHORUS mg/dL 6 9* 6 4* 2 1*        Results from last 7 days  Lab Units 05/28/18  1205 05/25/18  2309   INR  1 13 1 19*   PTT seconds 62*  --        Results from last 7 days  Lab Units 05/28/18  0430   LACTIC ACID mmol/L 0 6       0  Lab Value Date/Time   TROPONINI 0 13 (H) 05/20/2018 2021   TROPONINI 0 22 (H) 03/15/2018 0939   TROPONINI 0 27 (H) 03/15/2018 0549   TROPONINI 0 29 (H) 03/14/2018 1523   TROPONINI 0 06 (H) 03/14/2018 0938   TROPONINI 0 04 09/19/2017 1614     Imaging:   No new imaging today   EKG: NSR   Micro:  Lab Results   Component Value Date    BLOODCX No Growth After 5 Days  05/23/2018    BLOODCX No Growth After 5 Days   05/23/2018    BLOODCX Staphylococcus coagulase negative (A) 05/20/2018    URINECX No Growth <1000 cfu/mL 11/09/2017    SPUTUMCULTUR 1 colony Staphylococcus coagulase negative (A) 05/23/2018     Allergies: No Known Allergies  Medications:   Scheduled Meds:  Current Facility-Administered Medications:  acetaminophen 650 mg Oral Q6H PRN Chandni Piedra PA-C   artificial tear  Both Eyes HS BENNY Ponce   aspirin 81 mg Oral Daily BENNY Perez   atorvastatin 20 mg Oral Daily With [employer], DO   chlorhexidine 15 mL Swish & Spit Q12H [address] BENNY Marroquin   clopidogrel 75 mg Oral Daily BENNY Perez   epoetin babak 10,000 Units Intravenous Once per day on Mon Wed Fri Jesusita Hoyos MD   fentanyl citrate (PF) 50 mcg Intravenous Q2H PRN BENNY Ponce   heparin (porcine) 5,000 Units Subcutaneous Q8H [facility] BENNY Tariq   ibuprofen 400 mg Oral Q6H PRN BENNY Tariq   ipratropium 0 5 mg Nebulization TID Bonnielee Desanctis, DO   levalbuterol 1 25 mg Nebulization TID Bonnielee Desanctis, DO   metoprolol 5 mg Intravenous Q6H PRN BENNY Lawrence   midodrine 15 mg Oral TID AC BENNY Lawrence   pantoprazole 40 mg Intravenous Q24H [facility] BENNY Robin   polyvinyl alcohol 2 drop Both Eyes Q6H BENNY Robin     Continuous Infusions:   PRN Meds:    acetaminophen 650 mg Q6H PRN   fentanyl citrate (PF) 50 mcg Q2H PRN   ibuprofen 400 mg Q6H PRN   metoprolol 5 mg Q6H PRN     VTE Pharmacologic Prophylaxis: Sequential compression device (Venodyne)  and Heparin  VTE Mechanical Prophylaxis: sequential compression device  Invasive lines and devices: Invasive Devices     Peripheral Intravenous Line            Peripheral IV 05/25/18 Right Forearm 3 days    Peripheral IV 05/28/18 Left Antecubital less than 1 day          Line            HD Cath Double -- days          Drain            Gastrostomy/Enterostomy Percutaneous endoscopic gastrostomy (PEG) LUQ -- days          Airway            ETT  Hi-Lo; Cuffed 7 5 mm 8 days              Counseling / Coordination of Care  Total Critical Care time spent 32 minutes excluding procedures, teaching and family updates  Code Status: Level 1 - Full Code    Portions of the record may have been created with voice recognition software  Occasional wrong word or "sound a like" substitutions may have occurred due to the inherent limitations of voice recognition software  Read the chart carefully and recognize, using context, where substitutions have occurred      BENNY Lawrence

## 2018-05-30 LAB
BACTERIA SPT RESP CULT: NORMAL
GRAM STN SPEC: NORMAL

## 2018-05-31 LAB
BACTERIA BLD CULT: ABNORMAL
GRAM STN SPEC: ABNORMAL

## 2018-06-02 LAB
BACTERIA BLD CULT: NORMAL
BACTERIA BLD CULT: NORMAL

## 2021-04-15 NOTE — ED PROVIDER NOTES
History  Chief Complaint   Patient presents with    Hip Injury     Pt c/o increased L hip pain  Pt was in [facility] for AV fistula placement and patient c/o increased pain with movement  Pt had L hip XR and showed hip fx     Patient is 57-year-old female with a history of 2 previous CVAs significant right-sided hemiplegia, dysarthria/aphasia, end-stage renal disease on hemodialysis MWF, multiple myeloma, presenting for evaluation of broken hip  Patient was recently discharged home in care of family from nursing facility on Wednesday, she was supposed to have AV fistula placed earlier today, family noted that her left hip appeared swollen common x-rays performed found to have left intertrochanteric fracture since the emergency department for evaluation  Unclear mechanism, unknown time of injury, x-ray consistent with acute fracture, no reported trauma, patient is transported to dialysis Monday Wednesday Friday, was in rehab/nursing facility recently, otherwise family cares for patient 24 hours a day, she has not ambulated for most ear at this point  Additional history unavailable from patient, family expresses no other concerns and otherwise denies a complete review systems as noted            Prior to Admission Medications   Prescriptions Last Dose Informant Patient Reported? Taking?    Cholecalciferol 24970 units TABS   Yes No   Sig: Take 50,000 Units by mouth Every Monday and Friday in AM   acetaminophen (TYLENOL) 325 mg tablet   Yes No   Sig: Take 650 mg by mouth every 4 (four) hours as needed for mild pain   amLODIPine (NORVASC) 10 mg tablet   Yes No   Sig: Take 10 mg by mouth daily   aspirin 81 MG tablet   Yes No   Sig: Take 81 mg by mouth daily     atorvastatin (LIPITOR) 20 mg tablet   Yes Yes   Sig: Take 20 mg by mouth daily   b complex-C-folic acid (NEPHRO-TRACY) 0 8 mg tablet   Yes No   Sig: Take 0 8 mg by mouth daily with dinner   benzonatate (TESSALON PERLES) 100 mg capsule   Yes No   Sig: Take 100 mg by NOTIFICATION RETURN TO WORK / SCHOOL 
 
4/15/2021 4:16 PM 
 
Mr. Rosy Reyes [address] [facility] [address] To Whom It May Concern: 
 
Rosy Reyes is currently under the care of [facility]. He will return to work/school on:  4/19/21. Please excuse him from work duty at this time. If there are questions or concerns please have the patient contact our office. Sincerely, Jean Carlos Fuentes MD 
 
                                
 
 mouth every 4 (four) hours as needed for cough   hydrALAZINE (APRESOLINE) 50 mg tablet   Yes No   Sig: Take 25 mg by mouth every 12 (twelve) hours     ipratropium-albuterol (DUONEB) 0 5-2 5 mg/mL   Yes No   Sig: Take 3 mL by nebulization 4 (four) times a day   losartan (COZAAR) 100 MG tablet   Yes No   Sig: Take 100 mg by mouth daily   pantoprazole (PROTONIX) 20 mg tablet   Yes No   Sig: Take 20 mg by mouth daily      Facility-Administered Medications: None       Past Medical History:   Diagnosis Date    Anemia     Arthritis     Chronic kidney disease     Dialysis patient ([facility] )     monday, wednesday, friday    Hypertension     Multiple myeloma ([facility] )     Stroke [facility])        Past Surgical History:   Procedure Laterality Date     SECTION      JOINT REPLACEMENT Right     hip       Family History   Problem Relation Age of Onset    Hypertension Mother      I have reviewed and agree with the history as documented  Social History   Substance Use Topics    Smoking status: Never Smoker    Smokeless tobacco: Never Used    Alcohol use No        Review of Systems   Unable to perform ROS: Age   Constitutional: Negative for fever  Respiratory: Negative for cough and shortness of breath  Cardiovascular: Negative for chest pain  Gastrointestinal: Negative for abdominal pain and vomiting  Musculoskeletal:        Left thigh and hip swelling   Skin: Negative for rash and wound  Neurological: Negative for headaches  All other systems reviewed and are negative        Physical Exam  ED Triage Vitals [18 1323]   Temperature Pulse Respirations Blood Pressure SpO2   97 7 °F (36 5 °C) 97 20 158/67 97 %      Temp Source Heart Rate Source Patient Position - Orthostatic VS BP Location FiO2 (%)   Axillary Monitor Lying Right arm --      Pain Score       Worst Possible Pain           Orthostatic Vital Signs  Vitals:    18 1400 18 1430 18 1500 18 1900   BP: 114/52 122/56 (!) 98/48 126/63   Pulse: (!) 107 104 (!) 107 (!) 109   Patient Position - Orthostatic VS:   Lying Lying       Physical Exam   Constitutional: She appears well-developed and well-nourished  Patient legs supine in bed in no acute distress, appears comfortable at rest   HENT:   Head: Normocephalic and atraumatic  Eyes: EOM are normal  Pupils are equal, round, and reactive to light  Neck: Normal range of motion  Neck supple  No tracheal deviation present  Cardiovascular: Normal rate, regular rhythm and normal heart sounds  Exam reveals no gallop and no friction rub  No murmur heard  Pulmonary/Chest: Effort normal and breath sounds normal  She has no wheezes  She has no rales  Right subclavian port clean dry and   Abdominal: Soft  Bowel sounds are normal  She exhibits no distension  There is no tenderness  There is no rebound and no guarding  PEG tube insertion site is clean dry and intact   Musculoskeletal:   Patient's right side is atrophied her left lower extremity is shortened externally rotated she does have 2+ dorsalis pedis and posterior tibial pulses the foot is warm cap refills less than 2 seconds unable to withdraw able plantar flex and dorsiflex no tenderness over the foot or ankle leg knee or remainder of the femur left thigh is mildly swollen although skin is intact circumferentially she has significant pain with internal and external rotation right legs after feed, otherwise unremarkable musculoskeletal   Neurological: She is alert  Awake alert and comfortable she has which communicate with family cranial nerves are grossly intact, she is hemiparetic on the right left upper extremity has 5/5 motor her left lower extremity shortened externally rotated able to wiggle her toes and she has 2+ pulses left foot warm without ulcers or wounds   Skin: Skin is warm and dry  No rash noted  Psychiatric: She has a normal mood and affect  Her behavior is normal    Nursing note and vitals reviewed        ED Medications  Medications   acetaminophen (TYLENOL) tablet 650 mg (not administered)   amLODIPine (NORVASC) tablet 10 mg (0 mg Oral Hold 3/1/18 0924)   aspirin tablet 325 mg (325 mg Oral Given 3/1/18 0947)   b complex-vitamin C-folic acid (NEPHROCAPS) capsule 1 capsule (1 capsule Oral Not Given 3/1/18 1607)   hydrALAZINE (APRESOLINE) tablet 25 mg (25 mg Oral Given 3/1/18 1943)   losartan (COZAAR) tablet 100 mg (0 mg Oral Hold 3/1/18 0925)   pantoprazole (PROTONIX) EC tablet 20 mg (20 mg Oral Not Given 3/1/18 1608)   ondansetron (ZOFRAN) injection 4 mg (not administered)   insulin lispro (HumaLOG) 100 units/mL subcutaneous injection 1-6 Units (1 Units Subcutaneous Not Given 3/1/18 1703)   insulin lispro (HumaLOG) 100 units/mL subcutaneous injection 1-6 Units (1 Units Subcutaneous Not Given 3/1/18 0000)   HYDROmorphone (DILAUDID) injection 0 2 mg (0 2 mg Intravenous Given 3/1/18 1920)   oxyCODONE (ROXICODONE) oral solution 2 5 mg (not administered)   ipratropium-albuterol (DUO-NEB) 0 5-2 5 mg/3 mL inhalation solution 3 mL (not administered)   epoetin babak (EPOGEN,PROCRIT) injection 6,000 Units (not administered)   fentanyl citrate (PF) 100 MCG/2ML 25 mcg (25 mcg Intravenous Given 2/28/18 1359)   oxyCODONE (ROXICODONE) oral solution 2 5 mg (2 5 mg Oral Given 2/28/18 1532)       Diagnostic Studies  Results Reviewed     Procedure Component Value Units Date/Time    Comprehensive metabolic panel [[id number]]  (Abnormal) Collected:  02/28/18 1748    Lab Status:  Final result Specimen:  Blood from Arm, Right Updated:  02/28/18 1818     Sodium 135 (L) mmol/L      Potassium 3 2 (L) mmol/L      Chloride 96 (L) mmol/L      CO2 25 mmol/L      Anion Gap 14 (H) mmol/L      BUN 71 (H) mg/dL      Creatinine 5 32 (H) mg/dL      Glucose 157 (H) mg/dL      Calcium 10 1 mg/dL      AST 22 U/L      ALT 34 U/L      Alkaline Phosphatase 194 (H) U/L      Total Protein 6 8 g/dL      Albumin 2 9 (L) g/dL      Total Bilirubin 0 50 mg/dL      eGFR 8 ml/min/1 73sq m     Narrative:         National Kidney Disease Education Program recommendations are as follows:  GFR calculation is accurate only with a steady state creatinine  Chronic Kidney disease less than 60 ml/min/1 73 sq  meters  Kidney failure less than 15 ml/min/1 73 sq  meters  Clifton Zaidi [[id number]]  (Normal) Collected:  02/28/18 1748    Lab Status:  Final result Specimen:  Blood from Arm, Right Updated:  02/28/18 1810     Protime 13 1 seconds      INR 0 98    APTT [[id number]]  (Normal) Collected:  02/28/18 1748    Lab Status:  Final result Specimen:  Blood from Arm, Right Updated:  02/28/18 1810     PTT 29 seconds     Narrative:          Therapeutic Heparin Range = 60-90 seconds    CBC and differential [[id number]]  (Abnormal) Collected:  02/28/18 1748    Lab Status:  Final result Specimen:  Blood from Arm, Right Updated:  02/28/18 1754     WBC 11 18 (H) Thousand/uL      RBC 2 51 (L) Million/uL      Hemoglobin 8 7 (L) g/dL      Hematocrit 26 8 (L) %       (H) fL      MCH 34 7 (H) pg      MCHC 32 5 g/dL      RDW 21 1 (H) %      MPV 12 3 fL      Platelets 730 Thousands/uL      nRBC 0 /100 WBCs      Neutrophils Relative 77 (H) %      Lymphocytes Relative 15 %      Monocytes Relative 6 %      Eosinophils Relative 1 %      Basophils Relative 0 %      Neutrophils Absolute 8 62 (H) Thousands/µL      Lymphocytes Absolute 1 62 Thousands/µL      Monocytes Absolute 0 72 Thousand/µL      Eosinophils Absolute 0 08 Thousand/µL      Basophils Absolute 0 02 Thousands/µL                  VAS lower limb venous duplex study, unilateral/limited   ED Interpretation by Sen Jernigan DO (02/28 1539)   Read as negative      Final Result by Miguel Angel Ramirez DO (02/28 1750)      XR femur 2 views LEFT   ED Interpretation by Sen Jernigan DO (02/28 1509)   Intertrochanteric fracture again noted no other acute abnormality      Final Result by Kylah Hawk MD (02/28 1621)      Intertrochanteric versus of subtrochanteric left femoral fracture  Workstation performed: [id number]                    Procedures  Procedures       Phone Contacts  ED Phone Contact    ED Course  ED Course as of Mar 01 1955   Wed Feb 28, 2018   1421 Had discussion with patient's family she does not want surgery on her hip concern for blood clot she had severe life-threatening bleeding apparently when given subcu heparin prophylactically in December, concern for DVT in her thigh all the distal lower extremities neurovascularly intact she is high risk will discuss with Orthopedics regarding recommendations for long-term anticoagulation, follow-up, will perform ultrasound, discussed possibility of filter    1455 Long discussion with family, she does not want surgery even if it is for pain control for her hip fracture, patient is to get vascular fistula for for maintenance dialysis, goals of care to make patient comfortable she does not want surgical intervention, discussed with Orthopedics regarding recommendations for anticoagulation, this is an acute fracture in by history it appears consistent with acute fracture, recommended prophylactic anticoagulation versus SCDs, discussed with family, patient had life-threatening bleeding requiring massive transfusions from subcu heparin and December, will not anticoagulate the will offer SCDs, ultrasound were performed to see if she has thrombus currently if this is the case will evaluate for filter that,  she is immobile and has not walked for most a year if negative will discharge with SCDs, prescription for pain control, additional home health/nursing help as family does provide full 24 hour care for the patient    1509 Vascular resulted as negative, will not scan head patient does not want surgery, again dialysis is maintenance, with no escalation of care at this time, will write prescription for SCDs, home health care, return instructions           Patient's family expressed concern about comfort, she is bed-bound but requires transportation to dialysis 3 times a week, significant discomfort with even with minimal motion, family readdressed with patient and she is agreeable with surgery for comfort, will attempt to contact Ortho again and see if they are amenable to repairing the patient's hip for comfort, disposition pending                            MDM  Number of Diagnoses or Management Options  Closed intertrochanteric fracture of hip, left, initial encounter [facility]):   Diagnosis management comments: 57-year-old female bed bound history of previous CVA right hemiparesis, aphasia, PEG tube depended end-stage renal disease on hemodialysis multiple myeloma for evaluation of possible fracture, unclear mechanism, will discuss with family regarding goals of care and further management, disposition pending conversation with patient family    CritCare Time    Disposition  Final diagnoses:   Closed intertrochanteric fracture of hip, left, initial encounter [facility])     Time reflects when diagnosis was documented in both MDM as applicable and the Disposition within this note     Time User Action Codes Description Comment    2/28/2018  6:29 PM Renea Schulz Add [N18 6,  Z99 2] ESRD (end stage renal disease) on dialysis ([facility] )     2/28/2018  6:29 PM Francisco, [address] [N18 6,  Z99 2] ESRD (end stage renal disease) [facility] ([facility] )     2/28/2018  6:29 PM Francisco, [address] [N18 6,  Z99 2] ESRD (end stage renal disease) on dialysis ([facility] )     2/28/2018  6:32 PM Renea Schulz Add [K55 [id number]] Closed intertrochanteric fracture of left femur ([facility] )     2/28/2018  6:32 PM Haven Mean [W72 [id number]] Closed intertrochanteric fracture of left femur ([facility] )     2/28/2018  6:34 PM Elza Francisco Add [S72 142A] Closed intertrochanteric fracture of hip, left, initial encounter [facility])       ED Disposition     ED Disposition Condition Comment    Admit  Case was discussed with Lane Espinosa and the patient's admission status was agreed to be Admission Status: inpatient status to the service of Dr Ann Marie Jiang          Follow-up Information    None       Current Discharge Medication List      CONTINUE these medications which have NOT CHANGED    Details   atorvastatin (LIPITOR) 20 mg tablet Take 20 mg by mouth daily      acetaminophen (TYLENOL) 325 mg tablet Take 650 mg by mouth every 4 (four) hours as needed for mild pain      amLODIPine (NORVASC) 10 mg tablet Take 10 mg by mouth daily      aspirin 81 MG tablet Take 81 mg by mouth daily        b complex-C-folic acid (NEPHRO-TRACY) 0 8 mg tablet Take 0 8 mg by mouth daily with dinner      benzonatate (TESSALON PERLES) 100 mg capsule Take 100 mg by mouth every 4 (four) hours as needed for cough      Cholecalciferol 81208 units TABS Take 50,000 Units by mouth Every Monday and Friday in AM      hydrALAZINE (APRESOLINE) 50 mg tablet Take 25 mg by mouth every 12 (twelve) hours        ipratropium-albuterol (DUONEB) 0 5-2 5 mg/mL Take 3 mL by nebulization 4 (four) times a day      losartan (COZAAR) 100 MG tablet Take 100 mg by mouth daily      pantoprazole (PROTONIX) 20 mg tablet Take 20 mg by mouth daily           No discharge procedures on file      ED Provider  Electronically Signed by           Brando Kim DO  03/01/18 1956

## (undated) DEVICE — REM POLYHESIVE ADULT PATIENT RETURN ELECTRODE: Brand: VALLEYLAB

## (undated) DEVICE — SUT VICRYL 0 CT-1 27 IN J260H

## (undated) DEVICE — ADHESIVE SKN CLSR HISTOACRYL FLEX 0.5ML LF

## (undated) DEVICE — LIGHT HANDLE COVER SLEEVE DISP BLUE STELLAR

## (undated) DEVICE — INTENDED FOR TISSUE SEPARATION, AND OTHER PROCEDURES THAT REQUIRE A SHARP SURGICAL BLADE TO PUNCTURE OR CUT.: Brand: BARD-PARKER SAFETY BLADES SIZE 15, STERILE

## (undated) DEVICE — DRESSING MEPILEX AG BORDER 4 X 4 IN

## (undated) DEVICE — VESSEL LOOPS X-RAY DETECTABLE: Brand: DEROYAL

## (undated) DEVICE — STERILE A V FISTULA PACK: Brand: CARDINAL HEALTH

## (undated) DEVICE — CHLORAPREP HI-LITE 26ML ORANGE

## (undated) DEVICE — SUT SILK 2-0 18 IN A185H

## (undated) DEVICE — KERLIX BANDAGE ROLL: Brand: KERLIX

## (undated) DEVICE — 4.0MM THREE-FLUTED DRILL BIT QC/195MM

## (undated) DEVICE — SUT SILK 4-0 18 IN A183H

## (undated) DEVICE — GLOVE SRG BIOGEL 9

## (undated) DEVICE — MAYO STAND COVER: Brand: CONVERTORS

## (undated) DEVICE — SUT MONOCRYL 3-0 SH 27 IN Y416H

## (undated) DEVICE — STERILE ORIF HIP PACK: Brand: CARDINAL HEALTH

## (undated) DEVICE — LIGACLIP MCA MULTIPLE CLIP APPLIERS, 20 SMALL CLIPS: Brand: LIGACLIP

## (undated) DEVICE — INTENDED FOR TISSUE SEPARATION, AND OTHER PROCEDURES THAT REQUIRE A SHARP SURGICAL BLADE TO PUNCTURE OR CUT.: Brand: BARD-PARKER SAFETY BLADES SIZE 10, STERILE

## (undated) DEVICE — SUT MONOCRYL 4-0 PS-2 18 IN Y496G

## (undated) DEVICE — GLOVE SRG BIOGEL 7.5

## (undated) DEVICE — INTENDED FOR TISSUE SEPARATION, AND OTHER PROCEDURES THAT REQUIRE A SHARP SURGICAL BLADE TO PUNCTURE OR CUT.: Brand: BARD-PARKER ® CARBON RIB-BACK BLADES

## (undated) DEVICE — 2.5MM REAMING ROD WITH BALL TIP/950MM-STERILE

## (undated) DEVICE — TRAY FOLEY 16FR URIMETER SURESTEP

## (undated) DEVICE — 3.2MM GUIDE WIRE 400MM

## (undated) DEVICE — SUT VICRYL 2-0 CT-1 27 IN J259H

## (undated) DEVICE — DRESSING MEPILEX AG BORDER 4 X 8 IN

## (undated) DEVICE — ASTOUND SURGICAL GOWN, XXX LARGE, X-LONG: Brand: CONVERTORS

## (undated) DEVICE — 6617 IOBAN II PATIENT ISOLATION DRAPE 5/BX,4BX/CS: Brand: STERI-DRAPE™ IOBAN™ 2

## (undated) DEVICE — SUT MONOCRYL 4-0 PS-2 27 IN Y426H

## (undated) DEVICE — 1/2 FORCE SURGICAL SPRING CLIP: Brand: STEALTH® SPRING CLIP

## (undated) DEVICE — GLOVE INDICATOR PI UNDERGLOVE SZ 9 BLUE